# Patient Record
Sex: MALE | Race: WHITE | Employment: OTHER
[De-identification: names, ages, dates, MRNs, and addresses within clinical notes are randomized per-mention and may not be internally consistent; named-entity substitution may affect disease eponyms.]

---

## 2017-01-17 ENCOUNTER — TELEPHONE (OUTPATIENT)
Dept: FAMILY MEDICINE CLINIC | Facility: CLINIC | Age: 82
End: 2017-01-17

## 2017-01-17 RX ORDER — ROSUVASTATIN CALCIUM 10 MG/1
TABLET, COATED ORAL
Qty: 90 TABLET | Refills: 1 | Status: SHIPPED | OUTPATIENT
Start: 2017-01-17 | End: 2017-06-12 | Stop reason: SDUPTHER

## 2017-01-30 ENCOUNTER — NURSE ONLY (OUTPATIENT)
Dept: CARDIOLOGY | Facility: CLINIC | Age: 82
End: 2017-01-30

## 2017-01-30 DIAGNOSIS — Z95.0 PACEMAKER: ICD-10-CM

## 2017-01-30 PROCEDURE — 93288 INTERROG EVL PM/LDLS PM IP: CPT | Performed by: INTERNAL MEDICINE

## 2017-02-03 ENCOUNTER — TELEPHONE (OUTPATIENT)
Dept: FAMILY MEDICINE CLINIC | Facility: CLINIC | Age: 82
End: 2017-02-03

## 2017-02-09 RX ORDER — CLOPIDOGREL BISULFATE 75 MG/1
TABLET ORAL
Qty: 30 TABLET | Refills: 11 | Status: SHIPPED | OUTPATIENT
Start: 2017-02-09 | End: 2018-02-13 | Stop reason: SDUPTHER

## 2017-03-13 RX ORDER — LEVOTHYROXINE SODIUM 175 UG/1
175 TABLET ORAL DAILY
Qty: 90 TABLET | Refills: 1 | Status: SHIPPED | OUTPATIENT
Start: 2017-03-13 | End: 2018-02-06 | Stop reason: SDUPTHER

## 2017-05-05 ENCOUNTER — OFFICE VISIT (OUTPATIENT)
Dept: FAMILY MEDICINE CLINIC | Age: 82
End: 2017-05-05
Payer: MEDICARE

## 2017-05-05 VITALS
SYSTOLIC BLOOD PRESSURE: 124 MMHG | DIASTOLIC BLOOD PRESSURE: 70 MMHG | WEIGHT: 225 LBS | OXYGEN SATURATION: 97 % | BODY MASS INDEX: 35.24 KG/M2 | HEART RATE: 77 BPM

## 2017-05-05 DIAGNOSIS — M54.50 ACUTE RIGHT-SIDED LOW BACK PAIN WITHOUT SCIATICA: ICD-10-CM

## 2017-05-05 DIAGNOSIS — I48.20 CHRONIC ATRIAL FIBRILLATION (HCC): Primary | ICD-10-CM

## 2017-05-05 PROCEDURE — 1123F ACP DISCUSS/DSCN MKR DOCD: CPT | Performed by: FAMILY MEDICINE

## 2017-05-05 PROCEDURE — 4040F PNEUMOC VAC/ADMIN/RCVD: CPT | Performed by: FAMILY MEDICINE

## 2017-05-05 PROCEDURE — 1036F TOBACCO NON-USER: CPT | Performed by: FAMILY MEDICINE

## 2017-05-05 PROCEDURE — 99213 OFFICE O/P EST LOW 20 MIN: CPT | Performed by: FAMILY MEDICINE

## 2017-05-05 PROCEDURE — G8427 DOCREV CUR MEDS BY ELIG CLIN: HCPCS | Performed by: FAMILY MEDICINE

## 2017-05-05 PROCEDURE — G8598 ASA/ANTIPLAT THER USED: HCPCS | Performed by: FAMILY MEDICINE

## 2017-05-05 PROCEDURE — G8419 CALC BMI OUT NRM PARAM NOF/U: HCPCS | Performed by: FAMILY MEDICINE

## 2017-05-05 RX ORDER — TRIAMCINOLONE ACETONIDE 40 MG/ML
20 INJECTION, SUSPENSION INTRA-ARTICULAR; INTRAMUSCULAR ONCE
Status: COMPLETED | OUTPATIENT
Start: 2017-05-05 | End: 2017-05-05

## 2017-05-05 RX ADMIN — TRIAMCINOLONE ACETONIDE 20 MG: 40 INJECTION, SUSPENSION INTRA-ARTICULAR; INTRAMUSCULAR at 09:14

## 2017-05-05 ASSESSMENT — PATIENT HEALTH QUESTIONNAIRE - PHQ9
2. FEELING DOWN, DEPRESSED OR HOPELESS: 0
1. LITTLE INTEREST OR PLEASURE IN DOING THINGS: 0
SUM OF ALL RESPONSES TO PHQ9 QUESTIONS 1 & 2: 0
SUM OF ALL RESPONSES TO PHQ QUESTIONS 1-9: 0

## 2017-05-21 ASSESSMENT — ENCOUNTER SYMPTOMS
ABDOMINAL DISTENTION: 0
WHEEZING: 0
BACK PAIN: 1
COLOR CHANGE: 0
NAUSEA: 0
CONSTIPATION: 0
PHOTOPHOBIA: 0
ABDOMINAL PAIN: 0
COUGH: 0
VOMITING: 0
DIARRHEA: 0
SHORTNESS OF BREATH: 0
TROUBLE SWALLOWING: 0
FACIAL SWELLING: 0

## 2017-06-07 ENCOUNTER — HOSPITAL ENCOUNTER (OUTPATIENT)
Age: 82
Discharge: HOME | End: 2017-06-07
Payer: MEDICARE

## 2017-06-07 DIAGNOSIS — E78.5 HYPERLIPIDEMIA, UNSPECIFIED HYPERLIPIDEMIA TYPE: ICD-10-CM

## 2017-06-07 DIAGNOSIS — E03.9 HYPOTHYROIDISM, UNSPECIFIED TYPE: ICD-10-CM

## 2017-06-07 DIAGNOSIS — Z98.61 HISTORY OF PTCA: ICD-10-CM

## 2017-06-07 DIAGNOSIS — I25.10 CORONARY ARTERY DISEASE INVOLVING NATIVE CORONARY ARTERY OF NATIVE HEART WITHOUT ANGINA PECTORIS: ICD-10-CM

## 2017-06-07 DIAGNOSIS — I48.91 ATRIAL FIBRILLATION, UNSPECIFIED TYPE (HCC): ICD-10-CM

## 2017-06-07 DIAGNOSIS — E55.9 UNSPECIFIED VITAMIN D DEFICIENCY: ICD-10-CM

## 2017-06-07 DIAGNOSIS — Z95.0 PACEMAKER: ICD-10-CM

## 2017-06-07 LAB
ABSOLUTE EOS #: 0.2 K/UL (ref 0–0.4)
ABSOLUTE LYMPH #: 1.6 K/UL (ref 0.9–2.5)
ABSOLUTE MONO #: 0.5 K/UL (ref 0–1)
ALBUMIN SERPL-MCNC: 4.2 G/DL (ref 3.5–5.2)
ALBUMIN/GLOBULIN RATIO: NORMAL (ref 1–2.5)
ALP BLD-CCNC: 86 U/L (ref 40–129)
ALT SERPL-CCNC: 9 U/L (ref 5–41)
ANION GAP SERPL CALCULATED.3IONS-SCNC: 11 MMOL/L (ref 9–17)
AST SERPL-CCNC: 16 U/L
BASOPHILS # BLD: 0 %
BASOPHILS ABSOLUTE: 0 K/UL (ref 0–0.2)
BILIRUB SERPL-MCNC: 0.9 MG/DL (ref 0.3–1.2)
BUN BLDV-MCNC: 15 MG/DL (ref 8–23)
BUN/CREAT BLD: 16 (ref 9–20)
CALCIUM SERPL-MCNC: 9.9 MG/DL (ref 8.6–10.4)
CHLORIDE BLD-SCNC: 100 MMOL/L (ref 98–107)
CHOLESTEROL/HDL RATIO: 4.5
CHOLESTEROL: 148 MG/DL
CO2: 27 MMOL/L (ref 20–31)
CREAT SERPL-MCNC: 0.96 MG/DL (ref 0.7–1.2)
DIFFERENTIAL TYPE: YES
EOSINOPHILS RELATIVE PERCENT: 3 %
GFR AFRICAN AMERICAN: >60 ML/MIN
GFR NON-AFRICAN AMERICAN: >60 ML/MIN
GFR SERPL CREATININE-BSD FRML MDRD: NORMAL ML/MIN/{1.73_M2}
GFR SERPL CREATININE-BSD FRML MDRD: NORMAL ML/MIN/{1.73_M2}
GLUCOSE BLD-MCNC: 96 MG/DL (ref 70–99)
HCT VFR BLD CALC: 40.9 % (ref 41–53)
HDLC SERPL-MCNC: 33 MG/DL
HEMOGLOBIN: 13.7 G/DL (ref 13.5–17.5)
LDL CHOLESTEROL: 91 MG/DL (ref 0–130)
LYMPHOCYTES # BLD: 23 %
MAGNESIUM: 1.9 MG/DL (ref 1.6–2.6)
MCH RBC QN AUTO: 29.7 PG (ref 26–34)
MCHC RBC AUTO-ENTMCNC: 33.4 G/DL (ref 31–37)
MCV RBC AUTO: 88.9 FL (ref 80–100)
MONOCYTES # BLD: 8 %
PATIENT FASTING?: YES
PDW BLD-RTO: 14.2 % (ref 12.1–15.2)
PLATELET # BLD: 199 K/UL (ref 140–450)
PLATELET ESTIMATE: ABNORMAL
PMV BLD AUTO: ABNORMAL FL (ref 6–12)
POTASSIUM SERPL-SCNC: 4.5 MMOL/L (ref 3.7–5.3)
RBC # BLD: 4.6 M/UL (ref 4.5–5.9)
RBC # BLD: ABNORMAL 10*6/UL
SEG NEUTROPHILS: 66 %
SEGMENTED NEUTROPHILS ABSOLUTE COUNT: 4.5 K/UL (ref 2.1–6.5)
SODIUM BLD-SCNC: 138 MMOL/L (ref 135–144)
THYROXINE, FREE: 1.31 NG/DL (ref 0.93–1.7)
TOTAL PROTEIN: 7.6 G/DL (ref 6.4–8.3)
TRIGL SERPL-MCNC: 120 MG/DL
TSH SERPL DL<=0.05 MIU/L-ACNC: 11.35 MIU/L (ref 0.3–5)
VITAMIN D 25-HYDROXY: 32.1 NG/ML (ref 30–100)
VLDLC SERPL CALC-MCNC: ABNORMAL MG/DL (ref 1–30)
WBC # BLD: 6.8 K/UL (ref 3.5–11)
WBC # BLD: ABNORMAL 10*3/UL

## 2017-06-07 PROCEDURE — 83735 ASSAY OF MAGNESIUM: CPT

## 2017-06-07 PROCEDURE — 84439 ASSAY OF FREE THYROXINE: CPT

## 2017-06-07 PROCEDURE — 36415 COLL VENOUS BLD VENIPUNCTURE: CPT

## 2017-06-07 PROCEDURE — 93005 ELECTROCARDIOGRAM TRACING: CPT

## 2017-06-07 PROCEDURE — 80053 COMPREHEN METABOLIC PANEL: CPT

## 2017-06-07 PROCEDURE — 85025 COMPLETE CBC W/AUTO DIFF WBC: CPT

## 2017-06-07 PROCEDURE — 84443 ASSAY THYROID STIM HORMONE: CPT

## 2017-06-07 PROCEDURE — 80061 LIPID PANEL: CPT

## 2017-06-07 PROCEDURE — 82306 VITAMIN D 25 HYDROXY: CPT

## 2017-06-12 ENCOUNTER — OFFICE VISIT (OUTPATIENT)
Dept: CARDIOLOGY CLINIC | Age: 82
End: 2017-06-12
Payer: MEDICARE

## 2017-06-12 VITALS
HEART RATE: 60 BPM | WEIGHT: 216 LBS | BODY MASS INDEX: 33.83 KG/M2 | SYSTOLIC BLOOD PRESSURE: 110 MMHG | OXYGEN SATURATION: 96 % | DIASTOLIC BLOOD PRESSURE: 60 MMHG

## 2017-06-12 DIAGNOSIS — E03.9 HYPOTHYROIDISM, UNSPECIFIED TYPE: Primary | ICD-10-CM

## 2017-06-12 DIAGNOSIS — I25.10 CORONARY ARTERY DISEASE INVOLVING NATIVE CORONARY ARTERY OF NATIVE HEART WITHOUT ANGINA PECTORIS: ICD-10-CM

## 2017-06-12 DIAGNOSIS — I48.20 CHRONIC ATRIAL FIBRILLATION (HCC): ICD-10-CM

## 2017-06-12 DIAGNOSIS — E55.9 VITAMIN D DEFICIENCY DISEASE: ICD-10-CM

## 2017-06-12 DIAGNOSIS — E78.5 HYPERLIPIDEMIA, UNSPECIFIED HYPERLIPIDEMIA TYPE: ICD-10-CM

## 2017-06-12 DIAGNOSIS — Z95.0 PACEMAKER: ICD-10-CM

## 2017-06-12 LAB
EKG ATRIAL RATE: 60 BPM
EKG P-R INTERVAL: 256 MS
EKG Q-T INTERVAL: 508 MS
EKG QRS DURATION: 212 MS
EKG QTC CALCULATION (BAZETT): 508 MS
EKG R AXIS: -78 DEGREES
EKG T AXIS: 106 DEGREES
EKG VENTRICULAR RATE: 60 BPM

## 2017-06-12 PROCEDURE — 1123F ACP DISCUSS/DSCN MKR DOCD: CPT | Performed by: INTERNAL MEDICINE

## 2017-06-12 PROCEDURE — 4040F PNEUMOC VAC/ADMIN/RCVD: CPT | Performed by: INTERNAL MEDICINE

## 2017-06-12 PROCEDURE — 1036F TOBACCO NON-USER: CPT | Performed by: INTERNAL MEDICINE

## 2017-06-12 PROCEDURE — G8427 DOCREV CUR MEDS BY ELIG CLIN: HCPCS | Performed by: INTERNAL MEDICINE

## 2017-06-12 PROCEDURE — G8417 CALC BMI ABV UP PARAM F/U: HCPCS | Performed by: INTERNAL MEDICINE

## 2017-06-12 PROCEDURE — 93288 INTERROG EVL PM/LDLS PM IP: CPT | Performed by: INTERNAL MEDICINE

## 2017-06-12 PROCEDURE — G8598 ASA/ANTIPLAT THER USED: HCPCS | Performed by: INTERNAL MEDICINE

## 2017-06-12 PROCEDURE — 99214 OFFICE O/P EST MOD 30 MIN: CPT | Performed by: INTERNAL MEDICINE

## 2017-06-12 RX ORDER — ROSUVASTATIN CALCIUM 10 MG/1
TABLET, COATED ORAL
Qty: 90 TABLET | Refills: 3 | Status: SHIPPED | OUTPATIENT
Start: 2017-06-12 | End: 2018-07-23 | Stop reason: SDUPTHER

## 2017-09-13 ENCOUNTER — OFFICE VISIT (OUTPATIENT)
Dept: SURGERY | Age: 82
End: 2017-09-13
Payer: MEDICARE

## 2017-09-13 VITALS — RESPIRATION RATE: 18 BRPM | HEIGHT: 67 IN | BODY MASS INDEX: 34.06 KG/M2 | WEIGHT: 217 LBS

## 2017-09-13 DIAGNOSIS — K42.9 RECURRENT UMBILICAL HERNIA: Primary | ICD-10-CM

## 2017-09-13 DIAGNOSIS — K80.20 CALCULUS OF GALLBLADDER WITHOUT CHOLECYSTITIS WITHOUT OBSTRUCTION: ICD-10-CM

## 2017-09-13 PROCEDURE — 99213 OFFICE O/P EST LOW 20 MIN: CPT | Performed by: SURGERY

## 2017-09-13 PROCEDURE — 1036F TOBACCO NON-USER: CPT | Performed by: SURGERY

## 2017-09-13 PROCEDURE — G8417 CALC BMI ABV UP PARAM F/U: HCPCS | Performed by: SURGERY

## 2017-09-13 PROCEDURE — 1123F ACP DISCUSS/DSCN MKR DOCD: CPT | Performed by: SURGERY

## 2017-09-13 PROCEDURE — G8598 ASA/ANTIPLAT THER USED: HCPCS | Performed by: SURGERY

## 2017-09-13 PROCEDURE — 4040F PNEUMOC VAC/ADMIN/RCVD: CPT | Performed by: SURGERY

## 2017-09-13 PROCEDURE — G8427 DOCREV CUR MEDS BY ELIG CLIN: HCPCS | Performed by: SURGERY

## 2017-09-13 NOTE — LETTER
[facility] - [address] [address]  Phone: [phone]  Fax: [phone]    Genoveva Heller MD        October 1, 2017     Gilda Mcduffie DO  [address]  [place] [address]    Patient: Derek Cifuentes  MR Number: [id number]  YOB: 1932  Date of Visit: 9/13/2017    Dear Dr. Gilda Mcduffie: Thank you for the request for consultation for HAM-IT Dials to me for the evaluation of recurrent hernia, umbilical. Below are the relevant portions of my assessment and plan of care. Assessment:    1. Recurrent umbilical hernia     2. Calculus of gallbladder without cholecystitis without obstruction           Plan:    Mr Bala Tidwell has developed a recurrent umbilical hernia. He also has asymptomatic gallstones. The hernia is small and causes minimal discomfort at this time. Repair has been offered, but respectfully declined. He will follow up with me if the hernia increases in size or becomes more uncomfortable. If you have questions, please do not hesitate to call me. I look forward to following Sravani Shah along with you.     Sincerely,        Genoveva Heller MD

## 2017-10-01 ASSESSMENT — ENCOUNTER SYMPTOMS
BACK PAIN: 0
VOMITING: 0
TROUBLE SWALLOWING: 0
BLOOD IN STOOL: 0
SORE THROAT: 0
COUGH: 0
NAUSEA: 0
SHORTNESS OF BREATH: 0
CHOKING: 0
ABDOMINAL PAIN: 1

## 2017-10-01 NOTE — COMMUNICATION BODY
Assessment:    1. Recurrent umbilical hernia     2. Calculus of gallbladder without cholecystitis without obstruction           Plan:    Mr Magali Quinn has developed a recurrent umbilical hernia. He also has asymptomatic gallstones. The hernia is small and causes minimal discomfort at this time. Repair has been offered, but respectfully declined. He will follow up with me if the hernia increases in size or becomes more uncomfortable.

## 2017-10-01 NOTE — PROGRESS NOTES
gallbladder demonstrates shadowing stones in the dependent portion of    the gallbladder and gallbladder neck. There is no evidence of wall    thickening, pericholecystic fluid. Sonographic Pate sign was negative.       The liver is normal in sonographic appearance with normal echotexture. No    focal lesion is identified. Negative for intrahepatic or extrahepatic    biliary ductal dilatation. The common bile duct measures 5 mm.       The right kidney measures 11.6 x 4.2 x 4.6 cm and demonstrates normal    cortical thickness and echogenicity. No focal lesion is identified. Normal arterial waveforms are identified within the right renal    parenchyma. No hydronephrosis.             IMPRESSION:          1. 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 This examination is somewhat limited for the    evaluation of the abdominal aorta due to body habitus. If there is    continued clinical concern, consider further evaluation with CT    angiography to establish a baseline for future followup.       2. Cholelithiasis without evidence for acute cholecystitis. Trans[facility] by[facility] on Feb 25 2014 11:02A    Read by: Dean Chavez M.D.  [id number] on Feb 25 2014 11:02A    Electronically Signed by: Darcie Ly. Dean Chavez M.D. on:  Feb 25 2014 11:02A        Assessment:      1. Recurrent umbilical hernia     2. Calculus of gallbladder without cholecystitis without obstruction           Plan:      Mr Carolina Guerrero has developed a recurrent umbilical hernia. He also has asymptomatic gallstones. The hernia is small and causes minimal discomfort at this time. Repair has been offered, but respectfully declined. He will follow up with me if the hernia increases in size or becomes more uncomfortable.

## 2017-11-28 RX ORDER — CLOBETASOL PROPIONATE 0.5 MG/G
CREAM TOPICAL
Qty: 1 TUBE | Refills: 3 | Status: SHIPPED | OUTPATIENT
Start: 2017-11-28 | End: 2019-02-04 | Stop reason: SDUPTHER

## 2018-01-23 ENCOUNTER — HOSPITAL ENCOUNTER (OUTPATIENT)
Age: 83
Discharge: HOME | End: 2018-01-23
Payer: MEDICARE

## 2018-01-23 DIAGNOSIS — E78.5 HYPERLIPIDEMIA, UNSPECIFIED HYPERLIPIDEMIA TYPE: ICD-10-CM

## 2018-01-23 DIAGNOSIS — E03.9 HYPOTHYROIDISM, UNSPECIFIED TYPE: ICD-10-CM

## 2018-01-23 DIAGNOSIS — Z95.0 PACEMAKER: ICD-10-CM

## 2018-01-23 DIAGNOSIS — E55.9 VITAMIN D DEFICIENCY DISEASE: ICD-10-CM

## 2018-01-23 DIAGNOSIS — I48.20 CHRONIC ATRIAL FIBRILLATION (HCC): ICD-10-CM

## 2018-01-23 DIAGNOSIS — I25.10 CORONARY ARTERY DISEASE INVOLVING NATIVE CORONARY ARTERY OF NATIVE HEART WITHOUT ANGINA PECTORIS: ICD-10-CM

## 2018-01-23 LAB
ABSOLUTE EOS #: 0.1 K/UL (ref 0–0.4)
ABSOLUTE IMMATURE GRANULOCYTE: ABNORMAL K/UL (ref 0–0.3)
ABSOLUTE LYMPH #: 1.6 K/UL (ref 1–4.8)
ABSOLUTE MONO #: 0.4 K/UL (ref 0–1)
ALBUMIN SERPL-MCNC: 4.2 G/DL (ref 3.5–5.2)
ALBUMIN/GLOBULIN RATIO: ABNORMAL (ref 1–2.5)
ALP BLD-CCNC: 71 U/L (ref 40–129)
ALT SERPL-CCNC: 8 U/L (ref 5–41)
ANION GAP SERPL CALCULATED.3IONS-SCNC: 13 MMOL/L (ref 8–16)
AST SERPL-CCNC: 15 U/L
BASOPHILS # BLD: 1 % (ref 0–2)
BASOPHILS ABSOLUTE: 0.1 K/UL (ref 0–0.2)
BILIRUB SERPL-MCNC: 0.57 MG/DL (ref 0.3–1.2)
BUN BLDV-MCNC: 14 MG/DL (ref 8–23)
BUN/CREAT BLD: 15 (ref 9–20)
CALCIUM SERPL-MCNC: 9.5 MG/DL (ref 8.6–10.4)
CHLORIDE BLD-SCNC: 102 MMOL/L (ref 98–107)
CHOLESTEROL/HDL RATIO: 7.5
CHOLESTEROL: 196 MG/DL
CO2: 28 MMOL/L (ref 20–31)
CREAT SERPL-MCNC: 0.93 MG/DL (ref 0.7–1.2)
DIFFERENTIAL TYPE: YES
EKG ATRIAL RATE: 65 BPM
EKG P AXIS: 70 DEGREES
EKG P-R INTERVAL: 280 MS
EKG Q-T INTERVAL: 518 MS
EKG QRS DURATION: 220 MS
EKG QTC CALCULATION (BAZETT): 538 MS
EKG R AXIS: -77 DEGREES
EKG T AXIS: 96 DEGREES
EKG VENTRICULAR RATE: 65 BPM
EOSINOPHILS RELATIVE PERCENT: 2 % (ref 0–5)
GFR AFRICAN AMERICAN: >60 ML/MIN
GFR NON-AFRICAN AMERICAN: >60 ML/MIN
GFR SERPL CREATININE-BSD FRML MDRD: ABNORMAL ML/MIN/{1.73_M2}
GFR SERPL CREATININE-BSD FRML MDRD: ABNORMAL ML/MIN/{1.73_M2}
GLUCOSE BLD-MCNC: 100 MG/DL (ref 70–99)
HCT VFR BLD CALC: 39.2 % (ref 41–53)
HDLC SERPL-MCNC: 26 MG/DL
HEMOGLOBIN: 13.2 G/DL (ref 13.5–17.5)
IMMATURE GRANULOCYTES: ABNORMAL %
LDL CHOLESTEROL: 122 MG/DL (ref 0–130)
LYMPHOCYTES # BLD: 26 % (ref 13–44)
MAGNESIUM: 1.6 MG/DL (ref 1.6–2.6)
MCH RBC QN AUTO: 30 PG (ref 26–34)
MCHC RBC AUTO-ENTMCNC: 33.7 G/DL (ref 31–37)
MCV RBC AUTO: 88.9 FL (ref 80–100)
MONOCYTES # BLD: 7 % (ref 5–9)
NRBC AUTOMATED: ABNORMAL PER 100 WBC
PATIENT FASTING?: YES
PDW BLD-RTO: 14.2 % (ref 12.1–15.2)
PLATELET # BLD: 176 K/UL (ref 140–450)
PLATELET ESTIMATE: ABNORMAL
PMV BLD AUTO: ABNORMAL FL (ref 6–12)
POTASSIUM SERPL-SCNC: 4.2 MMOL/L (ref 3.7–5.3)
RBC # BLD: 4.41 M/UL (ref 4.5–5.9)
RBC # BLD: ABNORMAL 10*6/UL
SEG NEUTROPHILS: 64 % (ref 39–75)
SEGMENTED NEUTROPHILS ABSOLUTE COUNT: 3.9 K/UL (ref 2.1–6.5)
SODIUM BLD-SCNC: 143 MMOL/L (ref 135–144)
THYROXINE, FREE: 0.68 NG/DL (ref 0.93–1.7)
TOTAL PROTEIN: 7.3 G/DL (ref 6.4–8.3)
TRIGL SERPL-MCNC: 238 MG/DL
TSH SERPL DL<=0.05 MIU/L-ACNC: 55.12 MIU/L (ref 0.3–5)
VITAMIN D 25-HYDROXY: 26.2 NG/ML (ref 30–100)
VLDLC SERPL CALC-MCNC: ABNORMAL MG/DL (ref 1–30)
WBC # BLD: 6.2 K/UL (ref 3.5–11)
WBC # BLD: ABNORMAL 10*3/UL

## 2018-01-23 PROCEDURE — 80061 LIPID PANEL: CPT

## 2018-01-23 PROCEDURE — 36415 COLL VENOUS BLD VENIPUNCTURE: CPT

## 2018-01-23 PROCEDURE — 93005 ELECTROCARDIOGRAM TRACING: CPT

## 2018-01-23 PROCEDURE — 80053 COMPREHEN METABOLIC PANEL: CPT

## 2018-01-23 PROCEDURE — 85025 COMPLETE CBC W/AUTO DIFF WBC: CPT

## 2018-01-23 PROCEDURE — 84439 ASSAY OF FREE THYROXINE: CPT

## 2018-01-23 PROCEDURE — 82306 VITAMIN D 25 HYDROXY: CPT

## 2018-01-23 PROCEDURE — 84443 ASSAY THYROID STIM HORMONE: CPT

## 2018-01-23 PROCEDURE — 83735 ASSAY OF MAGNESIUM: CPT

## 2018-01-29 ENCOUNTER — OFFICE VISIT (OUTPATIENT)
Dept: CARDIOLOGY CLINIC | Age: 83
End: 2018-01-29
Payer: MEDICARE

## 2018-01-29 VITALS
BODY MASS INDEX: 34.61 KG/M2 | WEIGHT: 221 LBS | HEART RATE: 66 BPM | DIASTOLIC BLOOD PRESSURE: 60 MMHG | OXYGEN SATURATION: 96 % | SYSTOLIC BLOOD PRESSURE: 120 MMHG

## 2018-01-29 DIAGNOSIS — I48.20 CHRONIC ATRIAL FIBRILLATION (HCC): ICD-10-CM

## 2018-01-29 DIAGNOSIS — Z95.0 PACEMAKER: ICD-10-CM

## 2018-01-29 DIAGNOSIS — E55.9 VITAMIN D DEFICIENCY DISEASE: ICD-10-CM

## 2018-01-29 DIAGNOSIS — E78.5 HYPERLIPIDEMIA, UNSPECIFIED HYPERLIPIDEMIA TYPE: ICD-10-CM

## 2018-01-29 DIAGNOSIS — E03.9 HYPOTHYROIDISM, UNSPECIFIED TYPE: Primary | ICD-10-CM

## 2018-01-29 DIAGNOSIS — I25.10 CORONARY ARTERY DISEASE INVOLVING NATIVE CORONARY ARTERY OF NATIVE HEART WITHOUT ANGINA PECTORIS: ICD-10-CM

## 2018-01-29 PROCEDURE — G8484 FLU IMMUNIZE NO ADMIN: HCPCS | Performed by: INTERNAL MEDICINE

## 2018-01-29 PROCEDURE — G8598 ASA/ANTIPLAT THER USED: HCPCS | Performed by: INTERNAL MEDICINE

## 2018-01-29 PROCEDURE — 99214 OFFICE O/P EST MOD 30 MIN: CPT | Performed by: INTERNAL MEDICINE

## 2018-01-29 PROCEDURE — 4040F PNEUMOC VAC/ADMIN/RCVD: CPT | Performed by: INTERNAL MEDICINE

## 2018-01-29 PROCEDURE — 1123F ACP DISCUSS/DSCN MKR DOCD: CPT | Performed by: INTERNAL MEDICINE

## 2018-01-29 PROCEDURE — 93288 INTERROG EVL PM/LDLS PM IP: CPT | Performed by: INTERNAL MEDICINE

## 2018-01-29 PROCEDURE — G8417 CALC BMI ABV UP PARAM F/U: HCPCS | Performed by: INTERNAL MEDICINE

## 2018-01-29 PROCEDURE — G8427 DOCREV CUR MEDS BY ELIG CLIN: HCPCS | Performed by: INTERNAL MEDICINE

## 2018-01-29 PROCEDURE — 1036F TOBACCO NON-USER: CPT | Performed by: INTERNAL MEDICINE

## 2018-01-29 RX ORDER — METOPROLOL TARTRATE 100 MG/1
100 TABLET ORAL 2 TIMES DAILY
Qty: 60 TABLET | Refills: 11 | Status: SHIPPED | OUTPATIENT
Start: 2018-01-29 | End: 2019-03-04 | Stop reason: SDUPTHER

## 2018-01-29 NOTE — PROGRESS NOTES
Ov Dr. Kameron Portillo 6 month f/u  Pacemaker check   No hospitalizations, procedures, er visits   No chest pain   No sob   No dizziness, lightheadedness   Forgets to take medications   Appetite good   Bedside echo done   Check thyroid in 2 months   Change lopressor 100mg bid   See in 6 months

## 2018-01-29 NOTE — LETTER
Eriberto Rodriguez M.D. [address] [place] [address]  [phone]        2018      Ellamae Linen Phoenix, [facility] 80      RE:   Lynne Alexis  :  1932      Dear Dr. Phoenix:    [address]:  1. Sick sinus syndrome with pacemaker. 2.  Coronary artery disease. HISTORY OF PRESENT ILLNESS:   Mr. Lucas Mccall is a very pleasant 28-year-old gentleman who had atrial fibrillation in  with shortness of breath. He had 4-vessel bypass surgery at [facility] after he had a catheterization at that time. On 2012, he went to the emergency room with a heart rate of 32. I placed a [employer] 40 DDD pacemaker on 2012. On 2012, he had shortness of breath and repeat catheterization showed 99% LAD, circumflex and right coronary artery. The LIMA was patent to the LAD, the vein graft patent to the intermediate branch of the circumflex, the vein graft was occluded to the OM branch of the circumflex, and he had an occluded vein graft to the right coronary artery, EF of 40%. I did angioplasty to the right coronary artery placing two 3.0 x 38 mm ION stents and 3.0 x 24 and 3.5 x 16 ION stents, all in the right coronary artery with a good end result. He had an episode of 24-beat run of nonsustained ventricular tachycardia found on his pacemaker in 2016. We had increased his Lopressor to 50 mg 3 times a day, which he has remained. He has a history of hypothyroidism. He has still been hypothyroid and actually, today his TSH was up to 55. However, when I questioned him, he is taking his Synthroid on an as needed basis (rather rarely. ..). He has had no chest pain or chest discomfort. No PND, orthopnea, or pedal edema. No syncope or near syncope. His shortness of breath is about at baseline. LUNGS:  Quite clear to auscultation and percussion. ABDOMEN:  Soft and nontender. Good bowel sounds. EXTREMITIES:  Good femoral pulses. Good pedal pulses. No pedal edema. Skin was warm and dry. No calf tenderness. Nail beds pink. Good cap refill. PULSES:  Bilateral symmetrical radial, brachial and carotid pulses. No carotid bruits. Good femoral and pedal pulses. NEUROLOGIC EXAM:  Within normal limits. PSYCHIATRIC EXAM:  Within normal limits. Pacemaker was interrogated, still at beginning of life. He had a 12-beat run of nonsustained ventricular tachycardia. LABORATORY DATA:  On 01/23/2018, sodium 143, potassium 4.2, BUN 14, creatinine 0.93. GFR greater than 60. Magnesium is 1.6. His GFR is greater than 60. Cholesterol 196 with an HDL of 26, LDL of 122 with triglycerides at 238. ALT was 8, AST was 15. TSH was 55.12 with a free thyroxine of 0.68. Vitamin D was 26.2. White count 6.2, hemoglobin 13.2, with a platelet count of 805,439. EKG showed atrial sensed, ventricular paced rhythm. IMPRESSION:  1. A 12-beat run of nonsustained ventricular tachycardia, new since we have increased his Lopressor to 50 mg t.i.d.  2.  Four-vessel bypass surgery in 2000. 3.  Sick sinus syndrome with bradycardia and 2:1 block with the placement of [address] DDD pacemaker on 08/06/2012, still at beginning of life. 4.  Catheterization on 09/19/2012 because of shortness of breath that showed 99% disease in all 3 major vessels with a patent LIMA to the LAD, a patent vein graft to the OM branch of the circumflex with an occluded vein graft to the second OM and an occluded vein graft to the right coronary artery, EF of 40% with diffuse 95% disease in the proximal portion of a very large right coronary artery with angioplasty of the right coronary artery placing two 3.0 x 38, 3.0 x 24, and 3.5 x 16 ION stents in the right coronary artery with a good end result.

## 2018-02-05 NOTE — PROGRESS NOTES
of nonsustained ventricular tachycardia. CARDIAC RISK FACTORS:  Known CAD:  Positive. Bypass Surgery:  Positive. PTCA:  Positive. Hypertension:  Negative. Hyperlipidemia:  Positive. Smoking:  Negative. Diabetes:  Negative. MEDICATIONS AT HOME:  He is currently on aspirin 81 mg daily, Plavix 75 mg daily, Synthroid 175 mcg daily, Lopressor 50 mg t.i.d., Crestor 10 mg daily. PAST MEDICAL HISTORY:  Cardiac as above. He has had hypertension; hyperlipidemia; atrial fibrillation in 2009, on Coumadin; hypothyroidism, on high doses of thyroid with his TSH now at 55, with him not taking Synthroid on a regular basis. He has a history of myalgias on 20 mg of Crestor but has tolerated 10 mg. Status post cataract surgery. FAMILY HISTORY:  Significant for CAD. SOCIAL HISTORY:  He is 80years old, 2 children, one in [place]. He lives with a lady friend that he has been with for 24 years. He does not smoke or drink alcohol, stays active. REVIEW OF SYSTEMS:  Cardiac as above. Other 10 systems reviewed including neurologic, psychiatric, pulmonary, cardiac, GI, , renal, and musculoskeletal.  No fevers, sweats or chills. No weight gain or weight loss. PHYSICAL EXAMINATION:  VITAL SIGNS:  His blood pressure was 120/60 with a heart rate of 66 and regular. Respiratory rate 18. O2 saturation 96%. Weight 221 pounds. GENERAL:  He is a pleasant 80-year-old gentleman. Denied pain. He was oriented to person, place and time. Answered questions appropriately. SKIN:  No unusual skin changes. HEENT:  The pupils are equally round and intact. Mucous membranes were dry. NECK:  No JVD. Good carotid pulses. No carotid bruits. No lymphadenopathy or thyromegaly. CARDIOVASCULAR EXAM:  S1 and S2 were normal.  No S3 or S4. Soft systolic blowing type murmur. No diastolic murmur. PMI was normal.  No lifts, thrusts or pericardial friction rub. LUNGS:  Quite clear to auscultation and percussion.   ABDOMEN: Soft and nontender. Good bowel sounds. EXTREMITIES:  Good femoral pulses. Good pedal pulses. No pedal edema. Skin was warm and dry. No calf tenderness. Nail beds pink. Good cap refill. PULSES:  Bilateral symmetrical radial, brachial and carotid pulses. No carotid bruits. Good femoral and pedal pulses. NEUROLOGIC EXAM:  Within normal limits. PSYCHIATRIC EXAM:  Within normal limits. Pacemaker was interrogated, still at beginning of life. He had a 12-beat run of nonsustained ventricular tachycardia. LABORATORY DATA:  On 01/23/2018, sodium 143, potassium 4.2, BUN 14, creatinine 0.93. GFR greater than 60. Magnesium is 1.6. His GFR is greater than 60. Cholesterol 196 with an HDL of 26, LDL of 122 with triglycerides at 238. ALT was 8, AST was 15. TSH was 55.12 with a free thyroxine of 0.68. Vitamin D was 26.2. White count 6.2, hemoglobin 13.2, with a platelet count of 622,502. EKG showed atrial sensed, ventricular paced rhythm. IMPRESSION:  1. A 12-beat run of nonsustained ventricular tachycardia, new since we have increased his Lopressor to 50 mg t.i.d.  2.  Four-vessel bypass surgery in 2000. 3.  Sick sinus syndrome with bradycardia and 2:1 block with the placement of [address] DDD pacemaker on 08/06/2012, still at beginning of life. 4.  Catheterization on 09/19/2012 because of shortness of breath that showed 99% disease in all 3 major vessels with a patent LIMA to the LAD, a patent vein graft to the OM branch of the circumflex with an occluded vein graft to the second OM and an occluded vein graft to the right coronary artery, EF of 40% with diffuse 95% disease in the proximal portion of a very large right coronary artery with angioplasty of the right coronary artery placing two 3.0 x 38, 3.0 x 24, and 3.5 x 16 ION stents in the right coronary artery with a good end result. 5.  Hypothyroidism with TSH of 55, with him not taking Synthroid on a regular basis.   6.

## 2018-02-06 RX ORDER — LEVOTHYROXINE SODIUM 175 UG/1
175 TABLET ORAL DAILY
Qty: 90 TABLET | Refills: 1 | Status: SHIPPED | OUTPATIENT
Start: 2018-02-06 | End: 2018-09-05 | Stop reason: SDUPTHER

## 2018-02-13 ENCOUNTER — OFFICE VISIT (OUTPATIENT)
Dept: FAMILY MEDICINE CLINIC | Age: 83
End: 2018-02-13
Payer: MEDICARE

## 2018-02-13 VITALS
BODY MASS INDEX: 36.49 KG/M2 | DIASTOLIC BLOOD PRESSURE: 70 MMHG | HEART RATE: 60 BPM | SYSTOLIC BLOOD PRESSURE: 110 MMHG | WEIGHT: 233 LBS | OXYGEN SATURATION: 95 %

## 2018-02-13 DIAGNOSIS — Z23 NEED FOR 23-POLYVALENT PNEUMOCOCCAL POLYSACCHARIDE VACCINE: ICD-10-CM

## 2018-02-13 DIAGNOSIS — E03.9 ACQUIRED HYPOTHYROIDISM: Primary | ICD-10-CM

## 2018-02-13 PROCEDURE — 1123F ACP DISCUSS/DSCN MKR DOCD: CPT | Performed by: FAMILY MEDICINE

## 2018-02-13 PROCEDURE — G0009 ADMIN PNEUMOCOCCAL VACCINE: HCPCS | Performed by: FAMILY MEDICINE

## 2018-02-13 PROCEDURE — G8427 DOCREV CUR MEDS BY ELIG CLIN: HCPCS | Performed by: FAMILY MEDICINE

## 2018-02-13 PROCEDURE — 4040F PNEUMOC VAC/ADMIN/RCVD: CPT | Performed by: FAMILY MEDICINE

## 2018-02-13 PROCEDURE — G8484 FLU IMMUNIZE NO ADMIN: HCPCS | Performed by: FAMILY MEDICINE

## 2018-02-13 PROCEDURE — 1036F TOBACCO NON-USER: CPT | Performed by: FAMILY MEDICINE

## 2018-02-13 PROCEDURE — 99213 OFFICE O/P EST LOW 20 MIN: CPT | Performed by: FAMILY MEDICINE

## 2018-02-13 PROCEDURE — 90732 PPSV23 VACC 2 YRS+ SUBQ/IM: CPT | Performed by: FAMILY MEDICINE

## 2018-02-13 PROCEDURE — G8598 ASA/ANTIPLAT THER USED: HCPCS | Performed by: FAMILY MEDICINE

## 2018-02-13 PROCEDURE — G8417 CALC BMI ABV UP PARAM F/U: HCPCS | Performed by: FAMILY MEDICINE

## 2018-02-13 RX ORDER — CLOPIDOGREL BISULFATE 75 MG/1
TABLET ORAL
Qty: 30 TABLET | Refills: 11 | Status: SHIPPED | OUTPATIENT
Start: 2018-02-13 | End: 2019-03-25 | Stop reason: SDUPTHER

## 2018-02-13 ASSESSMENT — PATIENT HEALTH QUESTIONNAIRE - PHQ9
SUM OF ALL RESPONSES TO PHQ QUESTIONS 1-9: 0
SUM OF ALL RESPONSES TO PHQ9 QUESTIONS 1 & 2: 0
1. LITTLE INTEREST OR PLEASURE IN DOING THINGS: 0
2. FEELING DOWN, DEPRESSED OR HOPELESS: 0

## 2018-02-25 ASSESSMENT — ENCOUNTER SYMPTOMS
CONSTIPATION: 0
COLOR CHANGE: 0
BACK PAIN: 0
TROUBLE SWALLOWING: 0
NAUSEA: 0
DIARRHEA: 0
FACIAL SWELLING: 0
ABDOMINAL DISTENTION: 0
ABDOMINAL PAIN: 0
VOMITING: 0
PHOTOPHOBIA: 0
COUGH: 0
SHORTNESS OF BREATH: 0
WHEEZING: 0

## 2018-02-25 NOTE — PROGRESS NOTES
metoprolol (LOPRESSOR) 100 MG tablet Take 1 tablet by mouth 2 times daily 1/29/18  Yes Brian Hanson MD   clobetasol (TEMOVATE) 0.05 % cream Apply topically 2 times daily prn 11/28/17  Yes Brian Hanson MD   rosuvastatin (CRESTOR) 10 MG tablet TAKE 1 TABLET BY MOUTH nightly 6/12/17  Yes Brian Hanson MD   hydrocortisone 0.5 % cream Apply topically 2 times daily Apply topically 2 times daily. Uses prn   Yes Historical Provider, MD   aspirin 81 MG tablet Take 81 mg by mouth daily. Yes Historical Provider, MD   clopidogrel (PLAVIX) 75 MG tablet TAKE 1 TABLET BY MOUTH DAILY 2/13/18   Brian Hanson MD        Allergies:       Menthol (topical analgesic)    Social History:     Tobacco:    reports that he has never smoked. He has never used smokeless tobacco.  Alcohol:      reports that he drinks alcohol. Drug Use:  reports that he does not use drugs. Family History:     Family History   Problem Relation Age of Onset    Heart Disease Father        Review of Systems:     Positive and Negative as described in HPI    Review of Systems   Constitutional: Negative for activity change, appetite change, fever and unexpected weight change. HENT: Negative for ear pain, facial swelling and trouble swallowing. Eyes: Negative for photophobia and visual disturbance. Respiratory: Negative for cough, shortness of breath and wheezing. Cardiovascular: Negative for chest pain and palpitations. Gastrointestinal: Negative for abdominal distention, abdominal pain, constipation, diarrhea, nausea and vomiting. Endocrine: Negative for polydipsia, polyphagia and polyuria. Musculoskeletal: Negative for arthralgias, back pain, gait problem, joint swelling, myalgias, neck pain and neck stiffness. Skin: Negative for color change and rash. Allergic/Immunologic: Negative for environmental allergies and food allergies. Neurological: Negative for dizziness, syncope, weakness, light-headedness and headaches. Psychiatric/Behavioral: Negative for dysphoric mood. The patient is not nervous/anxious. Physical Exam:     Physical Exam   Constitutional: He is oriented to person, place, and time. He appears well-developed and well-nourished. HENT:   Head: Normocephalic and atraumatic. Right Ear: External ear normal.   Left Ear: External ear normal.   Eyes: Conjunctivae and EOM are normal.   Neck: Normal range of motion. Neck supple. No thyromegaly present. Cardiovascular: Normal rate. Exam reveals no gallop and no friction rub. Pulmonary/Chest: Effort normal and breath sounds normal. No respiratory distress. He has no wheezes. He has no rales. He exhibits no tenderness. Abdominal: Soft. Bowel sounds are normal. He exhibits no distension. There is no tenderness. There is no rebound and no guarding. Musculoskeletal: Normal range of motion. He exhibits no edema. Lymphadenopathy:     He has no cervical adenopathy. Neurological: He is alert and oriented to person, place, and time. He exhibits normal muscle tone. Coordination normal.   Skin: Skin is warm and dry. No rash noted. No erythema. Psychiatric: He has a normal mood and affect. His behavior is normal. Judgment and thought content normal.   Nursing note and vitals reviewed.       Vitals:  /70   Pulse 60   Wt 233 lb (105.7 kg)   SpO2 95%   BMI 36.49 kg/m²       Data:     Lab Results   Component Value Date     01/23/2018    K 4.2 01/23/2018     01/23/2018    CO2 28 01/23/2018    BUN 14 01/23/2018    CREATININE 0.93 01/23/2018    GLUCOSE 100 01/23/2018    PROT 7.3 01/23/2018    LABALBU 4.2 01/23/2018    BILITOT 0.57 01/23/2018    ALKPHOS 71 01/23/2018    AST 15 01/23/2018    ALT 8 01/23/2018     Lab Results   Component Value Date    WBC 6.2 01/23/2018    RBC 4.41 01/23/2018    HGB 13.2 01/23/2018    HCT 39.2 01/23/2018    MCV 88.9 01/23/2018    MCH 30.0 01/23/2018    MCHC 33.7 01/23/2018    RDW 14.2 01/23/2018     prescribed medications. Barriers to medication compliance addressed. Patient given educational materials - see patient instructions. All patient questions answered. Patient voiced understanding.

## 2018-05-09 ENCOUNTER — HOSPITAL ENCOUNTER (OUTPATIENT)
Age: 83
Discharge: HOME | End: 2018-05-11
Payer: MEDICARE

## 2018-05-09 ENCOUNTER — OFFICE VISIT (OUTPATIENT)
Dept: FAMILY MEDICINE CLINIC | Age: 83
End: 2018-05-09
Payer: MEDICARE

## 2018-05-09 ENCOUNTER — HOSPITAL ENCOUNTER (OUTPATIENT)
Age: 83
Discharge: HOME | End: 2018-05-09
Payer: MEDICARE

## 2018-05-09 ENCOUNTER — HOSPITAL ENCOUNTER (OUTPATIENT)
Dept: GENERAL RADIOLOGY | Age: 83
Discharge: HOME | End: 2018-05-11
Payer: MEDICARE

## 2018-05-09 VITALS — HEART RATE: 60 BPM | SYSTOLIC BLOOD PRESSURE: 118 MMHG | OXYGEN SATURATION: 97 % | DIASTOLIC BLOOD PRESSURE: 80 MMHG

## 2018-05-09 DIAGNOSIS — M79.671 RIGHT FOOT PAIN: ICD-10-CM

## 2018-05-09 DIAGNOSIS — M79.671 RIGHT FOOT PAIN: Primary | ICD-10-CM

## 2018-05-09 LAB
ANION GAP SERPL CALCULATED.3IONS-SCNC: 10 MMOL/L (ref 9–17)
BUN BLDV-MCNC: 15 MG/DL (ref 8–23)
BUN/CREAT BLD: 20 (ref 9–20)
CALCIUM SERPL-MCNC: 9.5 MG/DL (ref 8.6–10.4)
CHLORIDE BLD-SCNC: 101 MMOL/L (ref 98–107)
CO2: 31 MMOL/L (ref 20–31)
CREAT SERPL-MCNC: 0.75 MG/DL (ref 0.7–1.2)
GFR AFRICAN AMERICAN: >60 ML/MIN
GFR NON-AFRICAN AMERICAN: >60 ML/MIN
GFR SERPL CREATININE-BSD FRML MDRD: ABNORMAL ML/MIN/{1.73_M2}
GFR SERPL CREATININE-BSD FRML MDRD: ABNORMAL ML/MIN/{1.73_M2}
GLUCOSE BLD-MCNC: 115 MG/DL (ref 70–99)
POTASSIUM SERPL-SCNC: 3.6 MMOL/L (ref 3.7–5.3)
SODIUM BLD-SCNC: 142 MMOL/L (ref 135–144)
URIC ACID: 4.8 MG/DL (ref 3.4–7)

## 2018-05-09 PROCEDURE — 1036F TOBACCO NON-USER: CPT | Performed by: FAMILY MEDICINE

## 2018-05-09 PROCEDURE — G8598 ASA/ANTIPLAT THER USED: HCPCS | Performed by: FAMILY MEDICINE

## 2018-05-09 PROCEDURE — 84550 ASSAY OF BLOOD/URIC ACID: CPT

## 2018-05-09 PROCEDURE — 1123F ACP DISCUSS/DSCN MKR DOCD: CPT | Performed by: FAMILY MEDICINE

## 2018-05-09 PROCEDURE — 36415 COLL VENOUS BLD VENIPUNCTURE: CPT

## 2018-05-09 PROCEDURE — 99213 OFFICE O/P EST LOW 20 MIN: CPT | Performed by: FAMILY MEDICINE

## 2018-05-09 PROCEDURE — 73630 X-RAY EXAM OF FOOT: CPT

## 2018-05-09 PROCEDURE — 4040F PNEUMOC VAC/ADMIN/RCVD: CPT | Performed by: FAMILY MEDICINE

## 2018-05-09 PROCEDURE — 80048 BASIC METABOLIC PNL TOTAL CA: CPT

## 2018-05-09 PROCEDURE — G8427 DOCREV CUR MEDS BY ELIG CLIN: HCPCS | Performed by: FAMILY MEDICINE

## 2018-05-09 PROCEDURE — G8417 CALC BMI ABV UP PARAM F/U: HCPCS | Performed by: FAMILY MEDICINE

## 2018-05-09 ASSESSMENT — PATIENT HEALTH QUESTIONNAIRE - PHQ9
1. LITTLE INTEREST OR PLEASURE IN DOING THINGS: 0
SUM OF ALL RESPONSES TO PHQ9 QUESTIONS 1 & 2: 0
SUM OF ALL RESPONSES TO PHQ QUESTIONS 1-9: 0
2. FEELING DOWN, DEPRESSED OR HOPELESS: 0

## 2018-05-10 ENCOUNTER — TELEPHONE (OUTPATIENT)
Dept: FAMILY MEDICINE CLINIC | Age: 83
End: 2018-05-10

## 2018-05-10 DIAGNOSIS — M19.071 ARTHRITIS OF RIGHT FOOT: Primary | ICD-10-CM

## 2018-05-20 ASSESSMENT — ENCOUNTER SYMPTOMS
SHORTNESS OF BREATH: 0
FACIAL SWELLING: 0
TROUBLE SWALLOWING: 0
COUGH: 0
COLOR CHANGE: 0
BACK PAIN: 0
WHEEZING: 0
DIARRHEA: 0
ABDOMINAL DISTENTION: 0
ABDOMINAL PAIN: 0
CONSTIPATION: 0
VOMITING: 0
PHOTOPHOBIA: 0
NAUSEA: 0

## 2018-07-23 ENCOUNTER — HOSPITAL ENCOUNTER (OUTPATIENT)
Age: 83
Discharge: HOME | End: 2018-07-25
Payer: MEDICARE

## 2018-07-23 ENCOUNTER — HOSPITAL ENCOUNTER (OUTPATIENT)
Dept: GENERAL RADIOLOGY | Age: 83
Discharge: HOME | End: 2018-07-25
Payer: MEDICARE

## 2018-07-23 ENCOUNTER — HOSPITAL ENCOUNTER (OUTPATIENT)
Age: 83
Discharge: HOME | End: 2018-07-23
Payer: MEDICARE

## 2018-07-23 DIAGNOSIS — E03.9 HYPOTHYROIDISM, UNSPECIFIED TYPE: ICD-10-CM

## 2018-07-23 DIAGNOSIS — E78.5 HYPERLIPIDEMIA, UNSPECIFIED HYPERLIPIDEMIA TYPE: ICD-10-CM

## 2018-07-23 DIAGNOSIS — Z95.0 PACEMAKER: ICD-10-CM

## 2018-07-23 DIAGNOSIS — I48.20 CHRONIC ATRIAL FIBRILLATION (HCC): ICD-10-CM

## 2018-07-23 DIAGNOSIS — E55.9 VITAMIN D DEFICIENCY DISEASE: ICD-10-CM

## 2018-07-23 DIAGNOSIS — I25.10 CORONARY ARTERY DISEASE INVOLVING NATIVE CORONARY ARTERY OF NATIVE HEART WITHOUT ANGINA PECTORIS: ICD-10-CM

## 2018-07-23 LAB
ABSOLUTE EOS #: 0.1 K/UL (ref 0–0.4)
ABSOLUTE IMMATURE GRANULOCYTE: ABNORMAL K/UL (ref 0–0.3)
ABSOLUTE LYMPH #: 1.4 K/UL (ref 1–4.8)
ABSOLUTE MONO #: 0.4 K/UL (ref 0–1)
ALBUMIN SERPL-MCNC: 4.2 G/DL (ref 3.5–5.2)
ALBUMIN/GLOBULIN RATIO: ABNORMAL (ref 1–2.5)
ALP BLD-CCNC: 79 U/L (ref 40–129)
ALT SERPL-CCNC: 9 U/L (ref 5–41)
ANION GAP SERPL CALCULATED.3IONS-SCNC: 10 MMOL/L (ref 9–17)
AST SERPL-CCNC: 17 U/L
BASOPHILS # BLD: 1 % (ref 0–2)
BASOPHILS ABSOLUTE: 0 K/UL (ref 0–0.2)
BILIRUB SERPL-MCNC: 0.83 MG/DL (ref 0.3–1.2)
BUN BLDV-MCNC: 16 MG/DL (ref 8–23)
BUN/CREAT BLD: 18 (ref 9–20)
CALCIUM SERPL-MCNC: 9.7 MG/DL (ref 8.6–10.4)
CHLORIDE BLD-SCNC: 102 MMOL/L (ref 98–107)
CHOLESTEROL/HDL RATIO: 4.7
CHOLESTEROL: 145 MG/DL
CO2: 29 MMOL/L (ref 20–31)
CREAT SERPL-MCNC: 0.89 MG/DL (ref 0.7–1.2)
DIFFERENTIAL TYPE: YES
EKG ATRIAL RATE: 62 BPM
EKG P-R INTERVAL: 256 MS
EKG Q-T INTERVAL: 524 MS
EKG QRS DURATION: 220 MS
EKG QTC CALCULATION (BAZETT): 531 MS
EKG R AXIS: -74 DEGREES
EKG T AXIS: 99 DEGREES
EKG VENTRICULAR RATE: 62 BPM
EOSINOPHILS RELATIVE PERCENT: 2 % (ref 0–5)
GFR AFRICAN AMERICAN: >60 ML/MIN
GFR NON-AFRICAN AMERICAN: >60 ML/MIN
GFR SERPL CREATININE-BSD FRML MDRD: ABNORMAL ML/MIN/{1.73_M2}
GFR SERPL CREATININE-BSD FRML MDRD: ABNORMAL ML/MIN/{1.73_M2}
GLUCOSE BLD-MCNC: 101 MG/DL (ref 70–99)
HCT VFR BLD CALC: 40 % (ref 41–53)
HDLC SERPL-MCNC: 31 MG/DL
HEMOGLOBIN: 13.5 G/DL (ref 13.5–17.5)
IMMATURE GRANULOCYTES: ABNORMAL %
LDL CHOLESTEROL: 87 MG/DL (ref 0–130)
LYMPHOCYTES # BLD: 27 % (ref 13–44)
MAGNESIUM: 1.8 MG/DL (ref 1.6–2.6)
MCH RBC QN AUTO: 29.8 PG (ref 26–34)
MCHC RBC AUTO-ENTMCNC: 33.7 G/DL (ref 31–37)
MCV RBC AUTO: 88.5 FL (ref 80–100)
MONOCYTES # BLD: 7 % (ref 5–9)
NRBC AUTOMATED: ABNORMAL PER 100 WBC
PATIENT FASTING?: YES
PDW BLD-RTO: 13.9 % (ref 12.1–15.2)
PLATELET # BLD: 158 K/UL (ref 140–450)
PLATELET ESTIMATE: ABNORMAL
PMV BLD AUTO: ABNORMAL FL (ref 6–12)
POTASSIUM SERPL-SCNC: 4.2 MMOL/L (ref 3.7–5.3)
RBC # BLD: 4.52 M/UL (ref 4.5–5.9)
RBC # BLD: ABNORMAL 10*6/UL
SEG NEUTROPHILS: 63 % (ref 39–75)
SEGMENTED NEUTROPHILS ABSOLUTE COUNT: 3.4 K/UL (ref 2.1–6.5)
SODIUM BLD-SCNC: 141 MMOL/L (ref 135–144)
TOTAL PROTEIN: 7.5 G/DL (ref 6.4–8.3)
TRIGL SERPL-MCNC: 135 MG/DL
TSH SERPL DL<=0.05 MIU/L-ACNC: 1.67 MIU/L (ref 0.3–5)
VITAMIN D 25-HYDROXY: 36.7 NG/ML (ref 30–100)
VLDLC SERPL CALC-MCNC: ABNORMAL MG/DL (ref 1–30)
WBC # BLD: 5.4 K/UL (ref 3.5–11)
WBC # BLD: ABNORMAL 10*3/UL

## 2018-07-23 PROCEDURE — 83735 ASSAY OF MAGNESIUM: CPT

## 2018-07-23 PROCEDURE — 85025 COMPLETE CBC W/AUTO DIFF WBC: CPT

## 2018-07-23 PROCEDURE — 93005 ELECTROCARDIOGRAM TRACING: CPT

## 2018-07-23 PROCEDURE — 80053 COMPREHEN METABOLIC PANEL: CPT

## 2018-07-23 PROCEDURE — 80061 LIPID PANEL: CPT

## 2018-07-23 PROCEDURE — 82306 VITAMIN D 25 HYDROXY: CPT

## 2018-07-23 PROCEDURE — 71046 X-RAY EXAM CHEST 2 VIEWS: CPT

## 2018-07-23 PROCEDURE — 84443 ASSAY THYROID STIM HORMONE: CPT

## 2018-07-23 PROCEDURE — 36415 COLL VENOUS BLD VENIPUNCTURE: CPT

## 2018-07-23 RX ORDER — ROSUVASTATIN CALCIUM 10 MG/1
TABLET, COATED ORAL
Qty: 90 TABLET | Refills: 3 | Status: SHIPPED | OUTPATIENT
Start: 2018-07-23 | End: 2019-03-25 | Stop reason: SDUPTHER

## 2018-07-23 NOTE — TELEPHONE ENCOUNTER
Patient stopped in asking for refill of his Rosuvastatin 10mg tabs nightly to be sent to [facility]

## 2018-07-30 ENCOUNTER — OFFICE VISIT (OUTPATIENT)
Dept: CARDIOLOGY CLINIC | Age: 83
End: 2018-07-30
Payer: MEDICARE

## 2018-07-30 VITALS
HEART RATE: 60 BPM | SYSTOLIC BLOOD PRESSURE: 128 MMHG | OXYGEN SATURATION: 100 % | DIASTOLIC BLOOD PRESSURE: 70 MMHG | WEIGHT: 214 LBS | BODY MASS INDEX: 33.52 KG/M2

## 2018-07-30 DIAGNOSIS — I25.10 CORONARY ARTERY DISEASE INVOLVING NATIVE CORONARY ARTERY OF NATIVE HEART WITHOUT ANGINA PECTORIS: Primary | ICD-10-CM

## 2018-07-30 DIAGNOSIS — E78.5 HYPERLIPIDEMIA, UNSPECIFIED HYPERLIPIDEMIA TYPE: ICD-10-CM

## 2018-07-30 DIAGNOSIS — I48.20 CHRONIC ATRIAL FIBRILLATION (HCC): ICD-10-CM

## 2018-07-30 DIAGNOSIS — E03.9 ACQUIRED HYPOTHYROIDISM: ICD-10-CM

## 2018-07-30 DIAGNOSIS — E55.9 VITAMIN D DEFICIENCY DISEASE: ICD-10-CM

## 2018-07-30 DIAGNOSIS — Z95.0 PACEMAKER: ICD-10-CM

## 2018-07-30 PROCEDURE — 1123F ACP DISCUSS/DSCN MKR DOCD: CPT | Performed by: INTERNAL MEDICINE

## 2018-07-30 PROCEDURE — 1036F TOBACCO NON-USER: CPT | Performed by: INTERNAL MEDICINE

## 2018-07-30 PROCEDURE — 99214 OFFICE O/P EST MOD 30 MIN: CPT | Performed by: INTERNAL MEDICINE

## 2018-07-30 PROCEDURE — G8427 DOCREV CUR MEDS BY ELIG CLIN: HCPCS | Performed by: INTERNAL MEDICINE

## 2018-07-30 PROCEDURE — G8598 ASA/ANTIPLAT THER USED: HCPCS | Performed by: INTERNAL MEDICINE

## 2018-07-30 PROCEDURE — 1101F PT FALLS ASSESS-DOCD LE1/YR: CPT | Performed by: INTERNAL MEDICINE

## 2018-07-30 PROCEDURE — 4040F PNEUMOC VAC/ADMIN/RCVD: CPT | Performed by: INTERNAL MEDICINE

## 2018-07-30 PROCEDURE — G8417 CALC BMI ABV UP PARAM F/U: HCPCS | Performed by: INTERNAL MEDICINE

## 2018-07-30 PROCEDURE — 93288 INTERROG EVL PM/LDLS PM IP: CPT | Performed by: INTERNAL MEDICINE

## 2018-07-30 NOTE — PROGRESS NOTES
Ov DR Oseguera Livers 6 mth follow up   [facility] every day on [place]  No c/o chest pain   Some sob at end walk  Pacemaker checked per Roland doty age 15 walks with pt   Name is OMEGA MENENDEZ [facility], had another Mickeal Bevels   couple weeks ago  Bedside echo done. Will see in 6 mths.

## 2018-08-03 NOTE — PROGRESS NOTES
Jennyfer Mcclellan M.D. [address] [place] [address] [facility] [address] [phone]      2018      Humera Burroughs,   [address] [facility], [address] [place] [address] Avenue      RE:   Alyson Schaefer  :  1932      Dear Dr. Pratik Burroughs:    [address]:  1. Sick sinus syndrome. 2.  Coronary artery disease. HISTORY OF PRESENT ILLNESS: Mr. Chana Banasl is a pleasant 24-year-old gentleman who had shortness of breath and atrial fibrillation in . He had subsequent 4-vessel bypass surgery at [facility] after a catheterization. On 2012, he went to the emergency room with a heart rate of 32 and I placed a [employer] 40 DDD pacemaker on 2012. On 2012, he had shortness of breath and a repeat catheterization showed the LAD had 99% disease. The intermediate branch of the circumflex was subtotally occluded and filled from a vein graft. The OM branch of the circumflex was large and had remnant of a vein graft, which was occluded. The vein graft to the right coronary artery was occluded. The vein graft to the OM was occluded. The vein graft to the intermediate was widely patent. The LIMA to the LAD was widely patent. EF was 40%. Therefore, the vein graft to the OM and the vein graft to the right coronary artery were occluded. The right coronary artery was very large and had severe 95% to 99% disease in the midportion. We did complex angioplasty of the right coronary artery placing 3.0 x 38, 3.0 x 38, 3.5 x 24 and 3.5 x 16 mm drug-eluting ION stents with a good end result. He had one 24-beat run of nonsustained ventricular tachycardia in 2016. We increased his Lopressor to 50 mg 3 times a day and he has had no further ventricular tachycardia. He has been doing well since I saw him in January. He has had no hospitalizations or procedures.   His dog, Bonifacio,  several months ago, but [place], lalitha, 13 years old, still is with him 24 hours a day. [place]. He has no complaints of chest pain. He has some shortness of breath, but this has not changed. He has had no hospitalizations or procedures. His appetite has been good. He has had no fevers, sweats or chills. No weight loss or weight gain. Pacemaker was interrogated, still at beginning of life with 4-1/2 years left on battery. CARDIAC RISK FACTORS:  Known CAD:  Positive. Bypass Surgery:  Positive. PTCA:  Positive. Hypertension:  Negative. Hyperlipidemia:  Positive. Smoking:  Negative. Diabetes:  Negative. MEDICATIONS AT HOME:  He is currently on aspirin 81 mg daily, Plavix 75 mg daily, Synthroid 175 mcg daily, Lopressor 100 mg b.i.d., Crestor 10 mg daily. PAST MEDICAL HISTORY:  1. Cardiac as above. 2.  Hypertension. 3.  Hyperlipidemia. 4.  Atrial fibrillation in , on Coumadin. 5.  Hypothyroidism, on high dose of thyroid with his TSH being now normal, but this was high at 55 in January. He has tolerated 10 mg of Crestor daily. 6.  He is status post cataract surgery. FAMILY HISTORY:  Significant for CAD. SOCIAL HISTORY:  He is 80years old, 2 children, one in [place]. He lives with Luis Alberto Hobbs for the last 25 years. He does not smoke or drink alcohol. Stays active. He had a dog, Bonifacio, that was a poodle that  several months ago. Another dog, Gaby Dickson, is 15years old and is with him and adores him. Mr. Lucia Cotton and his partner, Luis Alberto Hobbs, are \"on the go most of the time. \"     REVIEW OF SYSTEMS:  Cardiac as above. Other systems reviewed including constitutional, eyes, ears, nose and throat, cardiovascular, respiratory, GI, , musculoskeletal, integumentary, neurologic, psychiatric, endocrine, hematologic and allergic/immunologic, and are negative except for what is described above.     PHYSICAL EXAMINATION:  VITAL SIGNS:  His blood pressure was 130/60 in both arms with a heart rate of years left on the battery, with him pacing the atrium 40% of the time and the ventricle 98% of the time. 3.  Euthyroid, on treatment. 4.  History of 24-beat run of nonsustained ventricular tachycardia, which has been controlled with higher dose of Lopressor at 100 mg b.i.d.  5.  EF of 45%, which has been stable. 6.  Hyperlipidemia, under good control. 7.  Hypertension, well controlled. PLAN:  1. We will see in 6 months. 2.  Continue same medications. DISCUSSION:  Mr. Yohannes Tejada overall is doing excellent. [place] on a daily basis with his dog, Crys, who adores him. He has had no chest pain, shortness of breath or any unusual loss of energy. He has had no hospitalizations or procedures. There is no indication that he is having any anginal-type symptoms. I would do no testing such as Lexiscan Cardiolite stress test or echocardiogram at this time. I made no change in medications. I look forward to seeing him again in 6 months. He and his partner, Arina Gann, are a delight to see and I look forward to seeing them in 6 months. Thank you very much.     Sincerely,        Saw Glover    D: 07/30/2018 11:22:33     T: 07/31/2018 5:02:50     [email or web address]  Job#: [id number]   Doc#: [id number]

## 2018-08-16 ENCOUNTER — TELEPHONE (OUTPATIENT)
Dept: FAMILY MEDICINE CLINIC | Age: 83
End: 2018-08-16

## 2018-09-05 RX ORDER — LEVOTHYROXINE SODIUM 175 UG/1
175 TABLET ORAL DAILY
Qty: 90 TABLET | Refills: 1 | Status: SHIPPED | OUTPATIENT
Start: 2018-09-05 | End: 2019-03-26 | Stop reason: SDUPTHER

## 2018-09-05 NOTE — TELEPHONE ENCOUNTER
Patient is asking for a refill on Levothyroxine 175 MCG. Patient did see Dr. Marques Perez in the past.  Last check up was 2/13/18. Patient seen Dr. Marielos Salinas on 7/30/18.     Drug [address] Maintenance   Topic Date Due    DTaP/Tdap/Td vaccine (1 - Tdap) 11/27/1951    Shingles Vaccine (1 of 2 - 2 Dose Series) 11/27/1982    Flu vaccine (1) 09/01/2018    TSH testing  07/23/2019    Pneumococcal low/med risk  Completed             (applicable per patient's age: Cancer Screenings, Depression Screening, Fall Risk Screening, Immunizations)    LDL Cholesterol (mg/dL)   Date Value   07/23/2018 87     AST (U/L)   Date Value   07/23/2018 17     ALT (U/L)   Date Value   07/23/2018 9     BUN (mg/dL)   Date Value   07/23/2018 16      (goal A1C is < 7)   (goal LDL is <100) need 30-50% reduction from baseline     BP Readings from Last 3 Encounters:   07/30/18 128/70   05/09/18 118/80   02/13/18 110/70    (goal /80)      All Future Testing planned in [facility]:  Lab Frequency Next Occurrence   TSH with Reflex Once 01/29/2019   TSH with Reflex Once 03/30/2019   CBC Auto Differential Once 03/30/2019   Comprehensive Metabolic Panel Once 32/63/1710   Vitamin D 25 Hydroxy Once 03/30/2019   Lipid Panel Once 03/30/2019   Magnesium Once 03/30/2019   EKG 12 Lead Once 03/30/2019       Next Visit Date:  Future Appointments  Date Time Provider Andres Sharma   2/4/2019 11:00 AM MD Abimael Benitez [facility]            Patient Active Problem List:     Hard of hearing     CAD (coronary artery disease)     Atrial fibrillation ([address]     Hiatal hernia     Hyperlipidemia     Hypothyroidism     Pacemaker     History of PTCA     Calculus of gallbladder without cholecystitis without obstruction     Umbilical hernia

## 2018-09-05 NOTE — TELEPHONE ENCOUNTER
Last OV: 5/9/2018  Last RX: 2-6-18  Next scheduled apt: Visit date not found    Patient last tsh was 1.67

## 2018-12-28 ENCOUNTER — TELEPHONE (OUTPATIENT)
Dept: CARDIOLOGY CLINIC | Age: 83
End: 2018-12-28

## 2019-01-30 ENCOUNTER — HOSPITAL ENCOUNTER (OUTPATIENT)
Age: 84
Discharge: HOME | End: 2019-01-30
Payer: MEDICARE

## 2019-01-30 DIAGNOSIS — I48.20 CHRONIC ATRIAL FIBRILLATION (HCC): ICD-10-CM

## 2019-01-30 DIAGNOSIS — E78.5 HYPERLIPIDEMIA, UNSPECIFIED HYPERLIPIDEMIA TYPE: ICD-10-CM

## 2019-01-30 DIAGNOSIS — Z95.0 PACEMAKER: ICD-10-CM

## 2019-01-30 DIAGNOSIS — I25.10 CORONARY ARTERY DISEASE INVOLVING NATIVE CORONARY ARTERY OF NATIVE HEART WITHOUT ANGINA PECTORIS: ICD-10-CM

## 2019-01-30 DIAGNOSIS — E55.9 VITAMIN D DEFICIENCY DISEASE: ICD-10-CM

## 2019-01-30 DIAGNOSIS — E03.9 ACQUIRED HYPOTHYROIDISM: ICD-10-CM

## 2019-01-30 LAB
ABSOLUTE EOS #: 0.3 K/UL (ref 0–0.4)
ABSOLUTE IMMATURE GRANULOCYTE: ABNORMAL K/UL (ref 0–0.3)
ABSOLUTE LYMPH #: 1.7 K/UL (ref 1–4.8)
ABSOLUTE MONO #: 0.4 K/UL (ref 0–1)
ALBUMIN SERPL-MCNC: 4.5 G/DL (ref 3.5–5.2)
ALBUMIN/GLOBULIN RATIO: ABNORMAL (ref 1–2.5)
ALP BLD-CCNC: 84 U/L (ref 40–129)
ALT SERPL-CCNC: 8 U/L (ref 5–41)
ANION GAP SERPL CALCULATED.3IONS-SCNC: 9 MMOL/L (ref 9–17)
AST SERPL-CCNC: 17 U/L
BASOPHILS # BLD: 1 % (ref 0–2)
BASOPHILS ABSOLUTE: 0 K/UL (ref 0–0.2)
BILIRUB SERPL-MCNC: 0.9 MG/DL (ref 0.3–1.2)
BUN BLDV-MCNC: 14 MG/DL (ref 8–23)
BUN/CREAT BLD: 15 (ref 9–20)
CALCIUM SERPL-MCNC: 10.2 MG/DL (ref 8.6–10.4)
CHLORIDE BLD-SCNC: 103 MMOL/L (ref 98–107)
CHOLESTEROL/HDL RATIO: 4.3
CHOLESTEROL: 150 MG/DL
CO2: 30 MMOL/L (ref 20–31)
CREAT SERPL-MCNC: 0.93 MG/DL (ref 0.7–1.2)
DIFFERENTIAL TYPE: YES
EKG ATRIAL RATE: 67 BPM
EKG P AXIS: 116 DEGREES
EKG P-R INTERVAL: 248 MS
EKG Q-T INTERVAL: 504 MS
EKG QRS DURATION: 220 MS
EKG QTC CALCULATION (BAZETT): 532 MS
EKG R AXIS: -74 DEGREES
EKG T AXIS: 101 DEGREES
EKG VENTRICULAR RATE: 67 BPM
EOSINOPHILS RELATIVE PERCENT: 6 % (ref 0–5)
GFR AFRICAN AMERICAN: >60 ML/MIN
GFR NON-AFRICAN AMERICAN: >60 ML/MIN
GFR SERPL CREATININE-BSD FRML MDRD: ABNORMAL ML/MIN/{1.73_M2}
GFR SERPL CREATININE-BSD FRML MDRD: ABNORMAL ML/MIN/{1.73_M2}
GLUCOSE BLD-MCNC: 104 MG/DL (ref 70–99)
HCT VFR BLD CALC: 41.9 % (ref 41–53)
HDLC SERPL-MCNC: 35 MG/DL
HEMOGLOBIN: 13.9 G/DL (ref 13.5–17.5)
IMMATURE GRANULOCYTES: ABNORMAL %
LDL CHOLESTEROL: 89 MG/DL (ref 0–130)
LYMPHOCYTES # BLD: 27 % (ref 13–44)
MAGNESIUM: 1.8 MG/DL (ref 1.6–2.6)
MCH RBC QN AUTO: 30 PG (ref 26–34)
MCHC RBC AUTO-ENTMCNC: 33.1 G/DL (ref 31–37)
MCV RBC AUTO: 90.4 FL (ref 80–100)
MONOCYTES # BLD: 7 % (ref 5–9)
NRBC AUTOMATED: ABNORMAL PER 100 WBC
PATIENT FASTING?: YES
PDW BLD-RTO: 13.8 % (ref 12.1–15.2)
PLATELET # BLD: 166 K/UL (ref 140–450)
PLATELET ESTIMATE: ABNORMAL
PMV BLD AUTO: ABNORMAL FL (ref 6–12)
POTASSIUM SERPL-SCNC: 4 MMOL/L (ref 3.7–5.3)
RBC # BLD: 4.63 M/UL (ref 4.5–5.9)
RBC # BLD: ABNORMAL 10*6/UL
SEG NEUTROPHILS: 59 % (ref 39–75)
SEGMENTED NEUTROPHILS ABSOLUTE COUNT: 3.7 K/UL (ref 2.1–6.5)
SODIUM BLD-SCNC: 142 MMOL/L (ref 135–144)
THYROXINE, FREE: 1.21 NG/DL (ref 0.93–1.7)
TOTAL PROTEIN: 7.9 G/DL (ref 6.4–8.3)
TRIGL SERPL-MCNC: 131 MG/DL
TSH SERPL DL<=0.05 MIU/L-ACNC: 11.13 MIU/L (ref 0.3–5)
VITAMIN D 25-HYDROXY: 27.6 NG/ML (ref 30–100)
VLDLC SERPL CALC-MCNC: ABNORMAL MG/DL (ref 1–30)
WBC # BLD: 6.2 K/UL (ref 3.5–11)
WBC # BLD: ABNORMAL 10*3/UL

## 2019-01-30 PROCEDURE — 93005 ELECTROCARDIOGRAM TRACING: CPT

## 2019-01-30 PROCEDURE — 84439 ASSAY OF FREE THYROXINE: CPT

## 2019-01-30 PROCEDURE — 80061 LIPID PANEL: CPT

## 2019-01-30 PROCEDURE — 82306 VITAMIN D 25 HYDROXY: CPT

## 2019-01-30 PROCEDURE — 85025 COMPLETE CBC W/AUTO DIFF WBC: CPT

## 2019-01-30 PROCEDURE — 36415 COLL VENOUS BLD VENIPUNCTURE: CPT

## 2019-01-30 PROCEDURE — 83735 ASSAY OF MAGNESIUM: CPT

## 2019-01-30 PROCEDURE — 80053 COMPREHEN METABOLIC PANEL: CPT

## 2019-01-30 PROCEDURE — 84443 ASSAY THYROID STIM HORMONE: CPT

## 2019-02-04 ENCOUNTER — OFFICE VISIT (OUTPATIENT)
Dept: CARDIOLOGY CLINIC | Age: 84
End: 2019-02-04
Payer: MEDICARE

## 2019-02-04 VITALS
OXYGEN SATURATION: 94 % | WEIGHT: 214 LBS | DIASTOLIC BLOOD PRESSURE: 60 MMHG | HEART RATE: 64 BPM | BODY MASS INDEX: 33.52 KG/M2 | SYSTOLIC BLOOD PRESSURE: 130 MMHG

## 2019-02-04 DIAGNOSIS — E78.5 HYPERLIPIDEMIA, UNSPECIFIED HYPERLIPIDEMIA TYPE: ICD-10-CM

## 2019-02-04 DIAGNOSIS — Z95.0 PACEMAKER: ICD-10-CM

## 2019-02-04 DIAGNOSIS — E03.9 ACQUIRED HYPOTHYROIDISM: ICD-10-CM

## 2019-02-04 DIAGNOSIS — I25.10 CORONARY ARTERY DISEASE INVOLVING NATIVE CORONARY ARTERY OF NATIVE HEART WITHOUT ANGINA PECTORIS: Primary | ICD-10-CM

## 2019-02-04 DIAGNOSIS — E55.9 VITAMIN D DEFICIENCY DISEASE: ICD-10-CM

## 2019-02-04 DIAGNOSIS — I48.20 CHRONIC ATRIAL FIBRILLATION (HCC): ICD-10-CM

## 2019-02-04 PROCEDURE — G8598 ASA/ANTIPLAT THER USED: HCPCS | Performed by: INTERNAL MEDICINE

## 2019-02-04 PROCEDURE — G8427 DOCREV CUR MEDS BY ELIG CLIN: HCPCS | Performed by: INTERNAL MEDICINE

## 2019-02-04 PROCEDURE — 93288 INTERROG EVL PM/LDLS PM IP: CPT | Performed by: INTERNAL MEDICINE

## 2019-02-04 PROCEDURE — G8417 CALC BMI ABV UP PARAM F/U: HCPCS | Performed by: INTERNAL MEDICINE

## 2019-02-04 PROCEDURE — 99214 OFFICE O/P EST MOD 30 MIN: CPT | Performed by: INTERNAL MEDICINE

## 2019-02-04 PROCEDURE — G8484 FLU IMMUNIZE NO ADMIN: HCPCS | Performed by: INTERNAL MEDICINE

## 2019-02-04 PROCEDURE — 1123F ACP DISCUSS/DSCN MKR DOCD: CPT | Performed by: INTERNAL MEDICINE

## 2019-02-04 PROCEDURE — 1101F PT FALLS ASSESS-DOCD LE1/YR: CPT | Performed by: INTERNAL MEDICINE

## 2019-02-04 PROCEDURE — 1036F TOBACCO NON-USER: CPT | Performed by: INTERNAL MEDICINE

## 2019-02-04 PROCEDURE — 4040F PNEUMOC VAC/ADMIN/RCVD: CPT | Performed by: INTERNAL MEDICINE

## 2019-02-04 RX ORDER — CLOBETASOL PROPIONATE 0.5 MG/G
CREAM TOPICAL
Qty: 1 TUBE | Refills: 3 | Status: SHIPPED | OUTPATIENT
Start: 2019-02-04 | End: 2019-09-23

## 2019-02-04 RX ORDER — CLOPIDOGREL BISULFATE 75 MG/1
75 TABLET ORAL DAILY
Qty: 30 TABLET | Refills: 11 | Status: SHIPPED | OUTPATIENT
Start: 2019-02-04 | End: 2019-03-25 | Stop reason: SDUPTHER

## 2019-02-18 ENCOUNTER — TELEPHONE (OUTPATIENT)
Dept: FAMILY MEDICINE CLINIC | Age: 84
End: 2019-02-18

## 2019-03-04 RX ORDER — METOPROLOL TARTRATE 100 MG/1
100 TABLET ORAL 2 TIMES DAILY
Qty: 60 TABLET | Refills: 11 | Status: SHIPPED | OUTPATIENT
Start: 2019-03-04 | End: 2019-03-25 | Stop reason: SDUPTHER

## 2019-03-25 ENCOUNTER — OFFICE VISIT (OUTPATIENT)
Dept: FAMILY MEDICINE CLINIC | Age: 84
End: 2019-03-25
Payer: MEDICARE

## 2019-03-25 ENCOUNTER — HOSPITAL ENCOUNTER (OUTPATIENT)
Age: 84
Discharge: HOME | End: 2019-03-25
Payer: MEDICARE

## 2019-03-25 VITALS
TEMPERATURE: 97.8 F | HEIGHT: 67 IN | BODY MASS INDEX: 32.96 KG/M2 | OXYGEN SATURATION: 95 % | WEIGHT: 210 LBS | SYSTOLIC BLOOD PRESSURE: 108 MMHG | DIASTOLIC BLOOD PRESSURE: 54 MMHG | HEART RATE: 60 BPM

## 2019-03-25 DIAGNOSIS — E03.9 ACQUIRED HYPOTHYROIDISM: ICD-10-CM

## 2019-03-25 DIAGNOSIS — E03.9 ACQUIRED HYPOTHYROIDISM: Primary | ICD-10-CM

## 2019-03-25 DIAGNOSIS — I48.20 CHRONIC ATRIAL FIBRILLATION (HCC): ICD-10-CM

## 2019-03-25 DIAGNOSIS — M17.11 PRIMARY OSTEOARTHRITIS OF RIGHT KNEE: ICD-10-CM

## 2019-03-25 LAB
T3 FREE: 3.32 PG/ML (ref 2.02–4.43)
TSH SERPL DL<=0.05 MIU/L-ACNC: 0.39 MIU/L (ref 0.3–5)

## 2019-03-25 PROCEDURE — 1123F ACP DISCUSS/DSCN MKR DOCD: CPT | Performed by: NURSE PRACTITIONER

## 2019-03-25 PROCEDURE — 99214 OFFICE O/P EST MOD 30 MIN: CPT | Performed by: NURSE PRACTITIONER

## 2019-03-25 PROCEDURE — G8417 CALC BMI ABV UP PARAM F/U: HCPCS | Performed by: NURSE PRACTITIONER

## 2019-03-25 PROCEDURE — 4040F PNEUMOC VAC/ADMIN/RCVD: CPT | Performed by: NURSE PRACTITIONER

## 2019-03-25 PROCEDURE — 84443 ASSAY THYROID STIM HORMONE: CPT

## 2019-03-25 PROCEDURE — G8598 ASA/ANTIPLAT THER USED: HCPCS | Performed by: NURSE PRACTITIONER

## 2019-03-25 PROCEDURE — 86376 MICROSOMAL ANTIBODY EACH: CPT

## 2019-03-25 PROCEDURE — 1036F TOBACCO NON-USER: CPT | Performed by: NURSE PRACTITIONER

## 2019-03-25 PROCEDURE — 36415 COLL VENOUS BLD VENIPUNCTURE: CPT

## 2019-03-25 PROCEDURE — G8484 FLU IMMUNIZE NO ADMIN: HCPCS | Performed by: NURSE PRACTITIONER

## 2019-03-25 PROCEDURE — 86800 THYROGLOBULIN ANTIBODY: CPT

## 2019-03-25 PROCEDURE — 84481 FREE ASSAY (FT-3): CPT

## 2019-03-25 PROCEDURE — G8427 DOCREV CUR MEDS BY ELIG CLIN: HCPCS | Performed by: NURSE PRACTITIONER

## 2019-03-25 RX ORDER — ROSUVASTATIN CALCIUM 10 MG/1
TABLET, COATED ORAL
Qty: 90 TABLET | Refills: 3 | Status: SHIPPED | OUTPATIENT
Start: 2019-03-25 | End: 2019-11-26 | Stop reason: SDUPTHER

## 2019-03-25 RX ORDER — CLOPIDOGREL BISULFATE 75 MG/1
TABLET ORAL
Qty: 90 TABLET | Refills: 3 | Status: SHIPPED | OUTPATIENT
Start: 2019-03-25 | End: 2020-01-08

## 2019-03-25 RX ORDER — METOPROLOL TARTRATE 100 MG/1
100 TABLET ORAL 2 TIMES DAILY
Qty: 180 TABLET | Refills: 3 | Status: SHIPPED | OUTPATIENT
Start: 2019-03-25 | End: 2020-03-27

## 2019-03-25 ASSESSMENT — PATIENT HEALTH QUESTIONNAIRE - PHQ9
2. FEELING DOWN, DEPRESSED OR HOPELESS: 0
SUM OF ALL RESPONSES TO PHQ QUESTIONS 1-9: 0
SUM OF ALL RESPONSES TO PHQ QUESTIONS 1-9: 0
1. LITTLE INTEREST OR PLEASURE IN DOING THINGS: 0
SUM OF ALL RESPONSES TO PHQ9 QUESTIONS 1 & 2: 0

## 2019-03-25 ASSESSMENT — ENCOUNTER SYMPTOMS
RHINORRHEA: 1
EYE ITCHING: 1
SHORTNESS OF BREATH: 0
CHEST TIGHTNESS: 0

## 2019-03-26 DIAGNOSIS — E03.9 ACQUIRED HYPOTHYROIDISM: Primary | ICD-10-CM

## 2019-03-26 LAB
THYROGLOBULIN AB: <20 IU/ML (ref 0–40)
THYROID PEROXIDASE (TPO) AB: 15.7 IU/ML (ref 0–35)

## 2019-03-26 RX ORDER — LEVOTHYROXINE SODIUM 0.15 MG/1
150 TABLET ORAL DAILY
Qty: 60 TABLET | Refills: 0 | Status: SHIPPED | OUTPATIENT
Start: 2019-03-26 | End: 2019-05-31 | Stop reason: SDUPTHER

## 2019-07-15 ENCOUNTER — HOSPITAL ENCOUNTER (OUTPATIENT)
Age: 84
Discharge: HOME | End: 2019-07-15
Payer: MEDICARE

## 2019-07-15 ENCOUNTER — OFFICE VISIT (OUTPATIENT)
Dept: FAMILY MEDICINE CLINIC | Age: 84
End: 2019-07-15
Payer: MEDICARE

## 2019-07-15 ENCOUNTER — HOSPITAL ENCOUNTER (OUTPATIENT)
Age: 84
Setting detail: SPECIMEN
Discharge: HOME | End: 2019-07-15
Payer: MEDICARE

## 2019-07-15 ENCOUNTER — HOSPITAL ENCOUNTER (OUTPATIENT)
Dept: GENERAL RADIOLOGY | Age: 84
Discharge: HOME | End: 2019-07-17
Payer: MEDICARE

## 2019-07-15 ENCOUNTER — HOSPITAL ENCOUNTER (OUTPATIENT)
Age: 84
Discharge: HOME | End: 2019-07-17
Payer: MEDICARE

## 2019-07-15 VITALS
BODY MASS INDEX: 32.98 KG/M2 | HEART RATE: 65 BPM | TEMPERATURE: 98 F | DIASTOLIC BLOOD PRESSURE: 78 MMHG | WEIGHT: 210.6 LBS | SYSTOLIC BLOOD PRESSURE: 136 MMHG | OXYGEN SATURATION: 96 %

## 2019-07-15 DIAGNOSIS — E03.9 ACQUIRED HYPOTHYROIDISM: ICD-10-CM

## 2019-07-15 DIAGNOSIS — M25.571 PAIN AND SWELLING OF RIGHT ANKLE: Primary | ICD-10-CM

## 2019-07-15 DIAGNOSIS — M25.571 ACUTE RIGHT ANKLE PAIN: ICD-10-CM

## 2019-07-15 DIAGNOSIS — I48.20 CHRONIC ATRIAL FIBRILLATION (HCC): ICD-10-CM

## 2019-07-15 DIAGNOSIS — M10.9 ACUTE GOUT OF RIGHT ANKLE, UNSPECIFIED CAUSE: ICD-10-CM

## 2019-07-15 DIAGNOSIS — M25.471 PAIN AND SWELLING OF RIGHT ANKLE: Primary | ICD-10-CM

## 2019-07-15 LAB
ABSOLUTE EOS #: 0.1 K/UL (ref 0–0.4)
ABSOLUTE IMMATURE GRANULOCYTE: ABNORMAL K/UL (ref 0–0.3)
ABSOLUTE LYMPH #: 1.3 K/UL (ref 1–4.8)
ABSOLUTE MONO #: 0.5 K/UL (ref 0–1)
ALBUMIN SERPL-MCNC: 4.2 G/DL (ref 3.5–5.2)
ALBUMIN/GLOBULIN RATIO: ABNORMAL (ref 1–2.5)
ALP BLD-CCNC: 94 U/L (ref 40–129)
ALT SERPL-CCNC: 7 U/L (ref 5–41)
ANION GAP SERPL CALCULATED.3IONS-SCNC: 13 MMOL/L (ref 9–17)
AST SERPL-CCNC: 20 U/L
BASOPHILS # BLD: 1 % (ref 0–2)
BASOPHILS ABSOLUTE: 0 K/UL (ref 0–0.2)
BILIRUB SERPL-MCNC: 1.14 MG/DL (ref 0.3–1.2)
BUN BLDV-MCNC: 15 MG/DL (ref 8–23)
BUN/CREAT BLD: 17 (ref 9–20)
CALCIUM SERPL-MCNC: 10.3 MG/DL (ref 8.6–10.4)
CHLORIDE BLD-SCNC: 99 MMOL/L (ref 98–107)
CO2: 29 MMOL/L (ref 20–31)
CREAT SERPL-MCNC: 0.89 MG/DL (ref 0.7–1.2)
DIFFERENTIAL TYPE: YES
EOSINOPHILS RELATIVE PERCENT: 2 % (ref 0–5)
GFR AFRICAN AMERICAN: >60 ML/MIN
GFR NON-AFRICAN AMERICAN: >60 ML/MIN
GFR SERPL CREATININE-BSD FRML MDRD: ABNORMAL ML/MIN/{1.73_M2}
GFR SERPL CREATININE-BSD FRML MDRD: ABNORMAL ML/MIN/{1.73_M2}
GLUCOSE BLD-MCNC: 106 MG/DL (ref 70–99)
HCT VFR BLD CALC: 38.4 % (ref 41–53)
HEMOGLOBIN: 13.3 G/DL (ref 13.5–17.5)
IMMATURE GRANULOCYTES: ABNORMAL %
LYMPHOCYTES # BLD: 18 % (ref 13–44)
MCH RBC QN AUTO: 31.2 PG (ref 26–34)
MCHC RBC AUTO-ENTMCNC: 34.6 G/DL (ref 31–37)
MCV RBC AUTO: 90.1 FL (ref 80–100)
MONOCYTES # BLD: 7 % (ref 5–9)
NRBC AUTOMATED: ABNORMAL PER 100 WBC
PDW BLD-RTO: 14.2 % (ref 12.1–15.2)
PLATELET # BLD: 144 K/UL (ref 140–450)
PLATELET ESTIMATE: ABNORMAL
PMV BLD AUTO: ABNORMAL FL (ref 6–12)
POTASSIUM SERPL-SCNC: 4.1 MMOL/L (ref 3.7–5.3)
RBC # BLD: 4.27 M/UL (ref 4.5–5.9)
RBC # BLD: ABNORMAL 10*6/UL
SEDIMENTATION RATE, ERYTHROCYTE: 32 MM (ref 0–20)
SEG NEUTROPHILS: 72 % (ref 39–75)
SEGMENTED NEUTROPHILS ABSOLUTE COUNT: 5.2 K/UL (ref 2.1–6.5)
SODIUM BLD-SCNC: 141 MMOL/L (ref 135–144)
THYROXINE, FREE: 1.15 NG/DL (ref 0.93–1.7)
TOTAL PROTEIN: 7.8 G/DL (ref 6.4–8.3)
TSH SERPL DL<=0.05 MIU/L-ACNC: 6.84 MIU/L (ref 0.3–5)
URIC ACID: 4.1 MG/DL (ref 3.4–7)
WBC # BLD: 7.2 K/UL (ref 3.5–11)
WBC # BLD: ABNORMAL 10*3/UL

## 2019-07-15 PROCEDURE — 85651 RBC SED RATE NONAUTOMATED: CPT

## 2019-07-15 PROCEDURE — 99214 OFFICE O/P EST MOD 30 MIN: CPT | Performed by: NURSE PRACTITIONER

## 2019-07-15 PROCEDURE — 86140 C-REACTIVE PROTEIN: CPT

## 2019-07-15 PROCEDURE — 84550 ASSAY OF BLOOD/URIC ACID: CPT

## 2019-07-15 PROCEDURE — G8598 ASA/ANTIPLAT THER USED: HCPCS | Performed by: NURSE PRACTITIONER

## 2019-07-15 PROCEDURE — 73610 X-RAY EXAM OF ANKLE: CPT

## 2019-07-15 PROCEDURE — 84443 ASSAY THYROID STIM HORMONE: CPT

## 2019-07-15 PROCEDURE — 36415 COLL VENOUS BLD VENIPUNCTURE: CPT | Performed by: NURSE PRACTITIONER

## 2019-07-15 PROCEDURE — G8417 CALC BMI ABV UP PARAM F/U: HCPCS | Performed by: NURSE PRACTITIONER

## 2019-07-15 PROCEDURE — 1123F ACP DISCUSS/DSCN MKR DOCD: CPT | Performed by: NURSE PRACTITIONER

## 2019-07-15 PROCEDURE — 1036F TOBACCO NON-USER: CPT | Performed by: NURSE PRACTITIONER

## 2019-07-15 PROCEDURE — 85025 COMPLETE CBC W/AUTO DIFF WBC: CPT

## 2019-07-15 PROCEDURE — G8427 DOCREV CUR MEDS BY ELIG CLIN: HCPCS | Performed by: NURSE PRACTITIONER

## 2019-07-15 PROCEDURE — 80053 COMPREHEN METABOLIC PANEL: CPT

## 2019-07-15 PROCEDURE — 4040F PNEUMOC VAC/ADMIN/RCVD: CPT | Performed by: NURSE PRACTITIONER

## 2019-07-15 PROCEDURE — 84439 ASSAY OF FREE THYROXINE: CPT

## 2019-07-15 NOTE — PATIENT INSTRUCTIONS
care is a key part of your treatment and safety. Be sure to make and go to all appointments, and call your doctor if you are having problems. It's also a good idea to know your test results and keep a list of the medicines you take. How can you care for yourself at home? · Plan your meals and snacks around foods that are low in purines and are safe for you to eat. These foods include:  ? Green vegetables and tomatoes. ? Fruits. ? Whole-grain breads, rice, and cereals. ? Eggs, peanut butter, and nuts. ? Low-fat milk, cheese, and other milk products. ? Popcorn. ? Gelatin desserts, chocolate, cocoa, and cakes and sweets, in small amounts. · You can eat certain foods that are medium-high in purines, but eat them only once in a while. These foods include:  ? Legumes, such as dried beans and dried peas. You can have 1 cup cooked legumes each day. ? Asparagus, cauliflower, spinach, mushrooms, and green peas. ? Fish and seafood (other than very high-purine seafood). ? Oatmeal, wheat bran, and wheat germ. · Limit very high-purine foods, including:  ? Organ meats, such as liver, kidneys, sweetbreads, and brains. ? Meats, including cotto, beef, pork, and lamb. ? Game meats and any other meats in large amounts. ? Anchovies, sardines, herring, mackerel, and scallops. ? Gravy. ? Beer. Where can you learn more? Go to https://[email or web address].[email or web address]. org and sign in to your [employer] account. Enter F448 in the [facility] box to learn more about \"Purine-Restricted Diet: Care Instructions. \"     If you do not have an account, please click on the \"Sign Up Now\" link. Current as of: November 7, 2018  Content Version: 12.0  © 8078-4652 Healthwise, [employer]. Care instructions adapted under license by [facility] ([facility]).  If you have questions about a medical condition or this instruction, always ask your healthcare professional. [address] any warranty or liability for your use of

## 2019-07-15 NOTE — PROGRESS NOTES
This Encounter   Procedures    XR ANKLE RIGHT (MIN 3 VIEWS)     Standing Status:   Future     Number of Occurrences:   1     Standing Expiration Date:   7/15/2020     Order Specific Question:   Reason for exam:     Answer:   pain and swelling in ankle x 2 days no injury    Comprehensive Metabolic Panel     Standing Status:   Future     Number of Occurrences:   1     Standing Expiration Date:   7/15/2020    Uric Acid     Standing Status:   Future     Number of Occurrences:   1     Standing Expiration Date:   7/15/2020    TSH with Reflex     Standing Status:   Future     Number of Occurrences:   1     Standing Expiration Date:   7/15/2020    Sedimentation Rate     Standing Status:   Future     Number of Occurrences:   1     Standing Expiration Date:   7/15/2020    CBC Auto Differential     Standing Status:   Future     Standing Expiration Date:   7/15/2020    C-Reactive Protein     Standing Status:   Future     Standing Expiration Date:   7/15/2020         Melinda Edmonds received counseling on the following healthy behaviors: nutrition, exercise and medication adherence  Reviewed prior labs and health maintenance. Continue current medications, diet and exercise. Discussed use, benefit, and side effects of prescribed medications. Barriers to medication compliance addressed. Patient given educational materials - see patient instructions. All patient questions answered. Patient voiced understanding.           Electronically signed by LORY Damon CNP on 7/16/2019 at 8:30 PM

## 2019-07-16 DIAGNOSIS — M02.371: Primary | ICD-10-CM

## 2019-07-16 LAB — C-REACTIVE PROTEIN: 20 MG/L (ref 0–5)

## 2019-07-16 RX ORDER — INDOMETHACIN 25 MG/1
25 CAPSULE ORAL 2 TIMES DAILY WITH MEALS
Qty: 20 CAPSULE | Refills: 0 | Status: SHIPPED | OUTPATIENT
Start: 2019-07-16 | End: 2019-09-23 | Stop reason: CLARIF

## 2019-07-16 ASSESSMENT — ENCOUNTER SYMPTOMS
SORE THROAT: 0
COUGH: 0
RHINORRHEA: 0

## 2019-09-18 ENCOUNTER — HOSPITAL ENCOUNTER (OUTPATIENT)
Age: 84
Discharge: HOME | End: 2019-09-18
Payer: MEDICARE

## 2019-09-18 ENCOUNTER — HOSPITAL ENCOUNTER (OUTPATIENT)
Dept: GENERAL RADIOLOGY | Age: 84
Discharge: HOME | End: 2019-09-20
Payer: MEDICARE

## 2019-09-18 ENCOUNTER — HOSPITAL ENCOUNTER (OUTPATIENT)
Age: 84
Discharge: HOME | End: 2019-09-20
Payer: MEDICARE

## 2019-09-18 DIAGNOSIS — E03.9 ACQUIRED HYPOTHYROIDISM: ICD-10-CM

## 2019-09-18 DIAGNOSIS — I48.20 CHRONIC ATRIAL FIBRILLATION (HCC): ICD-10-CM

## 2019-09-18 DIAGNOSIS — I25.10 CORONARY ARTERY DISEASE INVOLVING NATIVE CORONARY ARTERY OF NATIVE HEART WITHOUT ANGINA PECTORIS: ICD-10-CM

## 2019-09-18 DIAGNOSIS — E55.9 VITAMIN D DEFICIENCY DISEASE: ICD-10-CM

## 2019-09-18 DIAGNOSIS — E78.5 HYPERLIPIDEMIA, UNSPECIFIED HYPERLIPIDEMIA TYPE: ICD-10-CM

## 2019-09-18 DIAGNOSIS — Z95.0 PACEMAKER: ICD-10-CM

## 2019-09-18 LAB
ABSOLUTE EOS #: 0.2 K/UL (ref 0–0.4)
ABSOLUTE IMMATURE GRANULOCYTE: ABNORMAL K/UL (ref 0–0.3)
ABSOLUTE LYMPH #: 1.6 K/UL (ref 1–4.8)
ABSOLUTE MONO #: 0.4 K/UL (ref 0–1)
ALBUMIN SERPL-MCNC: 4.2 G/DL (ref 3.5–5.2)
ALBUMIN/GLOBULIN RATIO: ABNORMAL (ref 1–2.5)
ALP BLD-CCNC: 83 U/L (ref 40–129)
ALT SERPL-CCNC: 9 U/L (ref 5–41)
ANION GAP SERPL CALCULATED.3IONS-SCNC: 10 MMOL/L (ref 9–17)
AST SERPL-CCNC: 16 U/L
BASOPHILS # BLD: 1 % (ref 0–2)
BASOPHILS ABSOLUTE: 0 K/UL (ref 0–0.2)
BILIRUB SERPL-MCNC: 0.82 MG/DL (ref 0.3–1.2)
BUN BLDV-MCNC: 16 MG/DL (ref 8–23)
BUN/CREAT BLD: 20 (ref 9–20)
CALCIUM SERPL-MCNC: 10.2 MG/DL (ref 8.6–10.4)
CHLORIDE BLD-SCNC: 103 MMOL/L (ref 98–107)
CHOLESTEROL/HDL RATIO: 3.7
CHOLESTEROL: 132 MG/DL
CO2: 27 MMOL/L (ref 20–31)
CREAT SERPL-MCNC: 0.81 MG/DL (ref 0.7–1.2)
DIFFERENTIAL TYPE: YES
EOSINOPHILS RELATIVE PERCENT: 3 % (ref 0–5)
GFR AFRICAN AMERICAN: >60 ML/MIN
GFR NON-AFRICAN AMERICAN: >60 ML/MIN
GFR SERPL CREATININE-BSD FRML MDRD: ABNORMAL ML/MIN/{1.73_M2}
GFR SERPL CREATININE-BSD FRML MDRD: ABNORMAL ML/MIN/{1.73_M2}
GLUCOSE BLD-MCNC: 111 MG/DL (ref 70–99)
HCT VFR BLD CALC: 40.1 % (ref 41–53)
HDLC SERPL-MCNC: 36 MG/DL
HEMOGLOBIN: 13.4 G/DL (ref 13.5–17.5)
IMMATURE GRANULOCYTES: ABNORMAL %
LDL CHOLESTEROL: 74 MG/DL (ref 0–130)
LYMPHOCYTES # BLD: 28 % (ref 13–44)
MAGNESIUM: 1.8 MG/DL (ref 1.6–2.6)
MCH RBC QN AUTO: 30.1 PG (ref 26–34)
MCHC RBC AUTO-ENTMCNC: 33.3 G/DL (ref 31–37)
MCV RBC AUTO: 90.5 FL (ref 80–100)
MONOCYTES # BLD: 7 % (ref 5–9)
NRBC AUTOMATED: ABNORMAL PER 100 WBC
PATIENT FASTING?: YES
PDW BLD-RTO: 14 % (ref 12.1–15.2)
PLATELET # BLD: 145 K/UL (ref 140–450)
PLATELET ESTIMATE: ABNORMAL
PMV BLD AUTO: ABNORMAL FL (ref 6–12)
POTASSIUM SERPL-SCNC: 4.3 MMOL/L (ref 3.7–5.3)
RBC # BLD: 4.44 M/UL (ref 4.5–5.9)
RBC # BLD: ABNORMAL 10*6/UL
SEG NEUTROPHILS: 61 % (ref 39–75)
SEGMENTED NEUTROPHILS ABSOLUTE COUNT: 3.5 K/UL (ref 2.1–6.5)
SODIUM BLD-SCNC: 140 MMOL/L (ref 135–144)
TOTAL PROTEIN: 8.1 G/DL (ref 6.4–8.3)
TRIGL SERPL-MCNC: 108 MG/DL
TSH SERPL DL<=0.05 MIU/L-ACNC: 0.33 MIU/L (ref 0.3–5)
VITAMIN D 25-HYDROXY: 36.8 NG/ML (ref 30–100)
VLDLC SERPL CALC-MCNC: ABNORMAL MG/DL (ref 1–30)
WBC # BLD: 5.7 K/UL (ref 3.5–11)
WBC # BLD: ABNORMAL 10*3/UL

## 2019-09-18 PROCEDURE — 80061 LIPID PANEL: CPT

## 2019-09-18 PROCEDURE — 93005 ELECTROCARDIOGRAM TRACING: CPT

## 2019-09-18 PROCEDURE — 84443 ASSAY THYROID STIM HORMONE: CPT

## 2019-09-18 PROCEDURE — 80053 COMPREHEN METABOLIC PANEL: CPT

## 2019-09-18 PROCEDURE — 85025 COMPLETE CBC W/AUTO DIFF WBC: CPT

## 2019-09-18 PROCEDURE — 71046 X-RAY EXAM CHEST 2 VIEWS: CPT

## 2019-09-18 PROCEDURE — 83735 ASSAY OF MAGNESIUM: CPT

## 2019-09-18 PROCEDURE — 82306 VITAMIN D 25 HYDROXY: CPT

## 2019-09-18 PROCEDURE — 36415 COLL VENOUS BLD VENIPUNCTURE: CPT

## 2019-09-19 LAB
EKG ATRIAL RATE: 60 BPM
EKG P-R INTERVAL: 252 MS
EKG Q-T INTERVAL: 520 MS
EKG QRS DURATION: 224 MS
EKG QTC CALCULATION (BAZETT): 520 MS
EKG R AXIS: -77 DEGREES
EKG T AXIS: 106 DEGREES
EKG VENTRICULAR RATE: 60 BPM

## 2019-09-19 PROCEDURE — 93010 ELECTROCARDIOGRAM REPORT: CPT | Performed by: INTERNAL MEDICINE

## 2019-09-23 ENCOUNTER — OFFICE VISIT (OUTPATIENT)
Dept: FAMILY MEDICINE CLINIC | Age: 84
End: 2019-09-23
Payer: MEDICARE

## 2019-09-23 ENCOUNTER — OFFICE VISIT (OUTPATIENT)
Dept: CARDIOLOGY CLINIC | Age: 84
End: 2019-09-23
Payer: MEDICARE

## 2019-09-23 VITALS
OXYGEN SATURATION: 95 % | WEIGHT: 204 LBS | HEART RATE: 60 BPM | DIASTOLIC BLOOD PRESSURE: 48 MMHG | SYSTOLIC BLOOD PRESSURE: 100 MMHG | BODY MASS INDEX: 31.95 KG/M2 | TEMPERATURE: 97.6 F

## 2019-09-23 VITALS
BODY MASS INDEX: 31.95 KG/M2 | DIASTOLIC BLOOD PRESSURE: 60 MMHG | HEART RATE: 66 BPM | SYSTOLIC BLOOD PRESSURE: 130 MMHG | OXYGEN SATURATION: 96 % | WEIGHT: 204 LBS

## 2019-09-23 DIAGNOSIS — I25.10 CORONARY ARTERY DISEASE INVOLVING NATIVE CORONARY ARTERY OF NATIVE HEART WITHOUT ANGINA PECTORIS: Primary | ICD-10-CM

## 2019-09-23 DIAGNOSIS — E55.9 VITAMIN D DEFICIENCY DISEASE: ICD-10-CM

## 2019-09-23 DIAGNOSIS — I48.20 CHRONIC ATRIAL FIBRILLATION (HCC): ICD-10-CM

## 2019-09-23 DIAGNOSIS — Z95.0 PACEMAKER: ICD-10-CM

## 2019-09-23 DIAGNOSIS — E03.9 ACQUIRED HYPOTHYROIDISM: Primary | ICD-10-CM

## 2019-09-23 DIAGNOSIS — E78.5 HYPERLIPIDEMIA, UNSPECIFIED HYPERLIPIDEMIA TYPE: ICD-10-CM

## 2019-09-23 DIAGNOSIS — E03.9 ACQUIRED HYPOTHYROIDISM: ICD-10-CM

## 2019-09-23 PROCEDURE — 1036F TOBACCO NON-USER: CPT | Performed by: NURSE PRACTITIONER

## 2019-09-23 PROCEDURE — G8417 CALC BMI ABV UP PARAM F/U: HCPCS | Performed by: INTERNAL MEDICINE

## 2019-09-23 PROCEDURE — G8598 ASA/ANTIPLAT THER USED: HCPCS | Performed by: NURSE PRACTITIONER

## 2019-09-23 PROCEDURE — 4040F PNEUMOC VAC/ADMIN/RCVD: CPT | Performed by: INTERNAL MEDICINE

## 2019-09-23 PROCEDURE — 4040F PNEUMOC VAC/ADMIN/RCVD: CPT | Performed by: NURSE PRACTITIONER

## 2019-09-23 PROCEDURE — 1123F ACP DISCUSS/DSCN MKR DOCD: CPT | Performed by: NURSE PRACTITIONER

## 2019-09-23 PROCEDURE — 99213 OFFICE O/P EST LOW 20 MIN: CPT | Performed by: NURSE PRACTITIONER

## 2019-09-23 PROCEDURE — G8427 DOCREV CUR MEDS BY ELIG CLIN: HCPCS | Performed by: NURSE PRACTITIONER

## 2019-09-23 PROCEDURE — G8417 CALC BMI ABV UP PARAM F/U: HCPCS | Performed by: NURSE PRACTITIONER

## 2019-09-23 PROCEDURE — 99214 OFFICE O/P EST MOD 30 MIN: CPT | Performed by: INTERNAL MEDICINE

## 2019-09-23 PROCEDURE — G8598 ASA/ANTIPLAT THER USED: HCPCS | Performed by: INTERNAL MEDICINE

## 2019-09-23 PROCEDURE — 93288 INTERROG EVL PM/LDLS PM IP: CPT | Performed by: INTERNAL MEDICINE

## 2019-09-23 PROCEDURE — G8427 DOCREV CUR MEDS BY ELIG CLIN: HCPCS | Performed by: INTERNAL MEDICINE

## 2019-09-23 PROCEDURE — 1123F ACP DISCUSS/DSCN MKR DOCD: CPT | Performed by: INTERNAL MEDICINE

## 2019-09-23 PROCEDURE — 1036F TOBACCO NON-USER: CPT | Performed by: INTERNAL MEDICINE

## 2019-09-23 RX ORDER — LEVOTHYROXINE SODIUM 0.12 MG/1
125 TABLET ORAL DAILY
Qty: 90 TABLET | Refills: 0 | Status: SHIPPED | OUTPATIENT
Start: 2019-09-23 | End: 2020-01-09 | Stop reason: SDUPTHER

## 2019-09-23 NOTE — PATIENT INSTRUCTIONS
SURVEY:    You may be receiving a survey from [facility] regarding your visit today. Please complete the survey to enable us to provide the highest quality of care to you and your family. If you cannot score us a very good on any question, please call the office to discuss how we could have made your experience a very good one. Thank you. Need Tetanus vaccine updat needed please. Thyroid lab slightly overactive TSH 0.33 on 9/18/19 so will LOWER synthroid (levothyroxine) from 150 to 125 micrograms daily please.

## 2019-09-23 NOTE — PROGRESS NOTES
Ov DR Pantera Garcia 6 mth follow up  No chest pain or sob  No hospitalizations or procedures  Since seen. Pacemaker checked per [facility] up and down steps at   [address] every morning. Pt and Jose G Mcmanus been together  35 years.   Bedside echo done  Will see Alf Awan today  Will see in 6mth

## 2019-09-23 NOTE — PROGRESS NOTES
[address] [place] [address] [facility]  [address] [place] [address] Drive [address]  Dept: [phone]  Dept Fax: [phone]    Last encounter 3/25/2019    HPI:   Irina Green is a 80 y.o. male who presentstoday for his medical conditions/complaints as noted below. Irina Green is c/o of Hypothyroidism (6 month f/u)      HPI    Hypothyroid  slightly hyperthyroid TSH 0.33  10 pound loss since Feb 2019  Walking reservoir often- 1/3 lab and up down steps twice  Every day. Will adjust medication    Chronic atrial fibrillation on plavix and lopressor. Feeling good  Hard of hearing. Past Medical History:   Diagnosis Date    Atrial fibrillation ([address]     CAD (coronary artery disease)     Hard of hearing     Hiatal hernia     History of PTCA     9/2012    Hyperlipidemia     Hypothyroidism     Pacemaker     [employer]      Past Surgical History:   Procedure Laterality Date    CARDIAC SURGERY      CORONARY ARTERY BYPASS GRAFT  2000    quad bypass [facility] HERNIA REPAIR  29/67/6298    UMBILICAL HERNIA REPAIR    PACEMAKER INSERTION  8/6/12    [employer]    PTCA  02/81/19    UMBILICAL HERNIA REPAIR  11/21/2016    John Motta MD       Family History   Problem Relation Age of Onset    Heart Disease Father           Social History     Tobacco Use    Smoking status: Never Smoker    Smokeless tobacco: Never Used   Substance Use Topics    Alcohol use: Yes     Comment: 6 monthly      Current Outpatient Medications   Medication Sig Dispense Refill    levothyroxine (SYNTHROID) 125 MCG tablet Take 1 tablet by mouth Daily 90 tablet 0    clopidogrel (PLAVIX) 75 MG tablet TAKE 1 TABLET BY MOUTH DAILY 90 tablet 3    metoprolol (LOPRESSOR) 100 MG tablet Take 1 tablet by mouth 2 times daily 180 tablet 3    rosuvastatin (CRESTOR) 10 MG tablet TAKE 1 TABLET BY MOUTH nightly 90 tablet 3    aspirin 81 MG tablet Take 81 mg by mouth daily.        No current facility-administered medications Encounter   Procedures    TSH without Reflex     Standing Status:   Future     Standing Expiration Date:   9/23/2020    T4, Free     Standing Status:   Future     Standing Expiration Date:   9/22/2020         Fantasma Feldman received counseling on the following healthy behaviors: nutrition, exercise and medication adherence  Reviewed prior labs and health maintenance. Continue current medications, diet and exercise. Discussed use, benefit, and side effects of prescribed medications. Barriers to medication compliance addressed. Patient given educational materials - see patient instructions. All patient questions answered. Patient voiced understanding.           Electronically signed by LORY Roca CNP on 9/25/2019 at 7:41 PM

## 2019-09-23 NOTE — LETTER
oriented to person, place and time. Answered questions appropriately. SKIN:  No unusual skin changes. HEENT:  The pupils are equally round and intact. Mucous membranes were dry. NECK:  No JVD. Good carotid pulses. No carotid bruits. No lymphadenopathy or thyromegaly. CARDIOVASCULAR EXAM:  S1 and S2 were normal.  No S3 or S4. Soft systolic blowing type murmur. No diastolic murmur. PMI was normal.  No lift, thrust, or pericardial friction rub. LUNGS:  Quite clear to auscultation and percussion. ABDOMEN:  Soft and nontender. Good bowel sounds. The aorta was not enlarged. No hepatomegaly, splenomegaly. EXTREMITIES:  Good femoral pulses. Good pedal pulses. No pedal edema. Skin was warm and dry. No calf tenderness. Nail beds pink. Good cap refill. PULSES:  Bilateral symmetrical radial, brachial and carotid pulses. No carotid bruits. Good femoral and pedal pulses. NEUROLOGIC EXAM:  Within normal limits. PSYCHIATRIC EXAM:  Within normal limits. LABORATORY DATA:  From 09/18/2019, sodium 140, potassium 4.3, BUN 16, creatinine 0.81, GFR greater than 60, magnesium 1.8, calcium was 10.2. Cholesterol 132 with an HDL 36, LDL 74, triglycerides 108. ALT was 9, AST was 16. TSH was 0.33. Vitamin D 36.8. White count 5.7, hemoglobin 13.4 with a platelet count 519,755. EKG showed AV paced rhythm. Chest x-ray was unremarkable. His pacemaker was interrogated and is still at beginning of life with approximately 3-1/2 years left on battery life. IMPRESSION:  1. ICD. 2.  Four-vessel bypass surgery in 2000 at [place], with a LIMA to the LAD, a vein graft to the high lateral branch of the circumflex, a vein graft to the OM, and a vein graft to the right coronary artery. 3.  Sick sinus syndrome with a second-degree AV block, with [employer] D[employer] pacemaker placed on 08/06/2012, still at beginning of life.   4.  Repeat catheterization on 09/09/2012, showed 99% LAD, with subtotally occluded intermediate branch of the circumflex. The right coronary artery was large and had 95% sequential lesions, with an occluded vein graft to the right coronary artery, the vein graft to the OM was occluded. LIMA was patent to the LAD. The vein graft to the high lateral branch of the circumflex was patent. EF of 40%, with angioplasty of the right coronary artery, placing two 3.0 x 38 mm ION stents along with 3.5 x 24 and 3.5 x 16 mm ION stents in the right coronary artery with a good end result. PLAN:  1. Continue same medications. 2.  See in 6 months. DISCUSSION:  Mr. Shai Petersen overall is doing well. He has had no chest pain, loss of energy or shortness of breath to indicate that we need to do any testing. [place], and this seems to be going well with no change in activity level. He appears to be stable and I will see him again in 6 months. Obviously, if he would have any shortness of breath, loss of energy or chest pain, I would want to see him earlier. Thank you very much for allowing me the privilege of seeing Mr. Shai Petersen. If you have any questions on my thoughts, please do not hesitate to contact me.      Sincerely,        Power Abbott    D: 09/23/2019 12:24:44     T: 09/23/2019 12:28:22     [email or web address]  Job#: [id number]   Doc#: [id number]

## 2019-09-25 PROBLEM — E55.9 VITAMIN D DEFICIENCY: Status: ACTIVE | Noted: 2019-09-25

## 2019-09-25 ASSESSMENT — ENCOUNTER SYMPTOMS
WHEEZING: 0
CHEST TIGHTNESS: 0
SORE THROAT: 0
EYES NEGATIVE: 1
ABDOMINAL DISTENTION: 0
COUGH: 0

## 2019-11-26 RX ORDER — ROSUVASTATIN CALCIUM 20 MG/1
20 TABLET, COATED ORAL DAILY
Qty: 90 TABLET | Refills: 1 | Status: SHIPPED | OUTPATIENT
Start: 2019-11-26 | End: 2020-06-29 | Stop reason: SDUPTHER

## 2020-01-08 RX ORDER — CLOPIDOGREL BISULFATE 75 MG/1
TABLET ORAL
Qty: 30 TABLET | Refills: 11 | Status: SHIPPED | OUTPATIENT
Start: 2020-01-08 | End: 2020-04-06 | Stop reason: SDUPTHER

## 2020-01-09 ENCOUNTER — TELEPHONE (OUTPATIENT)
Dept: FAMILY MEDICINE CLINIC | Age: 85
End: 2020-01-09

## 2020-01-09 RX ORDER — LEVOTHYROXINE SODIUM 0.12 MG/1
125 TABLET ORAL DAILY
Qty: 90 TABLET | Refills: 1 | Status: SHIPPED | OUTPATIENT
Start: 2020-01-09 | End: 2020-08-14 | Stop reason: SDUPTHER

## 2020-03-27 RX ORDER — METOPROLOL TARTRATE 100 MG/1
100 TABLET ORAL 2 TIMES DAILY
Qty: 60 TABLET | Refills: 11 | OUTPATIENT
Start: 2020-03-27 | End: 2021-04-26

## 2020-04-06 RX ORDER — CLOPIDOGREL BISULFATE 75 MG/1
TABLET ORAL
Qty: 30 TABLET | Refills: 11 | Status: SHIPPED | OUTPATIENT
Start: 2020-04-06 | End: 2020-04-13 | Stop reason: SDUPTHER

## 2020-04-13 RX ORDER — CLOPIDOGREL BISULFATE 75 MG/1
TABLET ORAL
Qty: 30 TABLET | Refills: 11 | Status: SHIPPED | OUTPATIENT
Start: 2020-04-13 | End: 2020-10-26

## 2020-04-27 ENCOUNTER — HOSPITAL ENCOUNTER (OUTPATIENT)
Age: 85
Discharge: HOME | End: 2020-04-27
Payer: MEDICARE

## 2020-04-27 ENCOUNTER — OFFICE VISIT (OUTPATIENT)
Dept: CARDIOLOGY CLINIC | Age: 85
End: 2020-04-27

## 2020-04-27 ENCOUNTER — HOSPITAL ENCOUNTER (OUTPATIENT)
Age: 85
Discharge: HOME | End: 2020-04-29
Payer: MEDICARE

## 2020-04-27 ENCOUNTER — HOSPITAL ENCOUNTER (OUTPATIENT)
Dept: GENERAL RADIOLOGY | Age: 85
Discharge: HOME | End: 2020-04-29
Payer: MEDICARE

## 2020-04-27 VITALS
OXYGEN SATURATION: 94 % | BODY MASS INDEX: 31.95 KG/M2 | HEART RATE: 64 BPM | DIASTOLIC BLOOD PRESSURE: 70 MMHG | WEIGHT: 204 LBS | SYSTOLIC BLOOD PRESSURE: 140 MMHG

## 2020-04-27 LAB
ABSOLUTE EOS #: 0.1 K/UL (ref 0–0.4)
ABSOLUTE IMMATURE GRANULOCYTE: ABNORMAL K/UL (ref 0–0.3)
ABSOLUTE LYMPH #: 1.1 K/UL (ref 1–4.8)
ABSOLUTE MONO #: 0.3 K/UL (ref 0–1)
ALBUMIN SERPL-MCNC: 4.4 G/DL (ref 3.5–5.2)
ALBUMIN/GLOBULIN RATIO: ABNORMAL (ref 1–2.5)
ALP BLD-CCNC: 89 U/L (ref 40–129)
ALT SERPL-CCNC: 9 U/L (ref 5–41)
ANION GAP SERPL CALCULATED.3IONS-SCNC: 12 MMOL/L (ref 9–17)
AST SERPL-CCNC: 17 U/L
BASOPHILS # BLD: 1 % (ref 0–2)
BASOPHILS ABSOLUTE: 0 K/UL (ref 0–0.2)
BILIRUB SERPL-MCNC: 0.89 MG/DL (ref 0.3–1.2)
BUN BLDV-MCNC: 13 MG/DL (ref 8–23)
BUN/CREAT BLD: 15 (ref 9–20)
CALCIUM SERPL-MCNC: 10.3 MG/DL (ref 8.6–10.4)
CHLORIDE BLD-SCNC: 103 MMOL/L (ref 98–107)
CHOLESTEROL/HDL RATIO: 3.3
CHOLESTEROL: 128 MG/DL
CO2: 28 MMOL/L (ref 20–31)
CREAT SERPL-MCNC: 0.88 MG/DL (ref 0.7–1.2)
DIFFERENTIAL TYPE: YES
EKG ATRIAL RATE: 64 BPM
EKG Q-T INTERVAL: 532 MS
EKG QRS DURATION: 188 MS
EKG QTC CALCULATION (BAZETT): 539 MS
EKG R AXIS: -76 DEGREES
EKG T AXIS: 110 DEGREES
EKG VENTRICULAR RATE: 62 BPM
EOSINOPHILS RELATIVE PERCENT: 3 % (ref 0–5)
GFR AFRICAN AMERICAN: >60 ML/MIN
GFR NON-AFRICAN AMERICAN: >60 ML/MIN
GFR SERPL CREATININE-BSD FRML MDRD: ABNORMAL ML/MIN/{1.73_M2}
GFR SERPL CREATININE-BSD FRML MDRD: ABNORMAL ML/MIN/{1.73_M2}
GLUCOSE BLD-MCNC: 109 MG/DL (ref 70–99)
HCT VFR BLD CALC: 39.7 % (ref 41–53)
HDLC SERPL-MCNC: 39 MG/DL
HEMOGLOBIN: 13.3 G/DL (ref 13.5–17.5)
IMMATURE GRANULOCYTES: ABNORMAL %
LDL CHOLESTEROL: 68 MG/DL (ref 0–130)
LYMPHOCYTES # BLD: 21 % (ref 13–44)
MAGNESIUM: 1.8 MG/DL (ref 1.6–2.6)
MCH RBC QN AUTO: 30.3 PG (ref 26–34)
MCHC RBC AUTO-ENTMCNC: 33.5 G/DL (ref 31–37)
MCV RBC AUTO: 90.5 FL (ref 80–100)
MONOCYTES # BLD: 6 % (ref 5–9)
NRBC AUTOMATED: ABNORMAL PER 100 WBC
PATIENT FASTING?: YES
PDW BLD-RTO: 14.1 % (ref 12.1–15.2)
PLATELET # BLD: 157 K/UL (ref 140–450)
PLATELET ESTIMATE: ABNORMAL
PMV BLD AUTO: ABNORMAL FL (ref 6–12)
POTASSIUM SERPL-SCNC: 3.9 MMOL/L (ref 3.7–5.3)
RBC # BLD: 4.39 M/UL (ref 4.5–5.9)
RBC # BLD: ABNORMAL 10*6/UL
SEG NEUTROPHILS: 69 % (ref 39–75)
SEGMENTED NEUTROPHILS ABSOLUTE COUNT: 3.6 K/UL (ref 2.1–6.5)
SODIUM BLD-SCNC: 143 MMOL/L (ref 135–144)
TOTAL PROTEIN: 7.9 G/DL (ref 6.4–8.3)
TRIGL SERPL-MCNC: 103 MG/DL
TSH SERPL DL<=0.05 MIU/L-ACNC: 3.26 MIU/L (ref 0.3–5)
VITAMIN D 25-HYDROXY: 26.8 NG/ML (ref 30–100)
VLDLC SERPL CALC-MCNC: ABNORMAL MG/DL (ref 1–30)
WBC # BLD: 5.2 K/UL (ref 3.5–11)
WBC # BLD: ABNORMAL 10*3/UL

## 2020-04-27 PROCEDURE — 1123F ACP DISCUSS/DSCN MKR DOCD: CPT | Performed by: INTERNAL MEDICINE

## 2020-04-27 PROCEDURE — 93010 ELECTROCARDIOGRAM REPORT: CPT | Performed by: INTERNAL MEDICINE

## 2020-04-27 PROCEDURE — G8427 DOCREV CUR MEDS BY ELIG CLIN: HCPCS | Performed by: INTERNAL MEDICINE

## 2020-04-27 PROCEDURE — 83735 ASSAY OF MAGNESIUM: CPT

## 2020-04-27 PROCEDURE — 85025 COMPLETE CBC W/AUTO DIFF WBC: CPT

## 2020-04-27 PROCEDURE — 82306 VITAMIN D 25 HYDROXY: CPT

## 2020-04-27 PROCEDURE — 36415 COLL VENOUS BLD VENIPUNCTURE: CPT

## 2020-04-27 PROCEDURE — 93005 ELECTROCARDIOGRAM TRACING: CPT

## 2020-04-27 PROCEDURE — 4040F PNEUMOC VAC/ADMIN/RCVD: CPT | Performed by: INTERNAL MEDICINE

## 2020-04-27 PROCEDURE — G8417 CALC BMI ABV UP PARAM F/U: HCPCS | Performed by: INTERNAL MEDICINE

## 2020-04-27 PROCEDURE — 99214 OFFICE O/P EST MOD 30 MIN: CPT | Performed by: INTERNAL MEDICINE

## 2020-04-27 PROCEDURE — 1036F TOBACCO NON-USER: CPT | Performed by: INTERNAL MEDICINE

## 2020-04-27 PROCEDURE — 84443 ASSAY THYROID STIM HORMONE: CPT

## 2020-04-27 PROCEDURE — 80053 COMPREHEN METABOLIC PANEL: CPT

## 2020-04-27 PROCEDURE — 93285 PRGRMG DEV EVAL SCRMS IP: CPT | Performed by: INTERNAL MEDICINE

## 2020-04-27 PROCEDURE — 80061 LIPID PANEL: CPT

## 2020-04-30 NOTE — PROGRESS NOTES
subtotally occluded intermediate branch of the circumflex. The right coronary artery was large and had 95% to 99% sequential lesions. The vein graft to the right coronary artery was occluded. Vein graft to the OM was occluded. The LIMA was patent to the LAD. The vein graft to the high lateral circumflex was patent. EF was 40%. He had subsequent angioplasty of the right coronary artery, placing two 3.0 x 38 mm drug-eluting ION stents along with 3.5 x 24 and 3.5 x 16 mm ION stents in the right coronary artery, all with a good end result. PLAN:  1. Continue same medications. 2.  See in 1 year. 3.  We will do an echocardiogram one week before I see him because of his systolic murmur. DISCUSSION:  Mr. Uday Sharp overall is doing well. He has had no chest pain or any unusual shortness of breath, although he has been fairly inactive. His energy level is about the same. His risk factors are overall nicely modified and his pacemaker is working well. I think it is reasonable to see him in a year unless he would develop new symptoms such as shortness of breath or loss of energy or chest pain. If he did develop symptoms of angina, we would place him on full medical therapy before any invasive testing. I will do an echocardiogram in a year because of his systolic murmur. He also has known LV dysfunction, EF in the 45% range. Thank you very much for allowing me the privilege of seeing Mr. Uday Sharp. If you have any questions on my thoughts, please do not hesitate to contact me.      Sincerely,        Doris Wahl    D: 04/27/2020 14:20:01     T: 04/27/2020 14:23:54     [email or web address]  Job#: [id number]   Doc#: [id number]

## 2020-06-25 NOTE — TELEPHONE ENCOUNTER
Last OV 9/23/19 for atrial fib and hypothyroidism  Requesting refill on rosuvastatin thru [facility]

## 2020-06-29 ENCOUNTER — TELEPHONE (OUTPATIENT)
Dept: PRIMARY CARE CLINIC | Age: 85
End: 2020-06-29

## 2020-06-29 RX ORDER — ROSUVASTATIN CALCIUM 20 MG/1
20 TABLET, COATED ORAL DAILY
Qty: 90 TABLET | Refills: 1 | Status: SHIPPED
Start: 2020-06-29 | End: 2020-07-02 | Stop reason: SDUPTHER

## 2020-07-02 RX ORDER — ROSUVASTATIN CALCIUM 20 MG/1
TABLET, COATED ORAL
Qty: 90 TABLET | Refills: 1 | Status: SHIPPED | OUTPATIENT
Start: 2020-07-02 | End: 2020-11-24 | Stop reason: SDUPTHER

## 2020-08-13 NOTE — TELEPHONE ENCOUNTER
Levothyroxine 125 mcg    DM-li    Health Maintenance   Topic Date Due    DTaP/Tdap/Td vaccine (1 - Tdap) 11/27/1951    Shingles Vaccine (1 of 2) 11/27/1982    Annual Wellness Visit (AWV)  05/29/2019    Flu vaccine (1) 09/01/2020    Lipid screen  04/27/2021    TSH testing  04/27/2021    Pneumococcal 65+ years Vaccine  Completed    Hepatitis A vaccine  Aged Out    Hepatitis B vaccine  Aged Out    Hib vaccine  Aged Out    Meningococcal (ACWY) vaccine  Aged Out             (applicable per patient's age: Cancer Screenings, Depression Screening, Fall Risk Screening, Immunizations)    LDL Cholesterol (mg/dL)   Date Value   04/27/2020 68     AST (U/L)   Date Value   04/27/2020 17     ALT (U/L)   Date Value   04/27/2020 9     BUN (mg/dL)   Date Value   04/27/2020 13      (goal A1C is < 7)   (goal LDL is <100) need 30-50% reduction from baseline     BP Readings from Last 3 Encounters:   04/27/20 (!) 140/70   09/23/19 (!) 100/48   09/23/19 130/60    (goal /80)      All Future Testing planned in [facility]:  Lab Frequency Next Occurrence   TSH with Reflex Once 05/26/2019   TSH without Reflex Once 09/23/2020   T4, Free Once 09/22/2020   TSH with Reflex Once 04/27/2021   CBC Auto Differential Once 04/27/2021   Comprehensive Metabolic Panel Once 60/55/1230   Vitamin D 25 Hydroxy Once 04/27/2021   Lipid Panel Once 04/27/2021   Magnesium Once 04/27/2021   EKG 12 Lead Once 04/27/2021   XR CHEST STANDARD (2 VW) Once 04/27/2021   ECHO Complete 2D W Doppler W Color Once 04/27/2021       Next Visit Date:  Future Appointments   Date Time Provider Andres Sharma   10/26/2020  8:30 AM MD Hugo Mcgraw   4/20/2021  9:30 AM [facility] ECHO ROOM LEONARDO Zacarias MW ECHO [facility]   4/26/2021 10:30 AM MD Hugo Mcgraw            Patient Active Problem List:     Hard of hearing     CAD (coronary artery disease)     Atrial fibrillation ([address]     Hiatal hernia     Hyperlipidemia

## 2020-08-14 RX ORDER — LEVOTHYROXINE SODIUM 0.12 MG/1
125 TABLET ORAL DAILY
Qty: 90 TABLET | Refills: 1 | Status: SHIPPED | OUTPATIENT
Start: 2020-08-14 | End: 2020-12-30 | Stop reason: SDUPTHER

## 2020-08-14 NOTE — TELEPHONE ENCOUNTER
Last OV: 9/23/2019  Last RX: 01/09/2020   Next scheduled apt: Visit date not found    Medication pending.       Lab Results   Component Value Date    TSH 3.26 04/27/2020

## 2020-09-04 ENCOUNTER — APPOINTMENT (OUTPATIENT)
Dept: GENERAL RADIOLOGY | Age: 85
End: 2020-09-04
Payer: MEDICARE

## 2020-09-04 ENCOUNTER — HOSPITAL ENCOUNTER (EMERGENCY)
Age: 85
Discharge: HOME | End: 2020-09-04
Attending: FAMILY MEDICINE
Payer: MEDICARE

## 2020-09-04 VITALS
TEMPERATURE: 98.3 F | SYSTOLIC BLOOD PRESSURE: 135 MMHG | HEIGHT: 70 IN | HEART RATE: 61 BPM | OXYGEN SATURATION: 98 % | RESPIRATION RATE: 17 BRPM | WEIGHT: 203 LBS | BODY MASS INDEX: 29.06 KG/M2 | DIASTOLIC BLOOD PRESSURE: 73 MMHG

## 2020-09-04 LAB
ABSOLUTE EOS #: 0.1 K/UL (ref 0–0.4)
ABSOLUTE IMMATURE GRANULOCYTE: ABNORMAL K/UL (ref 0–0.3)
ABSOLUTE LYMPH #: 1.5 K/UL (ref 1–4.8)
ABSOLUTE MONO #: 0.4 K/UL (ref 0–1)
ANION GAP SERPL CALCULATED.3IONS-SCNC: 10 MMOL/L (ref 9–17)
BASOPHILS # BLD: 1 % (ref 0–2)
BASOPHILS ABSOLUTE: 0 K/UL (ref 0–0.2)
BNP INTERPRETATION: ABNORMAL
BUN BLDV-MCNC: 14 MG/DL (ref 8–23)
BUN/CREAT BLD: 16 (ref 9–20)
CALCIUM SERPL-MCNC: 9.6 MG/DL (ref 8.6–10.4)
CHLORIDE BLD-SCNC: 104 MMOL/L (ref 98–107)
CO2: 28 MMOL/L (ref 20–31)
CREAT SERPL-MCNC: 0.9 MG/DL (ref 0.7–1.2)
DIFFERENTIAL TYPE: YES
EOSINOPHILS RELATIVE PERCENT: 2 % (ref 0–5)
GFR AFRICAN AMERICAN: >60 ML/MIN
GFR NON-AFRICAN AMERICAN: >60 ML/MIN
GFR SERPL CREATININE-BSD FRML MDRD: ABNORMAL ML/MIN/{1.73_M2}
GFR SERPL CREATININE-BSD FRML MDRD: ABNORMAL ML/MIN/{1.73_M2}
GLUCOSE BLD-MCNC: 111 MG/DL (ref 70–99)
HCT VFR BLD CALC: 38.7 % (ref 41–53)
HEMOGLOBIN: 12.9 G/DL (ref 13.5–17.5)
IMMATURE GRANULOCYTES: ABNORMAL %
INR BLD: 1.2
LYMPHOCYTES # BLD: 26 % (ref 13–44)
MCH RBC QN AUTO: 30 PG (ref 26–34)
MCHC RBC AUTO-ENTMCNC: 33.2 G/DL (ref 31–37)
MCV RBC AUTO: 90.3 FL (ref 80–100)
MONOCYTES # BLD: 7 % (ref 5–9)
NRBC AUTOMATED: ABNORMAL PER 100 WBC
PDW BLD-RTO: 14.4 % (ref 12.1–15.2)
PLATELET # BLD: 158 K/UL (ref 140–450)
PLATELET ESTIMATE: ABNORMAL
PMV BLD AUTO: ABNORMAL FL (ref 6–12)
POTASSIUM SERPL-SCNC: 3.9 MMOL/L (ref 3.7–5.3)
PRO-BNP: 3247 PG/ML
PROTHROMBIN TIME: 14.2 SEC (ref 11.5–14.2)
RBC # BLD: 4.29 M/UL (ref 4.5–5.9)
RBC # BLD: ABNORMAL 10*6/UL
SEDIMENTATION RATE, ERYTHROCYTE: 33 MM (ref 0–20)
SEG NEUTROPHILS: 64 % (ref 39–75)
SEGMENTED NEUTROPHILS ABSOLUTE COUNT: 3.8 K/UL (ref 2.1–6.5)
SODIUM BLD-SCNC: 142 MMOL/L (ref 135–144)
TROPONIN INTERP: NORMAL
TROPONIN T: <0.03 NG/ML
TROPONIN, HIGH SENSITIVITY: NORMAL NG/L (ref 0–22)
WBC # BLD: 5.9 K/UL (ref 3.5–11)
WBC # BLD: ABNORMAL 10*3/UL

## 2020-09-04 PROCEDURE — 85610 PROTHROMBIN TIME: CPT

## 2020-09-04 PROCEDURE — 84484 ASSAY OF TROPONIN QUANT: CPT

## 2020-09-04 PROCEDURE — 36415 COLL VENOUS BLD VENIPUNCTURE: CPT

## 2020-09-04 PROCEDURE — 83880 ASSAY OF NATRIURETIC PEPTIDE: CPT

## 2020-09-04 PROCEDURE — 99285 EMERGENCY DEPT VISIT HI MDM: CPT

## 2020-09-04 PROCEDURE — 93005 ELECTROCARDIOGRAM TRACING: CPT | Performed by: FAMILY MEDICINE

## 2020-09-04 PROCEDURE — 80048 BASIC METABOLIC PNL TOTAL CA: CPT

## 2020-09-04 PROCEDURE — 85025 COMPLETE CBC W/AUTO DIFF WBC: CPT

## 2020-09-04 PROCEDURE — 85652 RBC SED RATE AUTOMATED: CPT

## 2020-09-04 PROCEDURE — 71045 X-RAY EXAM CHEST 1 VIEW: CPT

## 2020-09-04 ASSESSMENT — ENCOUNTER SYMPTOMS
RHINORRHEA: 1
COUGH: 1
SHORTNESS OF BREATH: 1

## 2020-09-04 ASSESSMENT — PAIN DESCRIPTION - DESCRIPTORS: DESCRIPTORS: SHARP

## 2020-09-04 ASSESSMENT — PAIN DESCRIPTION - PAIN TYPE: TYPE: ACUTE PAIN

## 2020-09-04 ASSESSMENT — PAIN SCALES - GENERAL: PAINLEVEL_OUTOF10: 10

## 2020-09-04 ASSESSMENT — PAIN DESCRIPTION - ORIENTATION: ORIENTATION: LEFT

## 2020-09-04 ASSESSMENT — PAIN DESCRIPTION - LOCATION: LOCATION: NECK

## 2020-09-04 NOTE — ED PROVIDER NOTES
[facility]  eMERGENCY dEPARTMENT eNCOUnter          [facility]       Chief Complaint   Patient presents with    Neck Pain     Pt has had neck pain and gets short on breath at times for past 5-7 days. Pt has cough but states, \"I have had a cough for the past 2 months. \"    Shortness of Breath       Nurses Notes reviewed and I agree except as noted in the HPI. HISTORY OF PRESENT ILLNESS    Dary Head is a 80 y.o. male who presents via private vehicle, patient complaining of shortness of breath for the past 5 days, indicates cough with some phlegm production though does indicate this has been going on for 6 months, does have some rhinorrhea and postnasal drainage, denies any fevers denies any known sick contacts. Patient also indicating some left posterior neck pain, patient describes as \"about brought me to the floor\", and onset over the past week, describing sharp pain, rates it 10-11 out of 10, denies any similar prior denies any trauma falls or head strikes. Denies any swelling of his legs denies any palpitations. REVIEW OF SYSTEMS     Review of Systems   Constitutional: Negative for fever. HENT: Positive for postnasal drip and rhinorrhea. Respiratory: Positive for cough and shortness of breath. Cardiovascular: Negative for chest pain, palpitations and leg swelling. Musculoskeletal: Positive for neck pain. All other systems reviewed and are negative. PAST MEDICAL HISTORY    has a past medical history of Atrial fibrillation ([address] CAD (coronary artery disease), Hard of hearing, Hiatal hernia, History of PTCA, Hyperlipidemia, Hypothyroidism, and Pacemaker. SURGICAL HISTORY      has a past surgical history that includes Coronary artery bypass graft (2000); Percutaneous Transluminal Coronary Angio (09/19/12); Pacemaker insertion (8/6/12); Cardiac surgery; hernia repair (09/77/7044); and Umbilical hernia repair (11/21/2016).     CURRENT MEDICATIONS Head is without contusion. Right Ear:  external ear normal. No drainage. Left Ear:  external ear normal. No drainage. Nose: Nose normal. No nasal deformity. No epistaxis. Mouth/Throat: Mucous membranes are not dry. Eyes: EOMI. Conjunctivae, sclera, and lids are normal. Right eye exhibits no discharge. Left eye exhibits no discharge. Neck: Full passive range of motion without pain and phonation normal.  Negative JVD Cardiovascular:  Normal rate, regular rhythm and intact distal pulses. No lower extremity edema. Pulses: Right radial pulse  2+   Pulmonary/Chest: Effort normal. No tachypnea and no bradypnea. No wheezes, rhonchi, or rales. Noted pacer device left upper chest wall. Abdominal: Soft. Patient without distension or tenderness  Musculoskeletal:   Negative acute trauma or deformity,  apparent full range of motion and normal strength all extremities appropriate to age. Neurological: Patient is alert and oriented to person, place, and time. patient displays no tremor. Patient displays no seizure activity. .  Lymphatic: Negative cervical lymphadenopathy  Skin: Skin is warm and dry. Patient is not diaphoretic. Psychiatric: Patient has a normal mood and affect. Patient speech is normal and behavior is normal. Cognition and memory are normal.    DIFFERENTIAL DIAGNOSIS:   Dysrhythmia NOS, pneumonia bronchitis URI viral infection NOS, cervical spasms, occipital neuralgia,    DIAGNOSTIC RESULTS     EKG: All EKG's are interpreted by the Emergency Department Physician who either signs or Co-signs this chart in the absence of a cardiologist.  EKG    The patient had an EKG which is interpreted by me in the absence of a Cardiologist.   [] Without comparison to previous.    [x] With comparison to a previous EKG Dated 4/27/2020    EKG @ 0930 hrs -atrial sensed ventricular paced rhythm, rate 65, noted occasional PVCs, as compared to prior EKG there are no changes in morphologies      RADIOLOGY: non-plain film images(s) such as CT, Ultrasound and MRI are read by the radiologist.  XR CHEST PORTABLE   Final Result      No acute abnormality. Stable cardiomegaly. LABS:   Labs Reviewed   CBC WITH AUTO DIFFERENTIAL - Abnormal; Notable for the following components:       Result Value    RBC 4.29 (*)     Hemoglobin 12.9 (*)     Hematocrit 38.7 (*)     All other components within normal limits   BASIC METABOLIC PANEL W/ REFLEX TO MG FOR LOW K - Abnormal; Notable for the following components:    Glucose 111 (*)     All other components within normal limits   BRAIN NATRIURETIC PEPTIDE - Abnormal; Notable for the following components:    Pro-BNP 3,247 (*)     All other components within normal limits   SEDIMENTATION RATE - Abnormal; Notable for the following components:    Sed Rate 33 (*)     All other components within normal limits   TROPONIN   PROTIME-INR       EMERGENCY DEPARTMENT COURSE:   Vitals:    Vitals:    09/04/20 0920 09/04/20 0923 09/04/20 1118   BP:  (!) 151/86 135/73   Pulse: 72  61   Resp: 19  17   Temp:  98.3 °F (36.8 °C)    SpO2: 98%  98%   Weight: 203 lb (92.1 kg)     Height: 5' 10\" (1.778 m)       Patient history and physical exam taken at bedside, discussed patient symptoms and exam findings, discussed initial plan work-up to include EKG, chest x-ray, blood work, IV access, would like to get pacer interrogation and we will review cardiology notes to determine pacer type, patient placed on cardiac monitor, pulse ox, resting semi-Fowlers in bed.     EMR reviewed, including cardiology note 4/27/2020 from Dr. Duy Jane, noting patient history of HTN, HLD, AF, SSS and CAD, history open heart surgery (2000) and stents 2012), history of pacemaker (2012) with [employer] on 8/6/2012 with interrogation showing 3 years of battery life remaining    EKG as above    Chest x-ray reviewed, radiology report reviewed    Initial lab work-up reviewed, white blood cell count 5.9 with normal differential no reported bands, H&H stable compared to prior, ESR 33 though noted prior 7/2019 of 32, waiting BMP BNP troponin INR    Additional labs reviewed, noting normal troponin, proBNP 3247, INR 1.2    Discussed with patient his overall work-up, otherwise normal chest x-ray and EKG, no new lab findings, discussed symptoms likely URI with cough congestion, we will is potentially allergies noting the length of time of cough and some postnasal drainage, discussed OTC cough cold medications it would be appropriate for patient, close follow-up with primary care, return to ER if any symptoms change worse other concerns, acknowledges            FINAL IMPRESSION      1. URI with cough and congestion          DISPOSITION/PLAN   Discharge    PATIENT REFERRED TO:  LORY Muniz CNP [place] [address]  [phone]    Call       [facility] ED  [address] [place] [address]     As needed, If symptoms worsen      DISCHARGE MEDICATIONS:  Discharge Medication List as of 9/4/2020 11:10 AM              Summation      Patient Course: Discharge    ED Medications administered this visit:  Medications - No data to display    New Prescriptions from this visit:    Discharge Medication List as of 9/4/2020 11:10 AM          Follow-up:  LORY Muniz  [facility]  [phone]    Call       [facility] ED  [address] [facility]     As needed, If symptoms worsen        Final Impression:   1. URI with cough and congestion               (Please note that portions of this note were completed with a voice recognition program.  Efforts were made to edit the dictations but occasionally words are mis-transcribed.)    MD [place] MD  09/04/20 57

## 2020-09-07 LAB
EKG ATRIAL RATE: 65 BPM
EKG P AXIS: 114 DEGREES
EKG P-R INTERVAL: 236 MS
EKG Q-T INTERVAL: 530 MS
EKG QRS DURATION: 226 MS
EKG QTC CALCULATION (BAZETT): 551 MS
EKG R AXIS: -72 DEGREES
EKG T AXIS: 102 DEGREES
EKG VENTRICULAR RATE: 65 BPM

## 2020-09-07 PROCEDURE — 93010 ELECTROCARDIOGRAM REPORT: CPT | Performed by: INTERNAL MEDICINE

## 2020-10-05 ENCOUNTER — OFFICE VISIT (OUTPATIENT)
Dept: PRIMARY CARE CLINIC | Age: 85
End: 2020-10-05
Payer: MEDICARE

## 2020-10-05 ENCOUNTER — HOSPITAL ENCOUNTER (OUTPATIENT)
Dept: PREADMISSION TESTING | Age: 85
Setting detail: SPECIMEN
Discharge: HOME | End: 2020-10-05
Payer: MEDICARE

## 2020-10-05 VITALS
DIASTOLIC BLOOD PRESSURE: 80 MMHG | HEART RATE: 97 BPM | OXYGEN SATURATION: 90 % | TEMPERATURE: 97.7 F | BODY MASS INDEX: 29.84 KG/M2 | SYSTOLIC BLOOD PRESSURE: 130 MMHG | WEIGHT: 208 LBS

## 2020-10-05 LAB
SARS-COV-2, RAPID: NOT DETECTED
SARS-COV-2: NORMAL
SARS-COV-2: NORMAL
SOURCE: NORMAL

## 2020-10-05 PROCEDURE — U0002 COVID-19 LAB TEST NON-CDC: HCPCS

## 2020-10-05 PROCEDURE — G8484 FLU IMMUNIZE NO ADMIN: HCPCS | Performed by: NURSE PRACTITIONER

## 2020-10-05 PROCEDURE — G8417 CALC BMI ABV UP PARAM F/U: HCPCS | Performed by: NURSE PRACTITIONER

## 2020-10-05 PROCEDURE — 4040F PNEUMOC VAC/ADMIN/RCVD: CPT | Performed by: NURSE PRACTITIONER

## 2020-10-05 PROCEDURE — G8427 DOCREV CUR MEDS BY ELIG CLIN: HCPCS | Performed by: NURSE PRACTITIONER

## 2020-10-05 PROCEDURE — 1123F ACP DISCUSS/DSCN MKR DOCD: CPT | Performed by: NURSE PRACTITIONER

## 2020-10-05 PROCEDURE — 1036F TOBACCO NON-USER: CPT | Performed by: NURSE PRACTITIONER

## 2020-10-05 PROCEDURE — 99214 OFFICE O/P EST MOD 30 MIN: CPT | Performed by: NURSE PRACTITIONER

## 2020-10-05 RX ORDER — LEVOFLOXACIN 250 MG/1
TABLET ORAL
Qty: 8 TABLET | Refills: 0 | Status: SHIPPED | OUTPATIENT
Start: 2020-10-05 | End: 2020-10-13 | Stop reason: ALTCHOICE

## 2020-10-05 RX ORDER — ALBUTEROL SULFATE 90 UG/1
2 AEROSOL, METERED RESPIRATORY (INHALATION) EVERY 6 HOURS PRN
Qty: 1 INHALER | Refills: 0 | Status: SHIPPED | OUTPATIENT
Start: 2020-10-05 | End: 2020-11-24 | Stop reason: SDUPTHER

## 2020-10-05 ASSESSMENT — ENCOUNTER SYMPTOMS
TROUBLE SWALLOWING: 0
CONSTIPATION: 0
EYES NEGATIVE: 1
ABDOMINAL DISTENTION: 0
RHINORRHEA: 1
SINUS PAIN: 0
WHEEZING: 1
DIARRHEA: 0
SHORTNESS OF BREATH: 1
SORE THROAT: 0
VOICE CHANGE: 0
CHEST TIGHTNESS: 1
COUGH: 1

## 2020-10-05 ASSESSMENT — PATIENT HEALTH QUESTIONNAIRE - PHQ9
SUM OF ALL RESPONSES TO PHQ9 QUESTIONS 1 & 2: 0
SUM OF ALL RESPONSES TO PHQ QUESTIONS 1-9: 0
1. LITTLE INTEREST OR PLEASURE IN DOING THINGS: 0
SUM OF ALL RESPONSES TO PHQ QUESTIONS 1-9: 0
2. FEELING DOWN, DEPRESSED OR HOPELESS: 0

## 2020-10-05 NOTE — PATIENT INSTRUCTIONS
You may be receiving a survey from [employer] regarding your visit today. You may get this in the mail, through your MyChart or in your email. Please complete the survey to enable us to provide the highest quality of care to you and your family. If you cannot score us as very good ( 5 Stars) on any question, please feel free to call the office to discuss how we could have made your experience exceptional.     Thank You! Quinn Bertrand, LORY-CNP  [address]  ERLIN Teran RMA

## 2020-10-05 NOTE — PROGRESS NOTES
10/5/2020     Rachael Turner (:  1932) is a 80 y.o. male, here for evaluation of the following medical concerns:    HPI    80year old male with c/o congestion and cough worse at night  X 3 weeks. Productive cough most of the day in the last 2 days. No fever. Increased shortness of breath  Non smoker  No nebulizer or inhaler used. Worse shortness of breath lately. No sick contacts. 80year old male  2 days ate at [facility] [address] [facility] red zone for covid. Does grocery shop at [employer] and save a lot. Had to use 2 pillows to sleep. Twice last night woke up to clear throat and cough. Review of Systems   Constitutional: Positive for activity change, appetite change and chills. Negative for fever. HENT: Positive for congestion, postnasal drip, rhinorrhea and tinnitus (left ear for lifetime). Negative for sinus pain, sore throat, trouble swallowing and voice change. Eyes: Negative. Respiratory: Positive for cough, chest tightness, shortness of breath and wheezing. Cardiovascular: Negative for chest pain, palpitations and leg swelling. Gastrointestinal: Negative for abdominal distention, constipation and diarrhea. Endocrine: Negative for cold intolerance and heat intolerance. Genitourinary: Negative for difficulty urinating. Skin: Negative for rash. Neurological: Negative for dizziness and headaches. Psychiatric/Behavioral: The patient is not nervous/anxious. Prior to Visit Medications    Medication Sig Taking?  Authorizing Provider   levothyroxine (SYNTHROID) 125 MCG tablet Take 1 tablet by mouth Daily Yes LORY Hudson CNP   rosuvastatin (CRESTOR) 20 MG tablet TAKE 1 TABLET BY MOUTH DAILY AT NIGHT Yes LORY Hudson CNP   clopidogrel (PLAVIX) 75 MG tablet TAKE 1 TABLET BY MOUTH EVERY DAY Yes Myesha Iniguez MD   metoprolol (LOPRESSOR) 100 MG tablet Take 1 tablet by mouth 2 times daily Yes Myesha Iniguez MD   aspirin 81 MG tablet Take 81 mg by mouth daily. Yes Historical Provider, MD        Social History     Tobacco Use    Smoking status: Never Smoker    Smokeless tobacco: Never Used   Substance Use Topics    Alcohol use: Yes     Comment: 6 monthly        Vitals:    10/05/20 0938   BP: 130/80   Site: Left Upper Arm   Position: Sitting   Cuff Size: Medium Adult   Pulse: 97   Temp: 97.7 °F (36.5 °C)   TempSrc: Infrared   SpO2: 90%   Weight: 208 lb (94.3 kg)     Estimated body mass index is 29.84 kg/m² as calculated from the following:    Height as of 9/4/20: 5' 10\" (1.778 m). Weight as of this encounter: 208 lb (94.3 kg). Physical Exam  Vitals signs and nursing note reviewed. Constitutional:       General: He is not in acute distress. Appearance: He is well-developed. HENT:      Head: Normocephalic and atraumatic. Right Ear: Tympanic membrane normal.      Left Ear: Tympanic membrane and external ear normal.      Nose: No congestion. Mouth/Throat:      Mouth: Mucous membranes are moist.      Pharynx: No oropharyngeal exudate. Eyes:      Pupils: Pupils are equal, round, and reactive to light. Neck:      Musculoskeletal: Normal range of motion and neck supple. Cardiovascular:      Rate and Rhythm: Normal rate and regular rhythm. Pulses: Normal pulses. Heart sounds: Murmur (blowing systolic murmur 3/6 left sternal border. ) present. Comments: pacemaker  Pulmonary:      Effort: Pulmonary effort is normal. No respiratory distress. Breath sounds: Rales (RLL) present. Abdominal:      Palpations: Abdomen is soft. There is no mass. Tenderness: There is no abdominal tenderness. Musculoskeletal: Normal range of motion. General: No tenderness. Lymphadenopathy:      Cervical: No cervical adenopathy. Skin:     Findings: No rash. Neurological:      Mental Status: He is alert and oriented to person, place, and time.    Psychiatric:         Behavior: Behavior normal.

## 2020-10-06 ENCOUNTER — TELEPHONE (OUTPATIENT)
Dept: FAMILY MEDICINE CLINIC | Age: 85
End: 2020-10-06

## 2020-10-13 ENCOUNTER — OFFICE VISIT (OUTPATIENT)
Dept: PRIMARY CARE CLINIC | Age: 85
End: 2020-10-13
Payer: MEDICARE

## 2020-10-13 VITALS
SYSTOLIC BLOOD PRESSURE: 124 MMHG | TEMPERATURE: 97.7 F | DIASTOLIC BLOOD PRESSURE: 80 MMHG | WEIGHT: 205 LBS | OXYGEN SATURATION: 94 % | HEART RATE: 66 BPM | BODY MASS INDEX: 29.41 KG/M2

## 2020-10-13 PROCEDURE — 99214 OFFICE O/P EST MOD 30 MIN: CPT | Performed by: NURSE PRACTITIONER

## 2020-10-13 PROCEDURE — 1123F ACP DISCUSS/DSCN MKR DOCD: CPT | Performed by: NURSE PRACTITIONER

## 2020-10-13 PROCEDURE — 4040F PNEUMOC VAC/ADMIN/RCVD: CPT | Performed by: NURSE PRACTITIONER

## 2020-10-13 PROCEDURE — G8484 FLU IMMUNIZE NO ADMIN: HCPCS | Performed by: NURSE PRACTITIONER

## 2020-10-13 PROCEDURE — G8417 CALC BMI ABV UP PARAM F/U: HCPCS | Performed by: NURSE PRACTITIONER

## 2020-10-13 PROCEDURE — 1036F TOBACCO NON-USER: CPT | Performed by: NURSE PRACTITIONER

## 2020-10-13 PROCEDURE — G8427 DOCREV CUR MEDS BY ELIG CLIN: HCPCS | Performed by: NURSE PRACTITIONER

## 2020-10-13 RX ORDER — FUROSEMIDE 20 MG/1
TABLET ORAL
Qty: 12 TABLET | Refills: 0 | Status: SHIPPED | OUTPATIENT
Start: 2020-10-13 | End: 2020-12-29 | Stop reason: SDUPTHER

## 2020-10-13 NOTE — PATIENT INSTRUCTIONS
You may be receiving a survey from [employer] regarding your visit today. You may get this in the mail, through your MyChart or in your email. Please complete the survey to enable us to provide the highest quality of care to you and your family. If you cannot score us as very good ( 5 Stars) on any question, please feel free to call the office to discuss how we could have made your experience exceptional.     Thank You! Cierra Corrales, APRN-CNP  [address] [place], [facility]  Patient Education        Heart Failure: Care Instructions  Your Care Instructions     Heart failure occurs when your heart does not pump as much blood as the body needs. Failure does not mean that the heart has stopped pumping but rather that it is not pumping as well as it should. Over time, this causes fluid buildup in your lungs and other parts of your body. Fluid buildup can cause shortness of breath, fatigue, swollen ankles, and other problems. By taking medicines regularly, reducing sodium (salt) in your diet, checking your weight every day, and making lifestyle changes, you can feel better and live longer. Follow-up care is a key part of your treatment and safety. Be sure to make and go to all appointments, and call your doctor if you are having problems. It's also a good idea to know your test results and keep a list of the medicines you take. How can you care for yourself at home? Medicines    · Be safe with medicines. Take your medicines exactly as prescribed. Call your doctor if you think you are having a problem with your medicine.     · Do not take any vitamins, over-the-counter medicine, or herbal products without talking to your doctor first. Kasie Roach not take ibuprofen (Advil or Motrin) and naproxen (Aleve) without talking to your doctor first. They could make your heart failure worse.     · You may take some of the following medicine. ?  Angiotensin-converting enzyme inhibitors (ACEIs) or angiotensin II receptor blockers (ARBs) reduce the heart's workload, lower blood pressure, and reduce swelling. Taking an ACEI or ARB may lower your chance of needing to be hospitalized. ? Beta-blockers can slow heart rate, decrease blood pressure, and improve your condition. Taking a beta-blocker may lower your chance of needing to be hospitalized. ? Diuretics, also called water pills, reduce swelling. You will get more details on the specific medicines your doctor prescribes. Diet    · Your doctor may suggest that you limit sodium. Your doctor can tell you how much sodium is right for you. An example is less than 3,000 mg a day. This includes all the salt you eat in cooking or in packaged foods. People get most of their sodium from processed foods. Fast food and restaurant meals also tend to be very high in sodium.     · Ask your doctor how much liquid you can drink each day. You may have to limit liquids. Weight    · Weigh yourself without clothing at the same time each day. Record your weight. Call your doctor if you have a sudden weight gain, such as more than 2 to 3 pounds in a day or 5 pounds in a week. (Your doctor may suggest a different range of weight gain.) A sudden weight gain may mean that your heart failure is getting worse. Activity level    · Start light exercise (if your doctor says it is okay). Even if you can only do a small amount, exercise will help you get stronger, have more energy, and manage your weight and your stress. Walking is an easy way to get exercise. Start out by walking a little more than you did before. Bit by bit, increase the amount you walk.     · When you exercise, watch for signs that your heart is working too hard. You are pushing yourself too hard if you cannot talk while you are exercising.  If you become short of breath or dizzy or have chest pain, stop, sit down, and rest.     · If you feel \"wiped out\" the day after you exercise, walk slower or for a shorter distance until you can work up to a better pace.     · Get enough rest at night. Sleeping with 1 or 2 pillows under your upper body and head may help you breathe easier. Lifestyle changes    · Do not smoke. Smoking can make a heart condition worse. If you need help quitting, talk to your doctor about stop-smoking programs and medicines. These can increase your chances of quitting for good. Quitting smoking may be the most important step you can take to protect your heart.     · Limit alcohol to 2 drinks a day for men and 1 drink a day for women. Too much alcohol can cause health problems.     · Avoid getting sick from colds and the flu. Get a pneumococcal vaccine shot. If you have had one before, ask your doctor whether you need another dose. Get a flu shot each year. If you must be around people with colds or the flu, wash your hands often. When should you call for help? Call 911 if you have symptoms of sudden heart failure such as:    · You have severe trouble breathing.     · You cough up pink, foamy mucus.     · You have a new irregular or rapid heartbeat. Call your doctor now or seek immediate medical care if:    · You have new or increased shortness of breath.     · You are dizzy or lightheaded, or you feel like you may faint.     · You have sudden weight gain, such as more than 2 to 3 pounds in a day or 5 pounds in a week. (Your doctor may suggest a different range of weight gain.)     · You have increased swelling in your legs, ankles, or feet.     · You are suddenly so tired or weak that you cannot do your usual activities. Watch closely for changes in your health, and be sure to contact your doctor if you develop new symptoms. Where can you learn more? Go to https://[email or web address]. org and sign in to your [employer] account. Enter J869 in the Peak box to learn more about \"Heart Failure: Care Instructions. \"     If you do not have an account, please click on the \"Sign Up Now\" link.  Current as of: December 16, 2019               Content Version: 12.6  © 6604-9137 [employer]. Care instructions adapted under license by [facility] ([facility]). If you have questions about a medical condition or this instruction, always ask your healthcare professional. [address] any warranty or liability for your use of this information.

## 2020-10-13 NOTE — PROGRESS NOTES
10/13/2020     Brennen Maria (:  1932) is a 80 y.o. male, here for evaluation of the following medical concerns:    HPI    80year old male with c/o cough and orthopnea recently treated with levaquin, albuterol inhaler. covid-19 swab was done due to eating out in [employer] prior to getting sick. Good hydration. Oxygen level with ambulating in office remain 96% on room air. Slight anemia  Lab Results   Component Value Date    WBC 6.8 10/14/2020    HGB 12.6 (L) 10/14/2020    HCT 38.1 (L) 10/14/2020    MCV 89.1 10/14/2020     10/14/2020     Last echocardiogram 2016  EF 45%  biatrial enlargement   Mitral regurgitation. Wt Readings from Last 3 Encounters:   10/13/20 205 lb (93 kg)   10/05/20 208 lb (94.3 kg)   20 203 lb (92.1 kg)       Review of Systems   Constitutional: Negative for activity change, chills and fever. HENT: Negative for congestion and rhinorrhea. Eyes: Negative. Respiratory: Positive for cough and shortness of breath. Cardiovascular: Negative for chest pain. Gastrointestinal: Negative for constipation. Genitourinary: Negative for difficulty urinating. Musculoskeletal: Negative for arthralgias and back pain. Skin: Negative for rash. Neurological: Negative for dizziness and headaches. Psychiatric/Behavioral: The patient is not nervous/anxious. Prior to Visit Medications    Medication Sig Taking? Authorizing Provider   furosemide (LASIX) 20 MG tablet Take 1 pill by mouth twice a week Tuesday and Friday only please for Congestive heart failure.  Yes LORY Menjivar CNP   albuterol sulfate HFA (VENTOLIN HFA) 108 (90 Base) MCG/ACT inhaler Inhale 2 puffs into the lungs every 6 hours as needed for Wheezing Yes LORY Menjivar CNP   levothyroxine (SYNTHROID) 125 MCG tablet Take 1 tablet by mouth Daily Yes LORY Menjivar CNP   rosuvastatin (CRESTOR) 20 MG tablet TAKE 1 TABLET BY MOUTH DAILY AT NIGHT Yes LORY Menjivar CNP   clopidogrel (PLAVIX) 75 MG tablet TAKE 1 TABLET BY MOUTH EVERY DAY Yes Lilibeth Quintanilla MD   metoprolol (LOPRESSOR) 100 MG tablet Take 1 tablet by mouth 2 times daily Yes Lilibeth Quintanilla MD   aspirin 81 MG tablet Take 81 mg by mouth daily. Yes Historical Provider, MD        Social History     Tobacco Use    Smoking status: Never Smoker    Smokeless tobacco: Never Used   Substance Use Topics    Alcohol use: Yes     Comment: 6 monthly        Vitals:    10/13/20 1001   BP: 124/80   Site: Right Upper Arm   Position: Sitting   Cuff Size: Medium Adult   Pulse: 66   Temp: 97.7 °F (36.5 °C)   TempSrc: Infrared   SpO2: 94%   Weight: 205 lb (93 kg)     Estimated body mass index is 29.41 kg/m² as calculated from the following:    Height as of 9/4/20: 5' 10\" (1.778 m). Weight as of this encounter: 205 lb (93 kg). Physical Exam  Vitals signs and nursing note reviewed. Constitutional:       General: He is not in acute distress. Appearance: Normal appearance. He is well-developed. HENT:      Head: Normocephalic and atraumatic. Right Ear: Tympanic membrane normal.      Left Ear: Tympanic membrane and external ear normal.      Nose: No congestion. Mouth/Throat:      Pharynx: No oropharyngeal exudate. Eyes:      Pupils: Pupils are equal, round, and reactive to light. Neck:      Musculoskeletal: Normal range of motion and neck supple. Vascular: No carotid bruit (neg thalia). Cardiovascular:      Rate and Rhythm: Normal rate and regular rhythm. Pulses: Normal pulses. Heart sounds: Murmur (holosystolic murmur L sternal border 3/6) present. Pulmonary:      Effort: Pulmonary effort is normal. No respiratory distress. Breath sounds: Normal breath sounds. Abdominal:      Palpations: Abdomen is soft. There is no mass. Tenderness: There is no abdominal tenderness. Musculoskeletal: Normal range of motion. Lymphadenopathy:      Cervical: No cervical adenopathy.    Skin:

## 2020-10-14 ENCOUNTER — HOSPITAL ENCOUNTER (OUTPATIENT)
Age: 85
Discharge: HOME | End: 2020-10-16
Payer: MEDICARE

## 2020-10-14 ENCOUNTER — HOSPITAL ENCOUNTER (OUTPATIENT)
Age: 85
Discharge: HOME | End: 2020-10-14
Payer: MEDICARE

## 2020-10-14 ENCOUNTER — HOSPITAL ENCOUNTER (OUTPATIENT)
Dept: GENERAL RADIOLOGY | Age: 85
Discharge: HOME | End: 2020-10-16
Payer: MEDICARE

## 2020-10-14 LAB
ABSOLUTE EOS #: 0.1 K/UL (ref 0–0.4)
ABSOLUTE IMMATURE GRANULOCYTE: ABNORMAL K/UL (ref 0–0.3)
ABSOLUTE LYMPH #: 1.3 K/UL (ref 1–4.8)
ABSOLUTE MONO #: 0.6 K/UL (ref 0–1)
ALBUMIN SERPL-MCNC: 3.9 G/DL (ref 3.5–5.2)
ALBUMIN/GLOBULIN RATIO: ABNORMAL (ref 1–2.5)
ALP BLD-CCNC: 89 U/L (ref 40–129)
ALT SERPL-CCNC: 13 U/L (ref 5–41)
ANION GAP SERPL CALCULATED.3IONS-SCNC: 9 MMOL/L (ref 9–17)
AST SERPL-CCNC: 19 U/L
BASOPHILS # BLD: 1 % (ref 0–2)
BASOPHILS ABSOLUTE: 0 K/UL (ref 0–0.2)
BILIRUB SERPL-MCNC: 0.88 MG/DL (ref 0.3–1.2)
BUN BLDV-MCNC: 13 MG/DL (ref 8–23)
BUN/CREAT BLD: 15 (ref 9–20)
CALCIUM SERPL-MCNC: 9.3 MG/DL (ref 8.6–10.4)
CHLORIDE BLD-SCNC: 101 MMOL/L (ref 98–107)
CHOLESTEROL/HDL RATIO: 4
CHOLESTEROL: 116 MG/DL
CO2: 28 MMOL/L (ref 20–31)
CREAT SERPL-MCNC: 0.84 MG/DL (ref 0.7–1.2)
DIFFERENTIAL TYPE: YES
EOSINOPHILS RELATIVE PERCENT: 2 % (ref 0–5)
GFR AFRICAN AMERICAN: >60 ML/MIN
GFR NON-AFRICAN AMERICAN: >60 ML/MIN
GFR SERPL CREATININE-BSD FRML MDRD: ABNORMAL ML/MIN/{1.73_M2}
GFR SERPL CREATININE-BSD FRML MDRD: ABNORMAL ML/MIN/{1.73_M2}
GLUCOSE BLD-MCNC: 119 MG/DL (ref 70–99)
HCT VFR BLD CALC: 38.1 % (ref 41–53)
HDLC SERPL-MCNC: 29 MG/DL
HEMOGLOBIN: 12.6 G/DL (ref 13.5–17.5)
IMMATURE GRANULOCYTES: ABNORMAL %
LDL CHOLESTEROL: 70 MG/DL (ref 0–130)
LYMPHOCYTES # BLD: 20 % (ref 13–44)
MAGNESIUM: 1.9 MG/DL (ref 1.6–2.6)
MCH RBC QN AUTO: 29.6 PG (ref 26–34)
MCHC RBC AUTO-ENTMCNC: 33.2 G/DL (ref 31–37)
MCV RBC AUTO: 89.1 FL (ref 80–100)
MONOCYTES # BLD: 9 % (ref 5–9)
NRBC AUTOMATED: ABNORMAL PER 100 WBC
PATIENT FASTING?: YES
PDW BLD-RTO: 14.6 % (ref 12.1–15.2)
PLATELET # BLD: 191 K/UL (ref 140–450)
PLATELET ESTIMATE: ABNORMAL
PMV BLD AUTO: ABNORMAL FL (ref 6–12)
POTASSIUM SERPL-SCNC: 3.7 MMOL/L (ref 3.7–5.3)
RBC # BLD: 4.27 M/UL (ref 4.5–5.9)
RBC # BLD: ABNORMAL 10*6/UL
SEG NEUTROPHILS: 68 % (ref 39–75)
SEGMENTED NEUTROPHILS ABSOLUTE COUNT: 4.7 K/UL (ref 2.1–6.5)
SODIUM BLD-SCNC: 138 MMOL/L (ref 135–144)
THYROXINE, FREE: 1.45 NG/DL (ref 0.93–1.7)
TOTAL PROTEIN: 7.6 G/DL (ref 6.4–8.3)
TRIGL SERPL-MCNC: 86 MG/DL
TSH SERPL DL<=0.05 MIU/L-ACNC: 5.77 MIU/L (ref 0.3–5)
VITAMIN D 25-HYDROXY: 27.7 NG/ML (ref 30–100)
VLDLC SERPL CALC-MCNC: ABNORMAL MG/DL (ref 1–30)
WBC # BLD: 6.8 K/UL (ref 3.5–11)
WBC # BLD: ABNORMAL 10*3/UL

## 2020-10-14 PROCEDURE — 80053 COMPREHEN METABOLIC PANEL: CPT

## 2020-10-14 PROCEDURE — 83735 ASSAY OF MAGNESIUM: CPT

## 2020-10-14 PROCEDURE — 71046 X-RAY EXAM CHEST 2 VIEWS: CPT

## 2020-10-14 PROCEDURE — 80061 LIPID PANEL: CPT

## 2020-10-14 PROCEDURE — 84439 ASSAY OF FREE THYROXINE: CPT

## 2020-10-14 PROCEDURE — 93005 ELECTROCARDIOGRAM TRACING: CPT

## 2020-10-14 PROCEDURE — 82306 VITAMIN D 25 HYDROXY: CPT

## 2020-10-14 PROCEDURE — 84443 ASSAY THYROID STIM HORMONE: CPT

## 2020-10-14 PROCEDURE — 36415 COLL VENOUS BLD VENIPUNCTURE: CPT

## 2020-10-14 PROCEDURE — 85025 COMPLETE CBC W/AUTO DIFF WBC: CPT

## 2020-10-15 LAB
EKG ATRIAL RATE: 72 BPM
EKG P AXIS: 87 DEGREES
EKG P-R INTERVAL: 198 MS
EKG Q-T INTERVAL: 518 MS
EKG QRS DURATION: 224 MS
EKG QTC CALCULATION (BAZETT): 567 MS
EKG R AXIS: -74 DEGREES
EKG T AXIS: 103 DEGREES
EKG VENTRICULAR RATE: 72 BPM

## 2020-10-15 PROCEDURE — 93010 ELECTROCARDIOGRAM REPORT: CPT | Performed by: INTERNAL MEDICINE

## 2020-10-15 ASSESSMENT — ENCOUNTER SYMPTOMS
SHORTNESS OF BREATH: 1
RHINORRHEA: 0
EYES NEGATIVE: 1
COUGH: 1
CONSTIPATION: 0
BACK PAIN: 0

## 2020-10-26 ENCOUNTER — OFFICE VISIT (OUTPATIENT)
Dept: CARDIOLOGY CLINIC | Age: 85
End: 2020-10-26
Payer: MEDICARE

## 2020-10-26 VITALS
WEIGHT: 198 LBS | BODY MASS INDEX: 28.41 KG/M2 | SYSTOLIC BLOOD PRESSURE: 120 MMHG | HEART RATE: 71 BPM | DIASTOLIC BLOOD PRESSURE: 70 MMHG | OXYGEN SATURATION: 93 %

## 2020-10-26 PROCEDURE — 1123F ACP DISCUSS/DSCN MKR DOCD: CPT | Performed by: INTERNAL MEDICINE

## 2020-10-26 PROCEDURE — G8428 CUR MEDS NOT DOCUMENT: HCPCS | Performed by: INTERNAL MEDICINE

## 2020-10-26 PROCEDURE — G8484 FLU IMMUNIZE NO ADMIN: HCPCS | Performed by: INTERNAL MEDICINE

## 2020-10-26 PROCEDURE — 99214 OFFICE O/P EST MOD 30 MIN: CPT | Performed by: INTERNAL MEDICINE

## 2020-10-26 PROCEDURE — 1036F TOBACCO NON-USER: CPT | Performed by: INTERNAL MEDICINE

## 2020-10-26 PROCEDURE — 4040F PNEUMOC VAC/ADMIN/RCVD: CPT | Performed by: INTERNAL MEDICINE

## 2020-10-26 PROCEDURE — G8417 CALC BMI ABV UP PARAM F/U: HCPCS | Performed by: INTERNAL MEDICINE

## 2020-10-26 NOTE — LETTER
Mati Vieira M.D. [address] [place] [address]  [phone]        2020        Laura Huizar, CNP  [address] [facility] 80    RE:   Beata Gracia  :  1932    Dear Jo Monreal:    CHIEF COMPLAINT:  1. Sick sinus syndrome, status post [employer] pacemaker on 2012.  2.  Status post coronary artery bypass surgery in . 3.  Status post PTCA in . 4.  Murmur of the aortic stenosis, although echo is still pending. HISTORY OF PRESENT ILLNESS:  I had the pleasure of seeing Mr. Irma Mcdowell in our office on 10/26/2020. He is a pleasant 80-year-old gentleman who had atrial fibrillation in  associated with shortness of breath. He had a catheterization at [facility] with subsequent 4-vessel coronary artery bypass surgery in [facility]. On 2020, he developed a heart rate of 32 beats per minute. We stopped his beta-blocker, but he remained bradycardic, and therefore, I placed a [employer] 40 DDD pacemaker on 2012, which is still at beginning of life. He is pacer dependent. On 2012, he developed shortness of breath, and a repeat catheterization showed 99% LAD, intermediate branch of the circumflex was occluded, the OM branch of the circumflex was occluded, and the right coronary artery had 95% to 99% disease. The vein graft to the right coronary artery was occluded. The vein graft to the OM was occluded. The vein graft to the high lateral branch of the circumflex was patent. The LIMA was patent to the LAD. His EF was 40%. We did angioplasty of the right coronary artery, placing 3.0 x 38, 3.0 x 38, 3.5 x 24, and 3.5 x 16 mm drug-eluting ION stents with a good end result. We did not attempt to open an occluded OM branch to the circumflex.     He had a 24-beat run of nonsustained ventricular tachycardia in 2016, we increased his Lopressor and he has had no further episodes. He states he has shortness of breath but does not feel that it is worse than it has been previously. His energy level is low. He has had no chest pain or chest discomfort. He saw Luke Webb, who noted a rather loud murmur in the aortic region, ordered an echocardiogram; however, Mr. Josiane Fletcher did not do this as of yet. He has had no syncope or near syncope, lightheadedness or dizziness. He was given Levaquin for possible pneumonia, which helped his shortness of breath at that time. CARDIAC RISK FACTORS:  Known CAD:  Positive. Bypass Surgery:  Positive. PTCA:  Positive. Hypertension:  Positive. Hyperlipidemia:  Positive. Smoking:  Negative. Diabetes:  Negative. MEDICATIONS AT HOME:  He is currently on albuterol inhaler every 6 hours p.r.n., aspirin 81 mg daily, Lasix 20 mg twice a week on Tuesdays and Thursdays, Synthroid 125 mcg daily, Lopressor 100 mg b.i.d., Crestor 20 mg daily. PAST MEDICAL AND SURGICAL HISTORY:  1. Cardiac as above. 2.  Hypertension. 3.  Hyperlipidemia. 4.  Atrial fibrillation in 2000, for which he had been on Coumadin, which was stopped after his open heart surgery. 5.  Euthyroid, on treatment. 6.  Status post cataract surgery. FAMILY HISTORY:  Significant for CAD. SOCIAL HISTORY:  He is 80years old, 2 children, one in [place]. He has lived with [facility] for [facility] years. Does not smoke or drink alcohol. He has been relatively inactive. REVIEW OF SYSTEMS:  Cardiac as above. Other systems reviewed including constitutional, eyes, ears, nose and throat, cardiovascular, respiratory, GI, , musculoskeletal, integumentary, neurologic, endocrine, hematologic and allergic/immunologic are negative except for what is described above. No weight loss or weight gain. No change in bowel habits. No blood in stools. No fevers, sweats or chills.     PHYSICAL EXAMINATION: VITAL SIGNS:  His blood pressure was 130/70 with a heart rate of 70 and regular. Respiratory rate 18. O2 sat 93%. Weight 198 pounds. GENERAL:  He is a pleasant 80year-old gentleman. Denied pain. He was oriented to person, place and time. Answered questions appropriately. SKIN:  No unusual skin changes. HEENT:  The pupils are equally round and intact. Mucous membranes were dry. NECK:  No JVD. Good carotid pulses. No carotid bruits. No lymphadenopathy or thyromegaly. CARDIOVASCULAR EXAM:  S1 and S2 were normal.  No S3 or S4. He had a grade 3/6 systolic ejection murmur in the aortic region consistent with aortic stenosis. No diastolic murmur. PMI was normal.  No lift, thrust, or pericardial friction rub. LUNGS:  Quite clear to auscultation and percussion. ABDOMEN:  Soft and nontender. Good bowel sounds. EXTREMITIES:  Good femoral pulses. Good pedal pulses. No pedal edema. Skin was warm and dry. No calf tenderness. Nail beds pink. Good cap refill. PULSES:  Bilateral symmetrical radial, brachial and carotid pulses. No carotid bruits. Good femoral and pedal pulses. NEUROLOGIC EXAM:  Within normal limits. PSYCHIATRIC EXAM:  Within normal limits. LABORATORY DATA:  On 10/14/2020, sodium was 138, potassium 3.7, BUN 13, creatinine 0.84, GFR greater than 60. Magnesium was 1.9, glucose 119, calcium was 9.3. Cholesterol 116, HDL 29, LDL was 70, triglycerides 86. ALT was 13, AST was 19. TSH was 5.77, free thyroxine 1.45. Vitamin D 27.7. White count 6.8, hemoglobin 12.6 with a platelet count of 545,[phone]. EKG showed atrial sensed, ventricular paced rhythm. His chest x-ray was unremarkable. IMPRESSION:  1. Aortic ejection murmur in the right sternal border in the second intercostal space consistent with aortic stenosis, with echo pending. 2.  Coronary artery disease.   3.  Four-vessel bypass surgery in 2000 at [place] with a LIMA to the

## 2020-10-26 NOTE — PROGRESS NOTES
Ov DR Marycarmen Lockhart 6 mth follow up  Some sob no worse   No chest pain   No ankle edema  Cough productive   White sputum. No hospitalizations or   Procedures since seen. In ED in September with   Neck pain. Will do echo and call   See in 6 mths.

## 2020-11-03 ENCOUNTER — OFFICE VISIT (OUTPATIENT)
Dept: PRIMARY CARE CLINIC | Age: 85
End: 2020-11-03
Payer: MEDICARE

## 2020-11-03 VITALS
SYSTOLIC BLOOD PRESSURE: 120 MMHG | BODY MASS INDEX: 28.7 KG/M2 | WEIGHT: 200 LBS | HEART RATE: 71 BPM | TEMPERATURE: 97 F | DIASTOLIC BLOOD PRESSURE: 80 MMHG | OXYGEN SATURATION: 95 %

## 2020-11-03 PROCEDURE — G8427 DOCREV CUR MEDS BY ELIG CLIN: HCPCS | Performed by: NURSE PRACTITIONER

## 2020-11-03 PROCEDURE — 1123F ACP DISCUSS/DSCN MKR DOCD: CPT | Performed by: NURSE PRACTITIONER

## 2020-11-03 PROCEDURE — G8484 FLU IMMUNIZE NO ADMIN: HCPCS | Performed by: NURSE PRACTITIONER

## 2020-11-03 PROCEDURE — G8417 CALC BMI ABV UP PARAM F/U: HCPCS | Performed by: NURSE PRACTITIONER

## 2020-11-03 PROCEDURE — 1036F TOBACCO NON-USER: CPT | Performed by: NURSE PRACTITIONER

## 2020-11-03 PROCEDURE — 4040F PNEUMOC VAC/ADMIN/RCVD: CPT | Performed by: NURSE PRACTITIONER

## 2020-11-03 PROCEDURE — 99214 OFFICE O/P EST MOD 30 MIN: CPT | Performed by: NURSE PRACTITIONER

## 2020-11-03 ASSESSMENT — ENCOUNTER SYMPTOMS
CONSTIPATION: 0
COUGH: 0
EYES NEGATIVE: 1
NAUSEA: 0

## 2020-11-03 NOTE — PROGRESS NOTES
11/3/2020     Yesenia Rain (:  1932) is a 80 y.o. male, here for evaluation of the following medical concerns:    HPI    CHF  Weight consistent with taking lasix 3 days a week no repeat labs yet. Will request BMP. Pt seen cardiology but still has not done echocardiogram yet. Pt now aware we need this done to evaluate heart valves and pressures evaluated. Pt keeping track of weight at home. He is to inform office if weight increases 3-5 pounds. Hypothyroid on supplement  Lab Results   Component Value Date    TSH 5.77 (H) 10/14/2020       Has albuterol inhaler no known asthma or copd, but does have hx smoking approx 20 pack years in lifetime. Pt at baseline, gets winded with exertion. No current inhaler use. Fluid status euvolemic. Hypercholesterolemia on statin tolerating well. Review of Systems   Constitutional: Negative for activity change, appetite change, chills and fever. HENT: Negative for congestion and mouth sores. Eyes: Negative. Respiratory: Negative for cough. Gastrointestinal: Negative for constipation and nausea. Endocrine: Negative for cold intolerance and heat intolerance. Genitourinary: Negative for difficulty urinating. Musculoskeletal: Negative for arthralgias. Skin: Negative for rash. Neurological: Negative for dizziness and headaches. Psychiatric/Behavioral: Negative for sleep disturbance. The patient is not nervous/anxious. Prior to Visit Medications    Medication Sig Taking? Authorizing Provider   furosemide (LASIX) 20 MG tablet Take 1 pill by mouth twice a week Tuesday and Friday only please for Congestive heart failure.  Yes LORY Zaidi CNP   albuterol sulfate HFA (VENTOLIN HFA) 108 (90 Base) MCG/ACT inhaler Inhale 2 puffs into the lungs every 6 hours as needed for Wheezing Yes Scott Oh APRN - CNP   levothyroxine (SYNTHROID) 125 MCG tablet Take 1 tablet by mouth Daily Yes LORY Zaidi - CLAU rosuvastatin (CRESTOR) 20 MG tablet TAKE 1 TABLET BY MOUTH DAILY AT NIGHT Yes Marques Acosta, APRN - CNP   metoprolol (LOPRESSOR) 100 MG tablet Take 1 tablet by mouth 2 times daily Yes Karen De Souza MD   aspirin 81 MG tablet Take 81 mg by mouth daily. Yes Historical Provider, MD        Social History     Tobacco Use    Smoking status: Never Smoker    Smokeless tobacco: Never Used   Substance Use Topics    Alcohol use: Yes     Comment: 6 monthly        Vitals:    11/03/20 0956   BP: 120/80   Site: Left Upper Arm   Position: Sitting   Cuff Size: Medium Adult   Pulse: 71   Temp: 97 °F (36.1 °C)   TempSrc: Infrared   SpO2: 95%   Weight: 200 lb (90.7 kg)     Estimated body mass index is 28.7 kg/m² as calculated from the following:    Height as of 9/4/20: 5' 10\" (1.778 m). Weight as of this encounter: 200 lb (90.7 kg). Physical Exam  Vitals signs and nursing note reviewed. Constitutional:       General: He is not in acute distress. Appearance: Normal appearance. He is well-developed. HENT:      Head: Normocephalic and atraumatic. Right Ear: Tympanic membrane normal.      Left Ear: Tympanic membrane and external ear normal.      Nose: No congestion. Mouth/Throat:      Mouth: Mucous membranes are moist.      Pharynx: No oropharyngeal exudate. Eyes:      Pupils: Pupils are equal, round, and reactive to light. Neck:      Musculoskeletal: Normal range of motion and neck supple. Cardiovascular:      Rate and Rhythm: Normal rate and regular rhythm. Pulses: Normal pulses. Heart sounds: Normal heart sounds. No murmur. Pulmonary:      Effort: Pulmonary effort is normal. No respiratory distress. Breath sounds: Normal breath sounds. Abdominal:      Palpations: Abdomen is soft. There is no mass. Tenderness: There is no abdominal tenderness. Musculoskeletal: Normal range of motion. General: No swelling. Right lower leg: No edema. Left lower leg: No edema.

## 2020-11-17 ENCOUNTER — HOSPITAL ENCOUNTER (OUTPATIENT)
Dept: PULMONOLOGY | Age: 85
Discharge: HOME | End: 2020-11-17
Payer: MEDICARE

## 2020-11-17 ENCOUNTER — HOSPITAL ENCOUNTER (OUTPATIENT)
Dept: PREADMISSION TESTING | Age: 85
Setting detail: SPECIMEN
Discharge: HOME | End: 2020-11-17
Payer: MEDICARE

## 2020-11-17 ENCOUNTER — HOSPITAL ENCOUNTER (OUTPATIENT)
Dept: NON INVASIVE DIAGNOSTICS | Age: 85
Discharge: HOME | End: 2020-11-17
Payer: MEDICARE

## 2020-11-17 VITALS — OXYGEN SATURATION: 99 %

## 2020-11-17 LAB
LV EF: 35 %
LVEF MODALITY: NORMAL
SARS-COV-2, RAPID: NOT DETECTED
SARS-COV-2: NORMAL
SARS-COV-2: NORMAL
SOURCE: NORMAL

## 2020-11-17 PROCEDURE — 94727 GAS DIL/WSHOT DETER LNG VOL: CPT

## 2020-11-17 PROCEDURE — C9803 HOPD COVID-19 SPEC COLLECT: HCPCS

## 2020-11-17 PROCEDURE — 93306 TTE W/DOPPLER COMPLETE: CPT

## 2020-11-17 PROCEDURE — 94060 EVALUATION OF WHEEZING: CPT

## 2020-11-17 PROCEDURE — 94060 EVALUATION OF WHEEZING: CPT | Performed by: INTERNAL MEDICINE

## 2020-11-17 PROCEDURE — 6370000000 HC RX 637 (ALT 250 FOR IP): Performed by: INTERNAL MEDICINE

## 2020-11-17 PROCEDURE — U0002 COVID-19 LAB TEST NON-CDC: HCPCS

## 2020-11-17 PROCEDURE — 94729 DIFFUSING CAPACITY: CPT | Performed by: INTERNAL MEDICINE

## 2020-11-17 PROCEDURE — 94729 DIFFUSING CAPACITY: CPT

## 2020-11-17 RX ORDER — ALBUTEROL SULFATE 90 UG/1
2 AEROSOL, METERED RESPIRATORY (INHALATION) ONCE
Status: COMPLETED | OUTPATIENT
Start: 2020-11-17 | End: 2020-11-17

## 2020-11-17 RX ADMIN — ALBUTEROL SULFATE 2 PUFF: 90 AEROSOL, METERED RESPIRATORY (INHALATION) at 12:56

## 2020-11-17 NOTE — PROCEDURES
[address]                               PULMONARY FUNCTION    PATIENT NAME: Jack Rosen                     :        1932  MED REC NO:   [id number]                              ROOM:  ACCOUNT NO:   [de-identified]                           ADMIT DATE: 2020  PROVIDER:     Roxanna Castro    DATE OF PROCEDURE:  2020    PULMONARY FUNCTION TESTS WITH BRONCHODILATOR AND DLCO    ATTENDING PHYSICIAN:  Hermes Medina CNP    REASON FOR TEST:  Shortness of breath, history of smoking. SUMMARY:  1. Respiratory therapist reports the patient's effort as being good. The patient was unable to perform body plethysmography, so volumes are  obtained with nitrogen washout. 2.  The forced vital capacity is decreased at 75% of predicted. 3.  The forced exhaled volume in 1 second is decreased at 60% of  predicted. 4.  The forced exhaled volume in 1 second/forced vital capacity is  decreased at 79% of predicted. 5.  The forced exhaled flow at 25-75% is decreased at 50% of predicted. 6.  A significant improvement of 31% was seen in the forced exhaled flow  at 25-75% after one time dose of bronchodilator with a 10% improvement  in the forced exhaled volume in 1 second. 7.  Total lung capacity is decreased at 79% of predicted. 8.  Residual volume/total lung capacity is increased at 129% of  predicted. 9.  The DLCO is decreased at 57% of predicted. IMPRESSION:  1.  Pulmonary function test results show a mild restrictive disease with  a concomitant moderate obstructive component. There is a significant  reversible obstructive component as seen by an improvement in the forced  exhaled flow at 25-75% and forced exhaled volume in 1 second after one  time dose of bronchodilator. 2.  Increased residual volume/total lung capacity suggest air trapping.   3.  The DLCO is moderately cabrera URBINA    D: 11/17/2020 13:55:10       T: 11/17/2020 15:12:51     [email or web address]  Job#: [id number]     Doc#: [id number]    CC:

## 2020-11-20 RX ORDER — FLUTICASONE FUROATE AND VILANTEROL TRIFENATATE 100; 25 UG/1; UG/1
1 POWDER RESPIRATORY (INHALATION) DAILY
Qty: 1 EACH | Refills: 1 | Status: SHIPPED | OUTPATIENT
Start: 2020-11-20 | End: 2021-03-05 | Stop reason: SDUPTHER

## 2020-11-24 ENCOUNTER — OFFICE VISIT (OUTPATIENT)
Dept: PRIMARY CARE CLINIC | Age: 85
End: 2020-11-24
Payer: MEDICARE

## 2020-11-24 ENCOUNTER — TELEPHONE (OUTPATIENT)
Dept: CARDIOLOGY CLINIC | Age: 85
End: 2020-11-24

## 2020-11-24 VITALS
TEMPERATURE: 97.8 F | WEIGHT: 192 LBS | BODY MASS INDEX: 27.55 KG/M2 | SYSTOLIC BLOOD PRESSURE: 112 MMHG | DIASTOLIC BLOOD PRESSURE: 68 MMHG | OXYGEN SATURATION: 96 % | HEART RATE: 78 BPM

## 2020-11-24 PROCEDURE — 1123F ACP DISCUSS/DSCN MKR DOCD: CPT | Performed by: NURSE PRACTITIONER

## 2020-11-24 PROCEDURE — G8926 SPIRO NO PERF OR DOC: HCPCS | Performed by: NURSE PRACTITIONER

## 2020-11-24 PROCEDURE — G8427 DOCREV CUR MEDS BY ELIG CLIN: HCPCS | Performed by: NURSE PRACTITIONER

## 2020-11-24 PROCEDURE — 99213 OFFICE O/P EST LOW 20 MIN: CPT | Performed by: NURSE PRACTITIONER

## 2020-11-24 PROCEDURE — 1036F TOBACCO NON-USER: CPT | Performed by: NURSE PRACTITIONER

## 2020-11-24 PROCEDURE — G8417 CALC BMI ABV UP PARAM F/U: HCPCS | Performed by: NURSE PRACTITIONER

## 2020-11-24 PROCEDURE — G8484 FLU IMMUNIZE NO ADMIN: HCPCS | Performed by: NURSE PRACTITIONER

## 2020-11-24 PROCEDURE — 3023F SPIROM DOC REV: CPT | Performed by: NURSE PRACTITIONER

## 2020-11-24 PROCEDURE — 4040F PNEUMOC VAC/ADMIN/RCVD: CPT | Performed by: NURSE PRACTITIONER

## 2020-11-24 PROCEDURE — G0438 PPPS, INITIAL VISIT: HCPCS | Performed by: NURSE PRACTITIONER

## 2020-11-24 RX ORDER — ALBUTEROL SULFATE 90 UG/1
2 AEROSOL, METERED RESPIRATORY (INHALATION) EVERY 4 HOURS PRN
Qty: 1 INHALER | Refills: 1 | Status: SHIPPED | OUTPATIENT
Start: 2020-11-24 | End: 2021-03-05 | Stop reason: SDUPTHER

## 2020-11-24 RX ORDER — ROSUVASTATIN CALCIUM 20 MG/1
20 TABLET, COATED ORAL DAILY
Qty: 90 TABLET | Refills: 1 | Status: SHIPPED | OUTPATIENT
Start: 2020-11-24 | End: 2021-12-06 | Stop reason: SDUPTHER

## 2020-11-24 NOTE — PROGRESS NOTES
Medicare Annual Wellness Visit  Name: Arsh Velez Date: 2020   MRN: [id number] Sex: Male   Age: 80 y.o. Ethnicity: Non-/Non    : 1932 Race: Rosa Stiles is here for Medicare AWV; Cough (Has a hard time sleeping at night due to his cough when laying down.); Health Maintenance (had flu at [facility]); and Results (pt here to go over results.)    Screenings for behavioral, psychosocial and functional/safety risks, and cognitive dysfunction are all negative except as indicated below. These results, as well as other patient data from the [address] [facility] [address] form, are documented in Flowsheets linked to this Encounter. Allergies   Allergen Reactions    Menthol (Topical Analgesic)          Prior to Visit Medications    Medication Sig Taking? Authorizing Provider   albuterol sulfate HFA (VENTOLIN HFA) 108 (90 Base) MCG/ACT inhaler Inhale 2 puffs into the lungs every 4 hours as needed for Wheezing Yes LORY Zaidi CNP   rosuvastatin (CRESTOR) 20 MG tablet Take 1 tablet by mouth daily Yes LORY Zaidi CNP   fluticasone-vilanterol (BREO ELLIPTA) 100-25 MCG/INH AEPB inhaler Inhale 1 puff into the lungs daily Yes LORY Zaidi CNP   furosemide (LASIX) 20 MG tablet Take 1 pill by mouth twice a week Tuesday and Friday only please for Congestive heart failure. Yes LORY Zaidi CNP   levothyroxine (SYNTHROID) 125 MCG tablet Take 1 tablet by mouth Daily Yes LORY Zaidi CNP   metoprolol (LOPRESSOR) 100 MG tablet Take 1 tablet by mouth 2 times daily Yes Daniella Salinas MD   aspirin 81 MG tablet Take 81 mg by mouth daily.  Yes Historical Provider, MD         Past Medical History:   Diagnosis Date    Atrial fibrillation ([facility]     CAD (coronary artery disease)     Hard of hearing     Hiatal hernia     History of PTCA     2012    Hyperlipidemia     Hypothyroidism     Pacemaker     [employer]       Past Surgical History:   Procedure Laterality Date    CARDIAC SURGERY      CORONARY ARTERY BYPASS GRAFT  2000    quad bypass [place]    HERNIA REPAIR  63/32/4153    UMBILICAL HERNIA REPAIR    PACEMAKER INSERTION  8/6/12    [employer]    PTCA  14/05/38    UMBILICAL HERNIA REPAIR  11/21/2016    Lizbeth Jay MD         Family History   Problem Relation Age of Onset    Heart Disease Father        CareTeam (Including outside providers/suppliers regularly involved in providing care):   Patient Care Team:  LORY Christianson CNP as PCP - General (Certified Nurse Practitioner)  LORY Christianson CNP as PCP [facility]  Benjamin Mai MD as Surgeon (General Surgery)    Wt Readings from Last 3 Encounters:   11/24/20 192 lb (87.1 kg)   11/03/20 200 lb (90.7 kg)   10/26/20 198 lb (89.8 kg)     Vitals:    11/24/20 1015   BP: 112/68   Site: Right Upper Arm   Position: Sitting   Cuff Size: Medium Adult   Pulse: 78   Temp: 97.8 °F (36.6 °C)   TempSrc: Infrared   SpO2: 96%   Weight: 192 lb (87.1 kg)     Body mass index is 27.55 kg/m². Based upon direct observation of the patient, evaluation of cognition reveals recent and remote memory intact.     General Appearance: alert and oriented to person, place and time, well developed and well- nourished, in no acute distress  Skin: warm and dry, no rash or erythema  Head: normocephalic and atraumatic  Eyes: pupils equal, round, and reactive to light, extraocular eye movements intact, conjunctivae normal  ENT: tympanic membrane, external ear and ear canal normal bilaterally  Neck: supple and non-tender without mass, no thyromegaly or thyroid nodules, no cervical lymphadenopathy  Pulmonary/Chest: clear to auscultation bilaterally- no wheezes, rales or rhonchi, normal air movement, no respiratory distress  Cardiovascular: normal rate, regular rhythm, normal S1 and S2, systolic right sternal border 3/6 murmurs, rubs, clicks, or gallops, distal pulses intact, no carotid bruits  Abdomen: soft, patient in written form: see Patient Instructions/AVS.    Darius Sotelo was seen today for medicare awv, cough, health maintenance and results. Diagnoses and all orders for this visit:    Routine general medical examination at a health care facility    Advanced directives, counseling/discussion  -     [facility] Clinical Specialist    Systolic murmur    Paroxysmal atrial fibrillation (HCC)    Aortic stenosis, moderate    Stage 1 mild COPD by GOLD classification ([address]    Other orders  -     albuterol sulfate HFA (VENTOLIN HFA) 108 (90 Base) MCG/ACT inhaler; Inhale 2 puffs into the lungs every 4 hours as needed for Wheezing  -     rosuvastatin (CRESTOR) 20 MG tablet; Take 1 tablet by mouth daily          HPI Notes    Name: Darcie Gill  : 1932         Chief Complaint:     Chief Complaint   Patient presents with    Medicare AWV    Cough     Has a hard time sleeping at night due to his cough when laying down.  Health Maintenance     had flu at [facility] Results     pt here to go over results. History of Present Illness:        HPI    Review recent PFT- mild restrictive lung disease with reversible obstructive component. DLCO decreased at 57% of predicted. Total Lung capacity is decreased at 79% predicted. Pt started on Breo inhaler and also has albuterol rescue,. Reviewed with pt proper use , reminded to rinse mouth after use. Echocardiogram reviewed with significant aortic stenosis reviewed s/s with patient if dizziness, near syncope, activity intolerance, chest pain to inform office or seek treatment.      Atrial fibrillation by hx on lopressor and ASA     Past Medical History:     Past Medical History:   Diagnosis Date    Atrial fibrillation ([address]     CAD (coronary artery disease)     Hard of hearing     Hiatal hernia     History of PTCA     2012    Hyperlipidemia     Hypothyroidism     Pacemaker     [employer]      Reviewed all health maintenance requirements and ordered appropriate tests  Health Maintenance Due   Topic Date Due    DTaP/Tdap/Td vaccine (1 - Tdap) 11/27/1951    Shingles Vaccine (1 of 2) 11/27/1982    Annual Wellness Visit (AWV)  05/29/2019       Past Surgical History:     Past Surgical History:   Procedure Laterality Date    CARDIAC SURGERY      CORONARY ARTERY BYPASS GRAFT  2000    quad bypass [place]    HERNIA REPAIR  85/87/3791    UMBILICAL HERNIA REPAIR    PACEMAKER INSERTION  8/6/12    [employer]    PTCA  71/61/86    UMBILICAL HERNIA REPAIR  11/21/2016    Guillermina Garcia MD        Medications:       Prior to Admission medications    Medication Sig Start Date End Date Taking? Authorizing Provider   albuterol sulfate HFA (VENTOLIN HFA) 108 (90 Base) MCG/ACT inhaler Inhale 2 puffs into the lungs every 4 hours as needed for Wheezing 11/24/20  Yes Brittni Im, APRN - CNP   rosuvastatin (CRESTOR) 20 MG tablet Take 1 tablet by mouth daily 11/24/20  Yes Brittni Im, APRN - CNP   fluticasone-vilanterol (BREO ELLIPTA) 100-25 MCG/INH AEPB inhaler Inhale 1 puff into the lungs daily 11/20/20  Yes Brittni Im, APRN - CNP   furosemide (LASIX) 20 MG tablet Take 1 pill by mouth twice a week Tuesday and Friday only please for Congestive heart failure. 10/13/20  Yes Brittni Im, APRN - CNP   levothyroxine (SYNTHROID) 125 MCG tablet Take 1 tablet by mouth Daily 8/14/20  Yes Brittni Im, APRN - CNP   metoprolol (LOPRESSOR) 100 MG tablet Take 1 tablet by mouth 2 times daily 3/27/20  Yes Gunnar Simpson MD   aspirin 81 MG tablet Take 81 mg by mouth daily. Yes Historical Provider, MD        Allergies:       Menthol (topical analgesic)    Social History:     Tobacco:    reports that he has never smoked. He has never used smokeless tobacco.  Alcohol:      reports current alcohol use. Drug Use:  reports no history of drug use.     Family History:     Family History   Problem Relation Age of Onset    Heart Disease Father        Review of Systems: Normal range of motion. Lymphadenopathy:      Cervical: No cervical adenopathy. Skin:     Findings: No rash. Neurological:      Mental Status: He is alert and oriented to person, place, and time. Psychiatric:         Behavior: Behavior normal.         Thought Content: Thought content normal.         Judgment: Judgment normal.                 Data:     Lab Results   Component Value Date     10/14/2020    K 3.7 10/14/2020     10/14/2020    CO2 28 10/14/2020    BUN 13 10/14/2020    CREATININE 0.84 10/14/2020    GLUCOSE 119 10/14/2020    PROT 7.6 10/14/2020    LABALBU 3.9 10/14/2020    BILITOT 0.88 10/14/2020    ALKPHOS 89 10/14/2020    AST 19 10/14/2020    ALT 13 10/14/2020     Lab Results   Component Value Date    WBC 6.8 10/14/2020    RBC 4.27 10/14/2020    HGB 12.6 10/14/2020    HCT 38.1 10/14/2020    MCV 89.1 10/14/2020    MCH 29.6 10/14/2020    MCHC 33.2 10/14/2020    RDW 14.6 10/14/2020     10/14/2020    MPV NOT REPORTED 10/14/2020     Lab Results   Component Value Date    TSH 5.77 10/14/2020     Lab Results   Component Value Date    CHOL 116 10/14/2020    HDL 29 10/14/2020          Assessment & Plan        Diagnosis Orders        1. Advanced directives, counseling/discussion  [facility] Clinical Special[facility]   2. Systolic murmur     3. Paroxysmal atrial fibrillation (HCC)     4. Aortic stenosis, moderate     5. Stage 1 mild COPD by GOLD classification ([facility])                     Completed Refills   Requested Prescriptions     Signed Prescriptions Disp Refills    albuterol sulfate HFA (VENTOLIN HFA) 108 (90 Base) MCG/ACT inhaler 1 Inhaler 1     Sig: Inhale 2 puffs into the lungs every 4 hours as needed for Wheezing    rosuvastatin (CRESTOR) 20 MG tablet 90 tablet 1     Sig: Take 1 tablet by mouth daily     Return in 6 months (on 5/24/2021) for Medicare Annual Wellness Visit in 1 year, hypercholesterolemia in June 2021.   Orders Placed This Encounter   Medications    albuterol sulfate HFA (VENTOLIN HFA) 108 (90 Base) MCG/ACT inhaler     Sig: Inhale 2 puffs into the lungs every 4 hours as needed for Wheezing     Dispense:  1 Inhaler     Refill:  1    rosuvastatin (CRESTOR) 20 MG tablet     Sig: Take 1 tablet by mouth daily     Dispense:  90 tablet     Refill:  1     Orders Placed This Encounter   Procedures   Yola Query Referral to [address] [place] [address] Specialist     Referral Priority:   Routine     Referral Type: Other     Referral Reason:   Specialty Services Required     Number of Visits Requested:   1         Patient Instructions     You may be receiving a survey from [employer] regarding your visit today. You may get this in the mail, through your MyChart or in your email. Please complete the survey to enable us to provide the highest quality of care to you and your family. If you cannot score us as very good ( 5 Stars) on any question, please feel free to call the office to discuss how we could have made your experience exceptional.     Thank You! Chris Bowen, LORY-ANNA Godinez Bent. Patient Education        Breathing Techniques for COPD: Care Instructions  Your Care Instructions     Breathing is hard when you have chronic obstructive pulmonary disease (COPD). You may take quick, short breaths. Breathing this way makes it harder to get air into your lungs. Learning new ways to control your breathing may help. You may feel better and be able to do more. You can try three basic ways to control your breathing. They are pursed-lip breathing, diaphragmatic breathing, and breathing while bending. Use these methods when you are more short of breath than normal. Practice them often so you can do them well. Follow-up care is a key part of your treatment and safety. Be sure to make and go to all appointments, and call your doctor if you are having problems. It's also a good idea to know your test results and keep a list of the medicines you take.   How can you care for yourself at home? · Pursed-lip breathing helps you breathe more air out so that your next breath can be deeper. For this type of breathing, you breathe in through your nose and out through your mouth while almost closing your lips. Breathe in for about 2 seconds, and breathe out for 4 to 6 seconds. Pursed-lip breathing decreases shortness of breath and improves your ability to exercise. · Diaphragmatic breathing helps your lungs expand so that they take in more air. ? Lie on your back, or prop yourself up on several pillows. ? Put one hand on your belly and the other on your chest. When you breathe in, push your belly out as far as possible. You should feel the hand on your belly move out, while the hand on your chest does not move. ? When you breathe out, you should feel the hand on your belly move in. When you can do this type of breathing well while lying down, learn to do it while sitting or standing. Many people with COPD find this breathing method helpful. ? Practice diaphragmatic breathing for 20 minutes, 2 or 3 times a day. · Breathing while bending forward at the waist may make breathing easier. It can reduce shortness of breath while you exercise or rest. It helps the diaphragm move more easily. The diaphragm is a large muscle that separates your lungs from your belly. It helps draw air into your lungs as you breathe. When should you call for help? Call your doctor now or seek immediate medical care if:    · Your breathing methods do not help.     · Your shortness of breath gets worse.     · You cough up blood.     · You have swelling in your belly and legs.     · You have severe chest pain. Watch closely for changes in your health, and be sure to contact your doctor if you have any problems. Where can you learn more? Go to https://anupama.Selleration. org and sign in to your [employer] account.  Enter C801 in the Noquo box to learn more about \"Breathing called a COPD exacerbation (say \"egg-ZASS-er-BAY-shun\"). When this happens, your usual symptoms quickly get worse and stay bad. This can be dangerous. You may have to go to the hospital.  How can you keep COPD from getting worse? Not smoking is the best way to keep COPD from getting worse. If you need help quitting, talk to your doctor about stop-smoking programs and medicines. Also, be sure to get your flu and pneumococcal vaccines. And avoid air pollution, fumes, and dust as much as you can. How is COPD treated? COPD is treated with medicines and oxygen. You also can take steps at home to stay healthy and keep your COPD from getting worse. Medicines and oxygen therapy  · You may be taking medicines such as:  ? Bronchodilators. These help open your airways and make breathing easier. Bronchodilators are either short-acting (work for 6 to 9 hours) or long-acting (work for 24 hours). You inhale most bronchodilators, so they start to act quickly. Always carry your quick-relief inhaler with you in case you need it while you are away from home. ? Corticosteroids. These reduce airway inflammation. They come in pill or inhaled form. You must take these medicines every day for them to work well. · Take your medicines exactly as prescribed. Call your doctor if you think you are having a problem with your medicine. · Oxygen therapy boosts the amount of oxygen in your blood and helps you breathe easier. Use the flow rate your doctor has recommended, and do not change it without talking to your doctor first.  Other care at home  · If your doctor recommends it, get more exercise. Walking is a good choice. Bit by bit, increase the amount you walk every day. Try for at least 30 minutes on most days of the week. · Learn breathing methods--such as breathing through pursed lips--to help you become less short of breath.   · If your doctor has not set you up with a pulmonary rehabilitation program, talk to him or her about whether rehab is right for you. Rehab includes exercise programs, education about your disease and how to manage it, help with diet and other changes, and emotional support. · Eat regular, healthy meals. Use bronchodilators about 1 hour before you eat to make it easier to eat. Eat several small meals instead of three large ones. Drink beverages at the end of the meal. Avoid foods that are hard to chew. Follow-up care is a key part of your treatment and safety. Be sure to make and go to all appointments, and call your doctor if you are having problems. It's also a good idea to know your test results and keep a list of the medicines you take. Where can you learn more? Go to https://WikiBrains.Fieldglass. org and sign in to your "[employer] account. Enter V314 in the [employer] box to learn more about \"Learning About COPD. \"     If you do not have an account, please click on the \"Sign Up Now\" link. Current as of: February 24, 2020               Content Version: 12.6  © 2006-2020 [employer]. Care instructions adapted under license by [facility] ([facility]). If you have questions about a medical condition or this instruction, always ask your healthcare professional. [place] [address] any warranty or liability for your use of this information. Patient Education        Learning About COPD Triggers  What are triggers? When you have COPD (chronic obstructive pulmonary disease), certain things can make your symptoms worse. These are called triggers. They include:  · Cigarette smoke or air pollution. · Illnesses like colds, flu, or pneumonia. · Cleaning supplies or other chemicals. · Gases, particles, or fumes from wood or kerosene home heaters. Not all people have the same triggers. What may cause symptoms in one person may not be a problem for another person. How do triggers affect COPD?   Triggers can make it harder for your lungs to work as they should and can lead to sudden difficulty breathing and other symptoms. When you are around a trigger, a COPD flare-up is more likely. If your symptoms are severe, you may need emergency treatment or have to go to the hospital for treatment. If you know what your triggers are and can avoid them, you can reduce how often you have flare-ups and how much COPD affects your life. It's also important to be active and to take your daily medicines as prescribed. This helps prevent flare-ups too. What can you do to avoid triggers? The first thing is to know your triggers. When you are having symptoms, note the things around you that might be causing them. Then look for patterns in what may be triggering your symptoms. When you have your list of possible triggers, work with your doctor to find ways to avoid them. Here are some ways to avoid a few common triggers. · Do not smoke or allow others to smoke around you. If you need help quitting, talk to your doctor about stop-smoking programs and medicines. These can increase your chances of quitting for good. · If there is a lot of pollution, pollen, or dust outside, stay at home and keep your windows closed. Use an air conditioner or air filter in your home. Check your local weather report or newspaper for air quality and pollen reports. · Get a flu vaccine every year. Talk to your doctor about getting a pneumococcal shot. Wash your hands often to prevent infections. How can you manage a flare-up? Do not panic if you start to have a COPD flare-up. If you have a COPD action plan, follow the plan. In general:  · Use your quick-relief inhaler as directed by your doctor. If your symptoms do not get better after you use your medicine, have someone take you to the emergency room. Call an ambulance if needed. · Use a spacer with your metered-dose inhaler (MDI). If you have a nebulizer for inhaled medicine, use it. A spacer or nebulizer may help get more medicine to your lungs.   · If your doctor has given you other inhaled medicines or steroid pills, take them as directed. · If your doctor has given you a prescription for an antibiotic, fill it if you need to. · Call your doctor if you have to use your antibiotic or steroid pills. Where can you learn more? Go to https://chpepiceweb.Terviu. org and sign in to your [employer] account. Enter L347 in the [facility] box to learn more about \"Learning About COPD Triggers. \"     If you do not have an account, please click on the \"Sign Up Now\" link. Current as of: February 24, 2020               Content Version: 12.6  © 0184-1914 [employer]. Care instructions adapted under license by [facility] ([facility]). If you have questions about a medical condition or this instruction, always ask your healthcare professional. [address] any warranty or liability for your use of this information. Personalized Preventive Plan for Ly Haley - 11/24/2020  Medicare offers a range of preventive health benefits. Some of the tests and screenings are paid in full while other may be subject to a deductible, co-insurance, and/or copay. Some of these benefits include a comprehensive review of your medical history including lifestyle, illnesses that may run in your family, and various assessments and screenings as appropriate. After reviewing your medical record and screening and assessments performed today your provider may have ordered immunizations, labs, imaging, and/or referrals for you. A list of these orders (if applicable) as well as your Preventive Care list are included within your After Visit Summary for your review. Other Preventive Recommendations:    · A preventive eye exam performed by an eye specialist is recommended every 1-2 years to screen for glaucoma; cataracts, macular degeneration, and other eye disorders. · A preventive dental visit is recommended every 6 months.   · Try to get at least 150 minutes of exercise per week or 10,000 steps per day on a pedometer . · Order or download the FREE \"Exercise & Physical Activity: Your Everyday Guide\" from [employer]. Call 7-240.766.8218 or search [employer] online. · You need 8318-3575 mg of calcium and 9348-6462 IU of vitamin D per day. It is possible to meet your calcium requirement with diet alone, but a vitamin D supplement is usually necessary to meet this goal.  · When exposed to the sun, use a sunscreen that protects against both UVA and UVB radiation with an SPF of 30 or greater. Reapply every 2 to 3 hours or after sweating, drying off with a towel, or swimming. · Always wear a seat belt when traveling in a car. Always wear a helmet when riding a bicycle or motorcycle. Electronically signed by LORY Henderson CNP on 11/25/2020 at 4:09 PM           Completed Refills   Requested Prescriptions     Signed Prescriptions Disp Refills    albuterol sulfate HFA (VENTOLIN HFA) 108 (90 Base) MCG/ACT inhaler 1 Inhaler 1     Sig: Inhale 2 puffs into the lungs every 4 hours as needed for Wheezing    rosuvastatin (CRESTOR) 20 MG tablet 90 tablet 1     Sig: Take 1 tablet by mouth daily           Wyjohn Coyne received counseling on the following healthy behaviors: nutrition and medication adherence  Reviewed prior labs and health maintenance  Continue current medications, diet and exercise. Discussed use, benefit, and side effects of prescribed medications. Barriers to medication compliance addressed. Patient given educational materials - see patient instructions  Was a self-tracking handout given in paper form or via EasyCopayt?  No:     Requested Prescriptions     Signed Prescriptions Disp Refills    albuterol sulfate HFA (VENTOLIN HFA) 108 (90 Base) MCG/ACT inhaler 1 Inhaler 1     Sig: Inhale 2 puffs into the lungs every 4 hours as needed for Wheezing    rosuvastatin (CRESTOR) 20 MG tablet 90 tablet 1     Sig: Take 1 tablet by mouth daily       All patient questions answered. Patient voiced understanding. Quality Measures    Body mass index is 27.55 kg/m². Elevated. Weight control planned discussed Healthy diet and regular exercise. BP: 112/68. Blood pressure is normal. Treatment plan consists of Weight Reduction and No treatment change needed. Fall Risk 10/5/2020 7/15/2019 5/9/2018 2/13/2018 5/5/2017 2/9/2015   2 or more falls in past year? no no no no no no   Fall with injury in past year? no no no no no no     The patient does not have a history of falls. I did not -fall , complete a risk assessment for falls.  A plan of care for falls home safety tips provided, No Treatment plan indicated    Lab Results   Component Value Date    LDLCHOLESTEROL 70 10/14/2020    (goal LDL reduction with dx if diabetes is 50% LDL reduction)    PHQ Scores 10/5/2020 3/25/2019 5/9/2018 2/13/2018 5/5/2017 2/9/2015   PHQ2 Score 0 0 0 0 0 0   PHQ9 Score 0 0 0 0 0 0     Interpretation of Total Score Depression Severity: 1-4 = Minimal depression, 5-9 = Mild depression, 10-14 = Moderate depression, 15-19 = Moderately severe depression, 20-27 = Severe depression

## 2020-11-24 NOTE — PATIENT INSTRUCTIONS
You may be receiving a survey from [employer] regarding your visit today. You may get this in the mail, through your MyChart or in your email. Please complete the survey to enable us to provide the highest quality of care to you and your family. If you cannot score us as very good ( 5 Stars) on any question, please feel free to call the office to discuss how we could have made your experience exceptional.     Thank You! Amber Cuenca, APRN-CLAU Otto, ROGERS Echevarria. Patient Education        Breathing Techniques for COPD: Care Instructions  Your Care Instructions     Breathing is hard when you have chronic obstructive pulmonary disease (COPD). You may take quick, short breaths. Breathing this way makes it harder to get air into your lungs. Learning new ways to control your breathing may help. You may feel better and be able to do more. You can try three basic ways to control your breathing. They are pursed-lip breathing, diaphragmatic breathing, and breathing while bending. Use these methods when you are more short of breath than normal. Practice them often so you can do them well. Follow-up care is a key part of your treatment and safety. Be sure to make and go to all appointments, and call your doctor if you are having problems. It's also a good idea to know your test results and keep a list of the medicines you take. How can you care for yourself at home? · Pursed-lip breathing helps you breathe more air out so that your next breath can be deeper. For this type of breathing, you breathe in through your nose and out through your mouth while almost closing your lips. Breathe in for about 2 seconds, and breathe out for 4 to 6 seconds. Pursed-lip breathing decreases shortness of breath and improves your ability to exercise. · Diaphragmatic breathing helps your lungs expand so that they take in more air. ? Lie on your back, or prop yourself up on several pillows.   ? Put one hand on a lung disease that makes it hard to breathe. COPD stands for chronic obstructive pulmonary disease. It is caused by damage to the lungs over many years, usually from smoking. COPD is a mix of two diseases: chronic bronchitis and emphysema. Other things that may put you at risk for COPD include breathing chemical fumes, dust, or air pollution over a long period of time. Secondhand smoke is also bad. In chronic bronchitis, the airways that carry air to the lungs (bronchial tubes) get inflamed and make a lot of mucus. This can narrow or block the airways, making it hard for you to breathe. In emphysema, the air sacs in your lungs are damaged and lose their stretch. Less air gets in and out of your lungs, which makes you feel short of breath. What can you expect when you have COPD? COPD gets worse over time. You cannot undo the damage to your lungs. Over time, you may find that:  · You get short of breath even when you do simple things like get dressed or fix a meal.  · It is hard to eat or exercise. · You lose weight and feel weaker. But there are things you can do to prevent more damage and feel better. What are the symptoms? The main symptoms are:  · A cough that will not go away. · Mucus that comes up when you cough. · Shortness of breath that gets worse with activity. At times, your symptoms may suddenly flare up and get much worse. This is a called a COPD exacerbation (say \"egg-ZASS-er-BAY-shsailaja\"). When this happens, your usual symptoms quickly get worse and stay bad. This can be dangerous. You may have to go to the hospital.  How can you keep COPD from getting worse? Not smoking is the best way to keep COPD from getting worse. If you need help quitting, talk to your doctor about stop-smoking programs and medicines. Also, be sure to get your flu and pneumococcal vaccines. And avoid air pollution, fumes, and dust as much as you can. How is COPD treated? COPD is treated with medicines and oxygen.  You good idea to know your test results and keep a list of the medicines you take. Where can you learn more? Go to https://chpepiceweb.Polatis. org and sign in to your [employer] account. Enter V314 in the [facility] box to learn more about \"Learning About COPD. \"     If you do not have an account, please click on the \"Sign Up Now\" link. Current as of: February 24, 2020               Content Version: 12.6  © 2006-2020 "[employer] Incorporated. Care instructions adapted under license by [facility] ([facility]). If you have questions about a medical condition or this instruction, always ask your healthcare professional. [address] any warranty or liability for your use of this information. Patient Education        Learning About COPD Triggers  What are triggers? When you have COPD (chronic obstructive pulmonary disease), certain things can make your symptoms worse. These are called triggers. They include:  · Cigarette smoke or air pollution. · Illnesses like colds, flu, or pneumonia. · Cleaning supplies or other chemicals. · Gases, particles, or fumes from wood or kerosene home heaters. Not all people have the same triggers. What may cause symptoms in one person may not be a problem for another person. How do triggers affect COPD? Triggers can make it harder for your lungs to work as they should and can lead to sudden difficulty breathing and other symptoms. When you are around a trigger, a COPD flare-up is more likely. If your symptoms are severe, you may need emergency treatment or have to go to the hospital for treatment. If you know what your triggers are and can avoid them, you can reduce how often you have flare-ups and how much COPD affects your life. It's also important to be active and to take your daily medicines as prescribed. This helps prevent flare-ups too. What can you do to avoid triggers? The first thing is to know your triggers.   When you are having symptoms, note the things around you that might be causing them. Then look for patterns in what may be triggering your symptoms. When you have your list of possible triggers, work with your doctor to find ways to avoid them. Here are some ways to avoid a few common triggers. · Do not smoke or allow others to smoke around you. If you need help quitting, talk to your doctor about stop-smoking programs and medicines. These can increase your chances of quitting for good. · If there is a lot of pollution, pollen, or dust outside, stay at home and keep your windows closed. Use an air conditioner or air filter in your home. Check your local weather report or newspaper for air quality and pollen reports. · Get a flu vaccine every year. Talk to your doctor about getting a pneumococcal shot. Wash your hands often to prevent infections. How can you manage a flare-up? Do not panic if you start to have a COPD flare-up. If you have a COPD action plan, follow the plan. In general:  · Use your quick-relief inhaler as directed by your doctor. If your symptoms do not get better after you use your medicine, have someone take you to the emergency room. Call an ambulance if needed. · Use a spacer with your metered-dose inhaler (MDI). If you have a nebulizer for inhaled medicine, use it. A spacer or nebulizer may help get more medicine to your lungs. · If your doctor has given you other inhaled medicines or steroid pills, take them as directed. · If your doctor has given you a prescription for an antibiotic, fill it if you need to. · Call your doctor if you have to use your antibiotic or steroid pills. Where can you learn more? Go to https://MobileCausegalinaGliAffidabili.it.Connotate. org and sign in to your [employer] account. Enter P238 in the [facility] box to learn more about \"Learning About COPD Triggers. \"     If you do not have an account, please click on the \"Sign Up Now\" link.   Current as of: February 24, 2020               Content Version: 12.6  © 5718-6960 [employer]. Care instructions adapted under license by [facility] ([facility]). If you have questions about a medical condition or this instruction, always ask your healthcare professional. [address] any warranty or liability for your use of this information. Personalized Preventive Plan for Meaghan Barahona - 11/24/2020  Medicare offers a range of preventive health benefits. Some of the tests and screenings are paid in full while other may be subject to a deductible, co-insurance, and/or copay. Some of these benefits include a comprehensive review of your medical history including lifestyle, illnesses that may run in your family, and various assessments and screenings as appropriate. After reviewing your medical record and screening and assessments performed today your provider may have ordered immunizations, labs, imaging, and/or referrals for you. A list of these orders (if applicable) as well as your Preventive Care list are included within your After Visit Summary for your review. Other Preventive Recommendations:    · A preventive eye exam performed by an eye specialist is recommended every 1-2 years to screen for glaucoma; cataracts, macular degeneration, and other eye disorders. · A preventive dental visit is recommended every 6 months. · Try to get at least 150 minutes of exercise per week or 10,000 steps per day on a pedometer . · Order or download the FREE \"Exercise & Physical Activity: Your Everyday Guide\" from [employer]. Call 8-912.541.4888 or search [employer] online. · You need 9982-8641 mg of calcium and 4657-3065 IU of vitamin D per day.  It is possible to meet your calcium requirement with diet alone, but a vitamin D supplement is usually necessary to meet this goal.  · When exposed to the sun, use a sunscreen that protects against both UVA and UVB radiation with an SPF of 30 or greater. Reapply every 2 to 3 hours or after sweating, drying off with a towel, or swimming. · Always wear a seat belt when traveling in a car. Always wear a helmet when riding a bicycle or motorcycle.

## 2020-11-25 ENCOUNTER — TELEPHONE (OUTPATIENT)
Dept: CARE COORDINATION | Age: 85
End: 2020-11-25

## 2020-11-25 ASSESSMENT — ENCOUNTER SYMPTOMS
WHEEZING: 0
CHEST TIGHTNESS: 0
SHORTNESS OF BREATH: 0
ABDOMINAL DISTENTION: 0
EYES NEGATIVE: 1
ABDOMINAL PAIN: 0
COUGH: 0

## 2020-11-25 NOTE — TELEPHONE ENCOUNTER
This ACP specialist received referral for Advance Care Planning on 11/24. Called patient to continue discussion on Advance Directive. Patient reports that he has already giving information on his friend Evgeny Buchanan and Rebekah Walsh to call them if he has an emergency. This ACP tried explaining to patient that the Advance Directive would put in place a primary healthcare decision maker if he is in a terminal condition or permanently unconscious. Patient reports that he is not interested at this time, and hung up. We will keep referral open for 30 days and will  try to follow up with patient in few weeks.

## 2020-12-08 ENCOUNTER — TELEPHONE (OUTPATIENT)
Dept: CARE COORDINATION | Age: 85
End: 2020-12-08

## 2020-12-14 ENCOUNTER — TELEPHONE (OUTPATIENT)
Dept: CARE COORDINATION | Age: 85
End: 2020-12-14

## 2020-12-23 ENCOUNTER — OFFICE VISIT (OUTPATIENT)
Dept: CARDIOLOGY CLINIC | Age: 85
End: 2020-12-23

## 2020-12-23 ENCOUNTER — HOSPITAL ENCOUNTER (OUTPATIENT)
Age: 85
Discharge: HOME | End: 2020-12-23
Payer: MEDICARE

## 2020-12-23 ENCOUNTER — TELEPHONE (OUTPATIENT)
Dept: PRIMARY CARE CLINIC | Age: 85
End: 2020-12-23

## 2020-12-23 LAB
-: ABNORMAL
ABSOLUTE EOS #: 0.1 K/UL (ref 0–0.4)
ABSOLUTE IMMATURE GRANULOCYTE: ABNORMAL K/UL (ref 0–0.3)
ABSOLUTE LYMPH #: 1.1 K/UL (ref 1–4.8)
ABSOLUTE MONO #: 0.4 K/UL (ref 0–1)
AMORPHOUS: ABNORMAL
BACTERIA: ABNORMAL
BASOPHILS # BLD: 1 % (ref 0–2)
BASOPHILS ABSOLUTE: 0 K/UL (ref 0–0.2)
BILIRUBIN URINE: NEGATIVE
CASTS UA: ABNORMAL /LPF
COLOR: YELLOW
COMMENT UA: ABNORMAL
CRYSTALS, UA: ABNORMAL /HPF
DIFFERENTIAL TYPE: YES
EOSINOPHILS RELATIVE PERCENT: 2 % (ref 0–5)
EPITHELIAL CELLS UA: ABNORMAL /HPF
GLUCOSE URINE: NEGATIVE
HCT VFR BLD CALC: 36 % (ref 41–53)
HEMOGLOBIN: 11.9 G/DL (ref 13.5–17.5)
IMMATURE GRANULOCYTES: ABNORMAL %
KETONES, URINE: NEGATIVE
LEUKOCYTE ESTERASE, URINE: NEGATIVE
LYMPHOCYTES # BLD: 21 % (ref 13–44)
MCH RBC QN AUTO: 29 PG (ref 26–34)
MCHC RBC AUTO-ENTMCNC: 33.2 G/DL (ref 31–37)
MCV RBC AUTO: 87.4 FL (ref 80–100)
MONOCYTES # BLD: 8 % (ref 5–9)
MUCUS: ABNORMAL
NITRITE, URINE: NEGATIVE
NRBC AUTOMATED: ABNORMAL PER 100 WBC
OTHER OBSERVATIONS UA: ABNORMAL
PDW BLD-RTO: 15 % (ref 12.1–15.2)
PH UA: 7 (ref 5–8)
PLATELET # BLD: 179 K/UL (ref 140–450)
PLATELET ESTIMATE: ABNORMAL
PMV BLD AUTO: ABNORMAL FL (ref 6–12)
PROTEIN UA: ABNORMAL
RBC # BLD: 4.11 M/UL (ref 4.5–5.9)
RBC # BLD: ABNORMAL 10*6/UL
RBC UA: ABNORMAL /HPF (ref 0–2)
RENAL EPITHELIAL, UA: ABNORMAL /HPF
SEG NEUTROPHILS: 68 % (ref 39–75)
SEGMENTED NEUTROPHILS ABSOLUTE COUNT: 3.4 K/UL (ref 2.1–6.5)
SPECIFIC GRAVITY UA: 1.01 (ref 1–1.03)
TRICHOMONAS: ABNORMAL
TROPONIN INTERP: ABNORMAL
TROPONIN T: 0.05 NG/ML
TROPONIN, HIGH SENSITIVITY: ABNORMAL NG/L (ref 0–22)
TSH SERPL DL<=0.05 MIU/L-ACNC: 3.65 MIU/L (ref 0.3–5)
TURBIDITY: CLEAR
URINE HGB: NEGATIVE
UROBILINOGEN, URINE: ABNORMAL
WBC # BLD: 5 K/UL (ref 3.5–11)
WBC # BLD: ABNORMAL 10*3/UL
WBC UA: ABNORMAL /HPF
YEAST: ABNORMAL

## 2020-12-23 PROCEDURE — 84484 ASSAY OF TROPONIN QUANT: CPT

## 2020-12-23 PROCEDURE — 84443 ASSAY THYROID STIM HORMONE: CPT

## 2020-12-23 PROCEDURE — G8417 CALC BMI ABV UP PARAM F/U: HCPCS | Performed by: INTERNAL MEDICINE

## 2020-12-23 PROCEDURE — 93005 ELECTROCARDIOGRAM TRACING: CPT

## 2020-12-23 PROCEDURE — 85025 COMPLETE CBC W/AUTO DIFF WBC: CPT

## 2020-12-23 PROCEDURE — 81001 URINALYSIS AUTO W/SCOPE: CPT

## 2020-12-23 PROCEDURE — G8427 DOCREV CUR MEDS BY ELIG CLIN: HCPCS | Performed by: INTERNAL MEDICINE

## 2020-12-23 PROCEDURE — 99211 OFF/OP EST MAY X REQ PHY/QHP: CPT | Performed by: INTERNAL MEDICINE

## 2020-12-23 PROCEDURE — 36415 COLL VENOUS BLD VENIPUNCTURE: CPT

## 2020-12-23 NOTE — PROGRESS NOTES
Pt walked into office   C/o a lot dizziness   Weakness. Feels flushed   No fever. DR Kendrick Lucia saw pt   Decreased metoprolol  To once a day   Do u/a , cbc tropinin   tsh and ekg .    Pt called ;with lab and ekg  Results  If not any better by tomorrow  Need to go to ED

## 2020-12-23 NOTE — TELEPHONE ENCOUNTER
Pt called [address] office c/o feeling hot in am, seen cardiologist today. Some testing done and medication adjusted (pt offers he was taking plavix twice a day incorrectly)     Discussed with pt need to have home health check on him for med and safety check pt agreeable. Pt recently lost his significant other Nona and is living at her home at this time    [facility]    Will order home health.

## 2020-12-25 ENCOUNTER — APPOINTMENT (OUTPATIENT)
Dept: GENERAL RADIOLOGY | Age: 85
End: 2020-12-25
Payer: MEDICARE

## 2020-12-25 ENCOUNTER — HOSPITAL ENCOUNTER (EMERGENCY)
Age: 85
Discharge: HOME | End: 2020-12-25
Attending: FAMILY MEDICINE
Payer: MEDICARE

## 2020-12-25 VITALS
WEIGHT: 182 LBS | TEMPERATURE: 98.5 F | OXYGEN SATURATION: 96 % | RESPIRATION RATE: 18 BRPM | SYSTOLIC BLOOD PRESSURE: 127 MMHG | HEIGHT: 70 IN | HEART RATE: 82 BPM | BODY MASS INDEX: 26.05 KG/M2 | DIASTOLIC BLOOD PRESSURE: 70 MMHG

## 2020-12-25 LAB
ABSOLUTE EOS #: 0.1 K/UL (ref 0–0.4)
ABSOLUTE IMMATURE GRANULOCYTE: ABNORMAL K/UL (ref 0–0.3)
ABSOLUTE LYMPH #: 1 K/UL (ref 1–4.8)
ABSOLUTE MONO #: 0.4 K/UL (ref 0–1)
ANION GAP SERPL CALCULATED.3IONS-SCNC: 11 MMOL/L (ref 9–17)
BASOPHILS # BLD: 1 % (ref 0–2)
BASOPHILS ABSOLUTE: 0 K/UL (ref 0–0.2)
BUN BLDV-MCNC: 11 MG/DL (ref 8–23)
BUN/CREAT BLD: 14 (ref 9–20)
CALCIUM SERPL-MCNC: 9.2 MG/DL (ref 8.6–10.4)
CHLORIDE BLD-SCNC: 103 MMOL/L (ref 98–107)
CO2: 27 MMOL/L (ref 20–31)
CREAT SERPL-MCNC: 0.76 MG/DL (ref 0.7–1.2)
DIFFERENTIAL TYPE: YES
EKG ATRIAL RATE: 77 BPM
EKG P AXIS: 114 DEGREES
EKG P-R INTERVAL: 196 MS
EKG Q-T INTERVAL: 488 MS
EKG QRS DURATION: 226 MS
EKG QTC CALCULATION (BAZETT): 552 MS
EKG R AXIS: -73 DEGREES
EKG T AXIS: 101 DEGREES
EKG VENTRICULAR RATE: 77 BPM
EOSINOPHILS RELATIVE PERCENT: 2 % (ref 0–5)
GFR AFRICAN AMERICAN: >60 ML/MIN
GFR NON-AFRICAN AMERICAN: >60 ML/MIN
GFR SERPL CREATININE-BSD FRML MDRD: ABNORMAL ML/MIN/{1.73_M2}
GFR SERPL CREATININE-BSD FRML MDRD: ABNORMAL ML/MIN/{1.73_M2}
GLUCOSE BLD-MCNC: 116 MG/DL (ref 70–99)
HCT VFR BLD CALC: 36.2 % (ref 41–53)
HEMOGLOBIN: 12 G/DL (ref 13.5–17.5)
IMMATURE GRANULOCYTES: ABNORMAL %
INR BLD: 1.2
LYMPHOCYTES # BLD: 22 % (ref 13–44)
MCH RBC QN AUTO: 29 PG (ref 26–34)
MCHC RBC AUTO-ENTMCNC: 33.2 G/DL (ref 31–37)
MCV RBC AUTO: 87.3 FL (ref 80–100)
MONOCYTES # BLD: 8 % (ref 5–9)
NRBC AUTOMATED: ABNORMAL PER 100 WBC
PDW BLD-RTO: 15.6 % (ref 12.1–15.2)
PLATELET # BLD: 161 K/UL (ref 140–450)
PLATELET ESTIMATE: ABNORMAL
PMV BLD AUTO: ABNORMAL FL (ref 6–12)
POTASSIUM SERPL-SCNC: 3.7 MMOL/L (ref 3.7–5.3)
PROTHROMBIN TIME: 14.6 SEC (ref 11.5–14.2)
RBC # BLD: 4.15 M/UL (ref 4.5–5.9)
RBC # BLD: ABNORMAL 10*6/UL
SEG NEUTROPHILS: 67 % (ref 39–75)
SEGMENTED NEUTROPHILS ABSOLUTE COUNT: 3.1 K/UL (ref 2.1–6.5)
SODIUM BLD-SCNC: 141 MMOL/L (ref 135–144)
TROPONIN INTERP: NORMAL
TROPONIN INTERP: NORMAL
TROPONIN T: <0.03 NG/ML
TROPONIN T: <0.03 NG/ML
TROPONIN, HIGH SENSITIVITY: NORMAL NG/L (ref 0–22)
TROPONIN, HIGH SENSITIVITY: NORMAL NG/L (ref 0–22)
WBC # BLD: 4.7 K/UL (ref 3.5–11)
WBC # BLD: ABNORMAL 10*3/UL

## 2020-12-25 PROCEDURE — 85610 PROTHROMBIN TIME: CPT

## 2020-12-25 PROCEDURE — 80048 BASIC METABOLIC PNL TOTAL CA: CPT

## 2020-12-25 PROCEDURE — 36415 COLL VENOUS BLD VENIPUNCTURE: CPT

## 2020-12-25 PROCEDURE — 71045 X-RAY EXAM CHEST 1 VIEW: CPT

## 2020-12-25 PROCEDURE — 93010 ELECTROCARDIOGRAM REPORT: CPT | Performed by: INTERNAL MEDICINE

## 2020-12-25 PROCEDURE — 93005 ELECTROCARDIOGRAM TRACING: CPT | Performed by: FAMILY MEDICINE

## 2020-12-25 PROCEDURE — 85025 COMPLETE CBC W/AUTO DIFF WBC: CPT

## 2020-12-25 PROCEDURE — 84484 ASSAY OF TROPONIN QUANT: CPT

## 2020-12-25 PROCEDURE — 6370000000 HC RX 637 (ALT 250 FOR IP): Performed by: FAMILY MEDICINE

## 2020-12-25 PROCEDURE — 99285 EMERGENCY DEPT VISIT HI MDM: CPT

## 2020-12-25 RX ORDER — METOPROLOL SUCCINATE 50 MG/1
50 TABLET, EXTENDED RELEASE ORAL DAILY
Qty: 30 TABLET | Refills: 0 | Status: SHIPPED | OUTPATIENT
Start: 2020-12-25 | End: 2021-01-29 | Stop reason: SDUPTHER

## 2020-12-25 RX ORDER — ASPIRIN 81 MG/1
324 TABLET, CHEWABLE ORAL ONCE
Status: COMPLETED | OUTPATIENT
Start: 2020-12-25 | End: 2020-12-25

## 2020-12-25 RX ORDER — ISOSORBIDE MONONITRATE 30 MG/1
30 TABLET, EXTENDED RELEASE ORAL DAILY
Status: DISCONTINUED | OUTPATIENT
Start: 2020-12-25 | End: 2020-12-25 | Stop reason: HOSPADM

## 2020-12-25 RX ORDER — ISOSORBIDE MONONITRATE 30 MG/1
15 TABLET, EXTENDED RELEASE ORAL DAILY
Qty: 30 TABLET | Refills: 0 | Status: SHIPPED | OUTPATIENT
Start: 2020-12-25 | End: 2020-12-29 | Stop reason: SDUPTHER

## 2020-12-25 RX ADMIN — ISOSORBIDE MONONITRATE 30 MG: 30 TABLET, EXTENDED RELEASE ORAL at 11:28

## 2020-12-25 RX ADMIN — ASPIRIN 324 MG: 81 TABLET, CHEWABLE ORAL at 10:03

## 2020-12-25 ASSESSMENT — PAIN SCALES - GENERAL
PAINLEVEL_OUTOF10: 8
PAINLEVEL_OUTOF10: 4

## 2020-12-25 ASSESSMENT — PAIN DESCRIPTION - DESCRIPTORS: DESCRIPTORS: CONSTANT;PRESSURE

## 2020-12-25 ASSESSMENT — PAIN DESCRIPTION - ORIENTATION: ORIENTATION: MID;LEFT;RIGHT

## 2020-12-25 ASSESSMENT — PAIN DESCRIPTION - FREQUENCY: FREQUENCY: CONTINUOUS

## 2020-12-25 ASSESSMENT — PAIN DESCRIPTION - PROGRESSION: CLINICAL_PROGRESSION: GRADUALLY WORSENING

## 2020-12-25 ASSESSMENT — PAIN DESCRIPTION - LOCATION: LOCATION: CHEST

## 2020-12-25 ASSESSMENT — PAIN DESCRIPTION - ONSET: ONSET: ON-GOING

## 2020-12-25 ASSESSMENT — PAIN DESCRIPTION - PAIN TYPE: TYPE: ACUTE PAIN

## 2020-12-25 NOTE — ED PROVIDER NOTES
Congestive heart failure. LEVOTHYROXINE (SYNTHROID) 125 MCG TABLET    Take 1 tablet by mouth Daily    ROSUVASTATIN (CRESTOR) 20 MG TABLET    Take 1 tablet by mouth daily       ALLERGIES     is allergic to menthol (topical analgesic). FAMILY HISTORY     He indicated that his mother is . He indicated that his father is . He indicated that both of his sisters are alive. He indicated that three of his six brothers are alive. He indicated that his maternal grandmother is . He indicated that his maternal grandfather is . He indicated that his paternal grandmother is . He indicated that his paternal grandfather is . He indicated that two of his three sons are alive. family history includes Heart Disease in his father. SOCIAL HISTORY      reports that he has never smoked. He has never used smokeless tobacco. He reports current alcohol use. He reports that he does not use drugs. PHYSICAL EXAM     INITIAL VITALS:  height is 5' 10\" (1.778 m) and weight is 182 lb (82.6 kg). His oral temperature is 98.5 °F (36.9 °C). His blood pressure is 127/70 and his pulse is 82. His respiration is 18 and oxygen saturation is 96%. Physical Exam   Constitutional: Patient is oriented to person, place, and time. Patient appears well-developed and well-nourished. Patient is active and cooperative. HENT:   Head: Normocephalic and atraumatic. Head is without contusion. Right Ear:  external ear normal. No drainage. Left Ear: external ear normal. No drainage. Nose: Nose normal. No nasal deformity. No epistaxis. Mouth/Throat: Mucous membranes are not dry. Eyes: EOMI. Conjunctivae, sclera, and lids are normal. Right eye exhibits no discharge. Left eye exhibits no discharge. Neck: Full passive range of motion without pain and phonation normal.   Cardiovascular:   Normal rate, regular rhythm and intact distal pulses. No lower extremity edema.   Pulses: Radial pulse is 2+ Pulmonary/Chest: Effort normal.   No tachypnea and no bradypnea. No wheezes, rhonchi, or rales. Noted pacer device left upper chest wall  Abdominal: Soft. Patient without distension or tenderness  Musculoskeletal:   Negative acute trauma or deformity,  apparent full range of motion and normal strength all extremities appropriate to age. Neurological: Patient is alert and oriented to person, place, and time. patient displays no tremor. Patient displays no seizure activity. Skin: Skin is warm and dry. Patient is not diaphoretic. Psychiatric: Patient has a normal mood and affect. Patient speech is normal and behavior is normal. Cognition and memory are normal.      DIFFERENTIAL DIAGNOSIS:   ACS, AA, PE, GERD/gastritis, PTX, anxiety, msk    DIAGNOSTIC RESULTS     EKG: All EKG's are interpreted by the Emergency Department Physician who either signs or Co-signs this chart in the absence of a cardiologist.  EKG    The patient had an EKG which is interpreted by me in the absence of a Cardiologist.   [] Without comparison to previous. [x] With comparison to a previous EKG Dated  12/23/2020    EKG @ 0842 hrs -paced rhythm, rate 96, axis -73,  , as compared to prior EKG no significant changes morphology    EKG  @ 1045 hrs -paced rhythm, rate 74, axis -66,  , as compared to prior EKG no significant changes morphology      RADIOLOGY: non-plain film images(s) such as CT, Ultrasound and MRI are read by the radiologist.  XR CHEST PORTABLE   Final Result      Hazy bibasilar opacities, which may represent atelectasis versus developing    infectious infiltrate.                       LABS:   Labs Reviewed   CBC WITH AUTO DIFFERENTIAL - Abnormal; Notable for the following components:       Result Value    RBC 4.15 (*)     Hemoglobin 12.0 (*)     Hematocrit 36.2 (*)     RDW 15.6 (*)     All other components within normal limits   BASIC METABOLIC PANEL W/ REFLEX TO MG FOR LOW K - Abnormal; Notable for the following components:    Glucose 116 (*)     All other components within normal limits   PROTIME-INR - Abnormal; Notable for the following components:    Protime 14.6 (*)     All other components within normal limits   TROPONIN   TROPONIN       EMERGENCY DEPARTMENT COURSE:   Vitals:    Vitals:    12/25/20 1046 12/25/20 1102 12/25/20 1116 12/25/20 1131   BP: 130/62 128/71 127/69 127/70   Pulse: 77 81 77 82   Resp: 20 17 19 18   Temp:       TempSrc:       SpO2: 96% 96% 96% 96%   Weight:       Height:         Patient history and physical exam taken at bedside, discussed patient's symptoms and exam findings as well as initial plan of workup to include EKG, chest x-ray, blood work with saline lock insertion, and chewable aspirin, pacer interrogation. Patient placed on cardiac monitor and continuous pulse oximetry resting semi-Fowlers in bed.     EKG as above    EMR reviewed, noting patient cardiology office visit 12/23, unable to view that note at this time, however I do see the labs that were drawn at the time treat otherwise normal    Advised by nursing that we are unable to interrogate a magnify360 cardiac pacemaker, will discuss this with cardiology    Chest x-ray reviewed    Lab work-up reviewed noting normal troponin    Discussed the patient initial work-up, I would like to get a 2-hour troponin and EKG, I will contact his cardiologist to discuss case, acknowledges    Case was discussed with Dr. Phillip Medicine, cardiology, regarding patient's presentation current work-up, agrees with getting 2-hour troponin/EKG, if all is well can discharge patient home, medication change including metoprolol succinate 50 mg daily, and start patient on Imdur 15 mg daily, both medications in the morning, he will not be in office next week but advised follow-up week after    EKG #2 as above, second troponin negative    Discussed with patient my conversation with his cardiologist, including recommendations for medication changes and adds, discussed his overall work-up in the emergency room, at this time I feel we can safely discharge patient home, will give first dose in brother in the emergency room and dose for tomorrow to allow patient time to get to the pharmacy, prescriptions otherwise sent to the pharmacy, outpatient follow-up with cardiology, return to ER if any symptoms change worsen or other concerns, patient acknowledges    FINAL IMPRESSION      1.  Chest pain, unspecified type          DISPOSITION/PLAN   D/c    PATIENT REFERRED TO:  MD Haresh [phone] [id number]  [phone]    Call       Leigh Turcios APRN - [facility]  [place] [address] [facility]  [phone]    Call   As needed    [facility] ED  [address] [facility] [address] [phone]    As needed, If symptoms worsen      DISCHARGE MEDICATIONS:  New Prescriptions    ISOSORBIDE MONONITRATE (IMDUR) 30 MG EXTENDED RELEASE TABLET    Take 0.5 tablets by mouth daily    METOPROLOL SUCCINATE (TOPROL XL) 50 MG EXTENDED RELEASE TABLET    Take 1 tablet by mouth daily           Summation      Patient Course:  D/c    ED Medications administered this visit:    Medications   isosorbide mononitrate (IMDUR) extended release tablet 30 mg (30 mg Oral Given 12/25/20 1128)   aspirin chewable tablet 324 mg (324 mg Oral Given 12/25/20 1003)       New Prescriptions from this visit:    New Prescriptions    ISOSORBIDE MONONITRATE (IMDUR) 30 MG EXTENDED RELEASE TABLET    Take 0.5 tablets by mouth daily    METOPROLOL SUCCINATE (TOPROL XL) 50 MG EXTENDED RELEASE TABLET    Take 1 tablet by mouth daily       Follow-up:  MD Haresh Rodriguez [address]    Call       Leigh Turcios APRN - CNP  [place] [address] [facility]  [phone]    Call   As needed    [facility] ED  [address] [facility] [address] [phone]    As needed, If symptoms worsen        Final Impression: 1. Chest pain, unspecified type               (Please note that portions of this note were completed with a voice recognition program.  Efforts were made to edit the dictations but occasionally words are mis-transcribed.)    MD Carlota Morrison MD  12/25/20 0835

## 2020-12-26 LAB
EKG ATRIAL RATE: 108 BPM
EKG ATRIAL RATE: 74 BPM
EKG P AXIS: 91 DEGREES
EKG P-R INTERVAL: 206 MS
EKG Q-T INTERVAL: 464 MS
EKG Q-T INTERVAL: 496 MS
EKG QRS DURATION: 196 MS
EKG QRS DURATION: 224 MS
EKG QTC CALCULATION (BAZETT): 550 MS
EKG QTC CALCULATION (BAZETT): 586 MS
EKG R AXIS: -66 DEGREES
EKG R AXIS: -73 DEGREES
EKG T AXIS: 102 DEGREES
EKG T AXIS: 97 DEGREES
EKG VENTRICULAR RATE: 74 BPM
EKG VENTRICULAR RATE: 96 BPM

## 2020-12-26 PROCEDURE — 93010 ELECTROCARDIOGRAM REPORT: CPT | Performed by: INTERNAL MEDICINE

## 2020-12-26 RX ORDER — CLOPIDOGREL BISULFATE 75 MG/1
1 TABLET ORAL DAILY
COMMUNITY
Start: 2020-12-05 | End: 2020-12-29 | Stop reason: SDUPTHER

## 2020-12-28 ENCOUNTER — TELEPHONE (OUTPATIENT)
Dept: PRIMARY CARE CLINIC | Age: 85
End: 2020-12-28

## 2020-12-28 NOTE — TELEPHONE ENCOUNTER
Pt called and questions if he should continue taking Plavix. Pt states he was reviewing medications and he was to ask if Plavix should be continued.

## 2020-12-28 NOTE — TELEPHONE ENCOUNTER
Yes is to be taking plavix important med for both heart and stroke prevention. There was no questions if this is to be taken, but we were more concerned if he was taking it incorrectly bid instead of daily which is what he told me on the phone. Home health is to see him this week , he was in ER over the weekend with chest pain and recently lost his significant other Pat , which they were together for many years. His mourning her loss has restricted his ability to care for himself some.

## 2020-12-29 NOTE — TELEPHONE ENCOUNTER
Ryan Arreola from home health called, will not be able to  pot for home care due to him driving around town. Did state that Pt states that the plavix was hand written on his discharge form from ED. Has not been taking this medication since at least Dec. 10th unsure how he was taking it before. Also needs additional refills, all pending. Do you want pt to come in sooner then the 06/22/21 appt? Please advise.

## 2020-12-30 RX ORDER — CLOPIDOGREL BISULFATE 75 MG/1
75 TABLET ORAL DAILY
Qty: 30 TABLET | Refills: 5 | Status: SHIPPED | OUTPATIENT
Start: 2020-12-30 | End: 2021-06-23

## 2020-12-30 RX ORDER — ISOSORBIDE MONONITRATE 30 MG/1
15 TABLET, EXTENDED RELEASE ORAL DAILY
Qty: 30 TABLET | Refills: 5 | Status: SHIPPED | OUTPATIENT
Start: 2020-12-30 | End: 2021-12-06 | Stop reason: SDUPTHER

## 2020-12-30 RX ORDER — FUROSEMIDE 20 MG/1
TABLET ORAL
Qty: 12 TABLET | Refills: 5 | Status: SHIPPED | OUTPATIENT
Start: 2020-12-30 | End: 2021-10-15 | Stop reason: SDUPTHER

## 2020-12-30 RX ORDER — LEVOTHYROXINE SODIUM 0.12 MG/1
125 TABLET ORAL DAILY
Qty: 90 TABLET | Refills: 1 | Status: SHIPPED | OUTPATIENT
Start: 2020-12-30 | End: 2021-09-02 | Stop reason: SDUPTHER

## 2020-12-30 NOTE — PROGRESS NOTES
Cardiology    Denies chest pain, but complains of being \"weak\" in the morning. Usually subsides about 10 AM.  No unusual sob. He is also grieving the loss of his wife 2 weeks ago. He has 3 stepdaughters who help him with food etc.    No syncope or near syncope. BP today was 94/50 with HR 60. Exam unremarkable. I think he is hypotensive in the morning from evening dose of Metoprolol. Will hold and observe symptoms. If develops chest pain or sob should go to ER. Will call in 1 week.     Jean-Pierre Ty MD

## 2021-01-04 ENCOUNTER — HOSPITAL ENCOUNTER (EMERGENCY)
Age: 86
Discharge: HOME | End: 2021-01-04
Attending: EMERGENCY MEDICINE
Payer: MEDICARE

## 2021-01-04 ENCOUNTER — APPOINTMENT (OUTPATIENT)
Dept: GENERAL RADIOLOGY | Age: 86
End: 2021-01-04
Payer: MEDICARE

## 2021-01-04 VITALS
SYSTOLIC BLOOD PRESSURE: 92 MMHG | TEMPERATURE: 97.8 F | OXYGEN SATURATION: 95 % | BODY MASS INDEX: 25.77 KG/M2 | HEART RATE: 71 BPM | RESPIRATION RATE: 19 BRPM | HEIGHT: 70 IN | WEIGHT: 180 LBS | DIASTOLIC BLOOD PRESSURE: 48 MMHG

## 2021-01-04 DIAGNOSIS — F41.1 ANXIETY STATE: ICD-10-CM

## 2021-01-04 DIAGNOSIS — R23.2 FLUSHES: Primary | ICD-10-CM

## 2021-01-04 LAB
ABSOLUTE EOS #: 0.1 K/UL (ref 0–0.4)
ABSOLUTE IMMATURE GRANULOCYTE: ABNORMAL K/UL (ref 0–0.3)
ABSOLUTE LYMPH #: 1.3 K/UL (ref 1–4.8)
ABSOLUTE MONO #: 0.4 K/UL (ref 0–1)
ANION GAP SERPL CALCULATED.3IONS-SCNC: 9 MMOL/L (ref 9–17)
BASOPHILS # BLD: 2 % (ref 0–2)
BASOPHILS ABSOLUTE: 0.1 K/UL (ref 0–0.2)
BUN BLDV-MCNC: 16 MG/DL (ref 8–23)
BUN/CREAT BLD: 22 (ref 9–20)
CALCIUM SERPL-MCNC: 9.3 MG/DL (ref 8.6–10.4)
CHLORIDE BLD-SCNC: 102 MMOL/L (ref 98–107)
CO2: 27 MMOL/L (ref 20–31)
CREAT SERPL-MCNC: 0.72 MG/DL (ref 0.7–1.2)
DIFFERENTIAL TYPE: YES
EOSINOPHILS RELATIVE PERCENT: 2 % (ref 0–5)
GFR AFRICAN AMERICAN: >60 ML/MIN
GFR NON-AFRICAN AMERICAN: >60 ML/MIN
GFR SERPL CREATININE-BSD FRML MDRD: ABNORMAL ML/MIN/{1.73_M2}
GFR SERPL CREATININE-BSD FRML MDRD: ABNORMAL ML/MIN/{1.73_M2}
GLUCOSE BLD-MCNC: 149 MG/DL (ref 70–99)
HCT VFR BLD CALC: 35.4 % (ref 41–53)
HEMOGLOBIN: 11.6 G/DL (ref 13.5–17.5)
IMMATURE GRANULOCYTES: ABNORMAL %
INR BLD: 1.1
LYMPHOCYTES # BLD: 22 % (ref 13–44)
MCH RBC QN AUTO: 29 PG (ref 26–34)
MCHC RBC AUTO-ENTMCNC: 32.9 G/DL (ref 31–37)
MCV RBC AUTO: 88.2 FL (ref 80–100)
MONOCYTES # BLD: 6 % (ref 5–9)
NRBC AUTOMATED: ABNORMAL PER 100 WBC
PARTIAL THROMBOPLASTIN TIME: 30 SEC (ref 23.9–33.8)
PDW BLD-RTO: 17.3 % (ref 12.1–15.2)
PLATELET # BLD: 155 K/UL (ref 140–450)
PLATELET ESTIMATE: ABNORMAL
PMV BLD AUTO: ABNORMAL FL (ref 6–12)
POTASSIUM SERPL-SCNC: 4 MMOL/L (ref 3.7–5.3)
PROTHROMBIN TIME: 13.9 SEC (ref 11.5–14.2)
RBC # BLD: 4.01 M/UL (ref 4.5–5.9)
RBC # BLD: ABNORMAL 10*6/UL
SEG NEUTROPHILS: 68 % (ref 39–75)
SEGMENTED NEUTROPHILS ABSOLUTE COUNT: 4.2 K/UL (ref 2.1–6.5)
SODIUM BLD-SCNC: 138 MMOL/L (ref 135–144)
TROPONIN INTERP: NORMAL
TROPONIN T: <0.03 NG/ML
TROPONIN, HIGH SENSITIVITY: NORMAL NG/L (ref 0–22)
TSH SERPL DL<=0.05 MIU/L-ACNC: 1.8 MIU/L (ref 0.3–5)
WBC # BLD: 6.1 K/UL (ref 3.5–11)
WBC # BLD: ABNORMAL 10*3/UL

## 2021-01-04 PROCEDURE — 85025 COMPLETE CBC W/AUTO DIFF WBC: CPT

## 2021-01-04 PROCEDURE — 84443 ASSAY THYROID STIM HORMONE: CPT

## 2021-01-04 PROCEDURE — 80048 BASIC METABOLIC PNL TOTAL CA: CPT

## 2021-01-04 PROCEDURE — 85730 THROMBOPLASTIN TIME PARTIAL: CPT

## 2021-01-04 PROCEDURE — 84484 ASSAY OF TROPONIN QUANT: CPT

## 2021-01-04 PROCEDURE — 85610 PROTHROMBIN TIME: CPT

## 2021-01-04 PROCEDURE — 93005 ELECTROCARDIOGRAM TRACING: CPT | Performed by: EMERGENCY MEDICINE

## 2021-01-04 PROCEDURE — 99283 EMERGENCY DEPT VISIT LOW MDM: CPT

## 2021-01-04 PROCEDURE — 36415 COLL VENOUS BLD VENIPUNCTURE: CPT

## 2021-01-04 PROCEDURE — 71045 X-RAY EXAM CHEST 1 VIEW: CPT

## 2021-01-04 ASSESSMENT — PAIN SCALES - GENERAL: PAINLEVEL_OUTOF10: 4

## 2021-01-04 ASSESSMENT — PAIN DESCRIPTION - PAIN TYPE: TYPE: ACUTE PAIN

## 2021-01-04 ASSESSMENT — PAIN DESCRIPTION - DESCRIPTORS: DESCRIPTORS: ACHING

## 2021-01-04 ASSESSMENT — PAIN DESCRIPTION - PROGRESSION: CLINICAL_PROGRESSION: GRADUALLY WORSENING

## 2021-01-04 NOTE — ED PROVIDER NOTES
[address] COMPLAINT    Chief Complaint   Patient presents with    Chest Pain     pt has chest pain arcross his chest that is no better than last time in ER after medication changes       HPI    Jazmine Giraldo is a 80 y.o. male who presentsto ED from home. By car. With complaint of \"chest burning\". Waking up at night with hot flushes and  Sweats. Onset for several days. Patient denies new medications. She has history of coronary artery disease atrial fibrillation and a pacemaker. Location of symptoms chest.  She denies chest pain. Patient said that it feels like \"hot flash\" usually in the evenings. Patient recently lost his wife. Patient denies fever denies chills. Patient states that the episodes usually happen at night. Patient wakes up with sweats and hot flashes. Patient denies any recent medication changes. Patient states that he had seen Landy Key who changed his medications. PAST MEDICAL HISTORY    Past Medical History:   Diagnosis Date    Atrial fibrillation ([address]     CAD (coronary artery disease)     Hard of hearing     Hiatal hernia     History of PTCA     9/2012    Hyperlipidemia     Hypothyroidism     Pacemaker     [employer]       SURGICAL HISTORY    Past Surgical History:   Procedure Laterality Date    CARDIAC SURGERY      CORONARY ARTERY BYPASS GRAFT  2000    quad bypass [place]    HERNIA REPAIR  42/45/8366    UMBILICAL HERNIA REPAIR    PACEMAKER INSERTION  8/6/12    [employer]    PTCA  03/41/95    UMBILICAL HERNIA REPAIR  11/21/2016    Jagdeep Mata MD       CURRENT MEDICATIONS    Current Outpatient Rx   Medication Sig Dispense Refill    furosemide (LASIX) 20 MG tablet Take 1 pill by mouth twice a week Tuesday and Friday only please for Congestive heart failure.  12 tablet 5    clopidogrel (PLAVIX) 75 MG tablet Take 1 tablet by mouth daily 30 tablet 5    isosorbide mononitrate (IMDUR) 30 MG extended release tablet Take 0.5 tablets by mouth daily 30 tablet 5    levothyroxine (SYNTHROID) 125 MCG tablet Take 1 tablet by mouth Daily 90 tablet 1    metoprolol succinate (TOPROL XL) 50 MG extended release tablet Take 1 tablet by mouth daily 30 tablet 0    albuterol sulfate HFA (VENTOLIN HFA) 108 (90 Base) MCG/ACT inhaler Inhale 2 puffs into the lungs every 4 hours as needed for Wheezing 1 Inhaler 1    rosuvastatin (CRESTOR) 20 MG tablet Take 1 tablet by mouth daily 90 tablet 1    fluticasone-vilanterol (BREO ELLIPTA) 100-25 MCG/INH AEPB inhaler Inhale 1 puff into the lungs daily 1 each 1    aspirin 81 MG tablet Take 81 mg by mouth daily.          ALLERGIES    Allergies   Allergen Reactions    Menthol (Topical Analgesic)        FAMILY HISTORY    Family History   Problem Relation Age of Onset    Heart Disease Father        SOCIAL HISTORY    Social History     Socioeconomic History    Marital status:      Spouse name: None    Number of children: None    Years of education: None    Highest education level: None   Occupational History    None   Social Needs    Financial resource strain: None    Food insecurity     Worry: None     Inability: None    Transportation needs     Medical: None     Non-medical: None   Tobacco Use    Smoking status: Never Smoker    Smokeless tobacco: Never Used   Substance and Sexual Activity    Alcohol use: Yes     Comment: 6 monthly    Drug use: No    Sexual activity: None   Lifestyle    Physical activity     Days per week: None     Minutes per session: None    Stress: None   Relationships    Social connections     Talks on phone: None     Gets together: None     Attends Restorationist service: None     Active member of club or organization: None     Attends meetings of clubs or organizations: None     Relationship status: None    Intimate partner violence     Fear of current or ex partner: None     Emotionally abused: None     Physically abused: None     Forced sexual activity: None   Other Topics Concern    None   Social History Narrative    None           Review of Systems:  Constitutional: Patient complains of sweats worse at night. Eyes:  Denies photophobia or discharge   HENT:  Denies sore throat or ear pain   Respiratory:  Denies cough or shortness of breath   Cardiovascular: She denies chest pain. Patient states that he has \"hot flashes across his chest.  GI:  Denies abdominal pain, nausea, vomiting, or diarrhea   Musculoskeletal:  Denies back pain   Skin:  Denies rash   Neurologic:  Denies headache, focal weakness or sensory changes   Endocrine:  Denies polyuria or polydypsia   Lymphatic:  Denies swollen glands   Psychiatric:  Denies depression, suicidal ideation or homicidal ideation   All systems negative except as marked. PHYSICAL EXAM    VITAL SIGNS: BP (!) 90/53   Pulse 68   Temp 97.8 °F (36.6 °C) (Oral)   Resp 18   Ht 5' 10\" (1.778 m)   Wt 180 lb (81.6 kg)   SpO2 97%   BMI 25.83 kg/m²    Constitutional: Elderly male in no acute distress HENT:  Normocephalic, Atraumatic, Bilateral external ears normal, Oropharynx moist, No oral exudates, Nose normal. Neck- Normal range of motion, No tenderness, Supple, No stridor. Eyes:  PERRL, EOMI, Conjunctiva normal, No discharge. Respiratory:  Normal breath sounds, No respiratory distress, No wheezing, No chest tenderness. Cardiovascular: Regular  GI:  Bowel sounds normal, Soft, No tenderness, No masses, No pulsatile masses. : External genitalia appear normal, No masses or lesions. No discharge. No CVA tenderness. Musculoskeletal:  Intact distal pulses, No edema, No tenderness, No cyanosis, No clubbing. Good range of motion in all major joints. No tenderness to palpation or major deformities noted. Back- No tenderness. Integument:  Warm, Dry, No erythema, No rash. Lymphatic:  No lymphadenopathy noted. Neurologic:  Alert & oriented x 3, Normal motor function, Normal sensory function, No focal deficits noted. Psychiatric:  Affect normal, Judgment normal, Mood normal.     EKG    Wide QRS, paced rhythm rate of 82    RADIOLOGY    XR CHEST PORTABLE   Final Result      Grossly negative for an acute cardiopulmonary process. PROCEDURES        Labs  Labs Reviewed   BASIC METABOLIC PANEL - Abnormal; Notable for the following components:       Result Value    Glucose 149 (*)     Bun/Cre Ratio 22 (*)     All other components within normal limits   CBC WITH AUTO DIFFERENTIAL - Abnormal; Notable for the following components:    RBC 4.01 (*)     Hemoglobin 11.6 (*)     Hematocrit 35.4 (*)     RDW 17.3 (*)     All other components within normal limits   APTT   PROTIME-INR   TROPONIN   TSH WITHOUT REFLEX   URINALYSIS             Summation      Patient Course: Labs with no acute findings. Patient has stable vital signs. Follow-up with primary care provider in 2 to 3 days. Follow-up with cardiologist.    Warning signs were discussed. Return to ED if worse. ED Medications administered this visit:  Medications - No data to display    New Prescriptions from this visit:    New Prescriptions    No medications on file       Follow-up:  No follow-up provider specified. Final Impression:   1. Flushes    2.  Anxiety state               (Please note that portions of this note were completed with a voice recognition program.  Efforts were made to edit the dictations but occasionally words are mis-transcribed.)        Lynne Ernandez MD  01/04/21 [id number]

## 2021-01-05 LAB
EKG ATRIAL RATE: 82 BPM
EKG P AXIS: -2 DEGREES
EKG P-R INTERVAL: 196 MS
EKG Q-T INTERVAL: 480 MS
EKG QRS DURATION: 218 MS
EKG QTC CALCULATION (BAZETT): 560 MS
EKG R AXIS: -77 DEGREES
EKG T AXIS: 95 DEGREES
EKG VENTRICULAR RATE: 82 BPM

## 2021-01-05 PROCEDURE — 93010 ELECTROCARDIOGRAM REPORT: CPT | Performed by: INTERNAL MEDICINE

## 2021-01-06 ENCOUNTER — CARE COORDINATION (OUTPATIENT)
Dept: CARE COORDINATION | Age: 86
End: 2021-01-06

## 2021-01-06 NOTE — CARE COORDINATION
Ambulatory Care Coordination Note  1/6/2021  CM Risk Score: 6  [place]son 10 Year Mortality Risk Score: 47%     ACC: Claude Massing, [place]    Summary Note:     Patient HOPR patient for Care coordination:   -Phone call attempt today to reach for Care coordination: left vm message. Chart REviewed: (Patient with recent loss of significant other)  -noted some confusion with medications. -home care was recently ordered to the patient's home, but unfortunately; patient not homebound    Patient Active Problem List   Diagnosis    Hard of hearing    CAD (coronary artery disease)    [address] [address]    Hiatal hernia    Hyperlipidemia    Hypothyroidism    Pacemaker    History of PTCA    Calculus of gallbladder without cholecystitis without obstruction    Umbilical hernia    Vitamin D deficiency       Utilization reviewed:   01/04/21: ER for:   Summation        Patient Course: Labs with no acute findings. Patient has stable vital signs. Follow-up with primary care provider in 2 to 3 days. Follow-up with cardiologist.    Warning signs were discussed. Return to ED if worse. ED Medications administered this visit:  Medications - No data to display     New Prescriptions from this visit:        New Prescriptions     No medications on file         Follow-up:  No follow-up provider specified. Final Impression:   1. Flushes    2. Anxiety state            12/25/20: ED visit Chest pain Reviewed ER note:         New Prescriptions from this visit:         New Prescriptions     ISOSORBIDE MONONITRATE (IMDUR) 30 MG EXTENDED RELEASE TABLET    Take 0.5 tablets by mouth daily     METOPROLOL SUCCINATE (TOPROL XL) 50 MG EXTENDED RELEASE TABLET    Take 1 tablet by mouth daily        Final Impression:   1. Chest pain, unspecified type      09/202: ED note reviewed:     URI    12/23/20 Cardiology Note Reviewed:   Denies chest pain, but complains of being \"weak\" in the morning.   Usually subsides about 10 AM.  No unusual sob. He is also grieving the loss of his wife 2 weeks ago. He has 3 stepdaughters who help him with food etc.     No syncope or near syncope.     BP today was 94/50 with HR 60. Exam unremarkable.     I think he is hypotensive in the morning from evening dose of Metoprolol. Will hold and observe symptoms.     If develops chest pain or sob should go to ER. Will call in 1 week.     Wai Vasquez MD     Care Coordination Plan of Care: This nurse Care Coordinator will  - attempt to reach back out to patient next week if no return call  -Patient to see PCP tomorrow.   -Reach out letter prepared,  to mail      Prior to Admission medications    Medication Sig Start Date End Date Taking? Authorizing Provider   furosemide (LASIX) 20 MG tablet Take 1 pill by mouth twice a week Tuesday and Friday only please for Congestive heart failure.  12/30/20   Claudette Filippo, APRN - CNP   clopidogrel (PLAVIX) 75 MG tablet Take 1 tablet by mouth daily 12/30/20   Claudette Filippo, APRN - CNP   isosorbide mononitrate (IMDUR) 30 MG extended release tablet Take 0.5 tablets by mouth daily 12/30/20   Claudette Filippo, APRN - CNP   levothyroxine (SYNTHROID) 125 MCG tablet Take 1 tablet by mouth Daily 12/30/20   Claudette Filippo, APRN - CNP   metoprolol succinate (TOPROL XL) 50 MG extended release tablet Take 1 tablet by mouth daily 12/25/20   Joan Apley, MD   albuterol sulfate HFA (VENTOLIN HFA) 108 (90 Base) MCG/ACT inhaler Inhale 2 puffs into the lungs every 4 hours as needed for Wheezing 11/24/20   Claudette Filippo, APRN - CNP   rosuvastatin (CRESTOR) 20 MG tablet Take 1 tablet by mouth daily 11/24/20   Claudette Filippo, APRN - CNP   fluticasone-vilanterol (BREO ELLIPTA) 100-25 MCG/INH AEPB inhaler Inhale 1 puff into the lungs daily 11/20/20   Claudette Filippo, APRN - CNP   metoprolol (LOPRESSOR) 100 MG tablet Take 1 tablet by mouth 2 times daily  Patient taking differently: Take 100 mg by mouth daily  3/27/20 12/25/20 Terry Crouch MD   aspirin 81 MG tablet Take 81 mg by mouth daily.     Historical Provider, MD       Future Appointments   Date Time Provider Andres Rodriguezi   1/7/2021  2:15 PM LORY Viera CNP [facility]   4/20/2021  9:30 AM [facility]   4/26/2021 10:30 AM Terry Crouch MD [facility]   6/22/2021  9:30 AM LORY Viera CNP [facility]

## 2021-01-06 NOTE — LETTER
1/6/2021    [address] [place] [address] [address]      Dear Kaylie Mayo,    My name is Blanca Luz and I am a registered nurse who partners with LORY Arce CNP to improve patients' health. LORY Arce CNP believes you would benefit from working with me. As a member of your health care team, I would work with other providers involved in your care, offer education for your specific health conditions, and connect you with additional resources as needed. I will collaborate with LORY Arce CNP to support you in following your treatment plan. The additional support I provide is no additional cost to you. My primary focus is to help you achieve specific goals and improve your health. We are committed to walk with you on this journey and look forward to working with you. Please call me to further discuss your healthcare needs. I am available by phone or for appointments at the office. You can reach me at [phone].     In good health,     Blanca Luz RN

## 2021-01-07 ENCOUNTER — CARE COORDINATION (OUTPATIENT)
Dept: CARE COORDINATION | Age: 86
End: 2021-01-07

## 2021-01-07 ENCOUNTER — OFFICE VISIT (OUTPATIENT)
Dept: PRIMARY CARE CLINIC | Age: 86
End: 2021-01-07
Payer: MEDICARE

## 2021-01-07 VITALS
SYSTOLIC BLOOD PRESSURE: 136 MMHG | RESPIRATION RATE: 20 BRPM | OXYGEN SATURATION: 95 % | TEMPERATURE: 97.5 F | DIASTOLIC BLOOD PRESSURE: 68 MMHG | HEART RATE: 81 BPM

## 2021-01-07 DIAGNOSIS — I35.0 AORTIC STENOSIS, MODERATE: ICD-10-CM

## 2021-01-07 DIAGNOSIS — Z12.5 PROSTATE CANCER SCREENING: ICD-10-CM

## 2021-01-07 DIAGNOSIS — R61 UNEXPLAINED NIGHT SWEATS: ICD-10-CM

## 2021-01-07 DIAGNOSIS — F43.21 MOURNING: ICD-10-CM

## 2021-01-07 DIAGNOSIS — K08.9 POOR DENTITION: ICD-10-CM

## 2021-01-07 DIAGNOSIS — E03.9 ACQUIRED HYPOTHYROIDISM: ICD-10-CM

## 2021-01-07 DIAGNOSIS — I48.0 PAROXYSMAL ATRIAL FIBRILLATION (HCC): Primary | ICD-10-CM

## 2021-01-07 DIAGNOSIS — R01.1 SYSTOLIC MURMUR: ICD-10-CM

## 2021-01-07 DIAGNOSIS — I25.810 CORONARY ARTERY DISEASE INVOLVING CORONARY BYPASS GRAFT OF NATIVE HEART WITHOUT ANGINA PECTORIS: ICD-10-CM

## 2021-01-07 PROCEDURE — 99348 HOME/RES VST EST LOW MDM 30: CPT | Performed by: NURSE PRACTITIONER

## 2021-01-07 PROCEDURE — 4040F PNEUMOC VAC/ADMIN/RCVD: CPT | Performed by: NURSE PRACTITIONER

## 2021-01-07 PROCEDURE — 1036F TOBACCO NON-USER: CPT | Performed by: NURSE PRACTITIONER

## 2021-01-07 PROCEDURE — 1123F ACP DISCUSS/DSCN MKR DOCD: CPT | Performed by: NURSE PRACTITIONER

## 2021-01-07 PROCEDURE — G8484 FLU IMMUNIZE NO ADMIN: HCPCS | Performed by: NURSE PRACTITIONER

## 2021-01-07 PROCEDURE — G8417 CALC BMI ABV UP PARAM F/U: HCPCS | Performed by: NURSE PRACTITIONER

## 2021-01-07 ASSESSMENT — ENCOUNTER SYMPTOMS
COUGH: 0
RHINORRHEA: 0
CONSTIPATION: 0
EYES NEGATIVE: 1
WHEEZING: 0
NAUSEA: 0
SHORTNESS OF BREATH: 0

## 2021-01-07 NOTE — PROGRESS NOTES
[facility] [address] PRIMARY CARE [place]  [address]  [address]  Dept: [phone]  Dept Fax: [phone]    Last encounter 11/24/2020    HPI:   Bill Gibbs is a 80 y.o. male who presentstoday for his medical conditions/complaints as noted below. Bill Gibbs is c/o of No chief complaint on file. HPI    80year old male lives alone multiple visits to ER in last few weeks. Lost significant other whom he lived with for many decades recently, and a week prior to that lost his  dog of 17 years. He is mourning both these losses. Today I am meeting pt in his home due to medications need checked for compliance and accuracy. Pt was off plavix prior and restarted. Has all med bottles today for review. He has gotten a new dog which is adjusting. Hypothyroid on supplement. Medication bottles reviewed today and timing AM and PM written on tops of all bottles. Denies any falls. He has multiple episodes of night sweats waking him around 2 am. Pt awoke with HOT feeling during this time and denies chest pain. We discussed multiple factors or possible causes: possibility of sleep apnea will do night pulse ox, risk of low blood sugar with weight loss not eating well since loss of spouse. 12 pound loss in last 1 1/2 months risk of angina with nitrate imdur 1/2 tab daily . Poor dentition-risk infection (afebrile)     Also risk of myelodysplastic or occult neoplasm with both night sweats and wt loss. Will monitor closely. Wt Readings from Last 3 Encounters:   01/04/21 180 lb (81.6 kg)   12/25/20 182 lb (82.6 kg)   11/24/20 192 lb (87.1 kg)     Lab Results   Component Value Date    WBC 6.1 01/04/2021    HGB 11.6 (L) 01/04/2021    HCT 35.4 (L) 01/04/2021    MCV 88.2 01/04/2021     01/04/2021     Lab Results   Component Value Date    TSH 1.80 01/04/2021       Not using inhaler.      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 Also successful bowling series with bowl listing his scores. Feeling appreciative for good life and glad he is doing okay. Past Medical History:   Diagnosis Date    Atrial fibrillation ([address]     CAD (coronary artery disease)     Hard of hearing     Hiatal hernia     History of PTCA     9/2012    Hyperlipidemia     Hypothyroidism     Pacemaker     [place]      Past Surgical History:   Procedure Laterality Date    CARDIAC SURGERY      CORONARY ARTERY BYPASS GRAFT  2000    quad bypass [place]    HERNIA REPAIR  66/11/9914    UMBILICAL HERNIA REPAIR    PACEMAKER INSERTION  8/6/12    [employer]    PTCA  90/74/35    UMBILICAL HERNIA REPAIR  11/21/2016    Maricarmen Tran MD       Family History   Problem Relation Age of Onset    Heart Disease Father           Social History     Tobacco Use    Smoking status: Never Smoker    Smokeless tobacco: Never Used   Substance Use Topics    Alcohol use: Yes     Comment: 6 monthly      Current Outpatient Medications   Medication Sig Dispense Refill    furosemide (LASIX) 20 MG tablet Take 1 pill by mouth twice a week Tuesday and Friday only please for Congestive heart failure.  12 tablet 5    clopidogrel (PLAVIX) 75 MG tablet Take 1 tablet by mouth daily 30 tablet 5    isosorbide mononitrate (IMDUR) 30 MG extended release tablet Take 0.5 tablets by mouth daily 30 tablet 5    levothyroxine (SYNTHROID) 125 MCG tablet Take 1 tablet by mouth Daily 90 tablet 1    metoprolol succinate (TOPROL XL) 50 MG extended release tablet Take 1 tablet by mouth daily 30 tablet 0    albuterol sulfate HFA (VENTOLIN HFA) 108 (90 Base) MCG/ACT inhaler Inhale 2 puffs into the lungs every 4 hours as needed for Wheezing 1 Inhaler 1    rosuvastatin (CRESTOR) 20 MG tablet Take 1 tablet by mouth daily 90 tablet 1    fluticasone-vilanterol (BREO ELLIPTA) 100-25 MCG/INH AEPB inhaler Inhale 1 puff into the lungs daily 1 each 1    aspirin 81 MG tablet Take 81 mg by mouth daily. No current facility-administered medications for this visit. Allergies   Allergen Reactions    Menthol (Topical Analgesic)        Health Maintenance   Topic Date Due    DTaP/Tdap/Td vaccine (1 - Tdap) 11/27/1951    Shingles Vaccine (1 of 2) 11/27/1982    Lipid screen  10/14/2021    Annual Wellness Visit (AWV)  11/25/2021    TSH testing  01/04/2022    Potassium monitoring  01/04/2022    Creatinine monitoring  01/04/2022    Flu vaccine  Completed    Pneumococcal 65+ years Vaccine  Completed    Hepatitis A vaccine  Aged Out    Hepatitis B vaccine  Aged Out    Hib vaccine  Aged Out    Meningococcal (ACWY) vaccine  Aged Out       Subjective:      Review of Systems   Constitutional: Positive for diaphoresis (at night 2 am common time) and unexpected weight change. Negative for activity change, appetite change, chills and fever. HENT: Negative for congestion, postnasal drip and rhinorrhea. Eyes: Negative. Respiratory: Negative for cough, shortness of breath and wheezing. Cardiovascular: Negative for chest pain. Gastrointestinal: Negative for constipation and nausea. Musculoskeletal: Negative for arthralgias. Skin: Negative for rash. Neurological: Negative for dizziness and headaches. Hematological: Negative for adenopathy. Psychiatric/Behavioral: Positive for decreased concentration and sleep disturbance (waking up sweating at 2 am). Negative for agitation. Objective:     Physical Exam  Vitals signs and nursing note reviewed. Constitutional:       General: He is not in acute distress. Appearance: Normal appearance. He is well-developed. HENT:      Head: Normocephalic and atraumatic. Comments: Poor dentition upper 2 teeth present and one broke off at gum line decay present.       Right Ear: Tympanic membrane normal. Left Ear: External ear normal.      Nose: No congestion. Mouth/Throat:      Mouth: Mucous membranes are moist.      Pharynx: No oropharyngeal exudate. Eyes:      Extraocular Movements: Extraocular movements intact. Pupils: Pupils are equal, round, and reactive to light. Neck:      Musculoskeletal: Normal range of motion and neck supple. Vascular: No carotid bruit (neg thalia). Comments: No cervical or axillary lymphadenopathy  Cardiovascular:      Rate and Rhythm: Normal rate and regular rhythm. Pulses: Normal pulses. Heart sounds: Murmur (left sternal border holosystolic, right sternal border 2/6 systolic murmur) present. Comments: pacemaker  Pulmonary:      Effort: Pulmonary effort is normal. No respiratory distress. Breath sounds: Normal breath sounds. Abdominal:      General: Abdomen is flat. Bowel sounds are normal.      Palpations: Abdomen is soft. There is no mass. Tenderness: There is no abdominal tenderness. Musculoskeletal: Normal range of motion. Right lower leg: No edema. Left lower leg: No edema. Lymphadenopathy:      Cervical: No cervical adenopathy. Skin:     General: Skin is warm. Findings: No rash. Neurological:      Mental Status: He is alert and oriented to person, place, and time. Psychiatric:         Behavior: Behavior normal.         Thought Content: Thought content normal.         Judgment: Judgment normal.       There were no vitals taken for this visit.     Data:     Lab Results   Component Value Date     01/04/2021    K 4.0 01/04/2021     01/04/2021    CO2 27 01/04/2021    BUN 16 01/04/2021    CREATININE 0.72 01/04/2021    GLUCOSE 149 01/04/2021    PROT 7.6 10/14/2020    LABALBU 3.9 10/14/2020    BILITOT 0.88 10/14/2020    ALKPHOS 89 10/14/2020    AST 19 10/14/2020    ALT 13 10/14/2020     Lab Results   Component Value Date    WBC 6.1 01/04/2021    RBC 4.01 01/04/2021    HGB 11.6 01/04/2021    HCT 35.4 01/04/2021    MCV 88.2 01/04/2021    MCH 29.0 01/04/2021    MCHC 32.9 01/04/2021    RDW 17.3 01/04/2021     01/04/2021    MPV NOT REPORTED 01/04/2021     Lab Results   Component Value Date    TSH 1.80 01/04/2021     Lab Results   Component Value Date    CHOL 116 10/14/2020    HDL 29 10/14/2020          Assessment & Plan       1. Paroxysmal atrial fibrillation (HCC)  Rate controlled, has pacemaker  On plavix, beta blocker and statin    2. Coronary artery disease involving coronary bypass graft of native heart without angina pectoris  On imdur  3. Acquired hypothyroidism  Continue supplement    4. Aortic stenosis, moderate  Loud murmur    5. Systolic murmur  unchanges    6. Mourning  Seems better good eye contact, good conversation, new dog.     7. Unexplained night sweats  Needs further eval    - Pulse oximetry, overnight; Future  - Insulin, Free; Future  - Cortisol; Future  - C-Reactive Protein; Future  - Hepatic Function Panel; Future  - Urinalysis Reflex to Culture; Future  - C-Peptide; Future  - Culture, Blood 1; Future  - Mononucleosis Screen; Future    8. Prostate cancer screening    - Psa screening; Future    9. Poor dentition    - Culture, Blood 1; Future      Differential dx: Low blood sugar  Sleep apnea  Thyroid abnormality  Neoplasm/lymphoma  Infection (poor dentition) check urinalysis. TB -low risk  angina            Completed Refills   Requested Prescriptions      No prescriptions requested or ordered in this encounter     No follow-ups on file. No orders of the defined types were placed in this encounter. No orders of the defined types were placed in this encounter. He Valentin received counseling on the following healthy behaviors: nutrition, exercise and medication adherence  Reviewed prior labs and health maintenance. Continue current medications, diet and exercise. Discussed use, benefit, and side effects of prescribed medications. Barriers to medication compliance addressed. Patient given educational materials - see patient instructions. All patient questions answered. Patient voiced understanding.           Electronically signed by LORY Martinez CNP on 1/7/2021 at 5:35 PM

## 2021-01-15 ENCOUNTER — TELEPHONE (OUTPATIENT)
Dept: PRIMARY CARE CLINIC | Age: 86
End: 2021-01-15

## 2021-01-15 ENCOUNTER — CARE COORDINATION (OUTPATIENT)
Dept: CARE COORDINATION | Age: 86
End: 2021-01-15

## 2021-01-15 DIAGNOSIS — F32.0 CURRENT MILD EPISODE OF MAJOR DEPRESSIVE DISORDER WITHOUT PRIOR EPISODE (HCC): ICD-10-CM

## 2021-01-15 DIAGNOSIS — R61 NIGHT SWEATS: ICD-10-CM

## 2021-01-15 DIAGNOSIS — Z71.89 ADVANCED CARE PLANNING/COUNSELING DISCUSSION: Primary | ICD-10-CM

## 2021-01-15 DIAGNOSIS — Z13.0 SCREENING, ANEMIA, DEFICIENCY, IRON: ICD-10-CM

## 2021-01-15 DIAGNOSIS — K14.4 SMOOTH TONGUE: ICD-10-CM

## 2021-01-15 DIAGNOSIS — Z12.5 PROSTATE CANCER SCREENING: Primary | ICD-10-CM

## 2021-01-15 SDOH — HEALTH STABILITY: PHYSICAL HEALTH: ON AVERAGE, HOW MANY MINUTES DO YOU ENGAGE IN EXERCISE AT THIS LEVEL?: 20 MIN

## 2021-01-15 SDOH — SOCIAL STABILITY: SOCIAL NETWORK: HOW OFTEN DO YOU GET TOGETHER WITH FRIENDS OR RELATIVES?: MORE THAN THREE TIMES A WEEK

## 2021-01-15 SDOH — SOCIAL STABILITY: SOCIAL NETWORK: HOW OFTEN DO YOU ATTENT MEETINGS OF THE CLUB OR ORGANIZATION YOU BELONG TO?: NEVER

## 2021-01-15 SDOH — HEALTH STABILITY: PHYSICAL HEALTH: ON AVERAGE, HOW MANY DAYS PER WEEK DO YOU ENGAGE IN MODERATE TO STRENUOUS EXERCISE (LIKE A BRISK WALK)?: 7 DAYS

## 2021-01-15 SDOH — ECONOMIC STABILITY: INCOME INSECURITY: HOW HARD IS IT FOR YOU TO PAY FOR THE VERY BASICS LIKE FOOD, HOUSING, MEDICAL CARE, AND HEATING?: NOT HARD AT ALL

## 2021-01-15 SDOH — ECONOMIC STABILITY: FOOD INSECURITY: WITHIN THE PAST 12 MONTHS, THE FOOD YOU BOUGHT JUST DIDN'T LAST AND YOU DIDN'T HAVE MONEY TO GET MORE.: NEVER TRUE

## 2021-01-15 SDOH — SOCIAL STABILITY: SOCIAL NETWORK
IN A TYPICAL WEEK, HOW MANY TIMES DO YOU TALK ON THE PHONE WITH FAMILY, FRIENDS, OR NEIGHBORS?: MORE THAN THREE TIMES A WEEK

## 2021-01-15 SDOH — SOCIAL STABILITY: SOCIAL NETWORK: ARE YOU MARRIED, WIDOWED, DIVORCED, SEPARATED, NEVER MARRIED, OR LIVING WITH A PARTNER?: DIVORCED

## 2021-01-15 SDOH — SOCIAL STABILITY: SOCIAL NETWORK: HOW OFTEN DO YOU ATTEND CHURCH OR RELIGIOUS SERVICES?: MORE THAN 4 TIMES PER YEAR

## 2021-01-15 NOTE — TELEPHONE ENCOUNTER
Check on patient how he is doing. Bp  Weight    Any further night sweats? I asked him to do night pulse ox did he do this (no results yet)    I also asked him to eat a bowl of cereal prior to bed to help if blood sugar could be dropping with his weight loss. Ask if this has helped. Need to do labs beginning Feb please   Cbc  cmp  Magnesium  PSA  Vitamin B12 and folate. Do not take vitamins for 2 days prior to testing please. Lab orders done can mail to pt.

## 2021-01-15 NOTE — CARE COORDINATION
Ambulatory Care Coordination Note  1/19/2021  CM Risk Score: 6  Charlson 10 Year Mortality Risk Score: 47%     ACC: Guillaume Way RN    Summary Note:     Ambulatory Care Coordination Note  1/15/2021  CM Risk Score: 6  Charlson 10 Year Mortality Risk Score: 47%     ACC: Guillaume Way RN    Summary Note:     Patient HOPR patient for Care coordination:   -Phone  today to reach for Care coordination:     Chart REviewed: (Patient with recent loss of significant other)  -noted some confusion with medications. -home care was recently ordered to the patient's home, but unfortunately; patient not homebound    Patient Active Problem List   Diagnosis    Hard of hearing    CAD (coronary artery disease)    Atrial fibrillation ([facility] [address]    Hiatal hernia    Hyperlipidemia    Hypothyroidism    Pacemaker    History of PTCA    Calculus of gallbladder without cholecystitis without obstruction    Umbilical hernia    Vitamin D deficiency       Utilization reviewed:   01/04/21: ER for:   Summation        Patient Course: Labs with no acute findings. Patient has stable vital signs. Follow-up with primary care provider in 2 to 3 days. Follow-up with cardiologist.    Warning signs were discussed. Return to ED if worse. ED Medications administered this visit:  Medications - No data to display     New Prescriptions from this visit:        New Prescriptions     No medications on file         Follow-up:  No follow-up provider specified. Final Impression:   1. Flushes    2. Anxiety state            12/25/20: ED visit Chest pain Reviewed ER note:         New Prescriptions from this visit:         New Prescriptions     ISOSORBIDE MONONITRATE (IMDUR) 30 MG EXTENDED RELEASE TABLET    Take 0.5 tablets by mouth daily     METOPROLOL SUCCINATE (TOPROL XL) 50 MG EXTENDED RELEASE TABLET    Take 1 tablet by mouth daily        Final Impression:   1.  Chest pain, unspecified type      09/202: ED note reviewed:     URI    12/23/20 Cardiology Note Reviewed:   Denies chest pain, but complains of being \"weak\" in the morning. Usually subsides about 10 AM.  No unusual sob. He is also grieving the loss of his wife 2 weeks ago. He has 3 stepdaughters who help him with food etc.     No syncope or near syncope.     BP today was 94/50 with HR 60. Exam unremarkable.     I think he is hypotensive in the morning from evening dose of Metoprolol. Will hold and observe symptoms.     If develops chest pain or sob should go to ER. Will call in 1 week.     Chula Renee MD     Reviewed Cheryle's instructions:   1)Pulse oximeter: Patient to monitor at home, waiting to get pulse ox. -MD office ordered. Reviewed PUlse ox instructions, when to notify PCP, when to return to Ed. 2)Blood pressure: 126/61 at eye doctor:   -Patient does not have blood presure cuff at home.   -will reference [facility] to see if they cover    3)Eye Doctor appointment:   -has cataracts that they are going to remove. 4)CHF: patient will weigh daily in the am.   Reviewed CHF Precautions with patient:  -patient verbalizes understanding to call MD office with weight increase of three pounds overnight or five pounds in a week. Patient to weigh daily; and record. Should weigh first thing in the morning, wearing the same type of clothes, after you urinate, before you eat breakfast.  Reviewed heart failure zones; and when should update cardiologist.   Denies any questions/concerns.  -Reviewed CHF zone tool; when to call MD/Cardiology. -patient denies any increase in swelling, denies any increase in shortness of breath, or any other new concerns regarding heart/CHF today. 5)COPD:   COPD:   --Patient denies any increase in cough/congestion. -patient denies any increase in shortness of breath.  -Patient to notify PCP office of any increased shortness of breath, or new symptoms to report.   -Patient  denies any questions regarding his medications.     Medications Reviewed:   1)albuterol: as needed  2)Breo: Daily  -Patient reports, 'that he does not have either inhaler, will call drug   Covid 19 precautions:   From CDC: Are you at higher risk for severe illness?  Wash your hands often.  Avoid close contact (6 feet, which is about two arm lengths) with people who are sick.  Put distance between yourself and other people if COVID-19 is spreading in your community.  Clean and disinfect frequently touched surfaces.  Avoid all cruise travel and non-essential air travel.  Call your healthcare professional if you have concerns about COVID-19 and your underlying condition or if you are sick. Patient discussed Covid 23 Vaccine: patient already talked to [facility]; he is waiting to hear from them on when he can come get the shot. Support in the home:   -Patient lives in is home.   -FeelsCal does okay by himself\". -He does, \"his own laundry\". -Denies needing additional support. Meals addressed:   -STatesCal feels that he does fine with the cooking, and he has been doing this\". -Denies needing any help. Offered Social Service referral: denies wanting social service. Transportation discussed: patient denies needing help with transportation    [address]  and completed. [place]:   -has [facility], shower chair, wancaitie  -Feels safe getting in/out.   -Has Cell phone with him and keeps with him at all times. Discussed Life Alert:  Patient denies wanting to set this up at this time. Phone call attempt to [facility] to question inhalers:   -phone continuously ringing busy; after calling multiple times?     Phone call back to patient to inform:   -[facility] the [facility], want to see if jaki has inhaler refills        Care Coordination Interventions    Program Enrollment: Complex Care  Referral from Primary Care Provider: Yes  Suggested Interventions and Community Resources  Fall Risk Prevention: Completed  Home Health Services: Completed (Comment: \"but patient not homebound, home care did not admit\". )  Meals on Wheels: Declined  Medication Assistance Program: Declined  Medi Set or Pill Pack: Declined  Registered Dietician: Completed (Comment: [facility])  Social Work: Declined  Specialty Services Referral: Completed (Comment: [facility]ferral to [facility])  Transportation Support: Declined  Zone Management Tools: Completed (Comment: copd, chf)         Prior to Admission medications    Medication Sig Start Date End Date Taking? Authorizing Provider   furosemide (LASIX) 20 MG tablet Take 1 pill by mouth twice a week Tuesday and Friday only please for Congestive heart failure. 12/30/20  Yes LORY Dick CNP   clopidogrel (PLAVIX) 75 MG tablet Take 1 tablet by mouth daily 12/30/20  Yes LORY Dick CNP   isosorbide mononitrate (IMDUR) 30 MG extended release tablet Take 0.5 tablets by mouth daily 12/30/20  Yes LORY Dick CNP   levothyroxine (SYNTHROID) 125 MCG tablet Take 1 tablet by mouth Daily 12/30/20  Yes LORY Dick CNP   metoprolol succinate (TOPROL XL) 50 MG extended release tablet Take 1 tablet by mouth daily 12/25/20  Yes David Hardwick MD   rosuvastatin (CRESTOR) 20 MG tablet Take 1 tablet by mouth daily 11/24/20  Yes LORY Dick CNP   aspirin 81 MG tablet Take 81 mg by mouth daily.    Yes Historical Provider, MD   albuterol sulfate HFA (VENTOLIN HFA) 108 (90 Base) MCG/ACT inhaler Inhale 2 puffs into the lungs every 4 hours as needed for Wheezing  Patient not taking: Reported on 1/15/2021 11/24/20   LORY Dick CNP   fluticasone-vilanterol (BREO ELLIPTA) 100-25 MCG/INH AEPB inhaler Inhale 1 puff into the lungs daily  Patient not taking: Reported on 1/15/2021 11/20/20   LORY Dick CNP   metoprolol (LOPRESSOR) 100 MG tablet Take 1 tablet by mouth 2 times daily  Patient taking differently: Take 100 mg by mouth daily  3/27/20 12/25/20  Maday Boucher MD       Future Appointments   Date Time Provider Andres Edith   4/20/2021  9:30 AM [facility] ECHO ROOM [facility] ECHO Cristina Vickers   4/26/2021 10:30 AM Maday Boucher MD Tia Number [id number]   6/22/2021  9:30 AM [facility]

## 2021-01-15 NOTE — TELEPHONE ENCOUNTER
States the night sweats are much improved    States he didn't know about night pulse ox    He is eating cereal before bed and feels this helped    Doesn't have BP or weight readings.  But feels its good     I advised him of labs and not to take vitamins 2 days prior and he states he doesn't take vitamins

## 2021-01-16 ASSESSMENT — PATIENT HEALTH QUESTIONNAIRE - PHQ9: SUM OF ALL RESPONSES TO PHQ QUESTIONS 1-9: 7

## 2021-01-16 NOTE — CARE COORDINATION
Phone call to [facility] this am; their phone was not working yesterday when admitted patient: questioned inhalers:   1)Albuterol $3.70  2)Breo $9.20    Spoke to pharmacy staff; questioned if patient has active script for Albuterol and Breo?   -Both of these inhalers     Phone call back to patient to inform:   -informed of inhalers will be ready today for ; notified of cost.    ACP Status reviewed: see ACP team  -See ACP Note; will refer to ACP Team    Depression Screening:   PHQ-9 Total Score: 7 (1/16/2021 11:47 AM)  Thoughts that you would be better off dead, or of hurting yourself in some way: 0 (1/16/2021 11:47 AM)    -offered referral to counseling/psych: patient denies  -Questioned if agreeable to be referred to spirtual care: patient agreeable. Referral to be placed. Care Coordination Plan of Care: This nurse Care Coordinator will  - follow up with patient next week  -COPD: how is breathing? CHF: how are weights? Monitoring at home? Questions on medications? Follow up ACP Team reaching out to patient.   -Follow up [address] Referral placed for patient; depression, recent loss of \"life  of 35 years\". -follow up [facility] reaching out to patient; diet education CHF, low sodium diet  -follow up and make sure patient picked up his inhalers       Ambulatory Care Coordination Assessment    Care Coordination Protocol  Program Enrollment: Complex Care  Referral from Primary Care Provider: Yes  Week 1 - Initial Assessment     Do you have all of your prescriptions and are they filled?: No  Barriers to medication adherence: None  Are you able to afford your medications?: Yes  How often do you have trouble taking your medications the way you have been told to take them?: I always take them as prescribed. Do you have Home O2 Therapy?: No      Ability to seek help/take action for Emergent Urgent situations i.e. fire, crime, inclement weather or health crisis. Elan Garcia Independent  Ability to ambulate to restroom: Independent  Ability handle personal hygeine needs (bathing/dressing/grooming): Independent  Ability to manage Medications: Independent  Ability to prepare Food Preparation: Independent  Ability to maintain home (clean home, laundry): Independent  Ability to drive and/or has transportation: Independent  Ability to do shopping: Independent  Ability to manage finances: Independent  Is patient able to live independently?: Yes     Current Housing: Private Residence        Per the Fall Risk Screening, did the patient have 2 or more falls or 1 fall with injury in the past year?: No     Frequent urination at night?: No  Do you use rails/bars?: No  Do you have a non-slip tub mat?: Yes     Are you experiencing loss of meaning?: No  Are you experiencing loss of hope and peace?: No     Thinking about your patient's physical health needs, are there any symptoms or problems (risk indicators) you are unsure about that require further investigation?: No identified areas of uncertainly or problems already being investigated   Are the patients physical health problems impacting on their mental well-being?: No identified areas of concern   Are there any problems with your patients lifestyle behaviors (alcohol, drugs, diet, exercise) that are impacting on physical or mental well-being?: No identified areas of concern   Do you have any other concerns about your patients mental well-being?  How would you rate their severity and impact on the patient?: No identified areas of concern   How would you rate their home environment in terms of safety and stability (including domestic violence, insecure housing, neighbor harassment)?: Consistently safe, supportive, stable, no identified problems   How do daily activities impact on the patient's well-being? (include current or anticipated unemployment, work, caregiving, access to transportation or other): No identified problems or perceived positive benefits   How would you rate their social network (family, work, friends)?: Good participation with social networks   How would you rate their financial resources (including ability to afford all required medical care)?: Financially secure, resources adequate, no identified problems   How wells does the patient now understand their health and well-being (symptoms, signs or risk factors) and what they need to do to manage their health?: Reasonable to good understanding and already engages in managing health or is willing to undertake better management   How well do you think your patient can engage in healthcare discussions? (Barriers include language, deafness, aphasia, alcohol or drug problems, learning difficulties, concentration): Clear and open communication, no identified barriers   Do other services need to be involved to help this patient?: Other care/services not required at this time   Are current services involved with this patient well-coordinated? (Include coordination with other services you are now recommendation): All required care/services in place and well-coordinated   Suggested Interventions and Community Resources  Fall Risk Prevention: Completed Home Health Services: Completed (Comment: \"but patient not homebound, home care did not admit\". )   Meals on Wheels: Declined   Medication Assistance Program: Declined   Medi Set or Pill Pack: Declined   Registered Dietician: Completed   Social Work: Declined   Specialty Service Referral: Completed   Transportation Services: Declined   Zone Management Tools: Completed         Schedule an appointment with the patient's PCP, Set up/Review an Education Plan, Set up/Review Goals              Prior to Admission medications    Medication Sig Start Date End Date Taking? Authorizing Provider   furosemide (LASIX) 20 MG tablet Take 1 pill by mouth twice a week Tuesday and Friday only please for Congestive heart failure.  12/30/20  Yes LORY Chris - CNP   clopidogrel (PLAVIX) 75 MG tablet Take 1 tablet by mouth daily 12/30/20  Yes LORY Chu CNP   isosorbide mononitrate (IMDUR) 30 MG extended release tablet Take 0.5 tablets by mouth daily 12/30/20  Yes LORY Chu CNP   levothyroxine (SYNTHROID) 125 MCG tablet Take 1 tablet by mouth Daily 12/30/20  Yes LORY Chu CNP   metoprolol succinate (TOPROL XL) 50 MG extended release tablet Take 1 tablet by mouth daily 12/25/20  Yes Andrés Granados MD   rosuvastatin (CRESTOR) 20 MG tablet Take 1 tablet by mouth daily 11/24/20  Yes LORY Chu CNP   aspirin 81 MG tablet Take 81 mg by mouth daily.    Yes Historical Provider, MD   albuterol sulfate HFA (VENTOLIN HFA) 108 (90 Base) MCG/ACT inhaler Inhale 2 puffs into the lungs every 4 hours as needed for Wheezing  Patient not taking: Reported on 1/15/2021 11/24/20   LORY Chu CNP   fluticasone-vilanterol (BREO ELLIPTA) 100-25 MCG/INH AEPB inhaler Inhale 1 puff into the lungs daily  Patient not taking: Reported on 1/15/2021 11/20/20   LORY Chu CNP   metoprolol (LOPRESSOR) 100 MG tablet Take 1 tablet by mouth 2 times daily  Patient taking differently: Take 100 mg by mouth daily  3/27/20 12/25/20  Todd Russell MD       Future Appointments   Date Time Provider Andres Sharma   4/20/2021  9:30 AM [facility] ECHO ROOM [facility]   4/26/2021 10:30 AM MD Soham Hinson [facility]   6/22/2021  9:30 AM LORY Chu CNP [facility]

## 2021-01-18 ENCOUNTER — TELEPHONE (OUTPATIENT)
Dept: CASE MANAGEMENT | Age: 86
End: 2021-01-18

## 2021-01-18 ENCOUNTER — CARE COORDINATION (OUTPATIENT)
Dept: CARE COORDINATION | Age: 86
End: 2021-01-18

## 2021-01-18 NOTE — ACP (ADVANCE CARE PLANNING)
SW attempted to contact pt regarding ACP referral to schedule appointment for advance directives and ACP discussion. Pt did not answer his phone and SW left a message for pt to return call.  Santi YOONW 1/18/2021

## 2021-01-18 NOTE — CARE COORDINATION
loss  Diabetes:  Hypertension:  Hyperlipidemia:  Other: CHF     Anthropometric Measurements:  HT: 5'10\"  Weight: 180  IBW: 166 + or - 10%  BMI: 25    Biochemical Data, Medical Tests and Procedures:    No results found for: LABA1C  No results found for: EAG    Lab Results   Component Value Date    CHOL 116 10/14/2020    CHOL 128 04/27/2020    CHOL 132 09/18/2019     Lab Results   Component Value Date    TRIG 86 10/14/2020    TRIG 103 04/27/2020    TRIG 108 09/18/2019     Lab Results   Component Value Date    HDL 29 (L) 10/14/2020    HDL 39 (L) 04/27/2020    HDL 36 (L) 09/18/2019     Lab Results   Component Value Date    LDLCHOLESTEROL 70 10/14/2020    LDLCHOLESTEROL 68 04/27/2020    LDLCHOLESTEROL 74 09/18/2019     Lab Results   Component Value Date    VLDL NOT REPORTED 10/14/2020    VLDL NOT REPORTED 04/27/2020    VLDL NOT REPORTED 09/18/2019     Lab Results   Component Value Date    CHOLHDLRATIO 4.0 10/14/2020    CHOLHDLRATIO 3.3 04/27/2020    CHOLHDLRATIO 3.7 09/18/2019       Lab Results   Component Value Date    WBC 6.1 01/04/2021    HGB 11.6 (L) 01/04/2021    HCT 35.4 (L) 01/04/2021    MCV 88.2 01/04/2021     01/04/2021       Lab Results   Component Value Date    CREATININE 0.72 01/04/2021    BUN 16 01/04/2021     01/04/2021    K 4.0 01/04/2021     01/04/2021    CO2 27 01/04/2021         NUTRITION DIAGNOSIS    #1 Problem  Food and nutrition-related knowledge deficit       Etiology  related to limiting sodium intake       Signs/Symptoms  as evidenced by patient unaware of foods to avoid/limit    NUTRITION INTERVENTION  Nutrition Prescription: used IBW    cardiac diet providing 1800-2000cals/d  Protein needs: 65-75gms/d  Fluid needs: 2400cc/day      Patient Goals:  1. [facility] [place] [address] guidelines- lowfat/cholesterol and low sodium.  Reviewed reading food labels-what to look for on labels.  Goal of limiting saturated, trans fat, cholesterol and sodium intake.    2. Discussed avoiding canned, prepackaged foods, processed meats and cheese. Discussed processed meats in detail to avoid- sausage, cotto, bologna, ham, ect- patient was eating some of these.  Goal is <2gm of sodium/day. 3. Reviewed best ways to cook foods-baking, broiling, grilling or steaming foods. Reviewed healthy fats- using olive or canola oil if adding oil and encouraged to use butter/margarine sparingly.   4. Discussed shopping the outer rim of the grocery store where fresher foods are found. Patient is adding salt to foods- discussed seasonings that can be used that do not contain salt. Encouraged patient to stop adding salt to foods. Nutrition Intervention Need:  Initial/Brief:  Comprehensive Nutrition Education: X  Coordination of other care during nutrition care:    Monitoring and Evaluation:  Ability to plan meals/snacks:  Ability to select healthy foods/meals: X  Food and Nutrition Knowledge: X  Self Monitoring:  Physical Activity:  Energy intake:  Fluid/beverage intake:  Fat/chol intake: X  Carb intake: Other: sodium intake: X    Plan:  1. Will continue to educate patient on DASH diet guidelines, foods, seasonings and sauces high in sodium to avoid, reading food labels, best way to cook foods.  Patient will be mailed nutrition information on all the above discussed. 2. Will follow up with patient in 2-3 weeks to review nutrition information and answer questions.     CAITLIN Riggs

## 2021-01-19 ENCOUNTER — HOSPITAL ENCOUNTER (OUTPATIENT)
Age: 86
Discharge: HOME | End: 2021-01-21
Payer: MEDICARE

## 2021-01-19 ENCOUNTER — OFFICE VISIT (OUTPATIENT)
Dept: PRIMARY CARE CLINIC | Age: 86
End: 2021-01-19
Payer: MEDICARE

## 2021-01-19 ENCOUNTER — HOSPITAL ENCOUNTER (OUTPATIENT)
Dept: GENERAL RADIOLOGY | Age: 86
Discharge: HOME | End: 2021-01-21
Payer: MEDICARE

## 2021-01-19 VITALS
DIASTOLIC BLOOD PRESSURE: 72 MMHG | OXYGEN SATURATION: 97 % | BODY MASS INDEX: 25.97 KG/M2 | WEIGHT: 181 LBS | HEART RATE: 63 BPM | SYSTOLIC BLOOD PRESSURE: 132 MMHG | TEMPERATURE: 97.8 F

## 2021-01-19 DIAGNOSIS — R29.898 XIPHOID PROMINENCE: Primary | ICD-10-CM

## 2021-01-19 DIAGNOSIS — R29.898 XIPHOID PROMINENCE: ICD-10-CM

## 2021-01-19 PROCEDURE — G8427 DOCREV CUR MEDS BY ELIG CLIN: HCPCS | Performed by: NURSE PRACTITIONER

## 2021-01-19 PROCEDURE — 99212 OFFICE O/P EST SF 10 MIN: CPT | Performed by: NURSE PRACTITIONER

## 2021-01-19 PROCEDURE — 1123F ACP DISCUSS/DSCN MKR DOCD: CPT | Performed by: NURSE PRACTITIONER

## 2021-01-19 PROCEDURE — G8417 CALC BMI ABV UP PARAM F/U: HCPCS | Performed by: NURSE PRACTITIONER

## 2021-01-19 PROCEDURE — 4040F PNEUMOC VAC/ADMIN/RCVD: CPT | Performed by: NURSE PRACTITIONER

## 2021-01-19 PROCEDURE — G8484 FLU IMMUNIZE NO ADMIN: HCPCS | Performed by: NURSE PRACTITIONER

## 2021-01-19 PROCEDURE — 1036F TOBACCO NON-USER: CPT | Performed by: NURSE PRACTITIONER

## 2021-01-19 PROCEDURE — 71046 X-RAY EXAM CHEST 2 VIEWS: CPT

## 2021-01-19 ASSESSMENT — ENCOUNTER SYMPTOMS
SHORTNESS OF BREATH: 0
ABDOMINAL DISTENTION: 0
WHEEZING: 0
COUGH: 0
ABDOMINAL PAIN: 0
EYES NEGATIVE: 1
CHEST TIGHTNESS: 0

## 2021-01-19 NOTE — PATIENT INSTRUCTIONS
You may be receiving a survey from [employer] regarding your visit today. You may get this in the mail, through your MyChart or in your email. Please complete the survey to enable us to provide the highest quality of care to you and your family. If you cannot score us as very good ( 5 Stars) on any question, please feel free to call the office to discuss how we could have made your experience exceptional.     Thank You! LORY Medrano-CNP  [address]  [place] Ter[place] RMA

## 2021-01-19 NOTE — PROGRESS NOTES
[facility] [address] PRIMARY CARE [place]  [address] [address]  Dept: [phone]  Dept Fax: [phone]    Last encounter 1/7/2021    HPI:   Mag Hsu is a 80 y.o. male who presentstoday for his medical conditions/complaints as noted below. Mag Hsu is c/o of   Bump on lower anterior chest.       HPI  Established patient    Noticed 3-4 days ago no recent injury or fall. But noticed area of xiphoid process outward more exaggerated when sitting back. Not painful, firm/bone like projection hx bypass surgery. Feels some clicking with moving chest. Only recent lifting was laundry basket. Overall pt weight has been stable since eating cereal in evening    Has new dog at home  Planned cataract surgery tomorrow.        Reviewed prior notes None, Cardiology, Neurology, Orthopedics, Pulmonary and Previous PCP  Reviewed previous Imaging and Hospital Records    Past Medical History:   Diagnosis Date    Atrial fibrillation ([address]     CAD (coronary artery disease)     Hard of hearing     Hiatal hernia     History of PTCA     9/2012    Hyperlipidemia     Hypothyroidism     Pacemaker     [employer]      Past Surgical History:   Procedure Laterality Date    CARDIAC SURGERY      CORONARY ARTERY BYPASS GRAFT  2000    quad bypass [place]    HERNIA REPAIR  24/92/1657    UMBILICAL HERNIA REPAIR    PACEMAKER INSERTION  8/6/12    [employer]    PTCA  36/72/38    UMBILICAL HERNIA REPAIR  11/21/2016    Jamey Armenta MD       Family History   Problem Relation Age of Onset    Heart Disease Father        Social History     Tobacco Use    Smoking status: Never Smoker    Smokeless tobacco: Never Used   Substance Use Topics    Alcohol use: Not Currently     Comment: 6 monthly      Current Outpatient Medications   Medication Sig Dispense Refill    furosemide (LASIX) 20 MG tablet Take 1 pill by mouth twice a week Tuesday and Friday only please for Congestive heart failure. 12 tablet 5    clopidogrel (PLAVIX) 75 MG tablet Take 1 tablet by mouth daily 30 tablet 5    isosorbide mononitrate (IMDUR) 30 MG extended release tablet Take 0.5 tablets by mouth daily 30 tablet 5    levothyroxine (SYNTHROID) 125 MCG tablet Take 1 tablet by mouth Daily 90 tablet 1    metoprolol succinate (TOPROL XL) 50 MG extended release tablet Take 1 tablet by mouth daily 30 tablet 0    rosuvastatin (CRESTOR) 20 MG tablet Take 1 tablet by mouth daily 90 tablet 1    aspirin 81 MG tablet Take 81 mg by mouth daily.  albuterol sulfate HFA (VENTOLIN HFA) 108 (90 Base) MCG/ACT inhaler Inhale 2 puffs into the lungs every 4 hours as needed for Wheezing (Patient not taking: Reported on 1/15/2021) 1 Inhaler 1    fluticasone-vilanterol (BREO ELLIPTA) 100-25 MCG/INH AEPB inhaler Inhale 1 puff into the lungs daily (Patient not taking: Reported on 1/15/2021) 1 each 1     No current facility-administered medications for this visit. Allergies   Allergen Reactions    Menthol (Topical Analgesic)        Health Maintenance   Topic Date Due    COVID-19 Vaccine (1 of 2) 11/27/1948    DTaP/Tdap/Td vaccine (1 - Tdap) 11/27/1951    Shingles Vaccine (1 of 2) 11/27/1982    Lipid screen  10/14/2021    Annual Wellness Visit (AWV)  11/25/2021    TSH testing  01/04/2022    Potassium monitoring  01/04/2022    Creatinine monitoring  01/04/2022    Flu vaccine  Completed    Pneumococcal 65+ years Vaccine  Completed    Hepatitis A vaccine  Aged Out    Hepatitis B vaccine  Aged Out    Hib vaccine  Aged Out    Meningococcal (ACWY) vaccine  Aged Out       Subjective:      Review of Systems   Constitutional: Negative. Negative for activity change, chills and fever. HENT: Negative for congestion, ear pain and sneezing. Eyes: Negative. Respiratory: Negative for cough, chest tightness, shortness of breath and wheezing.     Cardiovascular: Negative for chest pain, palpitations and leg swelling. Gastrointestinal: Negative for abdominal distention and abdominal pain. Endocrine: Negative for cold intolerance and heat intolerance. Genitourinary: Negative for difficulty urinating. Neurological: Negative for dizziness, syncope, light-headedness and headaches. Psychiatric/Behavioral: Negative for agitation, behavioral problems and sleep disturbance. Objective:     Physical Exam  Vitals signs and nursing note reviewed. Constitutional:       General: He is not in acute distress. Appearance: Normal appearance. He is well-developed. HENT:      Head: Normocephalic and atraumatic. Right Ear: Tympanic membrane normal.      Left Ear: Tympanic membrane and external ear normal.      Nose: No congestion. Mouth/Throat:      Mouth: Mucous membranes are moist.      Pharynx: No oropharyngeal exudate. Eyes:      Pupils: Pupils are equal, round, and reactive to light. Neck:      Musculoskeletal: Normal range of motion and neck supple. Cardiovascular:      Rate and Rhythm: Normal rate and regular rhythm. Pulses: Normal pulses. Heart sounds: Normal heart sounds. No murmur. Comments: Left upper chest pacemaker  Pulmonary:      Effort: Pulmonary effort is normal. No respiratory distress. Breath sounds: Normal breath sounds. Abdominal:      Palpations: Abdomen is soft. There is no mass. Tenderness: There is no abdominal tenderness. Musculoskeletal: Normal range of motion. Right lower leg: No edema. Left lower leg: No edema. Lymphadenopathy:      Cervical: No cervical adenopathy. Skin:     Coloration: Skin is pale. Findings: No rash. Neurological:      Mental Status: He is alert and oriented to person, place, and time. Psychiatric:         Behavior: Behavior normal.         Thought Content: Thought content normal.         Judgment: Judgment normal.            Xiphoid process with prominence new to pt noting.  Clicking to sternum with movement of arms or side to side motion. Non-tender. /72 (Site: Left Upper Arm, Position: Sitting, Cuff Size: Medium Adult)   Pulse 63   Temp 97.8 °F (36.6 °C) (Infrared)   Wt 181 lb (82.1 kg)   SpO2 97%   BMI 25.97 kg/m²     Data:     Lab Results   Component Value Date     01/04/2021    K 4.0 01/04/2021     01/04/2021    CO2 27 01/04/2021    BUN 16 01/04/2021    CREATININE 0.72 01/04/2021    GLUCOSE 149 01/04/2021    PROT 7.6 10/14/2020    LABALBU 3.9 10/14/2020    BILITOT 0.88 10/14/2020    ALKPHOS 89 10/14/2020    AST 19 10/14/2020    ALT 13 10/14/2020     Lab Results   Component Value Date    WBC 6.1 01/04/2021    RBC 4.01 01/04/2021    HGB 11.6 01/04/2021    HCT 35.4 01/04/2021    MCV 88.2 01/04/2021    MCH 29.0 01/04/2021    MCHC 32.9 01/04/2021    RDW 17.3 01/04/2021     01/04/2021    MPV NOT REPORTED 01/04/2021     Lab Results   Component Value Date    TSH 1.80 01/04/2021     Lab Results   Component Value Date    CHOL 116 10/14/2020    HDL 29 10/14/2020          Assessment & Plan       1. Xiphoid prominence  Likely normal variant will do xray to make sure no lytic lesion or fx. Some age related changes discussed xiphoid process with patient. No further intervention expected    - XR CHEST (2 VW); Future                  Completed Refills   Requested Prescriptions      No prescriptions requested or ordered in this encounter     No follow-ups on file. No orders of the defined types were placed in this encounter. Orders Placed This Encounter   Procedures    XR CHEST (2 VW)     Standing Status:   Future     Number of Occurrences:   1     Standing Expiration Date:   1/19/2022     Order Specific Question:   Reason for exam:     Answer:   pt with firm protrusion of what appears to be xiphoid process at base of sternum, hx CABG in past. new finding no trauma no pain.  rule out sternal separation/lesion         Wilberto Nicole received counseling on the following healthy behaviors: nutrition, exercise and medication adherence  Reviewed prior labs and health maintenance. Continue current medications, diet and exercise. Discussed use, benefit, and side effects of prescribed medications. Barriers to medication compliance addressed. Patient given educational materials - see patient instructions. All patient questions answered. Patient voiced understanding. On this date 01/19/21 I have spent 18 minutes reviewing previous notes, test results and face to face with the patient discussing the diagnosis and importance of compliance with the treatment plan as well as documenting on the day of the visit.      Electronically signed by LORY Ricks CNP on 1/19/2021 at 11:22 PM

## 2021-01-20 ENCOUNTER — TELEPHONE (OUTPATIENT)
Dept: CASE MANAGEMENT | Age: 86
End: 2021-01-20

## 2021-01-20 NOTE — ACP (ADVANCE CARE PLANNING)
SW attempted to contact pt again regarding referral for ACP. Pt did not answer and SW left a second message for pt to call SW regarding this. SW will try again next week.  Mal Mcconnell MSW LSW  1/20/2021

## 2021-01-21 ENCOUNTER — CARE COORDINATION (OUTPATIENT)
Dept: CARE COORDINATION | Age: 86
End: 2021-01-21

## 2021-01-21 NOTE — CARE COORDINATION
CC information sheets below, mailed out to pt.-  1)CHF zone tool   2)COPD zone tool   3)Fall precautions   4)li walk in care clinic   5)Right Care, Right Place, Right Time

## 2021-01-26 ENCOUNTER — CARE COORDINATION (OUTPATIENT)
Dept: CARE COORDINATION | Age: 86
End: 2021-01-26

## 2021-01-26 ENCOUNTER — TELEPHONE (OUTPATIENT)
Dept: PRIMARY CARE CLINIC | Age: 86
End: 2021-01-26

## 2021-01-26 NOTE — CARE COORDINATION
Ambulatory Care Coordination Note  1/26/2021  CM Risk Score: 6  Charlson 10 Year Mortality Risk Score: 47%     ACC: Nitin Savage RN    Summary Note:       Phone call attempt today to patient: leftvm message  -wanting to follow up regarding COPD, inhaler use, pcp appointment instructions, xray. -Dietician working with patient. -ACP TEam attempted reach out. Care Coordination Plan of Care: This nurse Care Coordinator will  - await call back from patient, and if no return call; will attempt to reach back out to patient next week. Care Coordination Interventions    Program Enrollment: Complex Care  Referral from Primary Care Provider: Yes  Suggested Interventions and Community Resources  Fall Risk Prevention: Completed  Home Health Services: Completed (Comment: \"but patient not homebound, home care did not admit\". )  Meals on Wheels: Declined  Medication Assistance Program: Declined  Medi Set or Pill Pack: Declined  Registered Dietician: Completed (Comment: [facility] dietican)  Social Work: Declined  Specialty Services Referral: Completed (Comment: REferral to [facility])  Transportation Support: Declined  Zone Management Tools: Completed (Comment: copd, chf)         Prior to Admission medications    Medication Sig Start Date End Date Taking? Authorizing Provider   furosemide (LASIX) 20 MG tablet Take 1 pill by mouth twice a week Tuesday and Friday only please for Congestive heart failure.  12/30/20   Clarivy Anthony APRN - CNP   clopidogrel (PLAVIX) 75 MG tablet Take 1 tablet by mouth daily 12/30/20   Claretta Rota, APRN - CNP   isosorbide mononitrate (IMDUR) 30 MG extended release tablet Take 0.5 tablets by mouth daily 12/30/20   Dania Anthony APRN - CNP   levothyroxine (SYNTHROID) 125 MCG tablet Take 1 tablet by mouth Daily 12/30/20   Claretta Rota, APRN - CNP   metoprolol succinate (TOPROL XL) 50 MG extended release tablet Take 1 tablet by mouth daily 12/25/20   Nicholas Ronquillo MD   albuterol sulfate HFA (VENTOLIN HFA) 108 (90 Base) MCG/ACT inhaler Inhale 2 puffs into the lungs every 4 hours as needed for Wheezing  Patient not taking: Reported on 1/15/2021 11/24/20   LORY Dietrich CNP   rosuvastatin (CRESTOR) 20 MG tablet Take 1 tablet by mouth daily 11/24/20   LORY Dietrich CNP   fluticasone-vilanterol (BREO ELLIPTA) 100-25 MCG/INH AEPB inhaler Inhale 1 puff into the lungs daily  Patient not taking: Reported on 1/15/2021 11/20/20   LORY Dietrich CNP   metoprolol (LOPRESSOR) 100 MG tablet Take 1 tablet by mouth 2 times daily  Patient taking differently: Take 100 mg by mouth daily  3/27/20 12/25/20  Trent Anderson MD   aspirin 81 MG tablet Take 81 mg by mouth daily.     Historical Provider, MD       Future Appointments   Date Time Provider Andres Sharma   4/20/2021  9:30 AM [facility] ECHO [facility]   4/26/2021 10:30 AM MD Shanelle Singh [facility]   6/22/2021  9:30 [facility]

## 2021-01-26 NOTE — TELEPHONE ENCOUNTER
Pt would like to know if anything can be done for that lump in his chest, states depending on how he moves it \"Snaps\"

## 2021-01-26 NOTE — TELEPHONE ENCOUNTER
No it is a normal variant which means with age and location we expect this can happen post open heart. The wires by xray for the sternum are intact. Would avoid activities that aggravate it. Likely his weight loss allows him to sense/and see this more prominently NO FURTHER intervention is needed. No tumor or lesion in bone to investigate, no fracture- all checked per recent xray.

## 2021-01-29 RX ORDER — METOPROLOL SUCCINATE 50 MG/1
50 TABLET, EXTENDED RELEASE ORAL DAILY
Qty: 30 TABLET | Refills: 5 | Status: SHIPPED | OUTPATIENT
Start: 2021-01-29 | End: 2021-08-10 | Stop reason: SDUPTHER

## 2021-01-29 NOTE — TELEPHONE ENCOUNTER
Patient needs new script for Metoprolol sent to [facility] Maintenance   Topic Date Due    COVID-19 Vaccine (1 of 2) 11/27/1948    DTaP/Tdap/Td vaccine (1 - Tdap) 11/27/1951    Shingles Vaccine (1 of 2) 11/27/1982    Lipid screen  10/14/2021    Annual Wellness Visit (AWV)  11/25/2021    TSH testing  01/04/2022    Potassium monitoring  01/04/2022    Creatinine monitoring  01/04/2022    Flu vaccine  Completed    Pneumococcal 65+ years Vaccine  Completed    Hepatitis A vaccine  Aged Out    Hepatitis B vaccine  Aged Out    Hib vaccine  Aged Out    Meningococcal (ACWY) vaccine  Aged Out             (applicable per patient's age: Cancer Screenings, Depression Screening, Fall Risk Screening, Immunizations)    LDL Cholesterol (mg/dL)   Date Value   10/14/2020 70     AST (U/L)   Date Value   10/14/2020 19     ALT (U/L)   Date Value   10/14/2020 13     BUN (mg/dL)   Date Value   01/04/2021 16      (goal A1C is < 7)   (goal LDL is <100) need 30-50% reduction from baseline     BP Readings from Last 3 Encounters:   01/19/21 132/72   01/07/21 136/68   01/04/21 (!) 92/48    (goal /80)      All Future Testing planned in [facility]:  Lab Frequency Next Occurrence   ECHO Complete 2D W Doppler W Color Once 04/27/2021   TSH with Reflex Once 04/26/2021   CBC Auto Differential Once 04/26/2021   Comprehensive Metabolic Panel Once 18/00/2638   Vitamin D 25 Hydroxy Once 04/26/2021   Lipid Panel Once 04/26/2021   Magnesium Once 04/26/2021   EKG 12 Lead Once 04/26/2021   Comprehensive Metabolic Panel Once 36/62/8495   Pulse oximetry, overnight Once 01/07/2021   Psa screening Once 01/07/2021   CBC Auto Differential Once 01/07/2021   Insulin, Free Once 01/07/2021   Cortisol Once 01/07/2021   C-Reactive Protein Once 01/07/2021   Hepatic Function Panel Once 01/07/2021   Urinalysis Reflex to Culture Once 01/07/2021   C-Peptide Once 01/07/2021   Culture, Blood 1 Once 01/07/2021   Mononucleosis Screen Once 01/07/2021 Vitamin B12 & Folate Once 01/15/2021   CBC Auto Differential Once 01/15/2021   Comprehensive Metabolic Panel Once 85/97/1872   Magnesium Once 01/15/2021   Psa screening Once 01/15/2021       Next Visit Date:  Future Appointments   Date Time Provider Andres Sharma   4/20/2021  9:30 AM [facility] ECHO ROOM [facility] ECHO [facility]   4/26/2021 10:30 AM MD Yazan Nunez [facility]   6/22/2021  9:30 AM LORY Joe - CNP  pc TPP            Patient Active Problem List:     Hard of hearing     CAD (coronary artery disease)     Atrial fibrillation ([address]     Hiatal hernia     Hyperlipidemia     Hypothyroidism     Pacemaker     History of PTCA     Calculus of gallbladder without cholecystitis without obstruction     Umbilical hernia     Vitamin D deficiency

## 2021-01-29 NOTE — TELEPHONE ENCOUNTER
Last OV: 01/07/21  Last RX: 12/25/20   Next scheduled apt: 06/22/21    Medication pending.       Health Maintenance   Topic Date Due    COVID-19 Vaccine (1 of 2) 11/27/1948    DTaP/Tdap/Td vaccine (1 - Tdap) 11/27/1951    Shingles Vaccine (1 of 2) 11/27/1982    Lipid screen  10/14/2021    Annual Wellness Visit (AWV)  11/25/2021    TSH testing  01/04/2022    Potassium monitoring  01/04/2022    Creatinine monitoring  01/04/2022    Flu vaccine  Completed    Pneumococcal 65+ years Vaccine  Completed    Hepatitis A vaccine  Aged Out    Hepatitis B vaccine  Aged Out    Hib vaccine  Aged Out    Meningococcal (ACWY) vaccine  Aged Out             (applicable per patient's age: Cancer Screenings, Depression Screening, Fall Risk Screening, Immunizations)    LDL Cholesterol (mg/dL)   Date Value   10/14/2020 70     AST (U/L)   Date Value   10/14/2020 19     ALT (U/L)   Date Value   10/14/2020 13     BUN (mg/dL)   Date Value   01/04/2021 16      (goal A1C is < 7)   (goal LDL is <100) need 30-50% reduction from baseline     BP Readings from Last 3 Encounters:   01/19/21 132/72   01/07/21 136/68   01/04/21 (!) 92/48    (goal /80)      All Future Testing planned in [facility]:  Lab Frequency Next Occurrence   ECHO Complete 2D W Doppler W Color Once 04/27/2021   TSH with Reflex Once 04/26/2021   CBC Auto Differential Once 04/26/2021   Comprehensive Metabolic Panel Once 63/66/8427   Vitamin D 25 Hydroxy Once 04/26/2021   Lipid Panel Once 04/26/2021   Magnesium Once 04/26/2021   EKG 12 Lead Once 04/26/2021   Comprehensive Metabolic Panel Once 54/02/5765   Pulse oximetry, overnight Once 01/07/2021   Psa screening Once 01/07/2021   CBC Auto Differential Once 01/07/2021   Insulin, Free Once 01/07/2021   Cortisol Once 01/07/2021   C-Reactive Protein Once 01/07/2021   Hepatic Function Panel Once 01/07/2021   Urinalysis Reflex to Culture Once 01/07/2021   C-Peptide Once 01/07/2021   Culture, Blood 1 Once 01/07/2021 Mononucleosis Screen Once 01/07/2021   Vitamin B12 & Folate Once 01/15/2021   CBC Auto Differential Once 01/15/2021   Comprehensive Metabolic Panel Once 09/96/9918   Magnesium Once 01/15/2021   Psa screening Once 01/15/2021       Next Visit Date:  Future Appointments   Date Time Provider Andres Sharma   4/20/2021  9:30 AM [facility] ECHO ROOM [facility] ECHO Magno Annie   4/26/2021 10:30 AM Caryle Roys, MD Lesli Pear [facility]   6/22/2021  9:30 AM LORY Armstrong - CNP nw pc TPP            Patient Active Problem List:     Hard of hearing     CAD (coronary artery disease)     Atrial fibrillation ([address]     Hiatal hernia     Hyperlipidemia     Hypothyroidism     Pacemaker     History of PTCA     Calculus of gallbladder without cholecystitis without obstruction     Umbilical hernia     Vitamin D deficiency

## 2021-02-01 ENCOUNTER — CARE COORDINATION (OUTPATIENT)
Dept: CARE COORDINATION | Age: 86
End: 2021-02-01

## 2021-02-01 NOTE — CARE COORDINATION
Ambulatory Care Coordination Note  2/1/2021  CM Risk Score: 6  Charlson 10 Year Mortality Risk Score: 47%     ACC: Jt Peña RN    Summary Note:       Phone call attempt today to patient: leftvm message  -have not been very successful in reaching patient for follow up.  -wanting to follow up regarding COPD, inhaler use, pcp appointment instructions, xray. -Dietician working with patient. -ACP TEam attempted reach out. Care Coordination Plan of Care: This nurse Care Coordinator will  - await call back from patient, and if no return call; will attempt to reach back out to patient later this week        Care Coordination Interventions    Program Enrollment: Complex Care  Referral from Primary Care Provider: Yes  Suggested Interventions and Community Resources  Fall Risk Prevention: Completed  Home Health Services: Completed (Comment: \"but patient not homebound, home care did not admit\". )  Meals on Wheels: Declined  Medication Assistance Program: Declined  Medi Set or Pill Pack: Declined  Registered Dietician: Completed (Comment: [facility] dietican)  Social Work: Declined  Specialty Services Referral: Completed (Comment: REferral to [facility])  Transportation Support: Declined  Zone Management Tools: Completed (Comment: copd, chf)         Prior to Admission medications    Medication Sig Start Date End Date Taking? Authorizing Provider   metoprolol succinate (TOPROL XL) 50 MG extended release tablet Take 1 tablet by mouth daily 1/29/21   LORY Cardona CNP   furosemide (LASIX) 20 MG tablet Take 1 pill by mouth twice a week Tuesday and Friday only please for Congestive heart failure.  12/30/20   LORY Cardona CNP   clopidogrel (PLAVIX) 75 MG tablet Take 1 tablet by mouth daily 12/30/20   LORY Cardona CNP   isosorbide mononitrate (IMDUR) 30 MG extended release tablet Take 0.5 tablets by mouth daily 12/30/20   LORY Cardona CNP   levothyroxine (SYNTHROID) 125 MCG tablet Take 1 tablet by mouth Daily 12/30/20   LORY Fernández CNP   albuterol sulfate HFA (VENTOLIN HFA) 108 (90 Base) MCG/ACT inhaler Inhale 2 puffs into the lungs every 4 hours as needed for Wheezing  Patient not taking: Reported on 1/15/2021 11/24/20   LORY Fernández CNP   rosuvastatin (CRESTOR) 20 MG tablet Take 1 tablet by mouth daily 11/24/20   LORY Fernández CNP   fluticasone-vilanterol (BREO ELLIPTA) 100-25 MCG/INH AEPB inhaler Inhale 1 puff into the lungs daily  Patient not taking: Reported on 1/15/2021 11/20/20   LORY Fernández CNP   metoprolol (LOPRESSOR) 100 MG tablet Take 1 tablet by mouth 2 times daily  Patient taking differently: Take 100 mg by mouth daily  3/27/20 12/25/20  Nabil Funk MD   aspirin 81 MG tablet Take 81 mg by mouth daily.     Historical Provider, MD       Future Appointments   Date Time Provider Andres Sharma   4/20/2021  9:30 AM [facility] ECHO ROOM [facility] ECHO [facility] Denisr   4/26/2021 10:30 AM MD Radha Herrera [facility]   6/22/2021  9:30 AM [facility]

## 2021-02-01 NOTE — CARE COORDINATION
Nutrition Care Coordinator Follow-Up visit:    Food Recall: eating 2-3 meals/d    Activity Level:  Sedentary: X  Lightly Active: Moderately Active:  Very Active:    Adult BMI:  Underweight (below 18.5)  Normal Weight (18.5-24.9)  Overweight (25-29. 9) X  Obese (30-39. 9)  Morbidly Obese (>40)    Weight Change: no change    Plan:  Plan was established with patient:  Increase dietary fiber by consuming whole grains, fruits and vegetables: X  Limit dietary cholesterol to >100mg/day: X  Increase water intake:  Avoid added sugar:   Avoid sweetened beverages:   Choose lean meats: X      Monitoring: Will monitor weight:  Will monitor adherence to meal plan: X  Will monitor adherence to exercise plan: Will monitor HGA1c:    Handouts Provided :  Low Carb snacking:  Carb counting /individual meal plan:  Portion Control: X  Food Labels: X  Physical Activity:  Low Fat/Cholesterol: X  Hypo/Hyperglycemia:  Calorie Controlled Meal Plan:    Goals: Increase water consumption to 8oz. 6-8 times daily:  Manage blood sugars by consuming 3 meals spaced every 4-5 hours with 2-3 snacks daily:  Increase fiber and decrease fat intake by consuming 1-2 fruit servings and 2-3 vegetable servings per day. discussed  Increase physical activity by:  Consume less than 2,000mg of sodium/day: discussed  Avoid consumption of sweetened beverages and added sugar by reading food labels:  Monitor blood sugars by using meter to check blood glucose before morning meal and 2 hours after a meal daily:  Decrease risk of coronary heart disease by consuming fish that contains omega-3 fatty acids at least twice a week, avoiding partially hydrogenated oil/trans fats and limiting saturated fat intake by reading food labels: discussed    Patient goals set:  1. Reviewed DASH guidelines- lowfat/cholesterol and low sodium.  Reviewed reading food labels-what to look for on labels.  Goal of limiting saturated, trans fat, cholesterol and sodium intake. 2. Reviewed avoiding canned, prepackaged foods, processed meats and cheese. Reviewed processed meats in detail to avoid- sausage, cotto, bologna, ham, ect- patient was eating some of these.  Goal is <2gm of sodium/day. 3. Reviewed best ways to cook foods-baking, broiling, grilling or steaming foods. Reviewed healthy fats- using olive or canola oil if adding oil and encouraged to use butter/margarine sparingly.   4. Reviewed shopping the outer rim of the grocery store where fresher foods are found. Patient relays trying not to add salt to foods. Reviewed seasonings that can be used that do not contain salt. Encouraged patient to stop adding salt to foods. Patient relays he did not get nutrition information I sent him yet, mail has been taking a while but will re-mail information just in case. Patient relays he is trying to avoid high sodium foods and cutting back on adding salt to foods. He had no questions at this time. Will follow up in 3-4 weeks to review and answer questions.      Livier Rubio

## 2021-02-03 ENCOUNTER — CARE COORDINATION (OUTPATIENT)
Dept: CARE COORDINATION | Age: 86
End: 2021-02-03

## 2021-02-03 SDOH — ECONOMIC STABILITY: HOUSING INSECURITY
IN THE LAST 12 MONTHS, WAS THERE A TIME WHEN YOU DID NOT HAVE A STEADY PLACE TO SLEEP OR SLEPT IN A SHELTER (INCLUDING NOW)?: NO

## 2021-02-03 NOTE — CARE COORDINATION
Ambulatory Care Coordination Note  2/3/2021  CM Risk Score: 6  Charlson 10 Year Mortality Risk Score: 47%     ACC: Luiza Lara RN    Summary Note:     Phone call to patient today to follow up with his care:   -spoke to patient himself.   -patient denies any new concerns today. Covid 19 [facility] REview:  -States, \"that he has spoke with the Health Department multiple times; and he is waiting for them to call him back. 2)Dietician Recommendations discussed:    Patient goals set:  1. Reviewed DASH guidelines- lowfat/cholesterol and low sodium.  Reviewed reading food labels-what to look for on labels.  Goal of limiting saturated, trans fat, cholesterol and sodium intake. 2. Reviewed avoiding canned, prepackaged foods, processed meats and cheese. Reviewed processed meats in detail to avoid- sausage, cotto, bologna, ham, ect- patient was eating some of these.  Goal is <2gm of sodium/day. 3. Reviewed best ways to cook foods-baking, broiling, grilling or steaming foods. Reviewed healthy fats- using olive or canola oil if adding oil and encouraged to use butter/margarine sparingly.   4. Reviewed shopping the outer rim of the grocery store where fresher foods are found. Patient relays trying not to add salt to foods. Reviewed seasonings that can be used that do not contain salt. Encouraged patient to stop adding salt to foods. Patient relays he did not get nutrition information I sent him yet, mail has been taking a while but will re-mail information just in case. Patient relays he is trying to avoid high sodium foods and cutting back on adding salt to foods. He had no questions at this time. Will follow up in 3-4 weeks to review and answer questions. 3)CHF:   -Questioned patient if he is weighing himself? -STaying, \"around 181-183 pounds\".      Reviewed CHF Precautions with patient:  -Patient verbalizes understanding to call MD office with weight increase of three pounds overnight or five pounds in a week.   Patient to weigh daily; and record. Should weigh first thing in the morning, wearing the same type of clothes, after you urinate, before you eat breakfast.  Reviewed heart failure zones; and when should update cardiologist.   Denies any questions/concerns. 4)COPD:   -Denies any increase in cough/congestion. -  -REviewed with patient Proper Inhaler Use:   1)Breo: Daily  2)Albuterol: Every 4 hours as needed for wheezing. 5)Spiritual Care REferral:   -discussed with patient Sister Joseph Flores calling him, and patient is agreeable. -Patient discusses recent passing of his dog, brother, and  of 28 years all within the last month or so. -Patient, \"is planning to move soon, not sure where yet\". -His, \"girlfriend's son lives in [place], and wants him to move in with him\". Patient, \"is considering this\". -Will follow up regarding. 6)Advanced CAre Planning:   -informed Patient that ACP Team Member will be calling back to help him get these documents completed. 7)Cataract Surgery:   -States, \"he did just have this surgery\". -Reports, \"that he feels his eye sight is better in that right eye since removal; and now he is going to have the cataract removed off Left eye\". Care Coordination Plan of Care: This nurse Care Coordinator will  - follow up with patient in 1 week:   1)did ACP Team Call him back to complete documents? 2)Spriitual Care: Did team member call him regarding referral?   3)COPD: how is his breathing? 4)CHF: how is his weights? Any new concerns? 5)Did he get his Covid 23 Vaccine? 6)Is he moving to [place]? Care Coordination Interventions    Program Enrollment: Complex Care  Referral from Primary Care Provider: Yes  Suggested Interventions and Community Resources  Fall Risk Prevention: Completed  Home Health Services: Completed (Comment: \"but patient not homebound, home care did not admit\".  )  Meals on Wheels: Declined  Medication Assistance Program: Declined  Medi Set or Pill Pack: Declined  Registered Dietician: Completed (Comment: ACC dietican)  Social Work: Declined  Specialty Services Referral: Completed (Comment: [facility])  Transportation Support: Declined  Zone Management Tools: Completed (Comment: copd, chf)     Education Reviewed Today: See Module for details. Goals Addressed                 This Visit's Progress       Care Coordination     Conditions and Symptoms        I will schedule office visits, as directed by my provider. I will keep my appointment or reschedule if I have to cancel. I will notify my provider of any barriers to my plan of care. I will follow my Zone Management tool to seek urgent or emergent care. I will notify my provider of any symptoms that indicate a worsening of my condition. Barriers: fear of failure, lack of support, and overwhelmed by complexity of regimen  Plan for overcoming my barriers:   -patient to work with ACM to help better manage his care.   -patient to monitor CHF: weigh self daily, take medications as prescribed.   -Patient to monitor COPD: use inhalers as prescribed, let PcP know if needs a refill.   -patient to let PCP know if unable to afford inhalers. Confidence: 7/10  Anticipated Goal Completion Date: 05/03/21              Prior to Admission medications    Medication Sig Start Date End Date Taking?  Authorizing Provider   albuterol sulfate HFA (VENTOLIN HFA) 108 (90 Base) MCG/ACT inhaler Inhale 2 puffs into the lungs every 4 hours as needed for Wheezing 11/24/20  Yes LORY Cardona CNP   fluticasone-vilanterol (BREO ELLIPTA) 100-25 MCG/INH AEPB inhaler Inhale 1 puff into the lungs daily 11/20/20  Yes LORY Cardona CNP   metoprolol succinate (TOPROL XL) 50 MG extended release tablet Take 1 tablet by mouth daily 1/29/21   LORY Cardona CNP   furosemide (LASIX) 20 MG tablet Take 1 pill by mouth twice a week Tuesday and Friday only please for Congestive heart failure. 12/30/20   Claudette Filippo, APRN - CNP   clopidogrel (PLAVIX) 75 MG tablet Take 1 tablet by mouth daily 12/30/20   Claudette Filippo, APRN - CNP   isosorbide mononitrate (IMDUR) 30 MG extended release tablet Take 0.5 tablets by mouth daily 12/30/20   Claudette Filippo, APRN - CNP   levothyroxine (SYNTHROID) 125 MCG tablet Take 1 tablet by mouth Daily 12/30/20   Claudette Filippo, APRN - CNP   rosuvastatin (CRESTOR) 20 MG tablet Take 1 tablet by mouth daily 11/24/20   Claudette Filippo, APRN - CNP   metoprolol (LOPRESSOR) 100 MG tablet Take 1 tablet by mouth 2 times daily  Patient taking differently: Take 100 mg by mouth daily  3/27/20 12/25/20  Emma Hanks MD   aspirin 81 MG tablet Take 81 mg by mouth daily.     Historical Provider, MD       Future Appointments   Date Time Provider Andres Sharma   4/20/2021  9:30 AM [facility] ECHO ROOM [facility] ECHO [facility]   4/26/2021 10:30 AM MD Britt Travis [facility]   6/22/2021  9:30 AM Claudette Paul, APRN - CNP [facility] pc [facility]

## 2021-02-03 NOTE — CARE COORDINATION
Ambulatory Care Coordination Note  2/3/2021  CM Risk Score: 6  Charlson 10 Year Mortality Risk Score: 47%     ACC: Lowell Hsu RN    Summary Note:     PHone call attempt to patient today:   Left additional voicemail message.   -Have been trying to reach patient for follow up.   -Did receive update from [address] yesterday patient pretty depressed with recent passing of his dog, brother, and girlfriend of 28 years.   -Referred patient to Spiritual Care, and they will be reaching out to patient. -ACP Team was reaching out to patient regarding ACP Documents, wanting to follow up regarding.   -Wanting to follow up regarding COPD, CHF. Care Coordination Plan of Care: This nurse Care Coordinator will  - await call back from patient, and if no return call; will attempt to reach back out to patient next week          Care Coordination Interventions    Program Enrollment: Complex Care  Referral from Primary Care Provider: Yes  Suggested Interventions and Community Resources  Fall Risk Prevention: Completed  Home Health Services: Completed (Comment: \"but patient not homebound, home care did not admit\". )  Meals on Wheels: Declined  Medication Assistance Program: Declined  Medi Set or Pill Pack: Declined  Registered Dietician: Completed (Comment: ACC dietican)  Social Work: Declined  Specialty Services Referral: Completed (Comment: REferral to [facility])  Transportation Support: Declined  Zone Management Tools: Completed (Comment: copd, chf)           Prior to Admission medications    Medication Sig Start Date End Date Taking? Authorizing Provider   metoprolol succinate (TOPROL XL) 50 MG extended release tablet Take 1 tablet by mouth daily 1/29/21   LORY Rios CNP   furosemide (LASIX) 20 MG tablet Take 1 pill by mouth twice a week Tuesday and Friday only please for Congestive heart failure.  12/30/20   LORY Rios CNP   clopidogrel (PLAVIX) 75 MG tablet Take 1 tablet by mouth daily 12/30/20 LORY Scott CNP   isosorbide mononitrate (IMDUR) 30 MG extended release tablet Take 0.5 tablets by mouth daily 12/30/20   LORY Scott CNP   levothyroxine (SYNTHROID) 125 MCG tablet Take 1 tablet by mouth Daily 12/30/20   LORY Scott CNP   albuterol sulfate HFA (VENTOLIN HFA) 108 (90 Base) MCG/ACT inhaler Inhale 2 puffs into the lungs every 4 hours as needed for Wheezing  Patient not taking: Reported on 1/15/2021 11/24/20   LORY Scott CNP   rosuvastatin (CRESTOR) 20 MG tablet Take 1 tablet by mouth daily 11/24/20   LORY Scott CNP   fluticasone-vilanterol (BREO ELLIPTA) 100-25 MCG/INH AEPB inhaler Inhale 1 puff into the lungs daily  Patient not taking: Reported on 1/15/2021 11/20/20   LORY Scott CNP   metoprolol (LOPRESSOR) 100 MG tablet Take 1 tablet by mouth 2 times daily  Patient taking differently: Take 100 mg by mouth daily  3/27/20 12/25/20  Kash Hardin MD   aspirin 81 MG tablet Take 81 mg by mouth daily.     Historical Provider, MD       Future Appointments   Date Time Provider Andres Sharma   4/20/2021  9:30 AM [facility] ECHO ROOM [facility] ECHO [facility]   4/26/2021 10:30 AM Kash Hardin MD [facility]   6/22/2021  9:30 AM LORY Scott CNP [facility]

## 2021-02-04 ENCOUNTER — CARE COORDINATION (OUTPATIENT)
Dept: CARE COORDINATION | Age: 86
End: 2021-02-04

## 2021-02-04 NOTE — CARE COORDINATION
Ambulatory Care Coordination Note  2/4/2021  CM Risk Score: 6  Charlson 10 Year Mortality Risk Score: 47%     ACC: Simeon Patel RN    Summary Note:     Phone call attempt to patient today to give patient phone number to [facility] to schedule Covid 19 vaccine;   -Left vm message and requested phone call back. -phone number: [phone]. Care Coordination Plan of Care: This nurse Care Coordinator will  - await call back from patient, and if no return call; will attempt to reach back out to patient again next week as scheduled. Care Coordination Interventions    Program Enrollment: Complex Care  Referral from Primary Care Provider: Yes  Suggested Interventions and Community Resources  Fall Risk Prevention: Completed  Home Health Services: Completed (Comment: \"but patient not homebound, home care did not admit\". )  Meals on Wheels: Declined  Medication Assistance Program: Declined  Medi Set or Pill Pack: Declined  Registered Dietician: Completed (Comment: ACC dietican)  Social Work: Declined  Specialty Services Referral: Completed (Comment: REferral to [facility])  Transportation Support: Declined  Zone Management Tools: Completed (Comment: copd, chf)           Prior to Admission medications    Medication Sig Start Date End Date Taking? Authorizing Provider   metoprolol succinate (TOPROL XL) 50 MG extended release tablet Take 1 tablet by mouth daily 1/29/21   LORY Serrato CNP   furosemide (LASIX) 20 MG tablet Take 1 pill by mouth twice a week Tuesday and Friday only please for Congestive heart failure.  12/30/20   LORY Serrato CNP   clopidogrel (PLAVIX) 75 MG tablet Take 1 tablet by mouth daily 12/30/20   LORY Serrato CNP   isosorbide mononitrate (IMDUR) 30 MG extended release tablet Take 0.5 tablets by mouth daily 12/30/20   LORY Serrato CNP   levothyroxine (SYNTHROID) 125 MCG tablet Take 1 tablet by mouth Daily 12/30/20   LORY Serrato CNP albuterol sulfate HFA (VENTOLIN HFA) 108 (90 Base) MCG/ACT inhaler Inhale 2 puffs into the lungs every 4 hours as needed for Wheezing 11/24/20   LORY Beauchamp CNP   rosuvastatin (CRESTOR) 20 MG tablet Take 1 tablet by mouth daily 11/24/20   LORY Beauchamp CNP   fluticasone-vilanterol (BREO ELLIPTA) 100-25 MCG/INH AEPB inhaler Inhale 1 puff into the lungs daily 11/20/20   LORY Beauchamp CNP   metoprolol (LOPRESSOR) 100 MG tablet Take 1 tablet by mouth 2 times daily  Patient taking differently: Take 100 mg by mouth daily  3/27/20 12/25/20  Jon Hopson MD   aspirin 81 MG tablet Take 81 mg by mouth daily.     Historical Provider, MD       Future Appointments   Date Time Provider Andres Sharma   4/20/2021  9:30 AM [facility] ECHO ROOM [facility] ECHO Izella Anitra   4/26/2021 10:30 AM MD Javi Islasix [facility]   6/22/2021  9:30 AM LORY Beauchamp CNP [facility]

## 2021-02-08 ENCOUNTER — CARE COORDINATION (OUTPATIENT)
Dept: CARE COORDINATION | Age: 86
End: 2021-02-08

## 2021-02-08 NOTE — CARE COORDINATION
Ambulatory Care Coordination Note  2021  CM Risk Score: 6  Charlson 10 Year Mortality Risk Score: 47%     ACC: Ren Olson, RN    Summary Note:     Phone call attempt today to patient:   -left vm message and requested phone call back:     Wanting to follow up with patient regardin)did ACP Team Call him back to complete documents? 2)Spriitual Care: Did team member call him regarding referral?   3)COPD: how is his breathing? 4)CHF: how is his weights? Any new concerns? 5)Did he get his Covid 23 Vaccine? Left him vm last week regarding [facility] phone number to schedule to receive there if he wanted to. [phone]  6)Is he moving to [place]? Care Coordination Plan of Care: This nurse Care Coordinator will  - await call back from patient, and if no return call; will attempt to reach back out to patient later this week if able. Care Coordination Interventions    Program Enrollment: Complex Care  Referral from Primary Care Provider: Yes  Suggested Interventions and Community Resources  Fall Risk Prevention: Completed  Home Health Services: Completed (Comment: \"but patient not homebound, home care did not admit\". )  Meals on Wheels: Declined  Medication Assistance Program: Declined  Medi Set or Pill Pack: Declined  Registered Dietician: Completed (Comment: ACC dietican)  Social Work: Declined  Specialty Services Referral: Completed (Comment: REferral to spirtual care)  Transportation Support: Declined  Zone Management Tools: Completed (Comment: copd, chf)           Prior to Admission medications    Medication Sig Start Date End Date Taking? Authorizing Provider   metoprolol succinate (TOPROL XL) 50 MG extended release tablet Take 1 tablet by mouth daily 21   LORY Scott CNP   furosemide (LASIX) 20 MG tablet Take 1 pill by mouth twice a week Tuesday and Friday only please for Congestive heart failure.  20   LORY Scott CNP   clopidogrel (PLAVIX) 75 MG tablet Take 1 tablet by mouth daily 12/30/20   LORY Oneill CNP   isosorbide mononitrate (IMDUR) 30 MG extended release tablet Take 0.5 tablets by mouth daily 12/30/20   LORY Oneill CNP   levothyroxine (SYNTHROID) 125 MCG tablet Take 1 tablet by mouth Daily 12/30/20   LORY Oneill CNP   albuterol sulfate HFA (VENTOLIN HFA) 108 (90 Base) MCG/ACT inhaler Inhale 2 puffs into the lungs every 4 hours as needed for Wheezing 11/24/20   LORY Oneill CNP   rosuvastatin (CRESTOR) 20 MG tablet Take 1 tablet by mouth daily 11/24/20   LORY Oneill CNP   fluticasone-vilanterol (BREO ELLIPTA) 100-25 MCG/INH AEPB inhaler Inhale 1 puff into the lungs daily 11/20/20   LORY Oneill CNP   metoprolol (LOPRESSOR) 100 MG tablet Take 1 tablet by mouth 2 times daily  Patient taking differently: Take 100 mg by mouth daily  3/27/20 12/25/20  Ct Calderon MD   aspirin 81 MG tablet Take 81 mg by mouth daily.     Historical Provider, MD       Future Appointments   Date Time Provider Andres Sharma   4/20/2021  9:30 AM [facility] ECHO ROOM [facility] ECHO [facility]   4/26/2021 10:30 AM Ct Calderon MD [facility]   6/22/2021  9:30 AM LORY Oneill CNP [facility]

## 2021-02-11 ENCOUNTER — CARE COORDINATION (OUTPATIENT)
Dept: CARE COORDINATION | Age: 86
End: 2021-02-11

## 2021-02-11 NOTE — CARE COORDINATION
Ambulatory Care Coordination Note  2021  CM Risk Score: 6  Charlson 10 Year Mortality Risk Score: 47%     ACC: Lowell Hsu, RN    Summary Note:     Phone call attempt today to patient: Left vm message and requested phone call back:      Wanting to follow up regardin)did ACP Team Call him back to complete documents? 2)Spriitual Care: Did team member call him regarding referral?   3)COPD: how is his breathing? 4)CHF: how is his weights? Any new concerns? 5)Did he get his Covid 23 Vaccine? Left him vm last week regarding [facility] phone number to schedule to receive there if he wanted to. [phone]  6)Is he moving to [address] [place] [address] Care: This nurse Care Coordinator will  - await call back from patient, and if no return call; will attempt to reach back out to patient next week. Care Coordination Interventions    Program Enrollment: Complex Care  Referral from Primary Care Provider: Yes  Suggested Interventions and Community Resources  Fall Risk Prevention: Completed  Home Health Services: Completed (Comment: \"but patient not homebound, home care did not admit\". )  Meals on Wheels: Declined  Medication Assistance Program: Declined  Medi Set or Pill Pack: Declined  Registered Dietician: Completed (Comment: ACC dietican)  Social Work: Declined  Specialty Services Referral: Completed (Comment: REferral to spirtual care)  Transportation Support: Declined  Zone Management Tools: Completed (Comment: copd, chf)         Prior to Admission medications    Medication Sig Start Date End Date Taking? Authorizing Provider   metoprolol succinate (TOPROL XL) 50 MG extended release tablet Take 1 tablet by mouth daily 21   LORY Rios CNP   furosemide (LASIX) 20 MG tablet Take 1 pill by mouth twice a week Tuesday and Friday only please for Congestive heart failure.  20   LORY Rios CNP   clopidogrel (PLAVIX) 75 MG tablet Take 1 tablet by mouth daily 12/30/20   LORY Chu CNP   isosorbide mononitrate (IMDUR) 30 MG extended release tablet Take 0.5 tablets by mouth daily 12/30/20   LORY Chu CNP   levothyroxine (SYNTHROID) 125 MCG tablet Take 1 tablet by mouth Daily 12/30/20   LORY Chu CNP   albuterol sulfate HFA (VENTOLIN HFA) 108 (90 Base) MCG/ACT inhaler Inhale 2 puffs into the lungs every 4 hours as needed for Wheezing 11/24/20   LORY Chu CNP   rosuvastatin (CRESTOR) 20 MG tablet Take 1 tablet by mouth daily 11/24/20   LORY Chu CNP   fluticasone-vilanterol (BREO ELLIPTA) 100-25 MCG/INH AEPB inhaler Inhale 1 puff into the lungs daily 11/20/20   LORY Chu CNP   metoprolol (LOPRESSOR) 100 MG tablet Take 1 tablet by mouth 2 times daily  Patient taking differently: Take 100 mg by mouth daily  3/27/20 12/25/20  Todd Russell MD   aspirin 81 MG tablet Take 81 mg by mouth daily.     Historical Provider, MD       Future Appointments   Date Time Provider Andres Sharma   4/20/2021  9:30 AM [facility] ECHO [facility]   4/26/2021 10:30 AM MD Soham Hinson [facility]   6/22/2021  9:30 AM LORY Chu CNP [facility]

## 2021-02-12 ENCOUNTER — TELEPHONE (OUTPATIENT)
Dept: CASE MANAGEMENT | Age: 86
End: 2021-02-12

## 2021-02-12 NOTE — ACP (ADVANCE CARE PLANNING)
SW attempted to contact pt regarding ACP referral for a third time this date. No answer received from pt. SW left a message regarding reason for call and requested return call from pt to schedule a meeting if he desired. SW notified that referral would be closed if SW did not hear from pt by the end of next week.  Ashley Pizarro MSW LSW 2/12/2021

## 2021-02-16 ENCOUNTER — CARE COORDINATION (OUTPATIENT)
Dept: CARE COORDINATION | Age: 86
End: 2021-02-16

## 2021-02-16 NOTE — CARE COORDINATION
Phone call back from patient:   -informs that he wasn't able to get through to [facility] or [address] [place] [address]; but he was able to [facility]; and they told him they would call him back.    -patient will, \"keep trying to reach [facility] and [address] [place] [address], and keep this nurse updated
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
## 2021-02-22 ENCOUNTER — CARE COORDINATION (OUTPATIENT)
Dept: CARE COORDINATION | Age: 86
End: 2021-02-22

## 2021-02-22 NOTE — CARE COORDINATION
Ambulatory Care Coordination Note  2/22/2021  CM Risk Score: 6  Charlson 10 Year Mortality Risk Score: 47%     ACC: Raghav Galvez RN    Summary Note:   Phone call attempt today to patient to follow up with her care: Left vm message and requested phone call back:     Wanting to follow up regarding:     CHF: how are weights? Review zone tool/precautions    COPD: how is breathing? Review zone tool/precautions. Covid 19 vaccine: did he get scheduled? AcP Documents: did he get these completed? Spirtual Care: has he talked to sister again? Care Coordination Plan of Care: This nurse Care Coordinator will  - await call back from patient, and if no return call, will attempt to reach back out to patient again later this week if able. Care Coordination Interventions    Program Enrollment: Complex Care  Referral from Primary Care Provider: Yes  Suggested Interventions and Community Resources  Fall Risk Prevention: Completed  Home Health Services: Completed (Comment: \"but patient not homebound, home care did not admit\". )  Meals on Wheels: Declined  Medication Assistance Program: Declined  Medi Set or Pill Pack: Declined  Registered Dietician: Completed (Comment: ACC dietican)  Social Work: Declined  Specialty Services Referral: Completed (Comment: REferral to spirtual care)  Transportation Support: Declined  Zone Management Tools: Completed (Comment: copd, chf)             Prior to Admission medications    Medication Sig Start Date End Date Taking? Authorizing Provider   metoprolol succinate (TOPROL XL) 50 MG extended release tablet Take 1 tablet by mouth daily 1/29/21   LORY Dietrich CNP   furosemide (LASIX) 20 MG tablet Take 1 pill by mouth twice a week Tuesday and Friday only please for Congestive heart failure.  12/30/20   LORY Dietrich CNP   clopidogrel (PLAVIX) 75 MG tablet Take 1 tablet by mouth daily 12/30/20   LORY Dietrich CNP   isosorbide mononitrate (IMDUR) 30 MG extended release tablet Take 0.5 tablets by mouth daily 12/30/20   LORY Dietrich CNP   levothyroxine (SYNTHROID) 125 MCG tablet Take 1 tablet by mouth Daily 12/30/20   LORY Dietrich CNP   albuterol sulfate HFA (VENTOLIN HFA) 108 (90 Base) MCG/ACT inhaler Inhale 2 puffs into the lungs every 4 hours as needed for Wheezing 11/24/20   LORY Dietrich CNP   rosuvastatin (CRESTOR) 20 MG tablet Take 1 tablet by mouth daily 11/24/20   LORY Dietrich CNP   fluticasone-vilanterol (BREO ELLIPTA) 100-25 MCG/INH AEPB inhaler Inhale 1 puff into the lungs daily 11/20/20   LORY Dietrich CNP   metoprolol (LOPRESSOR) 100 MG tablet Take 1 tablet by mouth 2 times daily  Patient taking differently: Take 100 mg by mouth daily  3/27/20 12/25/20  Trent Anderson MD   aspirin 81 MG tablet Take 81 mg by mouth daily.     Historical Provider, MD       Future Appointments   Date Time Provider Andres Sharma   4/20/2021  9:30 AM [facility] ECHO ROOM [facility] ECHO [facility]   4/26/2021 10:30 AM MD Shanelle Singh [facility]   6/22/2021  9:30 AM LORY Dietrich CNP [facility]

## 2021-02-26 ENCOUNTER — CARE COORDINATION (OUTPATIENT)
Dept: CARE COORDINATION | Age: 86
End: 2021-02-26

## 2021-02-26 NOTE — CARE COORDINATION
Ambulatory Care Coordination Note  2/26/2021  CM Risk Score: 6  Charlson 10 Year Mortality Risk Score: 47%     ACC: Antonia Valladares RN    Summary Note:     PHone call today to patient to follow up with his care:   -left vm message and requested phone call back      Wanting to follow up regarding:      CHF: how are weights? Review zone tool/precautions     COPD: how is breathing? Review zone tool/precautions.      Covid 19 vaccine: did he get scheduled?      AcP Documents: did he get these completed?      Spirtual Care: has he talked to sister [address] of Care: This nurse Care Coordinator will  - await call back from patient, and if no return call; will attempt to reach back out to patient next week    Care Coordination Interventions    Program Enrollment: Complex Care  Referral from Primary Care Provider: Yes  Suggested Interventions and Community Resources  Fall Risk Prevention: Completed  Home Health Services: Completed (Comment: \"but patient not homebound, home care did not admit\". )  Meals on Wheels: Declined  Medication Assistance Program: Declined  Medi Set or Pill Pack: Declined  Registered Dietician: Completed (Comment: ACC dietican)  Social Work: Declined  Specialty Services Referral: Completed (Comment: REferral to spirtual care)  Transportation Support: Declined  Zone Management Tools: Completed (Comment: copd, chf)         Prior to Admission medications    Medication Sig Start Date End Date Taking? Authorizing Provider   metoprolol succinate (TOPROL XL) 50 MG extended release tablet Take 1 tablet by mouth daily 1/29/21   LORY Cross CNP   furosemide (LASIX) 20 MG tablet Take 1 pill by mouth twice a week Tuesday and Friday only please for Congestive heart failure.  12/30/20   LORY Cross CNP   clopidogrel (PLAVIX) 75 MG tablet Take 1 tablet by mouth daily 12/30/20   LORY Cross CNP   isosorbide mononitrate (IMDUR) 30 MG extended release tablet Take 0.5 tablets by mouth daily 12/30/20   LORY Finley CNP   levothyroxine (SYNTHROID) 125 MCG tablet Take 1 tablet by mouth Daily 12/30/20   LORY Finley CNP   albuterol sulfate HFA (VENTOLIN HFA) 108 (90 Base) MCG/ACT inhaler Inhale 2 puffs into the lungs every 4 hours as needed for Wheezing 11/24/20   LORY Finley CNP   rosuvastatin (CRESTOR) 20 MG tablet Take 1 tablet by mouth daily 11/24/20   LORY Finley CNP   fluticasone-vilanterol (BREO ELLIPTA) 100-25 MCG/INH AEPB inhaler Inhale 1 puff into the lungs daily 11/20/20   LORY Finley CNP   metoprolol (LOPRESSOR) 100 MG tablet Take 1 tablet by mouth 2 times daily  Patient taking differently: Take 100 mg by mouth daily  3/27/20 12/25/20  Nabil Funk MD   aspirin 81 MG tablet Take 81 mg by mouth daily.     Historical Provider, MD       Future Appointments   Date Time Provider Andres Sharma   4/20/2021  9:30 AM [facility] ECHO ROOM [facility] ECHO [facility]   4/26/2021 10:30 AM MD Radha Herrera [facility]   6/22/2021  9:30 AM LORY Finley CNP [facility]

## 2021-03-02 ENCOUNTER — CARE COORDINATION (OUTPATIENT)
Dept: CARE COORDINATION | Age: 86
End: 2021-03-02

## 2021-03-02 NOTE — CARE COORDINATION
extended release tablet Take 0.5 tablets by mouth daily 12/30/20   LORY Finley CNP   levothyroxine (SYNTHROID) 125 MCG tablet Take 1 tablet by mouth Daily 12/30/20   LORY Finley CNP   albuterol sulfate HFA (VENTOLIN HFA) 108 (90 Base) MCG/ACT inhaler Inhale 2 puffs into the lungs every 4 hours as needed for Wheezing 11/24/20   LORY Finley CNP   rosuvastatin (CRESTOR) 20 MG tablet Take 1 tablet by mouth daily 11/24/20   LORY Finley CNP   fluticasone-vilanterol (BREO ELLIPTA) 100-25 MCG/INH AEPB inhaler Inhale 1 puff into the lungs daily 11/20/20   LORY Finley CNP   metoprolol (LOPRESSOR) 100 MG tablet Take 1 tablet by mouth 2 times daily  Patient taking differently: Take 100 mg by mouth daily  3/27/20 12/25/20  Nabil Funk MD   aspirin 81 MG tablet Take 81 mg by mouth daily.     Historical Provider, MD       Future Appointments   Date Time Provider Andres Sharma   4/20/2021  9:30 AM [facility] ECHO ROOM [facility]   4/26/2021 10:30 AM MD Radha Herrera [facility]   6/22/2021  9:30 AM LORY Finley CNP [facility]

## 2021-03-03 ENCOUNTER — CARE COORDINATION (OUTPATIENT)
Dept: CARE COORDINATION | Age: 86
End: 2021-03-03

## 2021-03-03 NOTE — CARE COORDINATION
Nutrition Care Coordinator Follow-Up visit:    Food Recall: eating 2-3 meals/d    Activity Level:  Sedentary: X  Lightly Active: Moderately Active:  Very Active:    Adult BMI:  Underweight (below 18.5)  Normal Weight (18.5-24.9)  Overweight (25-29. 9) X  Obese (30-39. 9)  Morbidly Obese (>40)    Weight Change: no change    Plan:  Plan was established with patient:  Increase dietary fiber by consuming whole grains, fruits and vegetables: X  Limit dietary cholesterol to >200mg/day: Increase water intake:  Avoid added sugar:  Avoid sweetened beverages:  Choose lean meats: x  <2gm/d of sodium: X      Monitoring: Will monitor weight:  Will monitor adherence to meal plan: X  Will monitor adherence to exercise plan: Will monitor HGA1c:    Handouts Provided :  Low Carb snacking:  Carb counting /individual meal plan:  Portion Control:  Food Labels: X  Physical Activity:  Low Fat/Cholesterol: X  Hypo/Hyperglycemia:  Calorie Controlled Meal Plan:   Low sodium guidelines: X    Goals: Increase water consumption to 8oz. 6-8 times daily:  Manage blood sugars by consuming 3 meals spaced every 4-5 hours with 2-3 snacks daily:  Increase fiber and decrease fat intake by consuming 1-2 fruit servings and 2-3 vegetable servings per day. Increase physical activity by:  Consume less than 2,000mg of sodium/day: reviewed  Avoid consumption of sweetened beverages and added sugar by reading food labels:  Monitor blood sugars by using meter to check blood glucose before morning meal and 2 hours after a meal daily:  Decrease risk of coronary heart disease by consuming fish that contains omega-3 fatty acids at least twice a week, avoiding partially hydrogenated oil/trans fats and limiting saturated fat intake by reading food labels:reviewed    Patient goals set:  1. Reviewed DASH guidelines- lowfat/cholesterol and low sodium.  Reviewed reading food labels-what to look for on labels.  Goal is <15gms of saturated fat/d, <3gms of trans fats/d, <2gm of sodium/day. 2. Reviewed avoiding canned, prepackaged foods, processed meats and cheese. Reviewed processed meats in detail to avoid- sausage, cotto, bologna, ham, ect- patient was eating some of these.    3. Reviewed best ways to cook foods-baking, broiling, grilling or steaming foods. Reviewed healthy fats- using olive or canola oil if adding oil and encouraged to use butter/margarine sparingly.   4. Reviewed shopping the outer rim of the grocery store where fresher foods are found. Reviewed seasonings that can be used that do not contain salt. Encouraged patient to stop adding salt to foods. Patient relays still has not received nutrition information, confirmed address and will re-mail. Patient relays has cut back on adding salt to foods, encouraged him to use other seasonings such as garlic powder, onion powder, ect that do not contain salt. He had no questions at this time. Will follow up in 3-4 weeks to review and answer questions.         Les Daniel

## 2021-03-05 ENCOUNTER — CARE COORDINATION (OUTPATIENT)
Dept: CARE COORDINATION | Age: 86
End: 2021-03-05

## 2021-03-05 RX ORDER — FLUTICASONE FUROATE AND VILANTEROL TRIFENATATE 100; 25 UG/1; UG/1
1 POWDER RESPIRATORY (INHALATION) DAILY
Qty: 2 EACH | Refills: 2 | Status: SHIPPED | OUTPATIENT
Start: 2021-03-05 | End: 2022-06-06 | Stop reason: ALTCHOICE

## 2021-03-05 RX ORDER — ALBUTEROL SULFATE 90 UG/1
2 AEROSOL, METERED RESPIRATORY (INHALATION) EVERY 4 HOURS PRN
Qty: 1 INHALER | Refills: 1 | Status: SHIPPED | OUTPATIENT
Start: 2021-03-05 | End: 2022-10-21

## 2021-03-05 NOTE — CARE COORDINATION
Ambulatory Care Coordination Note  3/5/2021  CM Risk Score: 6  Charlson 10 Year Mortality Risk Score: 47%     ACC: Simeon Patel, RN    Summary Note:     Phone call to patient today to follow up his care:   -StatesPatrizia is doing great\". CHF: how are weights? Review zone tool/precautions   -Informs, \"that his weight is staying right at 181 pounds\". -Denies any fluctuations, swelling, increase in shortness of breath. Reviewed CHF Precautions with patient:  -Patient verbalizes understanding to call MD office with weight increase of three pounds overnight or five pounds in a week. Patient to weigh daily; and record. Should weigh first thing in the morning, wearing the same type of clothes, after you urinate, before you eat breakfast.  Reviewed heart failure zones; and when should update cardiologist.   Denies any questions/concerns. COPD: how is breathing? Review zone tool/precautions.   -Denies any new concerns. Denies any cough/congestion.     Discussed Inhalers:   1)Albuterol and 2)Breo.   -informs that he needs refill on both of these sent to pharmacy. Will forward to PCP to update. Covid 19 vaccine: did he get scheduled?   -StatesPatrizia is happy he is scheduled for 03/08/21\". -will follow up after vaccine to see how he does. AcP Documents: did he get these completed? Discussed with patient getting this completed. -Informed that ACP Specialist has been trying to reach him.   -patient agreeable to have them call again in hopes to get these documents completed.    -notified ACP Specialist that patient is aware she will be calling. Spirtual Care: has he talked to sister again?      -Did receive update from spiritual care that they have been reaching out to patient for grief counseling.      Congestive Heart Failure Assessment    Do you understand a low sodium diet?: Yes (Comment: dietician working with patient to educate)  Do you salt your food before tasting it?: No     No patient-reported symptoms      Symptoms:  CHF associated leg swelling: Neg, CHF associated shortness of breath: Neg, CHF associated weakness: Neg      Symptom course: stable  Weight trend: stable     ,   COPD Assessment    Does the patient understand envrionmental exposure?: Yes  Is the patient able to verbalize Rescue vs. Long Acting medications?: Yes  Does the patient have a nebulizer?: No  Does the patient use a space with inhaled medications?: No     No patient-reported symptoms         Symptoms:     Symptom course: stable  Increase use of rapid acting/rescue inhaled medications?: No  Change in chronic cough?: No/At Baseline  Change in sputum?: No/At Baseline  Self Monitoring - SaO2: No  Have you had a recent diagnosis of pneumonia either by PCP or at a hospital?: No      and   General Assessment    Do you have any symptoms that are causing concern?: No       Care Coordination Plan of Care: This nurse Care Coordinator will  - forward to pcp to request inhaler refills for Breo and Albuterol .   -Notify patient is now scheduled for COVid 19 vaccine.   -Will then follow up in 1 week:   CHF: how are weights? Any swelling? Any concerns? COPD: how is breathing? Did he get his inhaler refills? AcP Documents: Did he talk to ? Spiritual Care: Grief counseling: how is this going? Care Coordination Interventions    Program Enrollment: Complex Care  Referral from Primary Care Provider: Yes  Suggested Interventions and Community Resources  Fall Risk Prevention: Completed  Home Health Services: Completed (Comment: \"but patient not homebound, home care did not admit\".  )  Meals on Wheels: Declined  Medication Assistance Program: Declined  Medi Set or Pill Pack: Declined  Registered Dietician: Completed (Comment: ACC dietican)  Social Work: Declined  Specialty Services Referral: Completed (Comment: REferral to spirtual care)  Transportation Support: Declined  Zone Management Tools: Completed (Comment: copd, chf)       Education Documentation  Educate follow-up care, taught by Ren Olson RN at 3/5/2021 10:34 AM.  Learner: Patient  Readiness: Acceptance  Method: Explanation  Response: Verbalizes Understanding, Needs Reinforcement  Comment: Reviewed with patient CHF precautions/zone tools, COPD precautions/zone tools. Discussed Covid 19 vaccine, now scheduled for 03/08/21. Spiritual care working with pt for grief counseling. ACP team reaching out to complete ACP documents    Diet, taught by Ren Olson RN at 3/5/2021 10:34 AM.  Learner: Patient  Readiness: Acceptance  Method: Explanation  Response: Verbalizes Understanding, Needs Reinforcement  Comment: Reviewed with patient CHF precautions/zone tools, COPD precautions/zone tools. Discussed Covid 19 vaccine, now scheduled for 03/08/21. Spiritual care working with pt for grief counseling. ACP team reaching out to complete ACP documents    Identify Meds, Dose, and Schedule, taught by Ren Olson RN at 3/5/2021 10:34 AM.  Learner: Patient  Readiness: Acceptance  Method: Explanation  Response: Verbalizes Understanding, Needs Reinforcement  Comment: Reviewed with patient CHF precautions/zone tools, COPD precautions/zone tools. Discussed Covid 19 vaccine, now scheduled for 03/08/21. Spiritual care working with pt for grief counseling. ACP team reaching out to complete ACP documents    General medication information, taught by Ren Olson RN at 3/5/2021 10:34 AM.  Learner: Patient  Readiness: Acceptance  Method: Explanation  Response: Verbalizes Understanding, Needs Reinforcement  Comment: Reviewed with patient CHF precautions/zone tools, COPD precautions/zone tools. Discussed Covid 19 vaccine, now scheduled for 03/08/21. Spiritual care working with pt for grief counseling. ACP team reaching out to complete ACP documents    Diuretics, taught by Ren Olson RN at 3/5/2021 10:34 AM.  Learner: Patient  Readiness: Acceptance  Method: Explanation  Response: Verbalizes Understanding, Needs Reinforcement  Comment: Reviewed with patient CHF precautions/zone tools, COPD precautions/zone tools. Discussed Covid 19 vaccine, now scheduled for 03/08/21. Spiritual care working with pt for grief counseling. ACP team reaching out to complete ACP documents    WHEN TO CALL THE DOCTOR CHF, taught by Ren Olson RN at 3/5/2021 10:34 AM.  Learner: Patient  Readiness: Acceptance  Method: Explanation  Response: Verbalizes Understanding, Needs Reinforcement  Comment: Reviewed with patient CHF precautions/zone tools, COPD precautions/zone tools. Discussed Covid 19 vaccine, now scheduled for 03/08/21. Spiritual care working with pt for grief counseling. ACP team reaching out to complete ACP documents    Monitoring Weight, taught by Ren Olson RN at 3/5/2021 10:34 AM.  Learner: Patient  Readiness: Acceptance  Method: Explanation  Response: Verbalizes Understanding, Needs Reinforcement  Comment: Reviewed with patient CHF precautions/zone tools, COPD precautions/zone tools. Discussed Covid 19 vaccine, now scheduled for 03/08/21. Spiritual care working with pt for grief counseling. ACP team reaching out to complete ACP documents    Educate on COPD Zone, taught by Ren Olson RN at 3/5/2021 10:34 AM.  Learner: Patient  Readiness: Acceptance  Method: Explanation  Response: Verbalizes Understanding, Needs Reinforcement  Comment: Reviewed with patient CHF precautions/zone tools, COPD precautions/zone tools. Discussed Covid 19 vaccine, now scheduled for 03/08/21. Spiritual care working with pt for grief counseling. ACP team reaching out to complete ACP documents    Educate chronic obstructive pulmonary disease exacerbation signs and symptoms, taught by Ren Olson RN at 3/5/2021 10:34 AM.  Learner: Patient  Readiness: Acceptance  Method: Explanation  Response: Verbalizes Understanding, Needs Reinforcement  Comment: Reviewed with patient CHF precautions/zone tools, COPD precautions/zone tools. Discussed Covid 19 vaccine, now scheduled for 03/08/21. Spiritual care working with pt for grief counseling. ACP team reaching out to complete ACP documents    Discuss COPD Overview, taught by Guillaume Way RN at 3/5/2021 10:34 AM.  Learner: Patient  Readiness: Acceptance  Method: Explanation  Response: Verbalizes Understanding, Needs Reinforcement  Comment: Reviewed with patient CHF precautions/zone tools, COPD precautions/zone tools. Discussed Covid 19 vaccine, now scheduled for 03/08/21. Spiritual care working with pt for grief counseling. ACP team reaching out to complete ACP documents    Education Comments  No comments found. Goals Addressed                 This Visit's Progress       Care Coordination     Conditions and Symptoms   Improving     I will schedule office visits, as directed by my provider. I will keep my appointment or reschedule if I have to cancel. I will notify my provider of any barriers to my plan of care. I will follow my Zone Management tool to seek urgent or emergent care. I will notify my provider of any symptoms that indicate a worsening of my condition. Barriers: fear of failure, lack of support, and overwhelmed by complexity of regimen  Plan for overcoming my barriers:   -patient to work with ACM to help better manage his care.   -patient to monitor CHF: weigh self daily, take medications as prescribed.   -Patient to monitor COPD: use inhalers as prescribed, let PcP know if needs a refill.   -patient to let PCP know if unable to afford inhalers. Confidence: 7/10  Anticipated Goal Completion Date: 05/03/21              Prior to Admission medications    Medication Sig Start Date End Date Taking?  Authorizing Provider   metoprolol succinate (TOPROL XL) 50 MG extended release tablet Take 1 tablet by mouth daily 1/29/21   LORY Pat - CNP   furosemide (LASIX) 20 MG tablet Take 1 pill by mouth twice a week Tuesday and Friday only please for Congestive heart failure. 12/30/20   LORY Brooks CNP   clopidogrel (PLAVIX) 75 MG tablet Take 1 tablet by mouth daily 12/30/20   LORY Brooks CNP   isosorbide mononitrate (IMDUR) 30 MG extended release tablet Take 0.5 tablets by mouth daily 12/30/20   LORY Brooks CNP   levothyroxine (SYNTHROID) 125 MCG tablet Take 1 tablet by mouth Daily 12/30/20   LORY Brooks CNP   albuterol sulfate HFA (VENTOLIN HFA) 108 (90 Base) MCG/ACT inhaler Inhale 2 puffs into the lungs every 4 hours as needed for Wheezing 11/24/20   LORY Brooks CNP   rosuvastatin (CRESTOR) 20 MG tablet Take 1 tablet by mouth daily 11/24/20   LORY Brooks CNP   fluticasone-vilanterol (BREO ELLIPTA) 100-25 MCG/INH AEPB inhaler Inhale 1 puff into the lungs daily 11/20/20   LORY Brooks CNP   metoprolol (LOPRESSOR) 100 MG tablet Take 1 tablet by mouth 2 times daily  Patient taking differently: Take 100 mg by mouth daily  3/27/20 12/25/20  Ursula Bill MD   aspirin 81 MG tablet Take 81 mg by mouth daily.     Historical Provider, MD       Future Appointments   Date Time Provider Andres Sharma   4/20/2021  9:30 AM [facility] ECHO ROOM [facility]   4/26/2021 10:30 AM MD Letty Perez [facility]   6/22/2021  9:30 AM LORY Brooks CNP [facility]

## 2021-03-12 ENCOUNTER — CARE COORDINATION (OUTPATIENT)
Dept: CARE COORDINATION | Age: 86
End: 2021-03-12

## 2021-03-12 NOTE — CARE COORDINATION
Ambulatory Care Coordination Note  3/12/2021  CM Risk Score: 6  Charlson 10 Year Mortality Risk Score: 47%     ACC: Ellen Ceballos RN    Summary Note:     Phone call attempt today to patient to follow up with his care:   -left vm message. CHF: how are weights? Any swelling? Any concerns? COPD: how is breathing? Did he get his inhaler refills? AcP Documents: Did he talk to ? Spiritual Care: Grief counseling: how is this going?   -Covid 19 vaccine: how did this go? Care Coordination Plan of Care: This nurse Care Coordinator will  - await call back from patient, and if no return call; will attempt to reach back out to patient next week        Care Coordination Interventions    Program Enrollment: Complex Care  Referral from Primary Care Provider: Yes  Suggested Interventions and Community Resources  Fall Risk Prevention: Completed  Home Health Services: Completed (Comment: \"but patient not homebound, home care did not admit\". )  Meals on Wheels: Declined  Medication Assistance Program: Declined  Medi Set or Pill Pack: Declined  Registered Dietician: Completed (Comment: ACC dietican)  Social Work: Declined  Specialty Services Referral: Completed (Comment: [facility]  Transportation Support: Declined  Zone Management Tools: Completed (Comment: copd, chf)         Prior to Admission medications    Medication Sig Start Date End Date Taking?  Authorizing Provider   fluticasone-vilanterol (BREO ELLIPTA) 100-25 MCG/INH AEPB inhaler Inhale 1 puff into the lungs daily For asthma/COPD 3/5/21   LORY Tran CNP   albuterol sulfate HFA (VENTOLIN HFA) 108 (90 Base) MCG/ACT inhaler Inhale 2 puffs into the lungs every 4 hours as needed for Wheezing 3/5/21   LORY Tran CNP   metoprolol succinate (TOPROL XL) 50 MG extended release tablet Take 1 tablet by mouth daily 1/29/21   LORY Tran CNP   furosemide (LASIX) 20 MG tablet Take 1 pill by mouth twice a week Tuesday and Friday only please for Congestive heart failure. 12/30/20   LORY Schmitt CNP   clopidogrel (PLAVIX) 75 MG tablet Take 1 tablet by mouth daily 12/30/20   LORY Schmitt CNP   isosorbide mononitrate (IMDUR) 30 MG extended release tablet Take 0.5 tablets by mouth daily 12/30/20   LORY Schmitt CNP   levothyroxine (SYNTHROID) 125 MCG tablet Take 1 tablet by mouth Daily 12/30/20   LORY Schmitt CNP   rosuvastatin (CRESTOR) 20 MG tablet Take 1 tablet by mouth daily 11/24/20   LORY Schmitt CNP   metoprolol (LOPRESSOR) 100 MG tablet Take 1 tablet by mouth 2 times daily  Patient taking differently: Take 100 mg by mouth daily  3/27/20 12/25/20  Annie Morgan MD   aspirin 81 MG tablet Take 81 mg by mouth daily.     Historical Provider, MD       Future Appointments   Date Time Provider Andres Sharma   4/20/2021  9:30 AM [facility] ECHO ROOM [facility]   4/26/2021 10:30 AM MD Kim Downey [facility]   6/22/2021  9:30 [facility]

## 2021-03-15 ENCOUNTER — TELEPHONE (OUTPATIENT)
Dept: CASE MANAGEMENT | Age: 86
End: 2021-03-15

## 2021-03-15 NOTE — TELEPHONE ENCOUNTER
ACP referral closed at this time. SW has made 4 contacts/ left messages with pt within January, February, and March with no response received. SW will remain available if pt decides he would like to complete in the future.  Bing Hackett MSW LSW 3/15/2021

## 2021-03-17 ENCOUNTER — CARE COORDINATION (OUTPATIENT)
Dept: CARE COORDINATION | Age: 86
End: 2021-03-17

## 2021-03-17 NOTE — CARE COORDINATION
Ambulatory Care Coordination Note  3/17/2021  CM Risk Score: 6  Charlson 10 Year Mortality Risk Score: 47%     ACC: Raghav Galvez RN    Summary Note:     Phone call attempt today to patient to follow up with his care:   -left vm message.   -Attempted to reach patient last week with no success. CHF: how are weights? Any swelling? Any concerns? COPD: how is breathing? Did he get his inhaler refills? AcP Documents: Did he talk to ? Spiritual Care: Grief counseling: how is this going?   -Covid 19 vaccine: how did this go? Care Coordination Plan of Care: This nurse Care Coordinator will  - await call back from patient, and if no return call; will attempt to reach back out to patient next week        Care Coordination Interventions    Program Enrollment: Complex Care  Referral from Primary Care Provider: Yes  Suggested Interventions and Community Resources  Fall Risk Prevention: Completed  Home Health Services: Completed (Comment: \"but patient not homebound, home care did not admit\". )  Meals on Wheels: Declined  Medication Assistance Program: Declined  Medi Set or Pill Pack: Declined  Registered Dietician: Completed (Comment: ACC dietican)  Social Work: Declined  Specialty Services Referral: Completed (Comment: REferral to [facility])  Transportation Support: Declined  Zone Management Tools: Completed (Comment: copd, chf)         Prior to Admission medications    Medication Sig Start Date End Date Taking?  Authorizing Provider   fluticasone-vilanterol (BREO ELLIPTA) 100-25 MCG/INH AEPB inhaler Inhale 1 puff into the lungs daily For asthma/COPD 3/5/21   Lucious Denver, APRN - CNP   albuterol sulfate HFA (VENTOLIN HFA) 108 (90 Base) MCG/ACT inhaler Inhale 2 puffs into the lungs every 4 hours as needed for Wheezing 3/5/21   Lucious Denver, APRN - CNP   metoprolol succinate (TOPROL XL) 50 MG extended release tablet Take 1 tablet by mouth daily 1/29/21   Lucious Denver, APRN - CNP   furosemide (LASIX) 20 MG tablet Take 1 pill by mouth twice a week Tuesday and Friday only please for Congestive heart failure. 12/30/20   LORY Chung CNP   clopidogrel (PLAVIX) 75 MG tablet Take 1 tablet by mouth daily 12/30/20   LORY Chung CNP   isosorbide mononitrate (IMDUR) 30 MG extended release tablet Take 0.5 tablets by mouth daily 12/30/20   LORY Chung CNP   levothyroxine (SYNTHROID) 125 MCG tablet Take 1 tablet by mouth Daily 12/30/20   LORY Chung CNP   rosuvastatin (CRESTOR) 20 MG tablet Take 1 tablet by mouth daily 11/24/20   LORY Chung CNP   metoprolol (LOPRESSOR) 100 MG tablet Take 1 tablet by mouth 2 times daily  Patient taking differently: Take 100 mg by mouth daily  3/27/20 12/25/20  Laney Gao MD   aspirin 81 MG tablet Take 81 mg by mouth daily.     Historical Provider, MD       Future Appointments   Date Time Provider Andres Sharma   4/20/2021  9:30 AM [facility] ECHO ROOM [facility] ECHO [facility]   4/26/2021 10:30 AM Cathalene Meo, MD [facility]   6/22/2021  9:30 AM LORY Chung CNP [facility]

## 2021-03-22 ENCOUNTER — CARE COORDINATION (OUTPATIENT)
Dept: CARE COORDINATION | Age: 86
End: 2021-03-22

## 2021-03-22 NOTE — CARE COORDINATION
furosemide (LASIX) 20 MG tablet Take 1 pill by mouth twice a week Tuesday and Friday only please for Congestive heart failure. 12/30/20   LORY Pat CNP   clopidogrel (PLAVIX) 75 MG tablet Take 1 tablet by mouth daily 12/30/20   LORY Pat CNP   isosorbide mononitrate (IMDUR) 30 MG extended release tablet Take 0.5 tablets by mouth daily 12/30/20   LORY Pat CNP   levothyroxine (SYNTHROID) 125 MCG tablet Take 1 tablet by mouth Daily 12/30/20   LORY Pat CNP   rosuvastatin (CRESTOR) 20 MG tablet Take 1 tablet by mouth daily 11/24/20   LORY Pat CNP   metoprolol (LOPRESSOR) 100 MG tablet Take 1 tablet by mouth 2 times daily  Patient taking differently: Take 100 mg by mouth daily  3/27/20 12/25/20  Rosalie Stapleton MD   aspirin 81 MG tablet Take 81 mg by mouth daily.     Historical Provider, MD       Future Appointments   Date Time Provider Andres Sharma   4/20/2021  9:30 AM [facility] ECHO ROOM [facility] ECHO [facility]   4/26/2021 10:30 AM MD Toni Hernández [facility]   6/22/2021  9:30 AM LORY Pat CNP [facility]

## 2021-03-26 ENCOUNTER — CARE COORDINATION (OUTPATIENT)
Dept: CARE COORDINATION | Age: 86
End: 2021-03-26

## 2021-03-26 NOTE — CARE COORDINATION
Nutrition Care Coordinator Follow-Up visit:    Food Recall: eating 2-3 meals/d    Activity Level:  Sedentary: X  Lightly Active: Moderately Active:  Very Active:    Adult BMI:  Underweight (below 18.5)  Normal Weight (18.5-24.9)  Overweight (25-29. 9) X  Obese (30-39. 9)  Morbidly Obese (>40)    Weight Change: no change    Plan:  Plan was established with patient:  Increase dietary fiber by consuming whole grains, fruits and vegetables: X  Limit dietary cholesterol to >100mg/day: X  Increase water intake:  Avoid added sugar:   Avoid sweetened beverages:   Choose lean meats: X  <2gm of sodium/d: X      Monitoring: Will monitor weight:  Will monitor adherence to meal plan: X  Will monitor adherence to exercise plan: Will monitor HGA1c:     Handouts Provided :  Low Carb snacking:   Carb counting /individual meal plan:  Portion Control:   Food Labels: X  Physical Activity:  Low Fat/Cholesterol:  Hypo/Hyperglycemia:  Calorie Controlled Meal Plan:  Low sodium guidelines: x    Goals: Increase water consumption to 8oz. 6-8 times daily:  Manage blood sugars by consuming 3 meals spaced every 4-5 hours with 2-3 snacks daily: reviewed  Increase fiber and decrease fat intake by consuming 1-2 fruit servings and 2-3 vegetable servings per day. Increase physical activity by:  Consume less than 2,000mg of sodium/day: reviewed  Avoid consumption of sweetened beverages and added sugar by reading food labels:  Monitor blood sugars by using meter to check blood glucose before morning meal and 2 hours after a meal daily:  Decrease risk of coronary heart disease by consuming fish that contains omega-3 fatty acids at least twice a week, avoiding partially hydrogenated oil/trans fats and limiting saturated fat intake by reading food labels: reviewed    Patient goals set:  1. Reviewed DASH guidelines- lowfat/cholesterol and low sodium.  Reviewed reading food labels-what to look for on labels.  Goal is <15gms of saturated fat/d, <3gms of trans fats/d, <2gm of sodium/day. 2. Reviewed avoiding canned, prepackaged foods, processed meats and cheese. Reviewed processed meats in detail to avoid- sausage, cotto, bologna, ham, ect.   3. Reviewed best ways to cook foods-baking, broiling, grilling or steaming foods. Reviewed healthy fats- using olive or canola oil if adding oil and encouraged to use butter/margarine sparingly.   4. Reviewed shopping the outer rim of the grocery store where fresher foods are found. Reviewed seasonings that can be used that do not contain salt. Patient relays still has not received nutrition information- address confirmed again and info re-mailed. Patient relays he continues to work on avoiding processed foods. He had no questions at this time. Will follow up in 3-4 weeks to review and answer questions.      Ashley De Luna

## 2021-03-26 NOTE — CARE COORDINATION
Ambulatory Care Coordination Note  3/26/2021  CM Risk Score: 6  Charlson 10 Year Mortality Risk Score: 47%     ACC: Rich Ellsworth RN    Summary Note:   Phone call today to patient to follow up with his care:   -Spoke to patient directly    CHF: how are weights? Any swelling? Any concerns?   -informs, \"weights staying right around 181 pounds\". Reviewed CHF Precautions with patient:  -Patient verbalizes understanding to call MD office with weight increase of three pounds overnight or five pounds in a week. Patient to weigh daily; and record. Should weigh first thing in the morning, wearing the same type of clothes, after you urinate, before you eat breakfast.  Reviewed heart failure zones; and when should update cardiologist.   Denies any questions/concerns. COPD: how is breathing? Did he get his inhaler refills?   -Denies any concerns.   -Has the Albuterol and can take if needed.   -patient also has the Breo to take daily. Reviewed with patient today proper administration/dosing of Breo. -  AcP Documents: Did he talk to ? --no  -Reviewed  phone number with patient today to reach out and call her to discuss ACP Documents. (Phone #_ [phone]. Patient agreeable to get these completed.: will call .    -Covid 19 vaccine: how did this go?   -States, Mason Koyanagi he gets the second vaccine on April 5th\". -Informs, \"he did good with the first vaccine\" no concerns.   Congestive Heart Failure Assessment    Do you understand a low sodium diet?: Yes (Comment: dietician working with patient to educate)  Do you salt your food before tasting it?: No     No patient-reported symptoms      Symptoms:     Symptom course: stable  Weight trend: stable     ,   COPD Assessment    Does the patient understand envrionmental exposure?: Yes  Is the patient able to verbalize Rescue vs. Long Acting medications?: Yes  Does the patient have a nebulizer?: No  Does the patient use a space with inhaled medications?: No     No patient-reported symptoms         Symptoms:     Symptom course: stable  Change in chronic cough?: No/At Baseline  Have you had a recent diagnosis of pneumonia either by PCP or at a hospital?: No      and   General Assessment    Do you have any symptoms that are causing concern?: No       Goals        Care Coordination     Conditions and Symptoms      I will schedule office visits, as directed by my provider. I will keep my appointment or reschedule if I have to cancel. I will notify my provider of any barriers to my plan of care. I will follow my Zone Management tool to seek urgent or emergent care. I will notify my provider of any symptoms that indicate a worsening of my condition. Barriers: fear of failure, lack of support, and overwhelmed by complexity of regimen  Plan for overcoming my barriers:   -patient to work with ACM to help better manage his care.   -patient to monitor CHF: weigh self daily, take medications as prescribed.   -Patient to monitor COPD: use inhalers as prescribed, let PcP know if needs a refill.   -patient to let PCP know if unable to afford inhalers. Confidence: 7/10  Anticipated Goal Completion Date: 05/03/21         Nutrition Plan      I will work on not adding additional salt to foods and learn to read labels to limit sodium intake to <2gms/day    Barriers: lack of education  Plan for overcoming my barriers: CC dietitian to educate on reading labels to limit sodium intake and encourage to avoid adding salt to foods  Confidence: 6/10  Anticipated Goal Completion Date: 4/19/21            Care Coordination Plan of Care: This nurse Care Coordinator will  -will follow up with patient in 2 weeks:   1)Spirtual care : do you need further follow up? Doing okay? 2)Covid 19 vaccine: received 04/05: how did you do? 3)CHF: how are your weights? Staying stable? 4)COPD: how is  Your breathing ? Using your inhalers?   -Any new concerns?   -Is he moving? Care Coordination Interventions    Program Enrollment: Complex Care  Referral from Primary Care Provider: Yes  Suggested Interventions and Community Resources  Fall Risk Prevention: Completed  Home Health Services: Completed (Comment: \"but patient not homebound, home care did not admit\". )  Meals on Wheels: Declined  Medication Assistance Program: Declined  Medi Set or Pill Pack: Declined  Registered Dietician: Completed (Comment: SHRUTHI toney)  Social Work: Declined  Specialty Services Referral: Completed (Comment: REferral to [facility])  Transportation Support: Declined  Zone Management Tools: Completed (Comment: copd, chf)         Prior to Admission medications    Medication Sig Start Date End Date Taking? Authorizing Provider   fluticasone-vilanterol (BREO ELLIPTA) 100-25 MCG/INH AEPB inhaler Inhale 1 puff into the lungs daily For asthma/COPD 3/5/21   LORY Joseph CNP   albuterol sulfate HFA (VENTOLIN HFA) 108 (90 Base) MCG/ACT inhaler Inhale 2 puffs into the lungs every 4 hours as needed for Wheezing 3/5/21   LORY Joseph CNP   metoprolol succinate (TOPROL XL) 50 MG extended release tablet Take 1 tablet by mouth daily 1/29/21   LORY Joseph CNP   furosemide (LASIX) 20 MG tablet Take 1 pill by mouth twice a week Tuesday and Friday only please for Congestive heart failure.  12/30/20   LORY Joseph CNP   clopidogrel (PLAVIX) 75 MG tablet Take 1 tablet by mouth daily 12/30/20   LORY Joseph CNP   isosorbide mononitrate (IMDUR) 30 MG extended release tablet Take 0.5 tablets by mouth daily 12/30/20   LORY Joseph CNP   levothyroxine (SYNTHROID) 125 MCG tablet Take 1 tablet by mouth Daily 12/30/20   LORY Joseph CNP   rosuvastatin (CRESTOR) 20 MG tablet Take 1 tablet by mouth daily 11/24/20   LORY Joseph CNP   metoprolol (LOPRESSOR) 100 MG tablet Take 1 tablet by mouth 2 times daily  Patient taking differently: Take 100 mg by mouth daily  3/27/20 12/25/20  Rony Monet MD   aspirin 81 MG tablet Take 81 mg by mouth daily.     Historical Provider, MD       Future Appointments   Date Time Provider Andres Sharma   4/20/2021  9:30 AM [facility] ECHO [facility] ECHO [facility]   4/26/2021 10:30 AM MD Zak Choi [facility]   6/22/2021  9:30 AM [facility]

## 2021-03-30 ENCOUNTER — TELEPHONE (OUTPATIENT)
Dept: SPIRITUAL SERVICES | Age: 86
End: 2021-03-30

## 2021-03-30 NOTE — TELEPHONE ENCOUNTER
Was able to talk with patient on the third attempted call. In talking about his losses he kept saying that it was hard and lonely, but it is a part of life. He expressed that he was having a difficult time getting the COVID vaccine. I was able to help him navigate the process and have found out that he has had his first vaccine and will get the second on April 5.

## 2021-04-06 ENCOUNTER — CARE COORDINATION (OUTPATIENT)
Dept: CARE COORDINATION | Age: 86
End: 2021-04-06

## 2021-04-06 NOTE — CARE COORDINATION
Phone call attempt back to patient to offer appointment for today at [facility] clinic for dizziness:   -left vm message and requested phone call back. Care Coordination Plan of Care: This nurse Care Coordinator will  - attempt to reach back out to patient soon.

## 2021-04-06 NOTE — CARE COORDINATION
stable  Weight trend: stable      and   General Assessment    Do you have any symptoms that are causing concern?: No       COPD Assessment    Does the patient understand envrionmental exposure?: Yes  Is the patient able to verbalize Rescue vs. Long Acting medications?: Yes  Does the patient have a nebulizer?: No  Does the patient use a space with inhaled medications?: No     No patient-reported symptoms         Symptoms:     Increase use of rapid acting/rescue inhaled medications?: No  Change in chronic cough?: No/At Baseline  Change in sputum?: No/At Baseline  Have you had a recent diagnosis of pneumonia either by PCP or at a hospital?: No       Care Coordination Plan of Care: This nurse Care Coordinator will  - plan to follow up with patient in 1 week  -follow up CHF: how are weights?   -Follow up COPD: how is breathing?   -Any further dizziness?   -Remind of Echo 04/20 and cardiology visit 04/26  -Covid 19 Vaccine: any other symptoms from thsi?   -AcP documents: did he get ahold of  to complete? Education Reviewed Today: See Module for details. Goals        Care Coordination     Conditions and Symptoms      I will schedule office visits, as directed by my provider. I will keep my appointment or reschedule if I have to cancel. I will notify my provider of any barriers to my plan of care. I will follow my Zone Management tool to seek urgent or emergent care. I will notify my provider of any symptoms that indicate a worsening of my condition. Barriers: fear of failure, lack of support, and overwhelmed by complexity of regimen  Plan for overcoming my barriers:   -patient to work with ACM to help better manage his care.   -patient to monitor CHF: weigh self daily, take medications as prescribed.   -Patient to monitor COPD: use inhalers as prescribed, let PcP know if needs a refill.   -patient to let PCP know if unable to afford inhalers.    Confidence: 7/10  Anticipated Goal Completion Date: 05/03/21         Nutrition Plan      I will work on not adding additional salt to foods and learn to read labels to limit sodium intake to <2gms/day    Barriers: lack of education  Plan for overcoming my barriers: CC dietitian to educate on reading labels to limit sodium intake and encourage to avoid adding salt to foods  Confidence: 6/10  Anticipated Goal Completion Date: 4/19/21            Future Appointments   Date Time Provider Andres Sharma   4/20/2021  9:30 AM [address] [place] [address] MATY Marin   4/26/2021 10:[facility]   6/22/2021  9:30 AM [facility]

## 2021-04-06 NOTE — CARE COORDINATION
Phone call attempt back to patient:   Left  message again requesting phone call back. -Wanted to schedule patient for today at 1220pm with Cindi Freeman for \"Same Day: appointment.   -Will attempt again soon.  -Also attempted Emergency contact, primary healthcare decision maker, Kervin Burnett; and left  requesting phone call back.

## 2021-04-06 NOTE — CARE COORDINATION
Ambulatory Care Coordination Note  2021  CM Risk Score: 6  Charlson 10 Year Mortality Risk Score: 47%     ACC: Nazanin Barba RN    Summary Note:     Phone call to patient today to follow up with his care:     Dizziness:   -informs, Srinivasa Pinon is dizzy this morning\". -\"not sure why? \".   -Denies any other symptoms other than dizziness. -Questioned patient if he has a way to check his blood pressure? Patient does not have his own cuff. He is home alone.   -patient going to call his neighbor and see if she has one, and see if she can come over and check it, and is going to call this nurse back.   -Did check with PCP office to see if there are any \"Same Day\" Appointments: otherwise will request patient please go to Walk in clinic to be seen. Care Coordination Plan of Care: This nurse Care Coordinator will  - await call back from patient, and if no call back; will call back soon to assess dizziness, offer pcp appointment, or advise patient go to walk in clinic to be seen.     -Also wanted to follow up regardin)Spirtual care : do you need further follow up? Doing okay? 2)Covid 19 vaccine: received : how did you do? 3)CHF: how are your weights? Staying stable? 4)COPD: how is  Your breathing ? Using your inhalers?   -Any new concerns?   -Is he moving?   -ACP Documents: did he talk to ? Care Coordination Interventions    Program Enrollment: Complex Care  Referral from Primary Care Provider: Yes  Suggested Interventions and Community Resources  Fall Risk Prevention: Completed  Home Health Services: Completed (Comment: \"but patient not homebound, home care did not admit\".  )  Meals on Wheels: Declined  Medication Assistance Program: Declined  Medi Set or Pill Pack: Declined  Registered Dietician: Completed (Comment: ACC dietican)  Social Work: Declined  Specialty Services Referral: Completed (Comment: REferral to spirtual care)  Transportation Support: Declined  Zone Management Tools: Completed (Comment: copd, chf)           Prior to Admission medications    Medication Sig Start Date End Date Taking? Authorizing Provider   fluticasone-vilanterol (BREO ELLIPTA) 100-25 MCG/INH AEPB inhaler Inhale 1 puff into the lungs daily For asthma/COPD 3/5/21   LORY Goyal CNP   albuterol sulfate HFA (VENTOLIN HFA) 108 (90 Base) MCG/ACT inhaler Inhale 2 puffs into the lungs every 4 hours as needed for Wheezing 3/5/21   LORY Goyal CNP   metoprolol succinate (TOPROL XL) 50 MG extended release tablet Take 1 tablet by mouth daily 1/29/21   LORY Goyal CNP   furosemide (LASIX) 20 MG tablet Take 1 pill by mouth twice a week Tuesday and Friday only please for Congestive heart failure. 12/30/20   LORY Goyal CNP   clopidogrel (PLAVIX) 75 MG tablet Take 1 tablet by mouth daily 12/30/20   LORY Goyal CNP   isosorbide mononitrate (IMDUR) 30 MG extended release tablet Take 0.5 tablets by mouth daily 12/30/20   LORY Goyal CNP   levothyroxine (SYNTHROID) 125 MCG tablet Take 1 tablet by mouth Daily 12/30/20   LORY Goyal CNP   rosuvastatin (CRESTOR) 20 MG tablet Take 1 tablet by mouth daily 11/24/20   LORY Goyal CNP   metoprolol (LOPRESSOR) 100 MG tablet Take 1 tablet by mouth 2 times daily  Patient taking differently: Take 100 mg by mouth daily  3/27/20 12/25/20  Deena Foss MD   aspirin 81 MG tablet Take 81 mg by mouth daily.     Historical Provider, MD       Future Appointments   Date Time Provider Andres Sharma   4/20/2021  9:30 AM [facility] ECHO [facility] ECHO [facility]   4/26/2021 10:30 AM MD Lavonne Galo [facility]   6/22/2021  9:30 AM LORY Goyal CNP [facility]

## 2021-04-13 ENCOUNTER — CARE COORDINATION (OUTPATIENT)
Dept: CARE COORDINATION | Age: 86
End: 2021-04-13

## 2021-04-13 NOTE — CARE COORDINATION
20 MG tablet Take 1 pill by mouth twice a week Tuesday and Friday only please for Congestive heart failure. 12/30/20   LORY Gomez CNP   clopidogrel (PLAVIX) 75 MG tablet Take 1 tablet by mouth daily 12/30/20   LORY Gomez CNP   isosorbide mononitrate (IMDUR) 30 MG extended release tablet Take 0.5 tablets by mouth daily 12/30/20   LORY Gomez CNP   levothyroxine (SYNTHROID) 125 MCG tablet Take 1 tablet by mouth Daily 12/30/20   LORY Gomez CNP   rosuvastatin (CRESTOR) 20 MG tablet Take 1 tablet by mouth daily 11/24/20   LORY Gomez CNP   metoprolol (LOPRESSOR) 100 MG tablet Take 1 tablet by mouth 2 times daily  Patient taking differently: Take 100 mg by mouth daily  3/27/20 12/25/20  Lobito Mcfadden MD   aspirin 81 MG tablet Take 81 mg by mouth daily.     Historical Provider, MD       Future Appointments   Date Time Provider Andres Sharma   4/20/2021  9:30 AM [facility] ECHO ROOM [facility] ECHO Adelina Naranjo   4/26/2021 10:30 AM MD Brittani Tamayo [facility]   6/22/2021  9:30 AM LORY Gomez CNP [facility]

## 2021-04-15 ENCOUNTER — CARE COORDINATION (OUTPATIENT)
Dept: CARE COORDINATION | Age: 86
End: 2021-04-15

## 2021-04-15 NOTE — CARE COORDINATION
Ambulatory Care Coordination Note  4/15/2021  CM Risk Score: 6  Charlson 10 Year Mortality Risk Score: 47%     ACC: Mikaela Green RN    Summary Note:     PHone call attempt today to patient:   -  -Called multiple times; first phone call to patient's home, male answered the phone, and said [place] multiple times, and then hung up the phone.     -Attempted to call back 2 more times, and then had to leave voicemail. Wanting to follow up regarding:  -follow up CHF: how are weights?   -Follow up COPD: how is breathing?   -Any further dizziness?   -Remind of Echo 04/20 and cardiology visit 04/26  -Covid 19 Vaccine: any other symptoms from thsi?   -AcP documents: did he get ahold of  to complete? Care Coordination Plan of Care: This nurse Care Coordinator will  - await call back from patient, and if no return call; will attempt to reach back out to patient again in the next week. Patient has ECHO 04/20/21; will follow up after this. Care Coordination Interventions    Program Enrollment: Complex Care  Referral from Primary Care Provider: Yes  Suggested Interventions and Community Resources  Fall Risk Prevention: Completed  Home Health Services: Completed (Comment: \"but patient not homebound, home care did not admit\". )  Meals on Wheels: Declined  Medication Assistance Program: Declined  Medi Set or Pill Pack: Declined  Registered Dietician: Completed (Comment: ACC dietican)  Social Work: Declined  Specialty Services Referral: Completed ([facility]  Transportation Support: Declined  Zone Management Tools: Completed (Comment: copd, chf)           Prior to Admission medications    Medication Sig Start Date End Date Taking?  Authorizing Provider   fluticasone-vilanterol (BREO ELLIPTA) 100-25 MCG/INH AEPB inhaler Inhale 1 puff into the lungs daily For asthma/COPD 3/5/21   LORY Mixon - CNP   albuterol sulfate HFA (VENTOLIN HFA) 108 (90 Base) MCG/ACT inhaler Inhale 2 puffs into the lungs every 4 hours as needed for Wheezing 3/5/21   LORY Miller CNP   metoprolol succinate (TOPROL XL) 50 MG extended release tablet Take 1 tablet by mouth daily 1/29/21   LORY Miller CNP   furosemide (LASIX) 20 MG tablet Take 1 pill by mouth twice a week Tuesday and Friday only please for Congestive heart failure. 12/30/20   LORY Miller CNP   clopidogrel (PLAVIX) 75 MG tablet Take 1 tablet by mouth daily 12/30/20   LORY Miller CNP   isosorbide mononitrate (IMDUR) 30 MG extended release tablet Take 0.5 tablets by mouth daily 12/30/20   LORY Miller CNP   levothyroxine (SYNTHROID) 125 MCG tablet Take 1 tablet by mouth Daily 12/30/20   LORY Miller CNP   rosuvastatin (CRESTOR) 20 MG tablet Take 1 tablet by mouth daily 11/24/20   LORY Miller CNP   metoprolol (LOPRESSOR) 100 MG tablet Take 1 tablet by mouth 2 times daily  Patient taking differently: Take 100 mg by mouth daily  3/27/20 12/25/20  Lissett Franklin MD   aspirin 81 MG tablet Take 81 mg by mouth daily.     Historical Provider, MD       Future Appointments   Date Time Provider Andres Sharma   4/20/2021  9:30 AM [facility] ECHO ROOM [facility] ECHO Candis Farris   4/26/2021 10:30 AM MD [facility]so Camp [facility]   6/22/2021  9:30 AM LORY Miller CNP [facility]

## 2021-04-20 ENCOUNTER — HOSPITAL ENCOUNTER (OUTPATIENT)
Age: 86
Discharge: HOME | End: 2021-04-20
Payer: MEDICARE

## 2021-04-20 ENCOUNTER — HOSPITAL ENCOUNTER (OUTPATIENT)
Dept: NON INVASIVE DIAGNOSTICS | Age: 86
Discharge: HOME | End: 2021-04-20
Payer: MEDICARE

## 2021-04-20 ENCOUNTER — CARE COORDINATION (OUTPATIENT)
Dept: CARE COORDINATION | Age: 86
End: 2021-04-20

## 2021-04-20 DIAGNOSIS — E78.5 HYPERLIPIDEMIA, UNSPECIFIED HYPERLIPIDEMIA TYPE: ICD-10-CM

## 2021-04-20 DIAGNOSIS — E55.9 VITAMIN D DEFICIENCY DISEASE: ICD-10-CM

## 2021-04-20 DIAGNOSIS — Z95.0 PACEMAKER: ICD-10-CM

## 2021-04-20 DIAGNOSIS — I34.0 MITRAL VALVE INSUFFICIENCY, UNSPECIFIED ETIOLOGY: ICD-10-CM

## 2021-04-20 DIAGNOSIS — R06.02 SOB (SHORTNESS OF BREATH): ICD-10-CM

## 2021-04-20 DIAGNOSIS — I25.10 CORONARY ARTERY DISEASE INVOLVING NATIVE CORONARY ARTERY OF NATIVE HEART WITHOUT ANGINA PECTORIS: ICD-10-CM

## 2021-04-20 DIAGNOSIS — E03.9 ACQUIRED HYPOTHYROIDISM: ICD-10-CM

## 2021-04-20 LAB
ABSOLUTE EOS #: 0.1 K/UL (ref 0–0.4)
ABSOLUTE IMMATURE GRANULOCYTE: ABNORMAL K/UL (ref 0–0.3)
ABSOLUTE LYMPH #: 1.4 K/UL (ref 1–4.8)
ABSOLUTE MONO #: 0.4 K/UL (ref 0–1)
ALBUMIN SERPL-MCNC: 3.9 G/DL (ref 3.5–5.2)
ALBUMIN/GLOBULIN RATIO: ABNORMAL (ref 1–2.5)
ALP BLD-CCNC: 64 U/L (ref 40–129)
ALT SERPL-CCNC: 7 U/L (ref 5–41)
ANION GAP SERPL CALCULATED.3IONS-SCNC: 7 MMOL/L (ref 9–17)
AST SERPL-CCNC: 14 U/L
BASOPHILS # BLD: 0 % (ref 0–2)
BASOPHILS ABSOLUTE: 0 K/UL (ref 0–0.2)
BILIRUB SERPL-MCNC: 0.78 MG/DL (ref 0.3–1.2)
BUN BLDV-MCNC: 12 MG/DL (ref 8–23)
BUN/CREAT BLD: 15 (ref 9–20)
CALCIUM SERPL-MCNC: 9.5 MG/DL (ref 8.6–10.4)
CHLORIDE BLD-SCNC: 104 MMOL/L (ref 98–107)
CHOLESTEROL/HDL RATIO: 5.1
CHOLESTEROL: 152 MG/DL
CO2: 31 MMOL/L (ref 20–31)
CREAT SERPL-MCNC: 0.8 MG/DL (ref 0.7–1.2)
DIFFERENTIAL TYPE: YES
EOSINOPHILS RELATIVE PERCENT: 2 % (ref 0–5)
GFR AFRICAN AMERICAN: >60 ML/MIN
GFR NON-AFRICAN AMERICAN: >60 ML/MIN
GFR SERPL CREATININE-BSD FRML MDRD: ABNORMAL ML/MIN/{1.73_M2}
GFR SERPL CREATININE-BSD FRML MDRD: ABNORMAL ML/MIN/{1.73_M2}
GLUCOSE BLD-MCNC: 102 MG/DL (ref 70–99)
HCT VFR BLD CALC: 37 % (ref 41–53)
HDLC SERPL-MCNC: 30 MG/DL
HEMOGLOBIN: 12.5 G/DL (ref 13.5–17.5)
IMMATURE GRANULOCYTES: ABNORMAL %
LDL CHOLESTEROL: 104 MG/DL (ref 0–130)
LV EF: 38 %
LVEF MODALITY: NORMAL
LYMPHOCYTES # BLD: 27 % (ref 13–44)
MAGNESIUM: 1.5 MG/DL (ref 1.6–2.6)
MCH RBC QN AUTO: 30.9 PG (ref 26–34)
MCHC RBC AUTO-ENTMCNC: 33.7 G/DL (ref 31–37)
MCV RBC AUTO: 91.6 FL (ref 80–100)
MONOCYTES # BLD: 8 % (ref 5–9)
NRBC AUTOMATED: ABNORMAL PER 100 WBC
PATIENT FASTING?: YES
PDW BLD-RTO: 14.4 % (ref 12.1–15.2)
PLATELET # BLD: 171 K/UL (ref 140–450)
PLATELET ESTIMATE: ABNORMAL
PMV BLD AUTO: ABNORMAL FL (ref 6–12)
POTASSIUM SERPL-SCNC: 4 MMOL/L (ref 3.7–5.3)
RBC # BLD: 4.04 M/UL (ref 4.5–5.9)
RBC # BLD: ABNORMAL 10*6/UL
SEG NEUTROPHILS: 63 % (ref 39–75)
SEGMENTED NEUTROPHILS ABSOLUTE COUNT: 3.4 K/UL (ref 2.1–6.5)
SODIUM BLD-SCNC: 142 MMOL/L (ref 135–144)
TOTAL PROTEIN: 7.1 G/DL (ref 6.4–8.3)
TRIGL SERPL-MCNC: 90 MG/DL
TSH SERPL DL<=0.05 MIU/L-ACNC: 0.51 MIU/L (ref 0.3–5)
VITAMIN D 25-HYDROXY: 25.5 NG/ML (ref 30–100)
VLDLC SERPL CALC-MCNC: ABNORMAL MG/DL (ref 1–30)
WBC # BLD: 5.4 K/UL (ref 3.5–11)
WBC # BLD: ABNORMAL 10*3/UL

## 2021-04-20 PROCEDURE — 80053 COMPREHEN METABOLIC PANEL: CPT

## 2021-04-20 PROCEDURE — 93306 TTE W/DOPPLER COMPLETE: CPT

## 2021-04-20 PROCEDURE — 93005 ELECTROCARDIOGRAM TRACING: CPT

## 2021-04-20 PROCEDURE — 85025 COMPLETE CBC W/AUTO DIFF WBC: CPT

## 2021-04-20 PROCEDURE — 84443 ASSAY THYROID STIM HORMONE: CPT

## 2021-04-20 PROCEDURE — 83735 ASSAY OF MAGNESIUM: CPT

## 2021-04-20 PROCEDURE — 82306 VITAMIN D 25 HYDROXY: CPT

## 2021-04-20 PROCEDURE — 80061 LIPID PANEL: CPT

## 2021-04-20 PROCEDURE — 36415 COLL VENOUS BLD VENIPUNCTURE: CPT

## 2021-04-21 ENCOUNTER — CARE COORDINATION (OUTPATIENT)
Dept: CARE COORDINATION | Age: 86
End: 2021-04-21

## 2021-04-21 LAB
EKG ATRIAL RATE: 68 BPM
EKG P-R INTERVAL: 216 MS
EKG Q-T INTERVAL: 536 MS
EKG QRS DURATION: 230 MS
EKG QTC CALCULATION (BAZETT): 569 MS
EKG R AXIS: -75 DEGREES
EKG T AXIS: 103 DEGREES
EKG VENTRICULAR RATE: 68 BPM

## 2021-04-21 PROCEDURE — 93010 ELECTROCARDIOGRAM REPORT: CPT | Performed by: INTERNAL MEDICINE

## 2021-04-21 NOTE — CARE COORDINATION
Ambulatory Care Coordination Note  4/21/2021  CM Risk Score: 6  Charlson 10 Year Mortality Risk Score: 47%     ACC: Vince Ramirez RN    Summary Note:   Phone call today to patient to follow up with his care:   -left vm message and requested phone call back.     -follow up CHF: how are weights?   -Follow up COPD: how is breathing?   -Any further dizziness?   -Remind of cardiology visit 04/26  -Covid 19 Vaccine: any other symptoms from thsi?   -AcP documents: did he get old of  to complete     PLAN:  -Will attempt to reach back out to patient later this week if no return call prior      Care Coordination Interventions    Program Enrollment: Complex Care  Referral from Primary Care Provider: Yes  Suggested Interventions and Community Resources  Fall Risk Prevention: Completed  Home Health Services: Completed (Comment: \"but patient not homebound, home care did not admit\". )  Meals on Wheels: Declined  Medication Assistance Program: Declined  Medi Set or Pill Pack: Declined  Registered Dietician: Completed (Comment: ACC dietican)  Social Work: Declined  Specialty Services Referral: Completed (Comment: REferral to [facility])  Transportation Support: Declined  Zone Management Tools: Completed (Comment: copd, chf)         Prior to Admission medications    Medication Sig Start Date End Date Taking? Authorizing Provider   fluticasone-vilanterol (BREO ELLIPTA) 100-25 MCG/INH AEPB inhaler Inhale 1 puff into the lungs daily For asthma/COPD 3/5/21   LORY Helton - CNP   albuterol sulfate HFA (VENTOLIN HFA) 108 (90 Base) MCG/ACT inhaler Inhale 2 puffs into the lungs every 4 hours as needed for Wheezing 3/5/21   LORY Helton CNP   metoprolol succinate (TOPROL XL) 50 MG extended release tablet Take 1 tablet by mouth daily 1/29/21   Shani Graves APRN - CNP   furosemide (LASIX) 20 MG tablet Take 1 pill by mouth twice a week Tuesday and Friday only please for Congestive heart failure.  12/30/20 LORY Da Silva CNP   clopidogrel (PLAVIX) 75 MG tablet Take 1 tablet by mouth daily 12/30/20   LORY Da Silva CNP   isosorbide mononitrate (IMDUR) 30 MG extended release tablet Take 0.5 tablets by mouth daily 12/30/20   LORY Da Silva CNP   levothyroxine (SYNTHROID) 125 MCG tablet Take 1 tablet by mouth Daily 12/30/20   LORY Da Silva CNP   rosuvastatin (CRESTOR) 20 MG tablet Take 1 tablet by mouth daily 11/24/20   LORY Da Silva CNP   metoprolol (LOPRESSOR) 100 MG tablet Take 1 tablet by mouth 2 times daily  Patient taking differently: Take 100 mg by mouth daily  3/27/20 12/25/20  Honorio Nevarez MD   aspirin 81 MG tablet Take 81 mg by mouth daily.     Historical Provider, MD       Future Appointments   Date Time Provider Andres Sharma   4/26/2021 10:30 AM MD Bharati Whiting [facility]   6/22/2021  9:30 AM LORY Da Silva CNP [facility]

## 2021-04-23 ENCOUNTER — CARE COORDINATION (OUTPATIENT)
Dept: CARE COORDINATION | Age: 86
End: 2021-04-23

## 2021-04-23 NOTE — CARE COORDINATION
Ambulatory Care Coordination Note  4/23/2021  CM Risk Score: 6  Charlson 10 Year Mortality Risk Score: 47%     ACC: Justus Hammond RN    Summary Note:   Phone call today to patient to follow up with his care:   -left vm message and requested phone call back.     -follow up CHF: how are weights?   -Follow up COPD: how is breathing?   -Any further dizziness?   -Remind of cardiology visit 04/26  -Covid 19 Vaccine: any other symptoms from thsi?   -AcP documents: did he get old of  to complete     PLAN:  -will attempt to reach back out to patient next week after cardiology appointment      Care Coordination Interventions    Program Enrollment: Complex Care  Referral from Primary Care Provider: Yes  Suggested Interventions and Community Resources  Fall Risk Prevention: Completed  Home Health Services: Completed (Comment: \"but patient not homebound, home care did not admit\". )  Meals on Wheels: Declined  Medication Assistance Program: Declined  Medi Set or Pill Pack: Declined  Registered Dietician: Completed (Comment: ACC dietican)  Social Work: Declined  Specialty Services Referral: Completed (Comment: REferral to [facility])  Transportation Support: Declined  Zone Management Tools: Completed (Comment: copd, chf)         Prior to Admission medications    Medication Sig Start Date End Date Taking? Authorizing Provider   fluticasone-vilanterol (BREO ELLIPTA) 100-25 MCG/INH AEPB inhaler Inhale 1 puff into the lungs daily For asthma/COPD 3/5/21   LORY Stauffer CNP   albuterol sulfate HFA (VENTOLIN HFA) 108 (90 Base) MCG/ACT inhaler Inhale 2 puffs into the lungs every 4 hours as needed for Wheezing 3/5/21   LORY Stauffer CNP   metoprolol succinate (TOPROL XL) 50 MG extended release tablet Take 1 tablet by mouth daily 1/29/21   LORY Stauffer CNP   furosemide (LASIX) 20 MG tablet Take 1 pill by mouth twice a week Tuesday and Friday only please for Congestive heart failure.  12/30/20 LORY Miller CNP   clopidogrel (PLAVIX) 75 MG tablet Take 1 tablet by mouth daily 12/30/20   LORY Miller CNP   isosorbide mononitrate (IMDUR) 30 MG extended release tablet Take 0.5 tablets by mouth daily 12/30/20   LORY Miller CNP   levothyroxine (SYNTHROID) 125 MCG tablet Take 1 tablet by mouth Daily 12/30/20   LORY Mliler CNP   rosuvastatin (CRESTOR) 20 MG tablet Take 1 tablet by mouth daily 11/24/20   LORY Miller CNP   metoprolol (LOPRESSOR) 100 MG tablet Take 1 tablet by mouth 2 times daily  Patient taking differently: Take 100 mg by mouth daily  3/27/20 12/25/20  Lissett Franklin MD   aspirin 81 MG tablet Take 81 mg by mouth daily.     Historical Provider, MD       Future Appointments   Date Time Provider Andres Sharma   4/26/2021 10:30 AM MD Adelso Camp [facility]   6/22/2021  9:30 AM LORY Miller CNP [facility]

## 2021-04-26 ENCOUNTER — OFFICE VISIT (OUTPATIENT)
Dept: CARDIOLOGY CLINIC | Age: 86
End: 2021-04-26
Payer: MEDICARE

## 2021-04-26 VITALS
SYSTOLIC BLOOD PRESSURE: 108 MMHG | OXYGEN SATURATION: 64 % | HEART RATE: 72 BPM | DIASTOLIC BLOOD PRESSURE: 60 MMHG | WEIGHT: 185 LBS | BODY MASS INDEX: 26.54 KG/M2

## 2021-04-26 DIAGNOSIS — I34.0 MITRAL VALVE INSUFFICIENCY, UNSPECIFIED ETIOLOGY: Primary | ICD-10-CM

## 2021-04-26 DIAGNOSIS — E78.5 HYPERLIPIDEMIA, UNSPECIFIED HYPERLIPIDEMIA TYPE: ICD-10-CM

## 2021-04-26 DIAGNOSIS — I25.10 CORONARY ARTERY DISEASE INVOLVING NATIVE CORONARY ARTERY OF NATIVE HEART WITHOUT ANGINA PECTORIS: ICD-10-CM

## 2021-04-26 DIAGNOSIS — Z95.0 PACEMAKER: ICD-10-CM

## 2021-04-26 DIAGNOSIS — E55.9 VITAMIN D DEFICIENCY DISEASE: ICD-10-CM

## 2021-04-26 PROCEDURE — 4040F PNEUMOC VAC/ADMIN/RCVD: CPT | Performed by: INTERNAL MEDICINE

## 2021-04-26 PROCEDURE — 99214 OFFICE O/P EST MOD 30 MIN: CPT | Performed by: INTERNAL MEDICINE

## 2021-04-26 PROCEDURE — 93288 INTERROG EVL PM/LDLS PM IP: CPT | Performed by: INTERNAL MEDICINE

## 2021-04-26 PROCEDURE — G8417 CALC BMI ABV UP PARAM F/U: HCPCS | Performed by: INTERNAL MEDICINE

## 2021-04-26 PROCEDURE — 1036F TOBACCO NON-USER: CPT | Performed by: INTERNAL MEDICINE

## 2021-04-26 PROCEDURE — 1123F ACP DISCUSS/DSCN MKR DOCD: CPT | Performed by: INTERNAL MEDICINE

## 2021-04-26 PROCEDURE — G8427 DOCREV CUR MEDS BY ELIG CLIN: HCPCS | Performed by: INTERNAL MEDICINE

## 2021-04-26 RX ORDER — CALCIUM CARBONATE 300MG(750)
400 TABLET,CHEWABLE ORAL DAILY
COMMUNITY
End: 2021-04-26 | Stop reason: SDUPTHER

## 2021-04-26 RX ORDER — CALCIUM CARBONATE 300MG(750)
400 TABLET,CHEWABLE ORAL DAILY
Qty: 30 TABLET | Refills: 11 | Status: SHIPPED | OUTPATIENT
Start: 2021-04-26 | End: 2022-05-02 | Stop reason: SDUPTHER

## 2021-04-26 NOTE — PROGRESS NOTES
Ov Dr. Tanya Yeboah for one year follow up   Pacer checked today by [facility] 1   Pt states was on crestor 40 started to get night sweats, cut down to 20 and still had   Them but not as bad, but then stopped taking them and now feels great  Has not taken them in three months  No chest pain heaviness   No sob  No dizziness or lightheadedness  No edema  Energy level good    Does own cooking         Will ADD Magnesium 400mg one tablet daily     Will get echo in 6 month at [facility]     Will follow up in 6 months (after echo)

## 2021-04-26 NOTE — LETTER
Sharon Mejia M.D. [address] [address]  [phone]          2021          Saeed Jose, CNP  [address] [facility] [address]      RE:   Lana Kessler  :  1932      Dear Duc Lam:    CHIEF COMPLAINT:  1. Sick sinus syndrome. 2.  Status post [employer] pacemaker on 2012, approximately 2 years left on generator. 3.  Status post bypass surgery and PTCA. 4.  Aortic stenosis. HISTORY OF PRESENT ILLNESS:  I had the pleasure of seeing Mr. Edward Moreno in our office on 2021. He is a pleasant 63-year-old gentleman who had atrial fibrillation in , associated with shortness of breath. He had a catheterization at [place], with subsequent 4-vessel coronary artery bypass surgery at [place]. On 2012, he had a heart rate of 32 beats per minute. We stopped his beta-blocker, he remained bradycardic, and therefore, I placed a [employer] DDD pacemaker on 2012, still at beginning of life, with approximately 2 years left on battery. He is pacer-dependent. On 2012, he had shortness of breath and a repeat catheterization showed 90% LAD, the intermediate branch of the circumflex was occluded, the OM branch of the circumflex was occluded, the right coronary artery had 95% to 99% disease, vein graft to the right coronary artery was occluded, vein graft to the OM was occluded, vein graft to the high lateral branch of the circumflex was patent, LIMA was patent to the LAD, EF of 40%. We did angioplasty of the right coronary artery, placing 3.0 x 38, 3.0 x 38, 3.5 x 24 and 3.5 x 16 mm drug-eluting ION stents with a good end result. We did not attempt to open the occluded OM branch of the circumflex. He has had no further cardiac catheterization. He does have a history of aortic stenosis.   He had an echocardiogram on 2020, that showed an EF of 35%, with a mean gradient of 33 mmHg, with moderate-to-severe aortic stenosis. We did another echocardiogram on 04/20, that showed EF of 35% to 40% with aortic valve calcification, although it appeared to be in the moderate range rather than moderate-to-severe. His PA pressure was elevated at 50 mmHg. He denies chest pain or chest discomfort. He was having night sweats and cut down his Crestor to 20 mg daily, which improved his night sweats. He stopped it entirely and they subsided entirely. He has fatigue, but it is about the same as before. He has had no chest pain or chest discomfort. No pedal edema. No PND or orthopnea. He has had no hospitalizations or procedures. CARDIAC RISK FACTORS:  Known CAD:  Positive. Bypass Surgery:  Positive. PTCA:  Positive. Hypertension:  Positive. Hyperlipidemia:  Positive. Smoking:  Negative. Diabetes:  Negative. MEDICATIONS AT HOME:  He is currently on albuterol p.r.n., aspirin 81 mg daily, Plavix 75 mg daily, Breo Ellipta 1 puff daily, Lasix 20 mg on Tuesdays and Fridays, Imdur 30 mg half a tablet daily, Synthroid 125 mcg daily, Toprol-XL 50 mg daily. PAST MEDICAL AND SURGICAL HISTORY:  1. Cardiac as above. 2.  Hypertension. 3.  Hyperlipidemia, untreated at this point as he had night sweats on Crestor. 4.  Atrial fibrillation in 2000, for which he had been on Coumadin, which was stopped after his open heart surgery. 5.  Euthyroid, on treatment. 6.  Cataract surgery. 7.  Sick sinus syndrome, status post pacemaker in 2012. FAMILY HISTORY:  Significant for CAD. SOCIAL HISTORY:  He is 80years old. Two children, one in [place]. He lived with [facility] for 29 years. Does not smoke or drink alcohol. He is relatively inactive. He cooks for himself. REVIEW OF SYSTEMS:  Cardiac as above.   Other systems reviewed including constitutional, eyes, ears, nose and throat, cardiovascular, respiratory, GI, , musculoskeletal, integumentary, neurologic, endocrine, hematologic and allergic/immunologic are negative except for what is described above. No weight loss or weight gain. No change in bowel habits. No blood in stools. No fevers, sweats or chills. PHYSICAL EXAMINATION:  VITAL SIGNS:  His blood pressure was 108/60 with a heart rate of 72 and regular. Respiratory rate 18. O2 sat was 94%. Weight 185 pounds. GENERAL:  He is a pleasant 80year-old gentleman. Denied pain. He was oriented to person, place and time. Answered questions appropriately. SKIN:  No unusual skin changes. HEENT:  The pupils are equally round and intact. Mucous membranes were dry. NECK:  No JVD. Good carotid pulses. He had bilateral carotid bruits, which in part was a transmitted murmur. No lymphadenopathy. CARDIOVASCULAR EXAM:  S1 and S2 were normal.  No S3 or S4. He had a loud grade 3/6 systolic ejection murmur. No diastolic murmur. PMI was normal.  No lift, thrust, or pericardial friction rub. LUNGS:  Fairly clear to auscultation and percussion. ABDOMEN:  Soft and nontender. Good bowel sounds. The aorta was not enlarged. No hepatomegaly, splenomegaly. EXTREMITIES:  Good femoral pulses. Good pedal pulses. Mild pedal edema. Skin was warm and dry. No calf tenderness. Nail beds pink. Good cap refill. PULSES:  Bilateral symmetrical radial, brachial and carotid pulses. Bilateral carotid bruits. Good femoral and pedal pulses. NEUROLOGIC EXAM:  Within normal limits. PSYCHIATRIC EXAM:  Within normal limits. LABORATORY DATA:  His sodium was 142, potassium 4.0, BUN was 12, creatinine 0.80, GFR greater than 60, magnesium 1.5, calcium was 9.5. His cholesterol was 152 with an HDL 30, , triglycerides 90. ALT was 7, AST was 14. TSH was 0.51. Vitamin D 25.5. White count 5.4, hemoglobin 12.5 with a platelet count of 344,940. EKG showed atrial sensed, ventricular paced rhythm.     Echocardiogram showed an EF of 35% to 40%, with calcified aortic valve, which appeared to have moderate stenosis and had difficulty getting a good gradient. PA pressure was elevated at 50 mmHg. IMPRESSION:  1. Ejection murmur consistent with aortic stenosis, with moderate to moderate-to-severe aortic stenosis. 2.  Moderate LV dysfunction, EF of 35% to 40%. 3.  Coronary artery disease. 4.  Four-vessel bypass surgery in 2000 at [facility] with a LIMA to the LAD, vein graft to the high lateral circumflex, vein graft to the OM and a vein graft to right coronary artery. 5.  Sick sinus syndrome with second-degree AV block with [employer] DDD pacemaker placed on 08/06/2012, with 2 years left on battery. 6.  Catheterization on 09/09/2012, showed 99% LAD with subtotally occluded intermediate branch of the circumflex, the right coronary artery had 95% to 99% sequential lesion, vein graft to the high lateral circumflex was patent, the vein graft to the right coronary artery was occluded, vein graft to the OM was occluded, native OM was occluded, EF of 40%, with angioplasty of the right coronary artery, placing two 3.0 x 38 mm drug-eluting ION stents along with 3.5 x 24 and 3.5 x 16 mm ION stents. PLAN:  1. Repeat echo in 6 months in [place] to try to get a better gradient across the aortic valve. 2.  I will see him 1 week later. 3.  If he would have any unusual shortness of breath, loss of energy or chest pain prior to this, I would want to see him earlier. DISCUSSION:  Mr. Stephanie Rios clinically is doing well. He is relatively inactive but still he is able to stay at home alone. His murmur is quite loud. We had a difficult time getting a good gradient across the aortic valve, but it appeared to be in the moderate range. In his previous echo, it looked more in moderate-to-severe. I will see him again in 6 months. We will have an echo done in [place] to try to get a better idea of his gradient. I did not make any change in his medications. If he would have any unusual chest pain, shortness of breath or loss of energy, I would want to see him earlier and we would do the echo earlier. Thank you very much for allowing me the privilege of seeing Mr. Kathy Fonseca. If you have any questions on my thoughts, please do not hesitate to contact me.      Sincerely,        Fransico Villalpando    D: 04/26/2021 11:11:45     T: 04/26/2021 11:17:54     [id number]  Job#: [id number]   Doc#: [id number]

## 2021-04-27 ENCOUNTER — CARE COORDINATION (OUTPATIENT)
Dept: CARE COORDINATION | Age: 86
End: 2021-04-27

## 2021-04-27 NOTE — PROGRESS NOTES
Viktoriya Mondragon M.D. [address] [place] [address]  [phone]          2021          Lily Agee, CNP  [address] [address]      RE:   Nuala Hamman  :  1932      Dear Denisha Jay:    CHIEF COMPLAINT:  1. Sick sinus syndrome. 2.  Status post [employer] pacemaker on 2012, approximately 2 years left on generator. 3.  Status post bypass surgery and PTCA. 4.  Aortic stenosis. HISTORY OF PRESENT ILLNESS:  I had the pleasure of seeing Mr. Kathy Fonseca in our office on 2021. He is a pleasant 77-year-old gentleman who had atrial fibrillation in , associated with shortness of breath. He had a catheterization at [facility], with subsequent 4-vessel coronary artery bypass surgery at [facility]. On 2012, he had a heart rate of 32 beats per minute. We stopped his beta-blocker, he remained bradycardic, and therefore, I placed a [employer] DDD pacemaker on 2012, still at beginning of life, with approximately 2 years left on battery. He is pacer-dependent. On 2012, he had shortness of breath and a repeat catheterization showed 90% LAD, the intermediate branch of the circumflex was occluded, the OM branch of the circumflex was occluded, the right coronary artery had 95% to 99% disease, vein graft to the right coronary artery was occluded, vein graft to the OM was occluded, vein graft to the high lateral branch of the circumflex was patent, LIMA was patent to the LAD, EF of 40%. We did angioplasty of the right coronary artery, placing 3.0 x 38, 3.0 x 38, 3.5 x 24 and 3.5 x 16 mm drug-eluting ION stents with a good end result. We did not attempt to open the occluded OM branch of the circumflex. He has had no further cardiac catheterization. He does have a history of aortic stenosis.   He had an echocardiogram on 2020, that showed an EF of 35%, neurologic, endocrine, hematologic and allergic/immunologic are negative except for what is described above. No weight loss or weight gain. No change in bowel habits. No blood in stools. No fevers, sweats or chills. PHYSICAL EXAMINATION:  VITAL SIGNS:  His blood pressure was 108/60 with a heart rate of 72 and regular. Respiratory rate 18. O2 sat was 94%. Weight 185 pounds. GENERAL:  He is a pleasant 80year-old gentleman. Denied pain. He was oriented to person, place and time. Answered questions appropriately. SKIN:  No unusual skin changes. HEENT:  The pupils are equally round and intact. Mucous membranes were dry. NECK:  No JVD. Good carotid pulses. He had bilateral carotid bruits, which in part was a transmitted murmur. No lymphadenopathy. CARDIOVASCULAR EXAM:  S1 and S2 were normal.  No S3 or S4. He had a loud grade 3/6 systolic ejection murmur. No diastolic murmur. PMI was normal.  No lift, thrust, or pericardial friction rub. LUNGS:  Fairly clear to auscultation and percussion. ABDOMEN:  Soft and nontender. Good bowel sounds. The aorta was not enlarged. No hepatomegaly, splenomegaly. EXTREMITIES:  Good femoral pulses. Good pedal pulses. Mild pedal edema. Skin was warm and dry. No calf tenderness. Nail beds pink. Good cap refill. PULSES:  Bilateral symmetrical radial, brachial and carotid pulses. Bilateral carotid bruits. Good femoral and pedal pulses. NEUROLOGIC EXAM:  Within normal limits. PSYCHIATRIC EXAM:  Within normal limits. LABORATORY DATA:  His sodium was 142, potassium 4.0, BUN was 12, creatinine 0.80, GFR greater than 60, magnesium 1.5, calcium was 9.5. His cholesterol was 152 with an HDL 30, , triglycerides 90. ALT was 7, AST was 14. TSH was 0.51. Vitamin D 25.5. White count 5.4, hemoglobin 12.5 with a platelet count of 238,800. EKG showed atrial sensed, ventricular paced rhythm.     Echocardiogram showed an EF of 35% to 40%, with calcified If he would have any unusual chest pain, shortness of breath or loss of energy, I would want to see him earlier and we would do the echo earlier. Thank you very much for allowing me the privilege of seeing Mr. Miriam Cuevas. If you have any questions on my thoughts, please do not hesitate to contact me.      Sincerely,        Viki Villalobos    D: 04/26/2021 11:11:45     T: 04/26/2021 11:17:54     [id number]  Job#: [id number]   Doc#: [id number]

## 2021-04-27 NOTE — CARE COORDINATION
-Pt states he is doing good today. Pt states he saw Dr. Phillip Pickens, 4/26/2021 and appt went well. He does not have any questions about his appt. CHF- pt states his weight in between 181-183 lbs. He states he  does not have any swelling or pain in legs or feet. COPD-pt states his breathing is good. He does not have any concerns. He does use his inhalers as prescribed.    -dizziness- pt denies any dizziness.    -Covid 19 Vaccine- pt does not have any symptoms from vaccine and states he is better now. -ACP documents- pt states he did not receive the ACP documents. Pt advised to reach out to RN ACM or PCP office with any health related concerns. Pt was reminded of appts listed below.     Writer will update RN PAULINAM on pt      FU appts/Provider:    Future Appointments   Date Time Provider Andres Sharma   6/22/2021  9:30 AM LORY Menard - CNP nw pc [facility]   10/11/2021 11:30 AM Marina Plants, MD [facility]

## 2021-04-30 ENCOUNTER — TELEPHONE (OUTPATIENT)
Dept: SPIRITUAL SERVICES | Age: 86
End: 2021-04-30

## 2021-04-30 NOTE — TELEPHONE ENCOUNTER
2-18-21  I was able to talk to him about the death of his wife, his brother and his dog. I felt after talking he was doing ok. He said to me. \"It's hard and lonely, but it is a part of life. \"  He was more frustrated that he was unable to get an appointment for a vaccine shot. I was able to help him navigate that piece and happy to say by April he had both shots.

## 2021-05-04 ENCOUNTER — CARE COORDINATION (OUTPATIENT)
Dept: CARE COORDINATION | Age: 86
End: 2021-05-04

## 2021-05-06 ENCOUNTER — CARE COORDINATION (OUTPATIENT)
Dept: CARE COORDINATION | Age: 86
End: 2021-05-06

## 2021-05-06 NOTE — CARE COORDINATION
Ambulatory Care Coordination Note  5/6/2021  CM Risk Score: 6  Charlson 10 Year Mortality Risk Score: 47%     ACC: Mikaela Green RN    Summary Note:   Phone call attempt today to patient:   -left vm and requested phone call back. Wanting to follow up regarding:   -Dr. Helena Sr, 4/26/2021  appt      CHF- weight has been running between 181-183 lbs. Any swelling to feet? COPD-how is your breathing? Using his inhalers as prescribed?    -dizziness-having any further dizziness?    -Covid 19 Vaccine-patient fully vaccinated. Any symptoms?     -ACP documents- does he want to complete? Care Coordination Plan of Care: This nurse Care Coordinator will  -will await call back from patient, and if no return call; will attempt to reach back out to patient next week    Care Coordination Interventions    Program Enrollment: Complex Care  Referral from Primary Care Provider: Yes  Suggested Interventions and Community Resources  Fall Risk Prevention: Completed  Home Health Services: Completed (Comment: \"but patient not homebound, home care did not admit\". )  Meals on Wheels: Declined  Medication Assistance Program: Declined  Medi Set or Pill Pack: Declined  Registered Dietician: Completed (Comment: ACC dietican)  Social Work: Declined  Specialty Services Referral: Completed (Comment: REferral to [facility])  Transportation Support: Declined  Zone Management Tools: Completed (Comment: copd, chf)           Prior to Admission medications    Medication Sig Start Date End Date Taking?  Authorizing Provider   Magnesium 400 MG TABS Take 400 mg by mouth daily 4/26/21   Clark Garcia MD   fluticasone-vilanterol (BREO ELLIPTA) 100-25 MCG/INH AEPB inhaler Inhale 1 puff into the lungs daily For asthma/COPD 3/5/21   LORY Vega CNP   albuterol sulfate HFA (VENTOLIN HFA) 108 (90 Base) MCG/ACT inhaler Inhale 2 puffs into the lungs every 4 hours as needed for Wheezing 3/5/21   LORY Vega CNP   metoprolol succinate (TOPROL XL) 50 MG extended release tablet Take 1 tablet by mouth daily 1/29/21   LORY Oneill CNP   furosemide (LASIX) 20 MG tablet Take 1 pill by mouth twice a week Tuesday and Friday only please for Congestive heart failure. 12/30/20   LORY Oneill CNP   clopidogrel (PLAVIX) 75 MG tablet Take 1 tablet by mouth daily 12/30/20   LORY Oneill CNP   isosorbide mononitrate (IMDUR) 30 MG extended release tablet Take 0.5 tablets by mouth daily 12/30/20   LORY Oneill CNP   levothyroxine (SYNTHROID) 125 MCG tablet Take 1 tablet by mouth Daily 12/30/20   LORY Oneill CNP   rosuvastatin (CRESTOR) 20 MG tablet Take 1 tablet by mouth daily  Patient not taking: Reported on 4/26/2021 11/24/20   LORY Oneill CNP   metoprolol (LOPRESSOR) 100 MG tablet Take 1 tablet by mouth 2 times daily  Patient taking differently: Take 100 mg by mouth daily  3/27/20 4/26/21  Maci Ayoub MD   aspirin 81 MG tablet Take 81 mg by mouth daily.     Historical Provider, MD       Future Appointments   Date Time Provider Andres Sharma   6/22/2021  9:30 AM LORY Oneill CNP  [facility]   10/11/2021 11:30 AM MD [facility]

## 2021-05-10 ENCOUNTER — CARE COORDINATION (OUTPATIENT)
Dept: CARE COORDINATION | Age: 86
End: 2021-05-10

## 2021-05-11 NOTE — CARE COORDINATION
2nd attempt to reach patient- unsuccessful. Message left requesting  Return call to [phone]. Will attempt to reach again within   1-2 weeks.

## 2021-05-12 ENCOUNTER — CARE COORDINATION (OUTPATIENT)
Dept: CARE COORDINATION | Age: 86
End: 2021-05-12

## 2021-05-12 NOTE — CARE COORDINATION
Ambulatory Care Coordination Note  5/12/2021  CM Risk Score: 6  Charlson 10 Year Mortality Risk Score: 47%     ACC: Jefe Richardson, RN    Summary Note:   Phone call attempt today to patient:   -left vm and requested phone call back. Wanting to follow up regarding:   -Dr. Gill Odonnell, 4/26/2021  appt      CHF- weight has been running between 181-183 lbs. Any swelling to feet? COPD-how is your breathing? Using his inhalers as prescribed?    -dizziness-having any further dizziness?    -Covid 19 Vaccine-patient fully vaccinated. Any symptoms?     -ACP documents- does he want to complete? Care Coordination Plan of Care: This nurse Care Coordinator will  -will await call back from patient, and if no return call; will attempt to reach back out to patient again tomorrow    Care Coordination Interventions    Program Enrollment: Complex Care  Referral from Primary Care Provider: Yes  Suggested Interventions and Community Resources  Fall Risk Prevention: Completed  Home Health Services: Completed (Comment: \"but patient not homebound, home care did not admit\". )  Meals on Wheels: Declined  Medication Assistance Program: Declined  Medi Set or Pill Pack: Declined  Registered Dietician: Completed (Comment: ACC dietican)  Social Work: Declined  Specialty Services Referral: Completed (Comment: REferral to [facility])  Transportation Support: Declined  Zone Management Tools: Completed (Comment: copd, chf)           Prior to Admission medications    Medication Sig Start Date End Date Taking?  Authorizing Provider   Magnesium 400 MG TABS Take 400 mg by mouth daily 4/26/21   Aquiles House MD   fluticasone-vilanterol (BREO ELLIPTA) 100-25 MCG/INH AEPB inhaler Inhale 1 puff into the lungs daily For asthma/COPD 3/5/21   LORY Haney CNP   albuterol sulfate HFA (VENTOLIN HFA) 108 (90 Base) MCG/ACT inhaler Inhale 2 puffs into the lungs every 4 hours as needed for Wheezing 3/5/21   LORY Haney CNP metoprolol succinate (TOPROL XL) 50 MG extended release tablet Take 1 tablet by mouth daily 1/29/21   LORY Summers CNP   furosemide (LASIX) 20 MG tablet Take 1 pill by mouth twice a week Tuesday and Friday only please for Congestive heart failure. 12/30/20   LORY Summers CNP   clopidogrel (PLAVIX) 75 MG tablet Take 1 tablet by mouth daily 12/30/20   LORY Summers CNP   isosorbide mononitrate (IMDUR) 30 MG extended release tablet Take 0.5 tablets by mouth daily 12/30/20   LORY Summers CNP   levothyroxine (SYNTHROID) 125 MCG tablet Take 1 tablet by mouth Daily 12/30/20   LORY Summers CNP   rosuvastatin (CRESTOR) 20 MG tablet Take 1 tablet by mouth daily  Patient not taking: Reported on 4/26/2021 11/24/20   LORY Summers CNP   metoprolol (LOPRESSOR) 100 MG tablet Take 1 tablet by mouth 2 times daily  Patient taking differently: Take 100 mg by mouth daily  3/27/20 4/26/21  Nuvia Ralph MD   aspirin 81 MG tablet Take 81 mg by mouth daily.     Historical Provider, MD       Future Appointments   Date Time Provider Andres Sharma   6/22/2021  9:30 AM [facility]   10/11/2021 11:30 AM N[facility]

## 2021-05-13 ENCOUNTER — CARE COORDINATION (OUTPATIENT)
Dept: CARE COORDINATION | Age: 86
End: 2021-05-13

## 2021-05-13 NOTE — CARE COORDINATION
Ambulatory Care Coordination Note  5/13/2021  CM Risk Score: 6  Charlson 10 Year Mortality Risk Score: 47%     ACC: Mikaela Green RN    Summary Note:   Phone call attempt today to patient:   -left vm and requested phone call back.   -attempted to reach yesterday as well.   -Left message for Emergency contact as well    Unsuccessful attempts to reach patient reviewed:   04/13/21,04/15/21, 04/21/21, 04/23/21  -patient seen Cardiology 04/26/21.  -04/27/21:  spoke to patient  Unsuccessful attempts continued: 05/04, 05/06, 05/10, 05/13    Wanting to follow up regarding:   -Dr. Malik Manual, 4/26/2021  appt      CHF- weight has been running between 181-183 lbs. Any swelling to feet? COPD-how is your breathing? Using his inhalers as prescribed?    -dizziness-having any further dizziness?    -Covid 19 Vaccine-patient fully vaccinated. Any symptoms?     -ACP documents- does he want to complete? Care Coordination Plan of Care: This nurse Care Coordinator will  -will await call back from patient, and if no return call; will attempt to reach back out to patient next week and if no return call; will discharge patient at that time. Care Coordination Interventions    Program Enrollment: Complex Care  Referral from Primary Care Provider: Yes  Suggested Interventions and Community Resources  Fall Risk Prevention: Completed  Home Health Services: Completed (Comment: \"but patient not homebound, home care did not admit\". )  Meals on Wheels: Declined  Medication Assistance Program: Declined  Medi Set or Pill Pack: Declined  Registered Dietician: Completed (Comment: ACC dietican)  Social Work: Declined  Specialty Services Referral: Completed ([facility]  Transportation Support: Declined  Zone Management Tools: Completed (Comment: copd, chf)           Prior to Admission medications    Medication Sig Start Date End Date Taking?  Authorizing Provider   Magnesium 400 MG TABS Take 400 mg by

## 2021-05-20 ENCOUNTER — CARE COORDINATION (OUTPATIENT)
Dept: CARE COORDINATION | Age: 86
End: 2021-05-20

## 2021-05-20 NOTE — CARE COORDINATION
Ambulatory Care Coordination Note  5/20/2021  CM Risk Score: 6  Charlson 10 Year Mortality Risk Score: 47%     ACC: Susanna Knutson RN    Summary Note:   Phone call attempt today to patient:   -left vm and requested phone call back.   -attempted to reach yesterday as well.   -Left message for Emergency contact as well    Unsuccessful attempts to reach patient reviewed:   04/13/21,04/15/21, 04/21/21, 04/23/21  -patient seen Cardiology 04/26/21.  -04/27/21:  spoke to patient  Unsuccessful attempts continued: 05/04, 05/06, 05/10, 05/13, 05/20/21    -unable to reach letter prepared, and  will mail. Wanting to follow up regarding:   -Dr. Katherine Jung, 4/26/2021  appt      CHF- weight has been running between 181-183 lbs. Any swelling to feet? COPD-how is your breathing? Using his inhalers as prescribed?    -dizziness-having any further dizziness?    -Covid 19 Vaccine-patient fully vaccinated. Any symptoms?     -ACP documents- does he want to complete? Care Coordination Plan of Care: This nurse Care Coordinator will  -will await call back from patient, and if no return call; will attempt to reach back out to patient next week and if no return call; will discharge patient at that time.  -sending the unable to reach letter today. Care Coordination Interventions    Program Enrollment: Complex Care  Referral from Primary Care Provider: Yes  Suggested Interventions and Community Resources  Fall Risk Prevention: Completed  Home Health Services: Completed (Comment: \"but patient not homebound, home care did not admit\".  )  Meals on Wheels: Declined  Medication Assistance Program: Declined  Medi Set or Pill Pack: Declined  Registered Dietician: Completed (Comment: ACC dietican)  Social Work: Declined  Specialty Services Referral: Completed (Comment: [facility]  Transportation Support: Declined  Zone Management Tools: Completed (Comment: copd, chf)           Prior to Admission medications    Medication Sig Start Date End Date Taking? Authorizing Provider   Magnesium 400 MG TABS Take 400 mg by mouth daily 4/26/21   Owen Maldonado MD   fluticasone-vilanterol (BREO ELLIPTA) 100-25 MCG/INH AEPB inhaler Inhale 1 puff into the lungs daily For asthma/COPD 3/5/21   LORY Barron - CNP   albuterol sulfate HFA (VENTOLIN HFA) 108 (90 Base) MCG/ACT inhaler Inhale 2 puffs into the lungs every 4 hours as needed for Wheezing 3/5/21   Alex Day, APRN - CNP   metoprolol succinate (TOPROL XL) 50 MG extended release tablet Take 1 tablet by mouth daily 1/29/21   Alex Day, APRN - CNP   furosemide (LASIX) 20 MG tablet Take 1 pill by mouth twice a week Tuesday and Friday only please for Congestive heart failure. 12/30/20   LORY Barron CNP   clopidogrel (PLAVIX) 75 MG tablet Take 1 tablet by mouth daily 12/30/20   Alex Day, LORY - CLAU   isosorbide mononitrate (IMDUR) 30 MG extended release tablet Take 0.5 tablets by mouth daily 12/30/20   LORY Barron - CNP   levothyroxine (SYNTHROID) 125 MCG tablet Take 1 tablet by mouth Daily 12/30/20   Alex Day, LORY - CNP   rosuvastatin (CRESTOR) 20 MG tablet Take 1 tablet by mouth daily  Patient not taking: Reported on 4/26/2021 11/24/20   LORY Barron CNP   metoprolol (LOPRESSOR) 100 MG tablet Take 1 tablet by mouth 2 times daily  Patient taking differently: Take 100 mg by mouth daily  3/27/20 4/26/21  Owen Maldonado MD   aspirin 81 MG tablet Take 81 mg by mouth daily.     Historical Provider, MD       Future Appointments   Date Time Provider Andres Sharma   6/22/2021  9:30 AM LORY Barron CNP [facility]   10/11/2021 11:30 AM Owen Maldonado MD [facility]

## 2021-05-26 ENCOUNTER — CARE COORDINATION (OUTPATIENT)
Dept: CARE COORDINATION | Age: 86
End: 2021-05-26

## 2021-05-27 ENCOUNTER — CARE COORDINATION (OUTPATIENT)
Dept: CARE COORDINATION | Age: 86
End: 2021-05-27

## 2021-05-27 NOTE — CARE COORDINATION
Ambulatory Care Coordination Note  5/27/2021  CM Risk Score: 6  Charlson 10 Year Mortality Risk Score: 47%     ACC: Mini Moscoso RN    Summary Note:     Chart Reviewed:   -have been unable to reach patient in follow up.  -did request  to mail unable to reach letter; letter mailed 05/26/21.   -will allow time for letter to reach patient. PLAN:  -will plan follow up to patient week of June 7th; and if no return call at that point; will remove patient from cc panel. Care Coordination Interventions    Program Enrollment: Complex Care  Referral from Primary Care Provider: Yes  Suggested Interventions and Community Resources  Fall Risk Prevention: Completed  Home Health Services: Completed (Comment: \"but patient not homebound, home care did not admit\". )  Meals on Wheels: Declined  Medication Assistance Program: Declined  Medi Set or Pill Pack: Declined  Registered Dietician: Completed (Comment: ACC dietican)  Social Work: Declined  Specialty Services Referral: Completed (Comment: REferral to [facility])  Transportation Support: Declined  Zone Management Tools: Completed (Comment: copd, chf)         Prior to Admission medications    Medication Sig Start Date End Date Taking? Authorizing Provider   Magnesium 400 MG TABS Take 400 mg by mouth daily 4/26/21   Margoth Muñoz MD   fluticasone-vilanterol (BREO ELLIPTA) 100-25 MCG/INH AEPB inhaler Inhale 1 puff into the lungs daily For asthma/COPD 3/5/21   LORY Mayorga CNP   albuterol sulfate HFA (VENTOLIN HFA) 108 (90 Base) MCG/ACT inhaler Inhale 2 puffs into the lungs every 4 hours as needed for Wheezing 3/5/21   LORY Mayorga CNP   metoprolol succinate (TOPROL XL) 50 MG extended release tablet Take 1 tablet by mouth daily 1/29/21   LORY Mayorga CNP   furosemide (LASIX) 20 MG tablet Take 1 pill by mouth twice a week Tuesday and Friday only please for Congestive heart failure.  12/30/20   LORY Mayorga CNP

## 2021-06-08 ENCOUNTER — CARE COORDINATION (OUTPATIENT)
Dept: CARE COORDINATION | Age: 86
End: 2021-06-08

## 2021-06-08 NOTE — CARE COORDINATION
Ambulatory Care Coordination Note  6/8/2021  CM Risk Score: 6  Charlson 10 Year Mortality Risk Score: 47%     ACC: Purvi Coleman RN    Summary Note:   Phone call to patient again today to follow up with his care:   -left additional voicemail message. Chart Reviewed:   -have been unable to reach patient in follow up. - mailed unable to reach letter; 05/26/21.   -care coordination protocol followed for outreaches, and patient has not returned this nurse's calls. PLAN:  --will forward to pcp, notify that unable to reach for follow up.   -will discharge patient from care coordination today. Care Coordination Interventions    Program Enrollment: Complex Care  Referral from Primary Care Provider: Yes  Suggested Interventions and Community Resources  Fall Risk Prevention: Completed  Home Health Services: Completed (Comment: \"but patient not homebound, home care did not admit\". )  Meals on Wheels: Declined  Medication Assistance Program: Declined  Medi Set or Pill Pack: Declined  Registered Dietician: Completed (Comment: ACC dietican)  Social Work: Declined  Specialty Services Referral: Completed (Comment: REferral to [facility])  Transportation Support: Declined  Zone Management Tools: Completed (Comment: copd, chf)         Prior to Admission medications    Medication Sig Start Date End Date Taking?  Authorizing Provider   Magnesium 400 MG TABS Take 400 mg by mouth daily 4/26/21   Kar Alvarado MD   fluticasone-vilanterol (BREO ELLIPTA) 100-25 MCG/INH AEPB inhaler Inhale 1 puff into the lungs daily For asthma/COPD 3/5/21   LORY Coronel CNP   albuterol sulfate HFA (VENTOLIN HFA) 108 (90 Base) MCG/ACT inhaler Inhale 2 puffs into the lungs every 4 hours as needed for Wheezing 3/5/21   LORY Coronel CNP   metoprolol succinate (TOPROL XL) 50 MG extended release tablet Take 1 tablet by mouth daily 1/29/21   LORY Coronel CNP   furosemide (LASIX) 20 MG tablet Take 1 pill by mouth twice a week Tuesday and Friday only please for Congestive heart failure. 12/30/20   LORY Vega CNP   clopidogrel (PLAVIX) 75 MG tablet Take 1 tablet by mouth daily 12/30/20   LORY Vega CNP   isosorbide mononitrate (IMDUR) 30 MG extended release tablet Take 0.5 tablets by mouth daily 12/30/20   LORY Vega CNP   levothyroxine (SYNTHROID) 125 MCG tablet Take 1 tablet by mouth Daily 12/30/20   LORY Vega CNP   rosuvastatin (CRESTOR) 20 MG tablet Take 1 tablet by mouth daily  Patient not taking: Reported on 4/26/2021 11/24/20   LORY Vega CNP   metoprolol (LOPRESSOR) 100 MG tablet Take 1 tablet by mouth 2 times daily  Patient taking differently: Take 100 mg by mouth daily  3/27/20 4/26/21  Clark Garcia MD   aspirin 81 MG tablet Take 81 mg by mouth daily.     Historical Provider, MD       Future Appointments   Date Time Provider Andres Sharma   6/22/2021  9:30 AM [facility]   10/11/2021 11:30 AM MD Skyler Agustin [facility]

## 2021-08-10 RX ORDER — METOPROLOL SUCCINATE 50 MG/1
50 TABLET, EXTENDED RELEASE ORAL DAILY
Qty: 90 TABLET | Refills: 1 | Status: SHIPPED | OUTPATIENT
Start: 2021-08-10 | End: 2021-12-06 | Stop reason: SDUPTHER

## 2021-08-10 NOTE — TELEPHONE ENCOUNTER
Patient asking for a new script for Metoprolol - patient uses Drug [address] Maintenance   Topic Date Due    COVID-19 Vaccine (1) Never done    DTaP/Tdap/Td vaccine (1 - Tdap) Never done    Shingles Vaccine (1 of 2) Never done    Flu vaccine (1) 09/01/2021    Annual Wellness Visit (AWV)  11/25/2021    Lipid screen  04/20/2022    TSH testing  04/20/2022    Potassium monitoring  04/20/2022    Creatinine monitoring  04/20/2022    Pneumococcal 65+ years Vaccine  Completed    Hepatitis A vaccine  Aged Out    Hepatitis B vaccine  Aged Out    Hib vaccine  Aged Out    Meningococcal (ACWY) vaccine  Aged Out             (applicable per patient's age: Cancer Screenings, Depression Screening, Fall Risk Screening, Immunizations)    LDL Cholesterol (mg/dL)   Date Value   04/20/2021 104     AST (U/L)   Date Value   04/20/2021 14     ALT (U/L)   Date Value   04/20/2021 7     BUN (mg/dL)   Date Value   04/20/2021 12      (goal A1C is < 7)   (goal LDL is <100) need 30-50% reduction from baseline     BP Readings from Last 3 Encounters:   04/26/21 108/60   01/19/21 132/72   01/07/21 136/68    (goal /80)      All Future Testing planned in [facility]:  Lab Frequency Next Occurrence   Pulse oximetry, overnight Once 01/07/2021   Psa screening Once 01/07/2021   Insulin, Free Once 01/07/2021   Cortisol Once 01/07/2021   C-Reactive Protein Once 01/07/2021   Hepatic Function Panel Once 01/07/2021   Urinalysis Reflex to Culture Once 01/07/2021   C-Peptide Once 01/07/2021   Culture, Blood 1 Once 01/07/2021   Mononucleosis Screen Once 01/07/2021   Vitamin B12 & Folate Once 01/15/2021   Psa screening Once 01/15/2021   CBC Auto Differential Once 10/26/2021   Comprehensive Metabolic Panel Once 13/43/0157   Vitamin D 25 Hydroxy Once 10/26/2021   Lipid Panel Once 10/26/2021   TSH with Reflex Once 10/26/2021   Magnesium Once 10/26/2021   EKG 12 Lead Once 10/26/2021   ECHO Complete 2D W Doppler W Color Once 10/26/2021       Next Visit Date:  Future Appointments   Date Time Provider Andres Sharma   10/11/2021 11:30 AM MD Alexa Rojo [facility]            Patient Active Problem List:     Hard of hearing     CAD (coronary artery disease)     Atrial fibrillation ([facility] [address]     Hiatal hernia     Hyperlipidemia     Hypothyroidism     Pacemaker     History of PTCA     Calculus of gallbladder without cholecystitis without obstruction     Umbilical hernia     Vitamin D deficiency

## 2021-09-02 ENCOUNTER — TELEPHONE (OUTPATIENT)
Dept: FAMILY MEDICINE CLINIC | Age: 86
End: 2021-09-02

## 2021-09-02 DIAGNOSIS — E03.9 ACQUIRED HYPOTHYROIDISM: ICD-10-CM

## 2021-09-02 RX ORDER — LEVOTHYROXINE SODIUM 0.12 MG/1
125 TABLET ORAL DAILY
Qty: 90 TABLET | Refills: 1 | Status: SHIPPED | OUTPATIENT
Start: 2021-09-02 | End: 2021-10-11

## 2021-09-02 NOTE — TELEPHONE ENCOUNTER
Levothyroxine 125 mcg    Dm-li    AWV scheduled for 11/29    Health Maintenance   Topic Date Due    COVID-19 Vaccine (1) Never done    DTaP/Tdap/Td vaccine (1 - Tdap) Never done    Shingles Vaccine (1 of 2) Never done    Flu vaccine (1) 09/01/2021    Annual Wellness Visit (AWV)  11/25/2021    Lipid screen  04/20/2022    TSH testing  04/20/2022    Potassium monitoring  04/20/2022    Creatinine monitoring  04/20/2022    Pneumococcal 65+ years Vaccine  Completed    Hepatitis A vaccine  Aged Out    Hepatitis B vaccine  Aged Out    Hib vaccine  Aged Out    Meningococcal (ACWY) vaccine  Aged Out             (applicable per patient's age: Cancer Screenings, Depression Screening, Fall Risk Screening, Immunizations)    LDL Cholesterol (mg/dL)   Date Value   04/20/2021 104     AST (U/L)   Date Value   04/20/2021 14     ALT (U/L)   Date Value   04/20/2021 7     BUN (mg/dL)   Date Value   04/20/2021 12      (goal A1C is < 7)   (goal LDL is <100) need 30-50% reduction from baseline     BP Readings from Last 3 Encounters:   04/26/21 108/60   01/19/21 132/72   01/07/21 136/68    (goal /80)      All Future Testing planned in [facility]:  Lab Frequency Next Occurrence   Pulse oximetry, overnight Once 01/07/2021   Psa screening Once 01/07/2021   Insulin, Free Once 01/07/2021   Cortisol Once 01/07/2021   C-Reactive Protein Once 01/07/2021   Hepatic Function Panel Once 01/07/2021   Urinalysis Reflex to Culture Once 01/07/2021   C-Peptide Once 01/07/2021   Culture, Blood 1 Once 01/07/2021   Mononucleosis Screen Once 01/07/2021   Vitamin B12 & Folate Once 01/15/2021   Psa screening Once 01/15/2021   CBC Auto Differential Once 10/26/2021   Comprehensive Metabolic Panel Once 60/74/1821   Vitamin D 25 Hydroxy Once 10/26/2021   Lipid Panel Once 10/26/2021   TSH with Reflex Once 10/26/2021   Magnesium Once 10/26/2021   EKG 12 Lead Once 10/26/2021   ECHO Complete 2D W Doppler W Color Once 10/26/2021       Next Visit Date:  Future Appointments   Date Time Provider Andres Sharma   10/11/2021 11:30 AM Vasu Hutchison MD Dearl Denton NATHAN   11/29/2021  9:20 AM LORY Hamilton - CNP Claudette Rail [facility]            Patient Active Problem List:     Hard of hearing     CAD (coronary artery disease)     Atrial fibrillation ([address]     Hiatal hernia     Hyperlipidemia     Hypothyroidism     Pacemaker     History of PTCA     Calculus of gallbladder without cholecystitis without obstruction     Umbilical hernia     Vitamin D deficiency

## 2021-09-07 ENCOUNTER — TELEPHONE (OUTPATIENT)
Dept: PRIMARY CARE CLINIC | Age: 86
End: 2021-09-07

## 2021-09-07 ENCOUNTER — NURSE TRIAGE (OUTPATIENT)
Dept: OTHER | Facility: CLINIC | Age: 86
End: 2021-09-07

## 2021-09-07 ENCOUNTER — HOSPITAL ENCOUNTER (EMERGENCY)
Age: 86
Discharge: HOME | End: 2021-09-07
Attending: EMERGENCY MEDICINE
Payer: MEDICARE

## 2021-09-07 ENCOUNTER — APPOINTMENT (OUTPATIENT)
Dept: GENERAL RADIOLOGY | Age: 86
End: 2021-09-07
Payer: MEDICARE

## 2021-09-07 VITALS
HEART RATE: 60 BPM | TEMPERATURE: 98.2 F | OXYGEN SATURATION: 94 % | WEIGHT: 182.5 LBS | DIASTOLIC BLOOD PRESSURE: 45 MMHG | HEIGHT: 70 IN | SYSTOLIC BLOOD PRESSURE: 119 MMHG | RESPIRATION RATE: 18 BRPM | BODY MASS INDEX: 26.13 KG/M2

## 2021-09-07 DIAGNOSIS — S92.324A CLOSED NONDISPLACED FRACTURE OF SECOND METATARSAL BONE OF RIGHT FOOT, INITIAL ENCOUNTER: Primary | ICD-10-CM

## 2021-09-07 PROCEDURE — 73630 X-RAY EXAM OF FOOT: CPT

## 2021-09-07 PROCEDURE — 99284 EMERGENCY DEPT VISIT MOD MDM: CPT

## 2021-09-07 RX ORDER — CEPHALEXIN 500 MG/1
500 CAPSULE ORAL 3 TIMES DAILY
Qty: 15 CAPSULE | Refills: 0 | Status: SHIPPED | OUTPATIENT
Start: 2021-09-07 | End: 2021-10-11

## 2021-09-07 ASSESSMENT — PAIN DESCRIPTION - LOCATION: LOCATION: FOOT

## 2021-09-07 ASSESSMENT — PAIN DESCRIPTION - DESCRIPTORS: DESCRIPTORS: ACHING

## 2021-09-07 ASSESSMENT — PAIN DESCRIPTION - PAIN TYPE: TYPE: ACUTE PAIN

## 2021-09-07 ASSESSMENT — PAIN DESCRIPTION - ONSET: ONSET: ON-GOING

## 2021-09-07 ASSESSMENT — PAIN SCALES - GENERAL: PAINLEVEL_OUTOF10: 5

## 2021-09-07 ASSESSMENT — PAIN DESCRIPTION - ORIENTATION: ORIENTATION: RIGHT

## 2021-09-07 NOTE — ED PROVIDER NOTES
eMERGENCY dEPARTMENT eNCOUnter        [facility]    Chief Complaint   Patient presents with    Foot Pain     Dropped 12 lb anvil on his right foot from work bench. Foot is ecchymotic and edematous with fluid blisters on top of foot       [facility]    Kareem Capps is a 80 y.o. male who presents to ED from home. By car. With complaint of R foot pain injury. Onset since yesterday. Intensity of symptoms moderate to severe with walking. Location of symptoms R foot. Patient states that he dropped an anvil on his foot. Patient presents with blistering and ecchymosis of the right foot. REVIEW OF SYSTEMS    All systems reviewed and positives are in the HPI      PAST MEDICAL HISTORY    Past Medical History:   Diagnosis Date    Atrial fibrillation ([address]     CAD (coronary artery disease)     Hard of hearing     Hiatal hernia     History of PTCA     9/2012    Hyperlipidemia     Hypothyroidism     Pacemaker     [employer]       SURGICAL HISTORY    Past Surgical History:   Procedure Laterality Date    CARDIAC SURGERY      CORONARY ARTERY BYPASS GRAFT  2000    quad bypass [place]    HERNIA REPAIR  04/65/2955    UMBILICAL HERNIA REPAIR    PACEMAKER INSERTION  8/6/12    [employer]    PTCA  49/23/30    UMBILICAL HERNIA REPAIR  11/21/2016    Petrona Iniguez MD       CURRENT MEDICATIONS    Current Outpatient Rx   Medication Sig Dispense Refill    cephALEXin (KEFLEX) 500 MG capsule Take 1 capsule by mouth 3 times daily 15 capsule 0    levothyroxine (SYNTHROID) 125 MCG tablet Take 1 tablet by mouth Daily 90 tablet 1    metoprolol succinate (TOPROL XL) 50 MG extended release tablet Take 1 tablet by mouth daily 90 tablet 1    clopidogrel (PLAVIX) 75 MG tablet Take 1 tablet by mouth daily 90 tablet 1    Magnesium 400 MG TABS Take 400 mg by mouth daily 30 tablet 11    furosemide (LASIX) 20 MG tablet Take 1 pill by mouth twice a week Tuesday and Friday only please for Congestive heart failure.  12 all   Social Connections: Moderately Isolated    Frequency of Communication with Friends and Family: More than three times a week    Frequency of Social Gatherings with Friends and Family: More than three times a week    Attends [employer] Services: More than 4 times per year    Active Member of [employer] or Organizations: No    Attends Club or Organization Meetings: Never    Marital Status:    Intimate Partner Violence:     Fear of Current or Ex-Partner:     Emotionally Abused:     Physically Abused:     Sexually Abused:        PHYSICAL EXAM    VITAL SIGNS: BP (!) 119/45   Pulse 60   Temp 98.2 °F (36.8 °C) (Oral)   Resp 18   Ht 5' 10\" (1.778 m)   Wt 182 lb 8 oz (82.8 kg)   SpO2 94%   BMI 26.19 kg/m²   Constitutional:  Well developed, well nourished, no acute distress, non-toxic appearance   HENT:  Atraumatic, external ears normal, nose normal, oropharynx moist.  Neck- normal range of motion, no tenderness, supple   Respiratory:  No respiratory distress, normal breath sounds. Cardiovascular:  Normal rate, normal rhythm, no murmurs, no gallops, no rubs   GI:  Soft, nondistended, normal bowel sounds, nontender   Musculoskeletal: R foot with swelling,  ecchymosis and blistering   Integument:  Well hydrated, no rash   Neurologic:  negative     RADIOLOGY/PROCEDURES    XR FOOT RIGHT (MIN 3 VIEWS)   Final Result      Fracture distal second metatarsal shaft and neck. Labs  Labs Reviewed - No data to display          Summation      Patient Course: Short leg splint to be placed TTD. Follow-up with Ortho. Patient also has some blistering on the dorsal right foot. ED Medications administered this visit:  Medications - No data to display    New Prescriptions from this visit:    New Prescriptions    CEPHALEXIN (KEFLEX) 500 MG CAPSULE    Take 1 capsule by mouth 3 times daily       Follow-up:  No follow-up provider specified. Final Impression:   1.  Closed nondisplaced fracture of second metatarsal bone of right foot, initial encounter               (Please note that portions of this note were completed with a voice recognition program.  Efforts were made to edit the dictations but occasionally words are mis-transcribed.)        Jordan De La Paz MD  09/07/21 [id number]

## 2021-09-07 NOTE — TELEPHONE ENCOUNTER
----- Message from Anya Goldmanok sent at 9/7/2021  2:43 PM EDT -----  Subject: Message to Provider    QUESTIONS  Information for Provider? Joseluis Valdes RN TRIAGED, INJURY/BREAK IN RIGHT FOOT,   WEEPING, SWOLLEN AND BRUISED, HAPPENED AT HOME WHILE CLEANING GARAGE,   TRANSFERRED TO OFFICE, TECH ISSUE, , [facility]   ---------------------------------------------------------------------------  --------------  CALL BACK INFO  What is the best way for the office to contact you? OK to leave message on   voicemail  Preferred Call Back Phone Number? [phone]  ---------------------------------------------------------------------------  --------------  SCRIPT ANSWERS  Relationship to Patient?  Self

## 2021-09-07 NOTE — TELEPHONE ENCOUNTER
I spoke with patient on phone and he describes having a large device fall off a bench and impacted his right foot causing a crush injury with bruising and all of his toes are black today with a swollen foot he does say he has an open injury to this foot which is oozing he describes as an orange fluid patient is on Plavix medication routinely he is advised to go directly to the ER caution because of this being a right foot injury his driving foot he feels he can safely make it there he is advised if he cannot or feels he cannot operate his vehicle properly to call 911    Report was given to Victor Goodell in the emergency room at [facility]

## 2021-09-07 NOTE — TELEPHONE ENCOUNTER
Received call from Lauro Shetty at [facility] with [employer]. Brief description of triage: foot injury    Triage indicates for patient to be today by pcp , walk in , or ucc if appt not available. Care advice provided, patient verbalizes understanding; denies any other questions or concerns; instructed to call back for any new or worsening symptoms. Writer provided warm transfer to [facility] at [facility] for appointment scheduling. Attention Provider: Thank you for allowing me to participate in the care of your patient. The patient was connected to triage in response to information provided to the [facility]. Please do not respond through this encounter as the response is not directed to a shared pool. Reason for Disposition   No prior tetanus shots (or is not fully vaccinated) and any wound (e.g., cut or scrape)    Answer Assessment - Initial Assessment Questions  1. MECHANISM: \"How did the injury happen? \" (e.g., twisting injury, direct blow)   Object from his garage feel on foot while he was cleaning    2. ONSET: \"When did the injury happen? \" (Minutes or hours ago)      Yesterday    3. LOCATION: \"Where is the injury located? \"    right foot    4. APPEARANCE of INJURY: \"What does the injury look like? \"  (e.g., deformity of leg)  Swelling , bruised , as well as toes, weeping    5. SEVERITY: \"Can you put weight on that leg? \" \"Can you walk? \"     Yes    6. SIZE: For cuts, bruises, or swelling, ask: \"How large is it? \" (e.g., inches or centimeters)   Bruises, entire foot is bruised    7. PAIN: \"Is there pain? \" If so, ask: \"How bad is the pain? \"  (Scale 1-10; or mild, moderate, severe)   yes, pain=7    8. TETANUS: For any breaks in the skin, ask: \"When was the last tetanus booster? \"   unsure, not recent he doesn't think    9. OTHER SYMPTOMS: \"Do you have any other symptoms? \"   No    10. PREGNANCY: \"Is there any chance you are pregnant? \" \"When was your last menstrual period? \" male    Protocols used: LEG INJURY-ADULT-[place]

## 2021-09-08 ENCOUNTER — TELEPHONE (OUTPATIENT)
Dept: PRIMARY CARE CLINIC | Age: 86
End: 2021-09-08

## 2021-09-08 NOTE — TELEPHONE ENCOUNTER
[address]  ED Follow up Call    Reason for ED visit:  Closed nondisplaced fracture of second metatarsal bone of right foot, initial      9/8/2021     Hi Elisa De La Paz , this is [facility] from Dr. Lacho Mitchell office, just calling to see how you are doing after your recent ED visit. Pt to follow with ortho. FU appts/Provider:    Future Appointments   Date Time Provider Andres Sharma   10/11/2021 11:30 AM MD Salome Parker W   11/29/2021  9:20 AM [facility]         VOICEMAIL DOCUMENTATION - ERASE IF NOT USED  Hi, this message is for Elisa De La Paz. This is Lavell Lam MA from Dr.Susan [facility] office. Just calling to see how you are doing after your recent visit to the Emergency Room. Merline Grow wants to make sure you were able to fill any prescriptions and that you understand your discharge instructions. Please return our call if you need to make a follow up appointment with your provider or have any further needs. Our phone number is [phone]. Have a great day.

## 2021-09-17 ENCOUNTER — HOSPITAL ENCOUNTER (OUTPATIENT)
Age: 86
Discharge: HOME | End: 2021-09-19
Payer: MEDICARE

## 2021-09-17 ENCOUNTER — HOSPITAL ENCOUNTER (OUTPATIENT)
Dept: GENERAL RADIOLOGY | Age: 86
Discharge: HOME | End: 2021-09-19
Payer: MEDICARE

## 2021-09-17 DIAGNOSIS — S92.324A CLOSED NONDISPLACED FRACTURE OF SECOND METATARSAL BONE OF RIGHT FOOT, INITIAL ENCOUNTER: ICD-10-CM

## 2021-09-17 PROCEDURE — 73630 X-RAY EXAM OF FOOT: CPT

## 2021-09-24 ENCOUNTER — HOSPITAL ENCOUNTER (OUTPATIENT)
Dept: GENERAL RADIOLOGY | Age: 86
Discharge: HOME | End: 2021-09-26
Payer: MEDICARE

## 2021-09-24 ENCOUNTER — HOSPITAL ENCOUNTER (OUTPATIENT)
Age: 86
Discharge: HOME | End: 2021-09-26
Payer: MEDICARE

## 2021-09-24 DIAGNOSIS — S92.324A CLOSED NONDISPLACED FRACTURE OF SECOND METATARSAL BONE OF RIGHT FOOT, INITIAL ENCOUNTER: ICD-10-CM

## 2021-09-24 PROCEDURE — 73630 X-RAY EXAM OF FOOT: CPT

## 2021-10-07 DIAGNOSIS — I34.0 MITRAL VALVE INSUFFICIENCY, UNSPECIFIED ETIOLOGY: ICD-10-CM

## 2021-10-11 ENCOUNTER — OFFICE VISIT (OUTPATIENT)
Dept: CARDIOLOGY CLINIC | Age: 86
End: 2021-10-11
Payer: MEDICARE

## 2021-10-11 ENCOUNTER — HOSPITAL ENCOUNTER (OUTPATIENT)
Age: 86
Discharge: HOME | End: 2021-10-11
Payer: MEDICARE

## 2021-10-11 VITALS
BODY MASS INDEX: 25.83 KG/M2 | HEART RATE: 68 BPM | WEIGHT: 180 LBS | DIASTOLIC BLOOD PRESSURE: 69 MMHG | SYSTOLIC BLOOD PRESSURE: 114 MMHG | OXYGEN SATURATION: 95 %

## 2021-10-11 DIAGNOSIS — E03.9 ACQUIRED HYPOTHYROIDISM: ICD-10-CM

## 2021-10-11 DIAGNOSIS — E78.5 HYPERLIPIDEMIA, UNSPECIFIED HYPERLIPIDEMIA TYPE: ICD-10-CM

## 2021-10-11 DIAGNOSIS — Z95.0 PACEMAKER: ICD-10-CM

## 2021-10-11 DIAGNOSIS — I34.0 MITRAL VALVE INSUFFICIENCY, UNSPECIFIED ETIOLOGY: ICD-10-CM

## 2021-10-11 DIAGNOSIS — E55.9 VITAMIN D DEFICIENCY DISEASE: ICD-10-CM

## 2021-10-11 DIAGNOSIS — I25.10 CORONARY ARTERY DISEASE INVOLVING NATIVE CORONARY ARTERY OF NATIVE HEART WITHOUT ANGINA PECTORIS: ICD-10-CM

## 2021-10-11 DIAGNOSIS — I25.10 CORONARY ARTERY DISEASE INVOLVING NATIVE CORONARY ARTERY OF NATIVE HEART WITHOUT ANGINA PECTORIS: Primary | ICD-10-CM

## 2021-10-11 LAB
ABSOLUTE EOS #: 0.1 K/UL (ref 0–0.4)
ABSOLUTE IMMATURE GRANULOCYTE: ABNORMAL K/UL (ref 0–0.3)
ABSOLUTE LYMPH #: 1.5 K/UL (ref 1–4.8)
ABSOLUTE MONO #: 0.4 K/UL (ref 0–1)
ALBUMIN SERPL-MCNC: 4.2 G/DL (ref 3.5–5.2)
ALBUMIN/GLOBULIN RATIO: ABNORMAL (ref 1–2.5)
ALP BLD-CCNC: 88 U/L (ref 40–129)
ALT SERPL-CCNC: 6 U/L (ref 5–41)
ANION GAP SERPL CALCULATED.3IONS-SCNC: 9 MMOL/L (ref 9–17)
AST SERPL-CCNC: 15 U/L
BASOPHILS # BLD: 1 % (ref 0–2)
BASOPHILS ABSOLUTE: 0 K/UL (ref 0–0.2)
BILIRUB SERPL-MCNC: 0.85 MG/DL (ref 0.3–1.2)
BUN BLDV-MCNC: 19 MG/DL (ref 8–23)
BUN/CREAT BLD: 20 (ref 9–20)
CALCIUM SERPL-MCNC: 10 MG/DL (ref 8.6–10.4)
CHLORIDE BLD-SCNC: 100 MMOL/L (ref 98–107)
CHOLESTEROL/HDL RATIO: 5.2
CHOLESTEROL: 196 MG/DL
CO2: 28 MMOL/L (ref 20–31)
CREAT SERPL-MCNC: 0.97 MG/DL (ref 0.7–1.2)
DIFFERENTIAL TYPE: YES
EOSINOPHILS RELATIVE PERCENT: 2 % (ref 0–5)
GFR AFRICAN AMERICAN: >60 ML/MIN
GFR NON-AFRICAN AMERICAN: >60 ML/MIN
GFR SERPL CREATININE-BSD FRML MDRD: ABNORMAL ML/MIN/{1.73_M2}
GFR SERPL CREATININE-BSD FRML MDRD: ABNORMAL ML/MIN/{1.73_M2}
GLUCOSE BLD-MCNC: 100 MG/DL (ref 70–99)
HCT VFR BLD CALC: 37.7 % (ref 41–53)
HDLC SERPL-MCNC: 38 MG/DL
HEMOGLOBIN: 12.7 G/DL (ref 13.5–17.5)
IMMATURE GRANULOCYTES: ABNORMAL %
LDL CHOLESTEROL: 136 MG/DL (ref 0–130)
LYMPHOCYTES # BLD: 26 % (ref 13–44)
MAGNESIUM: 2 MG/DL (ref 1.6–2.6)
MCH RBC QN AUTO: 32.1 PG (ref 26–34)
MCHC RBC AUTO-ENTMCNC: 33.5 G/DL (ref 31–37)
MCV RBC AUTO: 95.8 FL (ref 80–100)
MONOCYTES # BLD: 8 % (ref 5–9)
NRBC AUTOMATED: ABNORMAL PER 100 WBC
PATIENT FASTING?: YES
PDW BLD-RTO: 14.4 % (ref 12.1–15.2)
PLATELET # BLD: 178 K/UL (ref 140–450)
PLATELET ESTIMATE: ABNORMAL
PMV BLD AUTO: ABNORMAL FL (ref 6–12)
POTASSIUM SERPL-SCNC: 4.9 MMOL/L (ref 3.7–5.3)
RBC # BLD: 3.94 M/UL (ref 4.5–5.9)
RBC # BLD: ABNORMAL 10*6/UL
SEG NEUTROPHILS: 63 % (ref 39–75)
SEGMENTED NEUTROPHILS ABSOLUTE COUNT: 3.6 K/UL (ref 2.1–6.5)
SODIUM BLD-SCNC: 137 MMOL/L (ref 135–144)
THYROXINE, FREE: 2.02 NG/DL (ref 0.93–1.7)
TOTAL PROTEIN: 7.6 G/DL (ref 6.4–8.3)
TRIGL SERPL-MCNC: 111 MG/DL
TSH SERPL DL<=0.05 MIU/L-ACNC: 0.11 MIU/L (ref 0.3–5)
VITAMIN D 25-HYDROXY: 28.4 NG/ML (ref 30–100)
VLDLC SERPL CALC-MCNC: ABNORMAL MG/DL (ref 1–30)
WBC # BLD: 5.6 K/UL (ref 3.5–11)
WBC # BLD: ABNORMAL 10*3/UL

## 2021-10-11 PROCEDURE — 84443 ASSAY THYROID STIM HORMONE: CPT

## 2021-10-11 PROCEDURE — 80053 COMPREHEN METABOLIC PANEL: CPT

## 2021-10-11 PROCEDURE — 1036F TOBACCO NON-USER: CPT | Performed by: INTERNAL MEDICINE

## 2021-10-11 PROCEDURE — 36415 COLL VENOUS BLD VENIPUNCTURE: CPT

## 2021-10-11 PROCEDURE — 1123F ACP DISCUSS/DSCN MKR DOCD: CPT | Performed by: INTERNAL MEDICINE

## 2021-10-11 PROCEDURE — 82306 VITAMIN D 25 HYDROXY: CPT

## 2021-10-11 PROCEDURE — G8417 CALC BMI ABV UP PARAM F/U: HCPCS | Performed by: INTERNAL MEDICINE

## 2021-10-11 PROCEDURE — 4040F PNEUMOC VAC/ADMIN/RCVD: CPT | Performed by: INTERNAL MEDICINE

## 2021-10-11 PROCEDURE — 85025 COMPLETE CBC W/AUTO DIFF WBC: CPT

## 2021-10-11 PROCEDURE — G8484 FLU IMMUNIZE NO ADMIN: HCPCS | Performed by: INTERNAL MEDICINE

## 2021-10-11 PROCEDURE — 83735 ASSAY OF MAGNESIUM: CPT

## 2021-10-11 PROCEDURE — 80061 LIPID PANEL: CPT

## 2021-10-11 PROCEDURE — 99214 OFFICE O/P EST MOD 30 MIN: CPT | Performed by: INTERNAL MEDICINE

## 2021-10-11 PROCEDURE — 84439 ASSAY OF FREE THYROXINE: CPT

## 2021-10-11 PROCEDURE — 93288 INTERROG EVL PM/LDLS PM IP: CPT | Performed by: INTERNAL MEDICINE

## 2021-10-11 PROCEDURE — G8427 DOCREV CUR MEDS BY ELIG CLIN: HCPCS | Performed by: INTERNAL MEDICINE

## 2021-10-11 RX ORDER — LEVOTHYROXINE SODIUM 0.1 MG/1
100 TABLET ORAL DAILY
Qty: 60 TABLET | Refills: 0 | Status: SHIPPED | OUTPATIENT
Start: 2021-10-11 | End: 2021-12-07 | Stop reason: SDUPTHER

## 2021-10-11 NOTE — LETTER
Rupert Hurt M.D. [address] [place] [address]  [phone]          2021          Angela [place], CNP  [address] [facility] [address]      RE:   Sravani aGspar  :  1932      Dear Ravindra Wong:    CHIEF COMPLAINT:  1. Sick sinus syndrome. 2.  Status post [employer] pacemaker on 2012. 3.  Status post four-vessel bypass surgery in [facility]. 4.  Status post PTCA on 2012. 5.  Moderate aortic stenosis. HISTORY OF PRESENT ILLNESS:  I had the pleasure of seeing Mr. Sravani Gaspar in our office on 10/11/2021. He is a pleasant 70-year-old gentleman who had atrial fibrillation in , associated with shortness of breath. He had a catheterization at [facility] with subsequent four-vessel bypass surgery at [facility] in . On 2012, he had a heart rate of 32 beats per minute. We stopped his beta-blocker and he remained bradycardic, and therefore, I placed a [employer] DDD pacemaker on 2012, still at beginning of life. He is pacer-dependent. On 2012, he had shortness of breath and a repeat catheterization showed 90% LAD, the intermediate branch of the circumflex was occluded, the OM branch of the circumflex was occluded, the right coronary artery had 95% to 99% disease, vein graft to the right coronary artery was occluded, vein graft to the OM was occluded, vein graft to the high lateral circumflex was patent, LIMA was patent to the LAD, EF 40%. We did angioplasty of the right coronary artery, placing 3.0 x 38, 3.0 x 38, 3.5 x 24 and 3.5 x 16 mm drug-eluting ION stents with a good end result. We did not attempt to open the occluded OM branch of the circumflex. He does have moderate aortic stenosis. He had an echocardiogram at [facility] on 10/07/2021 that showed an EF of 35% to 40%.   He had moderate aortic stenosis with a mean gradient of 25 mmHg and aortic valve area of 1.1 cm2. He has done well. He has had no chest pain or chest discomfort or any unusual shortness of breath. He did drop an anvil in his right foot fracturing his fourth metacarpal.  He is followed by Dr. Bipin Martinez and it is healing nicely. He denies any PND or orthopnea. No syncope or near syncope. No lightheadedness or dizziness and again no chest pain. CARDIAC RISK FACTORS:  Known CAD:  Positive. Bypass Surgery:  Positive. PTCA:  Positive. Hypertension:  Positive. Hyperlipidemia:  Positive. Smoking:  Negative. Diabetes:  Negative. MEDICATIONS AT HOME:  He is currently on aspirin 81 mg daily, Plavix 75 mg daily, Lasix 20 mg twice a week on Tuesdays and Fridays, Imdur 30 mg half a tablet daily, Synthroid 125 mcg daily, magnesium daily, Toprol-XL 50 mg daily, Crestor 20 mg daily. PAST MEDICAL AND SURGICAL HISTORY:  1. Cardiac as above. 2.  Hypertension, well controlled. 3.  Hyperlipidemia. 4.  Atrial fibrillation in 2000, for which he had been on Coumadin, stopped after his open heart surgery. 5.  History of hypothyroidism, on supplements. 6.  Sick sinus syndrome, status post [employer] in 2012. FAMILY HISTORY:  Significant for CAD. SOCIAL HISTORY:  He is 80years old. Two children, one in [place]. He lived with Eneida Quiroz for 29 years. She passed away. Does not smoke or drink alcohol. He cooks for himself. He is now much more active mowing the lawn for two of his neighbors plus himself and doing yard work as well as cooking and cleaning. REVIEW OF SYSTEMS:  Cardiac as above. Other systems reviewed including constitutional, eyes, ears, nose and throat, cardiovascular, respiratory, GI, , musculoskeletal, integumentary, neurologic, endocrine, hematologic and allergic/immunologic are negative except for what is described above. No weight loss or weight gain. No change in bowel habits. No blood in stools.   No fevers, sweats or chills. PHYSICAL EXAMINATION:  VITAL SIGNS:  His blood pressure was 114/69 with a heart rate of 68 and regular. Respiratory rate 18. O2 saturation 95%. Weight 180 pounds. GENERAL:  He is a pleasant 80year-old gentleman. Denied pain. He was oriented to person, place and time. Answered questions appropriately. SKIN:  No unusual skin changes. HEENT:  The pupils are equally round and intact. Mucous membranes were dry. NECK:  No JVD. Good carotid pulses. No carotid bruits. No lymphadenopathy or thyromegaly. CARDIOVASCULAR EXAM:  S1 and S2 were normal.  No S3 or S4. Soft systolic blowing type murmur. No diastolic murmur. PMI was normal.  No lift, thrust, or pericardial friction rub. He had a grade 3/6 systolic ejection murmur. ABDOMEN:  Soft and nontender. Good bowel sounds. EXTREMITIES:  Good femoral pulses. Good pedal pulses. He had no pedal edema. Skin was warm and dry. No calf tenderness. Nail beds pink. Good cap refill. PULSES:  Bilateral symmetrical radial, brachial and carotid pulses. No carotid bruits. Good femoral and pedal pulses. NEUROLOGIC EXAM:  Within normal limits. PSYCHIATRIC EXAM:  Within normal limits. LABORATORY DATA:  From 10/11/2021, sodium 137, potassium 4.9, BUN 19, creatinine 0.97. GFR greater than 60. Magnesium 2.0. His glucose was 100. ALT was 6, AST was 15. His TSH was low at 0.11 with a free thyroxine pending. White count 5.6, hemoglobin 12.7 with a platelet count [phone],930. Echocardiogram at [facility] on 10/07 showed an EF of 35% to 40% with left atrium severely enlarged. He had moderate aortic stenosis with a peak gradient of 25 mmHg and aortic valve area of 1.1 cm2. PA pressure estimated at 43 mmHg. IMPRESSION:  1. Moderate aortic stenosis with an aortic valve area of 1.1 cm2 with a mean gradient of 25 mmHg across the aortic valve. 2.  Moderate LV dysfunction, EF of 35% to 40%.   3.  Four-vessel bypass surgery in 2000 at [place] with LIMA to the LAD, vein graft to the high lateral circumflex, vein graft to the OM and a vein graft to right coronary artery. 4.  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 5.  Catheterization on 09/09/2012, showing 99% LAD with subtotally occluded intermediate branch of the circumflex, the right coronary artery had 95% to 99% sequential lesions, vein graft to the high lateral circumflex was patent, vein graft to the right coronary artery was occluded, vein graft to the OM was occluded, native OM was occluded, EF 40%, with angioplasty of the right coronary artery, placing two 3.0 x 38 mm drug-eluting ION stents along with 3.5 x 24 and 3.5 x 16 mm ION stents. PLAN:  1. No change in medications. 2.  Repeat echocardiogram in 1 year since it has been stable in a moderate range of aortic stenosis and aortic valve area of 1.1 cm2.  3.  We will see in 6 months. DISCUSSION:  Mr. Anand Cardozo overall is doing well. He is asymptomatic with no chest pain, shortness of breath or loss of energy. I made no change in medications. His risks are very nicely modified. Thank you very much for allowing me the privilege of seeing Mr. Anand Cardozo. If you have any questions on my thoughts, please do not hesitate to contact me.     Sincerely,        Bernard Vaughn    D: 10/11/2021 12:03:43     T: 10/11/2021 12:07:53     [email or web address]  Job#: [id number]   Doc#: [id number]

## 2021-10-11 NOTE — PROGRESS NOTES
Ov DR Rishi Guevara 6 mth follow up   Echo at [facility], [facility]  No chest pain or sob.   Seen in ED dropped Anvil   Onto of rt foot fracturing foot  Seen DR Nataly Jenkins

## 2021-10-12 NOTE — PROGRESS NOTES
Cyn Nieto M.D. [address] [place] [address]  [phone]          2021          Jessika Callaway, CNP  [address] [address]      RE:   Angelique Katz  :  1932      Dear Rick Akhtar:    CHIEF COMPLAINT:  1. Sick sinus syndrome. 2.  Status post [employer] pacemaker on 2012. 3.  Status post four-vessel bypass surgery in [facility]. 4.  Status post PTCA on 2012. 5.  Moderate aortic stenosis. HISTORY OF PRESENT ILLNESS:  I had the pleasure of seeing Mr. Angelique Katz in our office on 10/11/2021. He is a pleasant 80-year-old gentleman who had atrial fibrillation in , associated with shortness of breath. He had a catheterization at [facility] with subsequent four-vessel bypass surgery at [facility] in . On 2012, he had a heart rate of 32 beats per minute. We stopped his beta-blocker and he remained bradycardic, and therefore, I placed a [employer] DDD pacemaker on 2012, still at beginning of life. He is pacer-dependent. On 2012, he had shortness of breath and a repeat catheterization showed 90% LAD, the intermediate branch of the circumflex was occluded, the OM branch of the circumflex was occluded, the right coronary artery had 95% to 99% disease, vein graft to the right coronary artery was occluded, vein graft to the OM was occluded, vein graft to the high lateral circumflex was patent, LIMA was patent to the LAD, EF 40%. We did angioplasty of the right coronary artery, placing 3.0 x 38, 3.0 x 38, 3.5 x 24 and 3.5 x 16 mm drug-eluting ION stents with a good end result. We did not attempt to open the occluded OM branch of the circumflex. He does have moderate aortic stenosis. He had an echocardiogram at [facility] on 10/07/2021 that showed an EF of 35% to 40%.   He had moderate aortic stenosis with a mean gradient of 25 mmHg and chills. PHYSICAL EXAMINATION:  VITAL SIGNS:  His blood pressure was 114/69 with a heart rate of 68 and regular. Respiratory rate 18. O2 saturation 95%. Weight 180 pounds. GENERAL:  He is a pleasant 80year-old gentleman. Denied pain. He was oriented to person, place and time. Answered questions appropriately. SKIN:  No unusual skin changes. HEENT:  The pupils are equally round and intact. Mucous membranes were dry. NECK:  No JVD. Good carotid pulses. No carotid bruits. No lymphadenopathy or thyromegaly. CARDIOVASCULAR EXAM:  S1 and S2 were normal.  No S3 or S4. Soft systolic blowing type murmur. No diastolic murmur. PMI was normal.  No lift, thrust, or pericardial friction rub. He had a grade 3/6 systolic ejection murmur. ABDOMEN:  Soft and nontender. Good bowel sounds. EXTREMITIES:  Good femoral pulses. Good pedal pulses. He had no pedal edema. Skin was warm and dry. No calf tenderness. Nail beds pink. Good cap refill. PULSES:  Bilateral symmetrical radial, brachial and carotid pulses. No carotid bruits. Good femoral and pedal pulses. NEUROLOGIC EXAM:  Within normal limits. PSYCHIATRIC EXAM:  Within normal limits. LABORATORY DATA:  From 10/11/2021, sodium 137, potassium 4.9, BUN 19, creatinine 0.97. GFR greater than 60. Magnesium 2.0. His glucose was 100. ALT was 6, AST was 15. His TSH was low at 0.11 with a free thyroxine pending. White count 5.6, hemoglobin 12.7 with a platelet count 689,009. Echocardiogram at [facility] on 10/07 showed an EF of 35% to 40% with left atrium severely enlarged. He had moderate aortic stenosis with a peak gradient of 25 mmHg and aortic valve area of 1.1 cm2. PA pressure estimated at 43 mmHg. IMPRESSION:  1. Moderate aortic stenosis with an aortic valve area of 1.1 cm2 with a mean gradient of 25 mmHg across the aortic valve. 2.  Moderate LV dysfunction, EF of 35% to 40%.   3.  Four-vessel bypass surgery in 2000 at [place] with LIMA to the LAD, vein graft to the high lateral circumflex, vein graft to the OM and a vein graft to right coronary artery. 4.  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 5.  Catheterization on 09/09/2012, showing 99% LAD with subtotally occluded intermediate branch of the circumflex, the right coronary artery had 95% to 99% sequential lesions, vein graft to the high lateral circumflex was patent, vein graft to the right coronary artery was occluded, vein graft to the OM was occluded, native OM was occluded, EF 40%, with angioplasty of the right coronary artery, placing two 3.0 x 38 mm drug-eluting ION stents along with 3.5 x 24 and 3.5 x 16 mm ION stents. PLAN:  1. No change in medications. 2.  Repeat echocardiogram in 1 year since it has been stable in a moderate range of aortic stenosis and aortic valve area of 1.1 cm2.  3.  We will see in 6 months. DISCUSSION:  Mr. Dio Villalta overall is doing well. He is asymptomatic with no chest pain, shortness of breath or loss of energy. I made no change in medications. His risks are very nicely modified. Thank you very much for allowing me the privilege of seeing Mr. Dio Villalta. If you have any questions on my thoughts, please do not hesitate to contact me.     Sincerely,        Ashlee Lyon    D: 10/11/2021 12:03:43     T: 10/11/2021 12:07:53     [email or web address]  Job#: [id number]   Doc#: [id number]

## 2021-10-15 RX ORDER — FUROSEMIDE 20 MG/1
TABLET ORAL
Qty: 12 TABLET | Refills: 5 | Status: SHIPPED | OUTPATIENT
Start: 2021-10-15 | End: 2022-06-28 | Stop reason: SDUPTHER

## 2021-10-29 ENCOUNTER — HOSPITAL ENCOUNTER (OUTPATIENT)
Dept: GENERAL RADIOLOGY | Age: 86
Discharge: HOME | End: 2021-10-31
Payer: MEDICARE

## 2021-10-29 ENCOUNTER — HOSPITAL ENCOUNTER (OUTPATIENT)
Age: 86
Discharge: HOME | End: 2021-10-31
Payer: MEDICARE

## 2021-10-29 DIAGNOSIS — S92.301D: ICD-10-CM

## 2021-10-29 DIAGNOSIS — S92.201D: ICD-10-CM

## 2021-10-29 PROCEDURE — 73630 X-RAY EXAM OF FOOT: CPT

## 2021-12-06 ENCOUNTER — OFFICE VISIT (OUTPATIENT)
Dept: FAMILY MEDICINE CLINIC | Age: 86
End: 2021-12-06
Payer: MEDICARE

## 2021-12-06 ENCOUNTER — HOSPITAL ENCOUNTER (OUTPATIENT)
Age: 86
Discharge: HOME | End: 2021-12-06
Payer: MEDICARE

## 2021-12-06 VITALS
DIASTOLIC BLOOD PRESSURE: 60 MMHG | SYSTOLIC BLOOD PRESSURE: 104 MMHG | WEIGHT: 189 LBS | HEIGHT: 70 IN | OXYGEN SATURATION: 90 % | BODY MASS INDEX: 27.06 KG/M2 | HEART RATE: 81 BPM

## 2021-12-06 DIAGNOSIS — Z00.00 ROUTINE GENERAL MEDICAL EXAMINATION AT A HEALTH CARE FACILITY: Primary | ICD-10-CM

## 2021-12-06 DIAGNOSIS — E03.9 ACQUIRED HYPOTHYROIDISM: ICD-10-CM

## 2021-12-06 DIAGNOSIS — I50.43 CHF (CONGESTIVE HEART FAILURE), NYHA CLASS I, ACUTE ON CHRONIC, COMBINED (HCC): ICD-10-CM

## 2021-12-06 DIAGNOSIS — Z12.5 PROSTATE CANCER SCREENING: ICD-10-CM

## 2021-12-06 DIAGNOSIS — Z71.89 ADVANCED DIRECTIVES, COUNSELING/DISCUSSION: ICD-10-CM

## 2021-12-06 DIAGNOSIS — J44.9 STAGE 1 MILD COPD BY GOLD CLASSIFICATION (HCC): ICD-10-CM

## 2021-12-06 LAB
PROSTATE SPECIFIC ANTIGEN: 2.02 UG/L
TSH SERPL DL<=0.05 MIU/L-ACNC: 1.82 MIU/L (ref 0.3–5)

## 2021-12-06 PROCEDURE — 36415 COLL VENOUS BLD VENIPUNCTURE: CPT

## 2021-12-06 PROCEDURE — G0439 PPPS, SUBSEQ VISIT: HCPCS | Performed by: NURSE PRACTITIONER

## 2021-12-06 PROCEDURE — 1123F ACP DISCUSS/DSCN MKR DOCD: CPT | Performed by: NURSE PRACTITIONER

## 2021-12-06 PROCEDURE — G0103 PSA SCREENING: HCPCS

## 2021-12-06 PROCEDURE — 4040F PNEUMOC VAC/ADMIN/RCVD: CPT | Performed by: NURSE PRACTITIONER

## 2021-12-06 PROCEDURE — 84443 ASSAY THYROID STIM HORMONE: CPT

## 2021-12-06 PROCEDURE — G8484 FLU IMMUNIZE NO ADMIN: HCPCS | Performed by: NURSE PRACTITIONER

## 2021-12-06 RX ORDER — CLOPIDOGREL BISULFATE 75 MG/1
75 TABLET ORAL DAILY
Qty: 90 TABLET | Refills: 1 | Status: SHIPPED | OUTPATIENT
Start: 2021-12-06 | End: 2022-04-08

## 2021-12-06 RX ORDER — ISOSORBIDE MONONITRATE 30 MG/1
15 TABLET, EXTENDED RELEASE ORAL DAILY
Qty: 30 TABLET | Refills: 5 | Status: SHIPPED | OUTPATIENT
Start: 2021-12-06 | End: 2022-06-06 | Stop reason: SDUPTHER

## 2021-12-06 RX ORDER — METOPROLOL SUCCINATE 50 MG/1
50 TABLET, EXTENDED RELEASE ORAL DAILY
Qty: 90 TABLET | Refills: 1 | Status: SHIPPED | OUTPATIENT
Start: 2021-12-06 | End: 2022-06-06 | Stop reason: SDUPTHER

## 2021-12-06 RX ORDER — ROSUVASTATIN CALCIUM 20 MG/1
20 TABLET, COATED ORAL DAILY
Qty: 90 TABLET | Refills: 1 | Status: SHIPPED | OUTPATIENT
Start: 2021-12-06 | End: 2022-06-06 | Stop reason: SDUPTHER

## 2021-12-06 ASSESSMENT — PATIENT HEALTH QUESTIONNAIRE - PHQ9
SUM OF ALL RESPONSES TO PHQ QUESTIONS 1-9: 0
SUM OF ALL RESPONSES TO PHQ9 QUESTIONS 1 & 2: 0
SUM OF ALL RESPONSES TO PHQ QUESTIONS 1-9: 0
1. LITTLE INTEREST OR PLEASURE IN DOING THINGS: 0
SUM OF ALL RESPONSES TO PHQ QUESTIONS 1-9: 0
2. FEELING DOWN, DEPRESSED OR HOPELESS: 0

## 2021-12-06 ASSESSMENT — LIFESTYLE VARIABLES: HOW OFTEN DO YOU HAVE A DRINK CONTAINING ALCOHOL: 0

## 2021-12-06 NOTE — PATIENT INSTRUCTIONS
Personalized Preventive Plan for Karan Perkins - 12/6/2021  Medicare offers a range of preventive health benefits. Some of the tests and screenings are paid in full while other may be subject to a deductible, co-insurance, and/or copay. Some of these benefits include a comprehensive review of your medical history including lifestyle, illnesses that may run in your family, and various assessments and screenings as appropriate. After reviewing your medical record and screening and assessments performed today your provider may have ordered immunizations, labs, imaging, and/or referrals for you. A list of these orders (if applicable) as well as your Preventive Care list are included within your After Visit Summary for your review. Other Preventive Recommendations:    · A preventive eye exam performed by an eye specialist is recommended every 1-2 years to screen for glaucoma; cataracts, macular degeneration, and other eye disorders. · A preventive dental visit is recommended every 6 months. · Try to get at least 150 minutes of exercise per week or 10,000 steps per day on a pedometer . · Order or download the FREE \"Exercise & Physical Activity: Your Everyday Guide\" from [employer]. Call 6-452.438.3737 or search [employer] online. · You need 3437-8355 mg of calcium and 5856-7999 IU of vitamin D per day. It is possible to meet your calcium requirement with diet alone, but a vitamin D supplement is usually necessary to meet this goal.  · When exposed to the sun, use a sunscreen that protects against both UVA and UVB radiation with an SPF of 30 or greater. Reapply every 2 to 3 hours or after sweating, drying off with a towel, or swimming. · Always wear a seat belt when traveling in a car. Always wear a helmet when riding a bicycle or motorcycle.

## 2021-12-06 NOTE — PROGRESS NOTES
Medicare Annual Wellness Visit  Name: Rebecca Arevalo Date: 2021   MRN: [id number] Sex: Male   Age: 80 y.o. Ethnicity: Non- / Non    : 1932 Race: White (non-)      Arden Romero is here for No chief complaint on file. Screenings for behavioral, psychosocial and functional/safety risks, and cognitive dysfunction are all negative except as indicated below. These results, as well as other patient data from the [address] form, are documented in Flowsheets linked to this Encounter. Allergies   Allergen Reactions    Menthol (Topical Analgesic)          Prior to Visit Medications    Medication Sig Taking? Authorizing Provider   rosuvastatin (CRESTOR) 20 MG tablet Take 1 tablet by mouth daily Yes LORY Jones CNP   isosorbide mononitrate (IMDUR) 30 MG extended release tablet Take 0.5 tablets by mouth daily Yes LORY Jones CNP   clopidogrel (PLAVIX) 75 MG tablet Take 1 tablet by mouth daily Yes LORY Jones CNP   metoprolol succinate (TOPROL XL) 50 MG extended release tablet Take 1 tablet by mouth daily Yes LORY Jones CNP   furosemide (LASIX) 20 MG tablet Take 1 pill by mouth twice a week Tuesday and Friday only please for Congestive heart failure. Yes LORY Jones CNP   levothyroxine (SYNTHROID) 100 MCG tablet Take 1 tablet by mouth Daily hypothyroidism Yes LORY Jones CNP   Magnesium 400 MG TABS Take 400 mg by mouth daily Yes Tim Bernstein MD   aspirin 81 MG tablet Take 81 mg by mouth daily.  Yes Historical Provider, MD   fluticasone-vilanterol (BREO ELLIPTA) 100-25 MCG/INH AEPB inhaler Inhale 1 puff into the lungs daily For asthma/COPD  Patient not taking: Reported on 10/11/2021  LORY Jones CNP   albuterol sulfate HFA (VENTOLIN HFA) 108 (90 Base) MCG/ACT inhaler Inhale 2 puffs into the lungs every 4 hours as needed for Wheezing  Patient not taking: Reported on 10/11/2021  LORY Jones - CNP   metoprolol (LOPRESSOR) 100 MG tablet Take 1 tablet by mouth 2 times daily  Patient taking differently: Take 100 mg by mouth daily   Nico Lee MD         Past Medical History:   Diagnosis Date    Atrial fibrillation [facility])     CAD (coronary artery disease)     Hard of hearing     Hiatal hernia     History of PTCA     9/2012    Hyperlipidemia     Hypothyroidism     Pacemaker     [employer]       Past Surgical History:   Procedure Laterality Date    CARDIAC SURGERY      CORONARY ARTERY BYPASS GRAFT  2000    quad bypass [facility] HERNIA REPAIR  94/13/1124    UMBILICAL HERNIA REPAIR    PACEMAKER INSERTION  8/6/12    [employer]    PTCA  63/85/96    UMBILICAL HERNIA REPAIR  11/21/2016    Dorinda Childs MD         Family History   Problem Relation Age of Onset    Heart Disease Father        CareTeam (Including outside providers/suppliers regularly involved in providing care):   Patient Care Team:  LORY Oliver CNP as PCP - General (Certified Nurse Practitioner)  [facility]  Mimi Lacy MD as Surgeon (General Surgery)    Wt Readings from Last 3 Encounters:   12/06/21 189 lb (85.7 kg)   10/11/21 180 lb (81.6 kg)   09/07/21 182 lb 8 oz (82.8 kg)     Vitals:    12/06/21 1314   BP: 104/60   Site: Left Upper Arm   Position: Sitting   Pulse: 81   SpO2: 90%   Weight: 189 lb (85.7 kg)   Height: 5' 10\" (1.778 m)     Body mass index is 27.12 kg/m². Based upon direct observation of the patient, evaluation of cognition reveals recent and remote memory intact.     General Appearance: alert and oriented to person, place and time, well developed and well- nourished, in no acute distress  Skin: warm and dry, no rash or erythema  Head: normocephalic and atraumatic  Eyes: pupils equal, round, and reactive to light, extraocular eye movements intact, conjunctivae normal  ENT: tympanic membrane, external ear and ear canal normal bilaterally  Neck: supple and non-tender without mass, no thyromegaly or thyroid nodules, no cervical lymphadenopathy  Pulmonary/Chest: clear to auscultation bilaterally- no wheezes, rales or rhonchi, normal air movement, no respiratory distress  Cardiovascular: normal rate, regular rhythm, normal S1 and S2, no murmurs, rubs, clicks, or gallops, distal pulses intact, no carotid bruits  Abdomen: soft, non-tender, non-distended, normal bowel sounds  Extremities: no cyanosis, clubbing or edema  Musculoskeletal: normal range of motion, no joint swelling, deformity or tenderness  Neurologic:  gait, coordination and speech normal    Patient's complete Health Risk Assessment and screening values have been reviewed and are found in Flowsheets. The following problems were reviewed today and where indicated follow up appointments were made and/or referrals ordered. Positive Risk Factor Screenings with Interventions:            General Health and ACP:  General  In general, how would you say your health is?: Very Good  In the past 7 days, have you experienced any of the following?  New or Increased Pain, New or Increased Fatigue, Loneliness, Social Isolation, Stress or Anger?: None of These  Do you get the social and emotional support that you need?: Yes  Do you have a Living Will?: Yes  Advance Directives     Power of [employer] Will ACP-Advance Directive ACP-Power of     Not on File Not on File Not on File Not on File      General Health Risk Interventions:  · No Living Will: Advance Care Planning addressed with patient today and Patient referred to Hanny (ACP) Physician/NP/PA Conversation    Date of Conversation: 12/6/2021  Conducted with: Patient with Decision Making Capacity    Healthcare Decision Maker:    Primary Decision Maker (Active): RAINA PARIS - Other - [phone]    Primary Decision Maker: Silver Gerald - Brother/Sister - [phone]  Click here to complete Healthcare Decision Makers including selection of the Healthcare Decision Maker Relationship (ie \"Primary\"). Today we documented Decision Maker(s) consistent with Legal Next of Kin hierarchy. Care Preferences:    Hospitalization: \"If your health worsens and it becomes clear that your chance of recovery is unlikely, what would be your preference regarding hospitalization? \"  The patient would prefer hospitalization. Ventilation: \"If you were unable to breath on your own and your chance of recovery was unlikely, what would be your preference about the use of a ventilator (breathing machine) if it was available to you? \"  The patient would desire the use of a ventilator. Resuscitation: \"In the event your heart stopped as a result of an underlying serious health condition, would you want attempts made to restart your heart, or would you prefer a natural death? \"  Yes, attempt to resuscitate.     treatment goals, benefit/burden of treatment options, artificial nutrition, ventilation preferences, hospitalization preferences and resuscitation preferences    Conversation Outcomes / Follow-Up Plan:  ACP incomplete - refer to ACP Clinical Specialist  Reviewed DNR/DNI and patient elects Full Code (Attempt Resuscitation)    Length of Voluntary ACP Conversation in minutes:  <16 minutes (Non-Billable)    Adam Rossi, APRN - CNP               ·   ·       Safety:  Safety  Do you have working smoke detectors?: Yes  Have all throw rugs been removed or fastened?: (!) No  Do you have non-slip mats or surfaces in all bathtubs/showers?: Yes  Do all of your stairways have a railing or banister?: Yes  Are your doorways, halls and stairs free of clutter?: Yes  Do you always fasten your seatbelt when you are in a car?: Yes  Safety Interventions:  · Home safety tips provided     Personalized Preventive Plan   Current Health Maintenance Status  Immunization History   Administered Date(s) Administered    Influenza Vaccine, unspecified formulation 10/16/2015    Influenza Virus Vaccine 11/17/2020    Influenza, High Dose (Fluzone 65 yrs and older) 01/07/2020    Pneumococcal Conjugate 13-valent (Kendrick Conner) 11/17/2016, 01/07/2020    Pneumococcal Polysaccharide ([id number]) 02/13/2018        Health Maintenance   Topic Date Due    COVID-19 Vaccine (1) Never done    DTaP/Tdap/Td vaccine (1 - Tdap) Never done    Shingles Vaccine (1 of 2) Never done    Flu vaccine (1) 09/01/2021    Annual Wellness Visit (AWV)  11/25/2021    Lipid screen  10/11/2022    Potassium monitoring  10/11/2022    Creatinine monitoring  10/11/2022    TSH testing  12/06/2022    Pneumococcal 65+ years Vaccine  Completed    Hepatitis A vaccine  Aged Out    Hepatitis B vaccine  Aged Out    Hib vaccine  Aged Out    Meningococcal (ACWY) vaccine  Aged Out     Recommendations for SPO Medical Due: see orders and patient instructions/AVS.  . Recommended screening schedule for the next 5-10 years is provided to the patient in written form: see Patient Instructions/AVS.    Diagnoses and all orders for this visit:    Routine general medical examination at a health care facility    Stage 1 mild COPD by GOLD classification ([facility] [address]    CHF (congestive heart failure), NYHA class I, acute on chronic, combined ([address]    Acquired hypothyroidism    Prostate cancer screening  -     PSA screening; Future    Advanced directives, counseling/discussion  -     [facility] Referral to ACP Clinical Specialist    Other orders  -     rosuvastatin (CRESTOR) 20 MG tablet; Take 1 tablet by mouth daily  -     isosorbide mononitrate (IMDUR) 30 MG extended release tablet; Take 0.5 tablets by mouth daily  -     clopidogrel (PLAVIX) 75 MG tablet; Take 1 tablet by mouth daily  -     metoprolol succinate (TOPROL XL) 50 MG extended release tablet;  Take 1 tablet by mouth daily

## 2021-12-07 DIAGNOSIS — E03.9 ACQUIRED HYPOTHYROIDISM: ICD-10-CM

## 2021-12-07 RX ORDER — LEVOTHYROXINE SODIUM 0.1 MG/1
100 TABLET ORAL DAILY
Qty: 90 TABLET | Refills: 1 | Status: SHIPPED | OUTPATIENT
Start: 2021-12-07 | End: 2022-06-06 | Stop reason: SDUPTHER

## 2021-12-08 ENCOUNTER — CLINICAL DOCUMENTATION (OUTPATIENT)
Dept: CASE MANAGEMENT | Age: 86
End: 2021-12-08

## 2021-12-08 NOTE — ACP (ADVANCE CARE PLANNING)
Advance Care Planning    Ambulatory ACP Specialist Patient Outreach    Date:  12/8/2021  ACP Specialist:  Panfilo Gupta    Outreach call to patient in follow-up to ACP Specialist referral from: LORY Sapp CNP    [x] PCP  [] Provider   [] Ambulatory Care Management [] Other for Reason:        [] Early ACP Decision-Making   [x] Advance Directive Assistance   [] Discuss Goals of Care   [] Code Status Discussion   [] Completion of Portable DNR order   [] Complete POST/MOST   [] Other (Specify)    Date Referral Received:12/7/2021  Today's Outreach:  [x] First   [] Second  [] Third                               Third outreach made by [x]  phone  [] email []   [email or web address]     Intervention:  [] Spoke with Patient  [x] Left VM requesting return call      Outcome:SW left message for pt requesting return call regarding completing advance directives. SW will await return call. Next Step:   [] ACP scheduled conversation  [x] Outreach again at a later date               [] Email / Mail [address]  [] Email / Mail Advance Directive            [] Close Referral. Routing closure to referring provider/staff and to ACP Specialist .      Thank you for this referral.

## 2022-01-01 RX ORDER — MIDODRINE HYDROCHLORIDE 10 MG/1
10 TABLET ORAL 2 TIMES DAILY
Qty: 60 TABLET | Refills: 11 | Status: ON HOLD | OUTPATIENT
Start: 2022-01-01 | End: 2023-01-01 | Stop reason: HOSPADM

## 2022-01-01 RX ORDER — PROPRANOLOL HYDROCHLORIDE 10 MG/1
10 TABLET ORAL 2 TIMES DAILY
Qty: 60 TABLET | Refills: 11 | Status: ON HOLD | OUTPATIENT
Start: 2022-01-01 | End: 2023-01-01 | Stop reason: HOSPADM

## 2022-01-04 ENCOUNTER — CLINICAL DOCUMENTATION (OUTPATIENT)
Dept: CASE MANAGEMENT | Age: 87
End: 2022-01-04

## 2022-01-04 NOTE — ACP (ADVANCE CARE PLANNING)
Advance Care Planning    Ambulatory ACP Specialist Patient Outreach    Date:  1/4/2022  ACP Specialist:  Panfilo Lindsey    Outreach call to patient in follow-up to ACP Specialist referral from: LORY Cruz CNP    [] PCP  [x] Provider   [] Ambulatory Care Management [] Other for Reason:        [] Early ACP Decision-Making   [x] Advance Directive Assistance   [] Discuss Goals of Care   [] Code Status Discussion   [] Completion of Portable DNR order   [] Complete POST/MOST   [] Other (Specify)    Date Referral Received:12/7/2021    Today's Outreach:  [] First   [x] Second  [] Third                               Third outreach made by [x]  phone  [] email []   [email or web address]     Intervention:  [x] Spoke with friend of patient  [] Left VM requesting return call      Outcome: SW called to pt's phone number listed on referral and recorded message received that pt's number is not working. Called alternative number listed for pt and friend reports that she will let pt know to call SW when she sees him again. Friend is uncertain of a different phone number to contact pt with. SW will attempt contact again in a few weeks if no message received from pt. Next Step:   [] ACP scheduled conversation  [x] Outreach again in a few weeks               [] Email / Mail [address]  [] Email / Mail Advance Directive            [] Close Referral. Routing closure to referring provider/staff and to ACP Specialist .      Thank you for this referral.

## 2022-01-24 ENCOUNTER — CLINICAL DOCUMENTATION (OUTPATIENT)
Dept: CASE MANAGEMENT | Age: 87
End: 2022-01-24

## 2022-01-24 NOTE — ACP (ADVANCE CARE PLANNING)
Advance Care Planning    Ambulatory ACP Specialist Patient Outreach    Date:  1/24/2022  ACP Specialist:  Panfilo Qiu    Outreach call to patient in follow-up to ACP Specialist referral from: LORY Lozano CNP    [x] PCP  [] Provider   [] Ambulatory Care Management [] Other for Reason:        [] Early ACP Decision-Making   [x] Advance Directive Assistance   [] Discuss Goals of Care   [] Code Status Discussion   [] Completion of Portable DNR order   [] Complete POST/MOST   [] Other (Specify)    Date Referral Received:12/7/2021    Today's Outreach:  [] First   [] Second  [x] Third                               Third outreach made by [x]  phone  [] email []   Kaulihart     Intervention:  [x] Unable to speak with Patient; listed phone number disconnected  [] Left VM requesting return call      Outcome: SW completed third attempt to contact pt this date and his phone number remains disconnected and no phone call was ever received after speaking with friend in early January. SW mailed advance directive packet with card for pt to contact when he is ready to complete and wants to set up an appointment. Referral closed at this time. Next Step:   [] ACP scheduled conversation  [] Outreach again in one week               [] Email / Mail ACP Info Sheets  [] Email / Mail Advance Directive            [x] Close Referral. Routing closure to referring provider/staff and to ACP Specialist .      Thank you for this referral.

## 2022-04-08 RX ORDER — CLOPIDOGREL BISULFATE 75 MG/1
75 TABLET ORAL DAILY
Qty: 90 TABLET | Refills: 1 | Status: SHIPPED | OUTPATIENT
Start: 2022-04-08 | End: 2022-07-07 | Stop reason: SDUPTHER

## 2022-04-08 NOTE — TELEPHONE ENCOUNTER
Last OV: 12/6/2021 AWV  Last RX:    Next scheduled apt: Visit date not found      surescript requesting a refill

## 2022-05-02 RX ORDER — CALCIUM CARBONATE 300MG(750)
400 TABLET,CHEWABLE ORAL DAILY
Qty: 30 TABLET | Refills: 11 | Status: SHIPPED | OUTPATIENT
Start: 2022-05-02

## 2022-05-11 ENCOUNTER — OFFICE VISIT (OUTPATIENT)
Dept: PRIMARY CARE CLINIC | Age: 87
End: 2022-05-11
Payer: MEDICARE

## 2022-05-11 VITALS
OXYGEN SATURATION: 96 % | HEIGHT: 70 IN | BODY MASS INDEX: 26.34 KG/M2 | DIASTOLIC BLOOD PRESSURE: 68 MMHG | HEART RATE: 59 BPM | SYSTOLIC BLOOD PRESSURE: 102 MMHG | WEIGHT: 184 LBS

## 2022-05-11 DIAGNOSIS — I50.43 CHF (CONGESTIVE HEART FAILURE), NYHA CLASS I, ACUTE ON CHRONIC, COMBINED (HCC): ICD-10-CM

## 2022-05-11 DIAGNOSIS — I48.0 PAROXYSMAL ATRIAL FIBRILLATION (HCC): ICD-10-CM

## 2022-05-11 DIAGNOSIS — L23.7 ALLERGIC CONTACT DERMATITIS DUE TO PLANTS, EXCEPT FOOD: Primary | ICD-10-CM

## 2022-05-11 DIAGNOSIS — J44.9 STAGE 1 MILD COPD BY GOLD CLASSIFICATION (HCC): ICD-10-CM

## 2022-05-11 PROCEDURE — 1123F ACP DISCUSS/DSCN MKR DOCD: CPT | Performed by: NURSE PRACTITIONER

## 2022-05-11 PROCEDURE — 1036F TOBACCO NON-USER: CPT | Performed by: NURSE PRACTITIONER

## 2022-05-11 PROCEDURE — G8427 DOCREV CUR MEDS BY ELIG CLIN: HCPCS | Performed by: NURSE PRACTITIONER

## 2022-05-11 PROCEDURE — 4040F PNEUMOC VAC/ADMIN/RCVD: CPT | Performed by: NURSE PRACTITIONER

## 2022-05-11 PROCEDURE — 99213 OFFICE O/P EST LOW 20 MIN: CPT | Performed by: NURSE PRACTITIONER

## 2022-05-11 PROCEDURE — 3023F SPIROM DOC REV: CPT | Performed by: NURSE PRACTITIONER

## 2022-05-11 PROCEDURE — G8417 CALC BMI ABV UP PARAM F/U: HCPCS | Performed by: NURSE PRACTITIONER

## 2022-05-11 RX ORDER — FAMOTIDINE 40 MG/1
40 TABLET, FILM COATED ORAL EVERY EVENING
Qty: 30 TABLET | Refills: 0 | Status: SHIPPED | OUTPATIENT
Start: 2022-05-11 | End: 2022-06-06 | Stop reason: ALTCHOICE

## 2022-05-11 RX ORDER — TRIAMCINOLONE ACETONIDE 1 MG/G
CREAM TOPICAL
Qty: 45 G | Refills: 0 | Status: SHIPPED | OUTPATIENT
Start: 2022-05-11 | End: 2022-06-06 | Stop reason: ALTCHOICE

## 2022-05-11 SDOH — ECONOMIC STABILITY: FOOD INSECURITY: WITHIN THE PAST 12 MONTHS, THE FOOD YOU BOUGHT JUST DIDN'T LAST AND YOU DIDN'T HAVE MONEY TO GET MORE.: NEVER TRUE

## 2022-05-11 SDOH — ECONOMIC STABILITY: FOOD INSECURITY: WITHIN THE PAST 12 MONTHS, YOU WORRIED THAT YOUR FOOD WOULD RUN OUT BEFORE YOU GOT MONEY TO BUY MORE.: NEVER TRUE

## 2022-05-11 ASSESSMENT — SOCIAL DETERMINANTS OF HEALTH (SDOH): HOW HARD IS IT FOR YOU TO PAY FOR THE VERY BASICS LIKE FOOD, HOUSING, MEDICAL CARE, AND HEATING?: NOT HARD AT ALL

## 2022-05-11 NOTE — PROGRESS NOTES
HPI Notes    Name: Jaimee Parry  : 1932         Chief Complaint:     Chief Complaint   Patient presents with    Rash     Pt states he has dealt with itching for the past 2 weeks. Mainly on bilateral arms, back of neck and right side hip/back area. History of Present Illness:        HPI  Pt called on call service and c/o itching for about 2 weeks onset on elbows of arms and right lateral abdomen only. No issues in axillae or groin. Has dog but no fleas seen. Pt has been mowing outside a lot lately and doing yardwork, he cares for both his yard and his neighbor's which is amazing. Areas of concern with pruritic feeling with no dominant rash, right lateral abdomen/back with scratches area no vesicles or patches, early dermatitis appearance. Cautioned pt with hx atrial fib and scratching on plavix. breo effective for copd control  No edema no shortness of breath and activity tolerance improving.      Lab Results   Component Value Date    TSH 1.82 2021           Past Medical History:     Past Medical History:   Diagnosis Date    Atrial fibrillation ([address]     CAD (coronary artery disease)     Hard of hearing     Hiatal hernia     History of PTCA     2012    Hyperlipidemia     Hypothyroidism     Pacemaker     [employer]      Reviewed all health maintenance requirements and ordered appropriate tests  Health Maintenance Due   Topic Date Due    COVID-19 Vaccine (1) Never done    DTaP/Tdap/Td vaccine (1 - Tdap) Never done    Shingles vaccine (1 of 2) Never done       Past Surgical History:     Past Surgical History:   Procedure Laterality Date    CARDIAC SURGERY      CORONARY ARTERY BYPASS GRAFT      quad bypass [place]    HERNIA REPAIR      UMBILICAL HERNIA REPAIR    PACEMAKER INSERTION  12    [employer]    PTCA  75/15/98    UMBILICAL HERNIA REPAIR  2016    Berry Arellano MD        Medications:       Prior to Admission medications Medication Sig Start Date End Date Taking? Authorizing Provider   famotidine (PEPCID) 40 MG tablet Take 1 tablet by mouth every evening For itching of skin 5/11/22  Yes LORY Sanchez CNP   triamcinolone (KENALOG) 0.1 % cream Apply topically  to the affected area 2 times a day elbows and right hip/side rash 5/11/22  Yes LORY Sanchez CNP   Magnesium 400 MG TABS Take 400 mg by mouth daily 5/2/22  Yes Farshad Price MD   clopidogrel (PLAVIX) 75 MG tablet Take 1 tablet by mouth daily 4/8/22  Yes LORY Sanchez CNP   levothyroxine (SYNTHROID) 100 MCG tablet Take 1 tablet by mouth Daily hypothyroidism 12/7/21  Yes LORY Sanchez CNP   rosuvastatin (CRESTOR) 20 MG tablet Take 1 tablet by mouth daily 12/6/21  Yes LORY Sanchez CNP   isosorbide mononitrate (IMDUR) 30 MG extended release tablet Take 0.5 tablets by mouth daily 12/6/21  Yes LORY Sanchez CNP   metoprolol succinate (TOPROL XL) 50 MG extended release tablet Take 1 tablet by mouth daily 12/6/21  Yes LORY Sanchez CNP   furosemide (LASIX) 20 MG tablet Take 1 pill by mouth twice a week Tuesday and Friday only please for Congestive heart failure. 10/15/21  Yes LORY Sanchez CNP   aspirin 81 MG tablet Take 81 mg by mouth daily.    Yes Historical Provider, MD   fluticasone-vilanterol (BREO ELLIPTA) 100-25 MCG/INH AEPB inhaler Inhale 1 puff into the lungs daily For asthma/COPD  Patient not taking: Reported on 10/11/2021 3/5/21   LORY Sanchez CNP   albuterol sulfate HFA (VENTOLIN HFA) 108 (90 Base) MCG/ACT inhaler Inhale 2 puffs into the lungs every 4 hours as needed for Wheezing  Patient not taking: Reported on 10/11/2021 3/5/21   LORY Sanchez CNP   metoprolol (LOPRESSOR) 100 MG tablet Take 1 tablet by mouth 2 times daily  Patient taking differently: Take 100 mg by mouth daily  3/27/20 4/26/21  Farshad Price MD        Allergies:       Menthol (topical analgesic)    Social History: Tobacco:    reports that he has never smoked. He has never used smokeless tobacco.  Alcohol:      reports previous alcohol use. Drug Use:  reports no history of drug use. Family History:     Family History   Problem Relation Age of Onset    Heart Disease Father        Review of Systems:         Review of Systems   Constitutional: Negative for activity change, chills and fever. HENT: Negative for congestion and rhinorrhea. Eyes: Negative. Respiratory: Negative for cough. Cardiovascular: Negative for leg swelling. Endocrine: Negative for cold intolerance and heat intolerance. Genitourinary: Negative for difficulty urinating. Musculoskeletal: Negative for arthralgias. Skin: Positive for rash (upper arm dorsal side elbows with itching sensation scratched marks, right lateral abdomen). Neurological: Negative for dizziness and headaches. Physical Exam:     Vitals:  /68   Pulse 59   Ht 5' 10\" (1.778 m)   Wt 184 lb (83.5 kg)   SpO2 96%   BMI 26.40 kg/m²       Physical Exam  Vitals and nursing note reviewed. Constitutional:       General: He is not in acute distress. Appearance: Normal appearance. He is well-developed. HENT:      Head: Normocephalic and atraumatic. Right Ear: Tympanic membrane normal.      Left Ear: Tympanic membrane and external ear normal.      Nose: No congestion. Mouth/Throat:      Mouth: Mucous membranes are moist.      Pharynx: No oropharyngeal exudate. Eyes:      Pupils: Pupils are equal, round, and reactive to light. Neck:      Vascular: No carotid bruit (neg thalia). Cardiovascular:      Rate and Rhythm: Normal rate and regular rhythm. Pulses: Normal pulses. Heart sounds: Normal heart sounds. No murmur heard. Pulmonary:      Effort: Pulmonary effort is normal. No respiratory distress. Breath sounds: Normal breath sounds. Abdominal:      Palpations: Abdomen is soft. There is no mass. Tenderness:  There is no abdominal tenderness. Musculoskeletal:         General: Normal range of motion. Cervical back: Normal range of motion and neck supple. Right lower leg: No edema. Left lower leg: No edema. Lymphadenopathy:      Cervical: No cervical adenopathy. Skin:     Findings: Rash (elbow areas with scratched marks no dominant rash or bite seen, right lateral abdomen and erythematous lichenfication from scratching present no bug bites no dominant area of tenderness. pruritic. ) present. Neurological:      Mental Status: He is alert and oriented to person, place, and time. Psychiatric:         Behavior: Behavior normal.         Thought Content: Thought content normal.         Judgment: Judgment normal.               Data:     Lab Results   Component Value Date     10/11/2021    K 4.9 10/11/2021     10/11/2021    CO2 28 10/11/2021    BUN 19 10/11/2021    CREATININE 0.97 10/11/2021    GLUCOSE 100 10/11/2021    PROT 7.6 10/11/2021    LABALBU 4.2 10/11/2021    BILITOT 0.85 10/11/2021    ALKPHOS 88 10/11/2021    AST 15 10/11/2021    ALT 6 10/11/2021     Lab Results   Component Value Date    WBC 5.6 10/11/2021    RBC 3.94 10/11/2021    HGB 12.7 10/11/2021    HCT 37.7 10/11/2021    MCV 95.8 10/11/2021    MCH 32.1 10/11/2021    MCHC 33.5 10/11/2021    RDW 14.4 10/11/2021     10/11/2021    MPV NOT REPORTED 10/11/2021     Lab Results   Component Value Date    TSH 1.82 12/06/2021     Lab Results   Component Value Date    CHOL 196 10/11/2021    HDL 38 10/11/2021    PSA 2.02 12/06/2021          Assessment & Plan        Diagnosis Orders   1. Allergic contact dermatitis due to plants, except food     2. Stage 1 mild COPD by GOLD classification ([address]     3. CHF (congestive heart failure), NYHA class I, acute on chronic, combined (HCC)     4. Paroxysmal atrial fibrillation (HCC)         Suspect contact exposure with yard care. No shingles  No bite.    Will treat with pepcid and topical kenalog and monitor response. If worsens may need antibiotic ointment or ammonia level    Pt appears that most strong that I have ever seen him smiling and states \"I feel good\"               Completed Refills   Requested Prescriptions     Signed Prescriptions Disp Refills    famotidine (PEPCID) 40 MG tablet 30 tablet 0     Sig: Take 1 tablet by mouth every evening For itching of skin    triamcinolone (KENALOG) 0.1 % cream 45 g 0     Sig: Apply topically  to the affected area 2 times a day elbows and right hip/side rash     No follow-ups on file. Orders Placed This Encounter   Medications    famotidine (PEPCID) 40 MG tablet     Sig: Take 1 tablet by mouth every evening For itching of skin     Dispense:  30 tablet     Refill:  0    triamcinolone (KENALOG) 0.1 % cream     Sig: Apply topically  to the affected area 2 times a day elbows and right hip/side rash     Dispense:  45 g     Refill:  0     No orders of the defined types were placed in this encounter. Patient Instructions   You may be receiving a survey from [employer] regarding your visit today. You may get this in the mail, through your MyChart or in your email. Please complete the survey to enable us to provide the highest quality of care to you and your family. If you cannot score us as very good ( 5 Stars) on any question, please feel free to call the office to discuss how we could have made your experience exceptional.     Thank You! WILBUR Randall  [address] [place]        Phone: [phone]  Fax: [address] [facility] Office Hours:  Monday: [facility] office location [phone] ([phone]) Offering additional late hours the first Monday of the month until 7 pm.   Tuesday: 8-5 Wednesday: 8-5 Thursday:  Additional hours offered 2 Thursdays a month. Please call to inquire those dates.    Fridays: 7:30-4:30        Electronically signed by LORY Randall CNP on 5/13/2022 at 8:42 AM Completed Refills   Requested Prescriptions     Signed Prescriptions Disp Refills    famotidine (PEPCID) 40 MG tablet 30 tablet 0     Sig: Take 1 tablet by mouth every evening For itching of skin    triamcinolone (KENALOG) 0.1 % cream 45 g 0     Sig: Apply topically  to the affected area 2 times a day elbows and right hip/side rash

## 2022-05-11 NOTE — PATIENT INSTRUCTIONS
You may be receiving a survey from [employer] regarding your visit today. You may get this in the mail, through your MyChart or in your email. Please complete the survey to enable us to provide the highest quality of care to you and your family. If you cannot score us as very good ( 5 Stars) on any question, please feel free to call the office to discuss how we could have made your experience exceptional.     Thank You! LORY Martinez-CNP  [address] Kevin Bridges LPN        Phone: [phone]  Fax: [phone] [facility] Office Hours:  Monday: [facility] office location 8-5 ([phone]) Offering additional late hours the first Monday of the month until 7 pm.   Tuesday: 8-5 Wednesday: 8-5 Thursday:  Additional hours offered 2 Thursdays a month. Please call to inquire those dates.    Fridays: 7:30-4:30

## 2022-05-13 ASSESSMENT — ENCOUNTER SYMPTOMS
COUGH: 0
EYES NEGATIVE: 1
RHINORRHEA: 0

## 2022-06-06 ENCOUNTER — OFFICE VISIT (OUTPATIENT)
Dept: FAMILY MEDICINE CLINIC | Age: 87
End: 2022-06-06
Payer: MEDICARE

## 2022-06-06 ENCOUNTER — HOSPITAL ENCOUNTER (OUTPATIENT)
Age: 87
Discharge: HOME | End: 2022-06-06
Payer: MEDICARE

## 2022-06-06 VITALS
BODY MASS INDEX: 25.7 KG/M2 | DIASTOLIC BLOOD PRESSURE: 74 MMHG | SYSTOLIC BLOOD PRESSURE: 122 MMHG | WEIGHT: 183.6 LBS | TEMPERATURE: 97.4 F | HEIGHT: 71 IN

## 2022-06-06 DIAGNOSIS — E03.9 ACQUIRED HYPOTHYROIDISM: ICD-10-CM

## 2022-06-06 DIAGNOSIS — I25.10 CORONARY ARTERY DISEASE INVOLVING NATIVE CORONARY ARTERY OF NATIVE HEART WITHOUT ANGINA PECTORIS: ICD-10-CM

## 2022-06-06 DIAGNOSIS — J44.9 STAGE 1 MILD COPD BY GOLD CLASSIFICATION (HCC): ICD-10-CM

## 2022-06-06 DIAGNOSIS — H90.0 CONDUCTIVE HEARING LOSS, BILATERAL: ICD-10-CM

## 2022-06-06 DIAGNOSIS — K14.4 SMOOTH TONGUE: ICD-10-CM

## 2022-06-06 DIAGNOSIS — Z95.0 PACEMAKER: ICD-10-CM

## 2022-06-06 DIAGNOSIS — I50.43 CHF (CONGESTIVE HEART FAILURE), NYHA CLASS I, ACUTE ON CHRONIC, COMBINED (HCC): Primary | ICD-10-CM

## 2022-06-06 DIAGNOSIS — E78.2 MIXED HYPERLIPIDEMIA: ICD-10-CM

## 2022-06-06 DIAGNOSIS — E55.9 VITAMIN D DEFICIENCY: ICD-10-CM

## 2022-06-06 DIAGNOSIS — I50.43 CHF (CONGESTIVE HEART FAILURE), NYHA CLASS I, ACUTE ON CHRONIC, COMBINED (HCC): ICD-10-CM

## 2022-06-06 DIAGNOSIS — I48.0 PAROXYSMAL ATRIAL FIBRILLATION (HCC): ICD-10-CM

## 2022-06-06 LAB
ABSOLUTE EOS #: 0.2 K/UL (ref 0–0.4)
ABSOLUTE LYMPH #: 1.4 K/UL (ref 1–4.8)
ABSOLUTE MONO #: 0.4 K/UL (ref 0–1)
ALBUMIN SERPL-MCNC: 4.2 G/DL (ref 3.5–5.2)
ALP BLD-CCNC: 103 U/L (ref 40–129)
ALT SERPL-CCNC: 7 U/L (ref 5–41)
ANION GAP SERPL CALCULATED.3IONS-SCNC: 11 MMOL/L (ref 9–17)
AST SERPL-CCNC: 15 U/L
BASOPHILS # BLD: 1 % (ref 0–2)
BASOPHILS ABSOLUTE: 0 K/UL (ref 0–0.2)
BILIRUB SERPL-MCNC: 0.72 MG/DL (ref 0.3–1.2)
BUN BLDV-MCNC: 22 MG/DL (ref 8–23)
BUN/CREAT BLD: 19 (ref 9–20)
CALCIUM SERPL-MCNC: 9.8 MG/DL (ref 8.6–10.4)
CHLORIDE BLD-SCNC: 99 MMOL/L (ref 98–107)
CHOLESTEROL/HDL RATIO: 5.6
CHOLESTEROL: 186 MG/DL
CO2: 27 MMOL/L (ref 20–31)
CREAT SERPL-MCNC: 1.14 MG/DL (ref 0.7–1.2)
DIFFERENTIAL TYPE: YES
EOSINOPHILS RELATIVE PERCENT: 4 % (ref 0–5)
FOLATE: 10.7 NG/ML
GFR AFRICAN AMERICAN: >60 ML/MIN
GFR NON-AFRICAN AMERICAN: >60 ML/MIN
GFR SERPL CREATININE-BSD FRML MDRD: ABNORMAL ML/MIN/{1.73_M2}
GLUCOSE BLD-MCNC: 101 MG/DL (ref 70–99)
HCT VFR BLD CALC: 36.8 % (ref 41–53)
HDLC SERPL-MCNC: 33 MG/DL
HEMOGLOBIN: 12.2 G/DL (ref 13.5–17.5)
LDL CHOLESTEROL: 135 MG/DL (ref 0–130)
LYMPHOCYTES # BLD: 26 % (ref 13–44)
MCH RBC QN AUTO: 30.9 PG (ref 26–34)
MCHC RBC AUTO-ENTMCNC: 33.2 G/DL (ref 31–37)
MCV RBC AUTO: 93.2 FL (ref 80–100)
MONOCYTES # BLD: 7 % (ref 5–9)
PATIENT FASTING?: YES
PDW BLD-RTO: 13.6 % (ref 12.1–15.2)
PLATELET # BLD: 199 K/UL (ref 140–450)
POTASSIUM SERPL-SCNC: 4.5 MMOL/L (ref 3.7–5.3)
PRO-BNP: 3356 PG/ML
RBC # BLD: 3.95 M/UL (ref 4.5–5.9)
SEG NEUTROPHILS: 62 % (ref 39–75)
SEGMENTED NEUTROPHILS ABSOLUTE COUNT: 3.5 K/UL (ref 2.1–6.5)
SODIUM BLD-SCNC: 137 MMOL/L (ref 135–144)
TOTAL PROTEIN: 7.5 G/DL (ref 6.4–8.3)
TRIGL SERPL-MCNC: 92 MG/DL
TSH SERPL DL<=0.05 MIU/L-ACNC: 0.9 UIU/ML (ref 0.3–5)
VITAMIN B-12: 167 PG/ML (ref 232–1245)
VITAMIN D 25-HYDROXY: 20.4 NG/ML
WBC # BLD: 5.6 K/UL (ref 3.5–11)

## 2022-06-06 PROCEDURE — 85025 COMPLETE CBC W/AUTO DIFF WBC: CPT

## 2022-06-06 PROCEDURE — 3023F SPIROM DOC REV: CPT | Performed by: NURSE PRACTITIONER

## 2022-06-06 PROCEDURE — 80053 COMPREHEN METABOLIC PANEL: CPT

## 2022-06-06 PROCEDURE — 1036F TOBACCO NON-USER: CPT | Performed by: NURSE PRACTITIONER

## 2022-06-06 PROCEDURE — 80061 LIPID PANEL: CPT

## 2022-06-06 PROCEDURE — 82746 ASSAY OF FOLIC ACID SERUM: CPT

## 2022-06-06 PROCEDURE — 82607 VITAMIN B-12: CPT

## 2022-06-06 PROCEDURE — 84443 ASSAY THYROID STIM HORMONE: CPT

## 2022-06-06 PROCEDURE — 82306 VITAMIN D 25 HYDROXY: CPT

## 2022-06-06 PROCEDURE — 99214 OFFICE O/P EST MOD 30 MIN: CPT | Performed by: NURSE PRACTITIONER

## 2022-06-06 PROCEDURE — G8417 CALC BMI ABV UP PARAM F/U: HCPCS | Performed by: NURSE PRACTITIONER

## 2022-06-06 PROCEDURE — 36415 COLL VENOUS BLD VENIPUNCTURE: CPT

## 2022-06-06 PROCEDURE — 83880 ASSAY OF NATRIURETIC PEPTIDE: CPT

## 2022-06-06 PROCEDURE — 1123F ACP DISCUSS/DSCN MKR DOCD: CPT | Performed by: NURSE PRACTITIONER

## 2022-06-06 PROCEDURE — G8427 DOCREV CUR MEDS BY ELIG CLIN: HCPCS | Performed by: NURSE PRACTITIONER

## 2022-06-06 RX ORDER — LEVOTHYROXINE SODIUM 0.1 MG/1
100 TABLET ORAL DAILY
Qty: 90 TABLET | Refills: 1 | Status: SHIPPED | OUTPATIENT
Start: 2022-06-06 | End: 2022-10-14 | Stop reason: DRUGHIGH

## 2022-06-06 RX ORDER — METOPROLOL SUCCINATE 50 MG/1
50 TABLET, EXTENDED RELEASE ORAL DAILY
Qty: 90 TABLET | Refills: 1 | Status: SHIPPED | OUTPATIENT
Start: 2022-06-06 | End: 2022-10-20 | Stop reason: ALTCHOICE

## 2022-06-06 RX ORDER — ISOSORBIDE MONONITRATE 30 MG/1
15 TABLET, EXTENDED RELEASE ORAL DAILY
Qty: 30 TABLET | Refills: 5 | Status: SHIPPED | OUTPATIENT
Start: 2022-06-06

## 2022-06-06 RX ORDER — ROSUVASTATIN CALCIUM 20 MG/1
20 TABLET, COATED ORAL DAILY
Qty: 90 TABLET | Refills: 1 | Status: SHIPPED | OUTPATIENT
Start: 2022-06-06

## 2022-06-06 ASSESSMENT — PATIENT HEALTH QUESTIONNAIRE - PHQ9
2. FEELING DOWN, DEPRESSED OR HOPELESS: 0
SUM OF ALL RESPONSES TO PHQ QUESTIONS 1-9: 0
1. LITTLE INTEREST OR PLEASURE IN DOING THINGS: 0
SUM OF ALL RESPONSES TO PHQ QUESTIONS 1-9: 0
SUM OF ALL RESPONSES TO PHQ9 QUESTIONS 1 & 2: 0

## 2022-06-06 NOTE — PROGRESS NOTES
[facility]  [facility]  [address] [facility]  [place] [address]  Dept: [phone]     Subjective:  Toyn Hudson is a 80 y.o. male presenting today for routine follow up of hypertension, hyperlipidemia, hypothyroidism and impaired fasting glucose and recent labs. He denies chest pain, shortness of breath or palpitations. He denies headaches, vision changes and lightheadedness. He denies myalgias. He denies numbness and tingling in the hands and feet. He has been compliant with diet and exercise. He has been compliant with his medications. He is tolerating medications. · Hypertension: well controlled with current medications   · Medications: continue current medication doses   Lab Results   Component Value Date     06/06/2022    K 4.5 06/06/2022    CL 99 06/06/2022    CO2 27 06/06/2022    BUN 22 06/06/2022    CREATININE 1.14 06/06/2022    GLUCOSE 101 (H) 06/06/2022    CALCIUM 9.8 06/06/2022    PROT 7.5 06/06/2022    LABALBU 4.2 06/06/2022    BILITOT 0.72 06/06/2022    ALKPHOS 103 06/06/2022    AST 15 06/06/2022    ALT 7 06/06/2022    LABGLOM >60 06/06/2022    GFRAA >60 06/06/2022    GLOB NOT REPORTED 04/21/2014     ·   · Hyperlipidemia: well controlled with current medications   · Medications: missing crestor medication    · Recommended low cholesterol diet   meds brought in today and crestor missing from meds, pt not taking. Will reorder meds.    Lab Results   Component Value Date    LDLCHOLESTEROL 135 (H) 06/06/2022     · Hypothyroidism:  well controlled with current dose of Synthroid   · Medications: continue current Synthroid dose   · Repeat thyroid levels with next routine lab visit   Lab Results   Component Value Date    TSH 0.90 06/06/2022       · Impaired fasting glucose:  stable  · Recommend low carb diet    · Nutrition Status:  well developed, well nourished   · Recommend continue current healthy lifestyle with diet and regular exercise         CURRENT ALLERGIES: Menthol (topical analgesic)    SOCIAL HISTORY:   Social History     Tobacco Use    Smoking status: Never Smoker    Smokeless tobacco: Never Used   Vaping Use    Vaping Use: Never used   Substance Use Topics    Alcohol use: Not Currently     Comment: 6 monthly    Drug use: No       HPI       Medication List          Accurate as of June 6, 2022 11:59 PM. If you have any questions, ask your nurse or doctor. START taking these medications    Tetanus-Diphth-Acell Pertussis 5-2.5-18.5 LF-MCG/0.5 injection  Commonly known as: BOOSTRIX  Inject 0.5 mLs into the muscle once for 1 dose  Started by: LORY Burger CNP        CONTINUE taking these medications    albuterol sulfate  (90 Base) MCG/ACT inhaler  Commonly known as: Ventolin HFA  Inhale 2 puffs into the lungs every 4 hours as needed for Wheezing     aspirin 81 MG tablet     clopidogrel 75 MG tablet  Commonly known as: PLAVIX  Take 1 tablet by mouth daily     furosemide 20 MG tablet  Commonly known as: Lasix  Take 1 pill by mouth twice a week Tuesday and Friday only please for Congestive heart failure.      isosorbide mononitrate 30 MG extended release tablet  Commonly known as: IMDUR  Take 0.5 tablets by mouth daily     levothyroxine 100 MCG tablet  Commonly known as: SYNTHROID  Take 1 tablet by mouth Daily hypothyroidism     Magnesium 400 MG Tabs  Take 400 mg by mouth daily     metoprolol succinate 50 MG extended release tablet  Commonly known as: TOPROL XL  Take 1 tablet by mouth daily     rosuvastatin 20 MG tablet  Commonly known as: CRESTOR  Take 1 tablet by mouth daily        STOP taking these medications    Breo Ellipta 100-25 MCG/INH Aepb inhaler  Generic drug: fluticasone-vilanterol  Stopped by: LORY Burger CNP     famotidine 40 MG tablet  Commonly known as: PEPCID  Stopped by: LORY Burger CNP     triamcinolone 0.1 % cream  Commonly known as: KENALOG  Stopped by: LORY Burger CNP Where to Get Your Medications      These medications were sent to [address] [place] [address] Gama Camara [phone]  Edin Newsome [address] [place] [id number]    Phone: [phone]   · isosorbide mononitrate 30 MG extended release tablet  · levothyroxine 100 MCG tablet  · metoprolol succinate 50 MG extended release tablet  · rosuvastatin 20 MG tablet  · Tetanus-Diphth-Acell Pertussis 5-2.5-18.5 LF-MCG/0.5 injection            Review of Systems   Constitutional: Negative for activity change and chills. HENT: Negative for congestion, rhinorrhea and sore throat. Eyes: Negative for discharge. Respiratory: Negative for cough. Cardiovascular: Negative for chest pain and leg swelling. Gastrointestinal: Negative for abdominal distention. Endocrine: Negative for cold intolerance and heat intolerance. Genitourinary: Negative for difficulty urinating. Musculoskeletal: Negative for arthralgias. Neurological: Negative for dizziness and headaches. Psychiatric/Behavioral: Negative for agitation. Objective:  /74 (Site: Left Upper Arm, Position: Sitting)   Temp 97.4 °F (36.3 °C)   Ht 5' 11\" (1.803 m)   Wt 183 lb 9.6 oz (83.3 kg)   BMI 25.61 kg/m²   Physical Exam  Vitals and nursing note reviewed. Constitutional:       General: He is not in acute distress. Appearance: Normal appearance. He is well-developed. HENT:      Head: Normocephalic and atraumatic. Right Ear: Tympanic membrane normal.      Left Ear: Tympanic membrane and external ear normal.      Nose: No congestion. Mouth/Throat:      Pharynx: No oropharyngeal exudate. Eyes:      Pupils: Pupils are equal, round, and reactive to light. Neck:      Vascular: No carotid bruit (neg thalia). Cardiovascular:      Rate and Rhythm: Normal rate and regular rhythm. Pulses: Normal pulses. Heart sounds: Normal heart sounds. No murmur heard.       Pulmonary:      Effort: Pulmonary effort is normal. No respiratory distress. Breath sounds: Normal breath sounds. Abdominal:      Palpations: Abdomen is soft. There is no mass. Tenderness: There is no abdominal tenderness. Musculoskeletal:         General: Normal range of motion. Cervical back: Normal range of motion and neck supple. Right lower leg: No edema. Left lower leg: No edema. Lymphadenopathy:      Cervical: No cervical adenopathy. Skin:     General: Skin is warm. Findings: No rash. Neurological:      Mental Status: He is alert and oriented to person, place, and time. Psychiatric:         Behavior: Behavior normal.         Thought Content: Thought content normal.         Judgment: Judgment normal.           Diagnostic Data:  Lab Results   Component Value Date    GLUCOSE 101 (H) 06/06/2022     Lab Results   Component Value Date    BUN 22 06/06/2022    CREATININE 1.14 06/06/2022     06/06/2022    K 4.5 06/06/2022    CALCIUM 9.8 06/06/2022    CL 99 06/06/2022    CO2 27 06/06/2022    LABGLOM >60 06/06/2022     Lab Results   Component Value Date    CHOL 186 06/06/2022    HDL 33 (L) 06/06/2022    LDLCHOLESTEROL 135 (H) 06/06/2022    TRIG 92 06/06/2022    ALT 7 06/06/2022    AST 15 06/06/2022     Lab Results   Component Value Date    TSH 0.90 06/06/2022    THYROXINE 2.02 (H) 10/11/2021    T4FREE 1.25 08/09/2013    FT3 3.32 03/25/2019     1.  CHF (congestive heart failure), NYHA class I, acute on chronic, combined [facility])  Lab Results   Component Value Date     06/06/2022    K 4.5 06/06/2022    CL 99 06/06/2022    CO2 27 06/06/2022    BUN 22 06/06/2022    CREATININE 1.14 06/06/2022    GLUCOSE 101 (H) 06/06/2022    CALCIUM 9.8 06/06/2022    PROT 7.5 06/06/2022    LABALBU 4.2 06/06/2022    BILITOT 0.72 06/06/2022    ALKPHOS 103 06/06/2022    AST 15 06/06/2022    ALT 7 06/06/2022    LABGLOM >60 06/06/2022    GFRAA >60 06/06/2022    GLOB NOT REPORTED 04/21/2014       - CBC with Auto Differential; Future  - Comprehensive Metabolic Panel; Future  - Brain Natriuretic Peptide; Future    2. Paroxysmal atrial fibrillation (HCC)  Rate and rhythms controlled    - BC with Auto Differential; Future  - Comprehensive Metabolic Panel; Future    3. Acquired hypothyroidism  On supplement  - TSH with Reflex; Future  - levothyroxine (SYNTHROID) 100 MCG tablet; Take 1 tablet by mouth Daily hypothyroidism  Dispense: 90 tablet; Refill: 1    4. Conductive hearing loss, bilateral    5. Pacemaker    6. Coronary artery disease involving native coronary artery of native heart without angina pectoris  No chest pain  Able to mow lawns   - CBC with Auto Differential; Future  - Comprehensive Metabolic Panel; Future    7. Mixed hyperlipidemia  On statin  - Lipid Panel; Future    8. Vitamin D deficiency    - Vitamin D 25 Hydroxy; Future    9. Smooth tongue    - Vitamin B12 & Folate; Future    10.  Stage 1 mild COPD by GOLD classification ([facility] [address]          · Health Maintenance:  · Discussed health maintenance issues: patient is up to date for health maintenance items  · Discussed immunization schedule: patient is up to date for vaccinations    · Follow Up  · Labs: done today  ·   · Follow up appt to be scheduled in 6 months to f/u labs    Electronically signed by LORY Bardales CNP on 6/9/2022 at 11:49 PM

## 2022-06-09 ASSESSMENT — ENCOUNTER SYMPTOMS
SORE THROAT: 0
COUGH: 0
RHINORRHEA: 0
ABDOMINAL DISTENTION: 0
EYE DISCHARGE: 0

## 2022-06-27 ENCOUNTER — NURSE ONLY (OUTPATIENT)
Dept: FAMILY MEDICINE CLINIC | Age: 87
End: 2022-06-27
Payer: MEDICARE

## 2022-06-27 VITALS — OXYGEN SATURATION: 96 % | SYSTOLIC BLOOD PRESSURE: 130 MMHG | DIASTOLIC BLOOD PRESSURE: 80 MMHG

## 2022-06-27 DIAGNOSIS — E55.9 VITAMIN D DEFICIENCY: Primary | ICD-10-CM

## 2022-06-27 PROCEDURE — 96372 THER/PROPH/DIAG INJ SC/IM: CPT | Performed by: NURSE PRACTITIONER

## 2022-06-27 RX ORDER — CYANOCOBALAMIN 1000 UG/ML
1000 INJECTION INTRAMUSCULAR; SUBCUTANEOUS ONCE
Status: COMPLETED | OUTPATIENT
Start: 2022-06-27 | End: 2022-06-27

## 2022-06-27 RX ADMIN — CYANOCOBALAMIN 1000 MCG: 1000 INJECTION INTRAMUSCULAR; SUBCUTANEOUS at 14:52

## 2022-06-28 RX ORDER — FUROSEMIDE 20 MG/1
TABLET ORAL
Qty: 12 TABLET | Refills: 5 | Status: SHIPPED | OUTPATIENT
Start: 2022-06-28 | End: 2022-10-20 | Stop reason: ALTCHOICE

## 2022-06-28 NOTE — TELEPHONE ENCOUNTER
Patient called in asking for lasix to be sent in        Last OV: 5/11/2022  Last RX:   Next scheduled apt: Visit date not found

## 2022-07-07 RX ORDER — CLOPIDOGREL BISULFATE 75 MG/1
75 TABLET ORAL DAILY
Qty: 90 TABLET | Refills: 1 | Status: SHIPPED | OUTPATIENT
Start: 2022-07-07

## 2022-07-07 NOTE — TELEPHONE ENCOUNTER
Patient called in requesting refill on clopidogrel       Pending for review       Last OV: 5/11/2022  Last RX: 04/2022  Next scheduled apt:07/27/2022

## 2022-09-16 ENCOUNTER — TELEPHONE (OUTPATIENT)
Dept: PRIMARY CARE CLINIC | Age: 87
End: 2022-09-16

## 2022-09-16 NOTE — TELEPHONE ENCOUNTER
----- Message from Yung Velasco sent at 9/16/2022 10:33 AM EDT -----  Subject: Appointment Request    Reason for Call: Established Patient Appointment needed: Urgent Adult   Urinary Problem    QUESTIONS    Reason for appointment request? No appointments available during search     Additional Information for Provider?  Patient would like to be scheduled an   appt for their frequent urination  ---------------------------------------------------------------------------  --------------  Lukas CH  [phone]; OK to leave message on voicemail  ---------------------------------------------------------------------------  --------------  SCRIPT ANSWERS  COVID Screen: Hemant Chan

## 2022-09-20 ENCOUNTER — OFFICE VISIT (OUTPATIENT)
Dept: PRIMARY CARE CLINIC | Age: 87
End: 2022-09-20
Payer: MEDICARE

## 2022-09-20 ENCOUNTER — HOSPITAL ENCOUNTER (OUTPATIENT)
Age: 87
Setting detail: SPECIMEN
Discharge: HOME | End: 2022-09-20
Payer: MEDICARE

## 2022-09-20 VITALS
BODY MASS INDEX: 25.8 KG/M2 | HEART RATE: 76 BPM | DIASTOLIC BLOOD PRESSURE: 60 MMHG | SYSTOLIC BLOOD PRESSURE: 115 MMHG | TEMPERATURE: 97.5 F | WEIGHT: 185 LBS | OXYGEN SATURATION: 96 %

## 2022-09-20 DIAGNOSIS — I50.22 CHRONIC SYSTOLIC (CONGESTIVE) HEART FAILURE (HCC): Primary | ICD-10-CM

## 2022-09-20 DIAGNOSIS — R35.0 FREQUENT URINATION: ICD-10-CM

## 2022-09-20 DIAGNOSIS — R35.0 BENIGN PROSTATIC HYPERPLASIA WITH URINARY FREQUENCY: ICD-10-CM

## 2022-09-20 DIAGNOSIS — Z12.5 PROSTATE CANCER SCREENING: ICD-10-CM

## 2022-09-20 DIAGNOSIS — N40.1 BENIGN PROSTATIC HYPERPLASIA WITH URINARY FREQUENCY: ICD-10-CM

## 2022-09-20 DIAGNOSIS — E03.9 ACQUIRED HYPOTHYROIDISM: ICD-10-CM

## 2022-09-20 DIAGNOSIS — I48.0 PAROXYSMAL ATRIAL FIBRILLATION (HCC): ICD-10-CM

## 2022-09-20 LAB
BILIRUBIN, POC: NEGATIVE
BLOOD URINE, POC: NEGATIVE
CHP ED QC CHECK: NORMAL
CLARITY, POC: CLEAR
COLOR, POC: YELLOW
GLUCOSE BLD-MCNC: 124 MG/DL
GLUCOSE URINE, POC: NEGATIVE
KETONES, POC: NEGATIVE
LEUKOCYTE EST, POC: NEGATIVE
NITRITE, POC: NEGATIVE
PH, POC: 5.5
PROTEIN, POC: NEGATIVE
SPECIFIC GRAVITY, POC: 1.03
UROBILINOGEN, POC: 0.2

## 2022-09-20 PROCEDURE — G8417 CALC BMI ABV UP PARAM F/U: HCPCS | Performed by: NURSE PRACTITIONER

## 2022-09-20 PROCEDURE — 99213 OFFICE O/P EST LOW 20 MIN: CPT | Performed by: NURSE PRACTITIONER

## 2022-09-20 PROCEDURE — 81003 URINALYSIS AUTO W/O SCOPE: CPT | Performed by: NURSE PRACTITIONER

## 2022-09-20 PROCEDURE — G8427 DOCREV CUR MEDS BY ELIG CLIN: HCPCS | Performed by: NURSE PRACTITIONER

## 2022-09-20 PROCEDURE — 87186 SC STD MICRODIL/AGAR DIL: CPT

## 2022-09-20 PROCEDURE — 82962 GLUCOSE BLOOD TEST: CPT | Performed by: NURSE PRACTITIONER

## 2022-09-20 PROCEDURE — 1123F ACP DISCUSS/DSCN MKR DOCD: CPT | Performed by: NURSE PRACTITIONER

## 2022-09-20 PROCEDURE — 87086 URINE CULTURE/COLONY COUNT: CPT

## 2022-09-20 PROCEDURE — 1036F TOBACCO NON-USER: CPT | Performed by: NURSE PRACTITIONER

## 2022-09-20 PROCEDURE — 87077 CULTURE AEROBIC IDENTIFY: CPT

## 2022-09-20 RX ORDER — TAMSULOSIN HYDROCHLORIDE 0.4 MG/1
0.4 CAPSULE ORAL NIGHTLY
Qty: 30 CAPSULE | Refills: 0 | Status: SHIPPED | OUTPATIENT
Start: 2022-09-20

## 2022-09-20 ASSESSMENT — ENCOUNTER SYMPTOMS
COUGH: 0
ABDOMINAL DISTENTION: 0
EYES NEGATIVE: 1
RHINORRHEA: 0
CHEST TIGHTNESS: 0

## 2022-09-20 NOTE — PROGRESS NOTES
HPI Notes    Name: Bette Walsh  : 1932         Chief Complaint:     Chief Complaint   Patient presents with    Urinary Frequency     Symptoms started 2 months ago       History of Present Illness:        Urinary Frequency   Associated symptoms include frequency and urgency. Pertinent negatives include no chills. 77-year-old male with complaints of history of recent increased frequency of urination especially at night urinalysis today in the office is completely normal has had no thieves fever no body aches states he is up between 2-4 times a night he is on a water pill I do reassure that he is taking this in the morning    Is missing his statin or cholesterol medication was filled in  per pharmacy likely did not refill it after the first 3-month supply will restart medication today    Patient I reviewed the pathophysiology of the prostate gland and likelihood his gland is large restricting the flow he denies any lower pelvic pressure constipation we will trial Flomax once a day at bedtime to see if this helps I do warn him that this could cause some dizziness patient is still active and out mowing his lawn    He is agreeable to call or follow-up in a month or let us know within the next couple weeks if the medication is working or if his symptoms persist may need to see urology with likelihood of enlarged prostate causing issues I did not do a prostate exam today patient declines having that exam done in several years last PSA is normal  Lab Results   Component Value Date    PSA 2.02 2021       Chf with good control on lasix 3 x week , weight stable  Lungs clear no shortness of breath no swelling in ankles. Pt energy feels good spends most of day outside.    Doing well      Past Medical History:     Past Medical History:   Diagnosis Date    Atrial fibrillation ([address]     CAD (coronary artery disease)     Hard of hearing     Hiatal hernia     History of PTCA     2012    Hyperlipidemia Hypothyroidism     Pacemaker     Decisive BI      Reviewed all health maintenance requirements and ordered appropriate tests  Health Maintenance Due   Topic Date Due    COVID-19 Vaccine (1) Never done    DTaP/Tdap/Td vaccine (1 - Tdap) Never done    Shingles vaccine (1 of 2) Never done    Flu vaccine (1) 09/01/2022       Past Surgical History:     Past Surgical History:   Procedure Laterality Date    CARDIAC SURGERY      CORONARY ARTERY BYPASS GRAFT  2000    quad bypass Linda    HERNIA REPAIR  28/43/4166    UMBILICAL HERNIA REPAIR    PACEMAKER INSERTION  8/6/12    Decisive BI    PTCA  36/63/22    UMBILICAL HERNIA REPAIR  11/21/2016    Jem Burnette MD        Medications:       Prior to Admission medications    Medication Sig Start Date End Date Taking? Authorizing Provider   tamsulosin (FLOMAX) 0.4 MG capsule Take 1 capsule by mouth at bedtime Urinary frequency/enlarged prostate, take at bedtime 9/20/22  Yes LORY Whitaker CNP   clopidogrel (PLAVIX) 75 MG tablet Take 1 tablet by mouth daily 7/7/22  Yes Torri Spencer DO   furosemide (LASIX) 20 MG tablet Take 1 pill by mouth twice a week Tuesday and Friday only please for Congestive heart failure. 6/28/22  Yes LORY Whitaker CNP   rosuvastatin (CRESTOR) 20 MG tablet Take 1 tablet by mouth daily 6/6/22  Yes LORY Whitaker CNP   levothyroxine (SYNTHROID) 100 MCG tablet Take 1 tablet by mouth Daily hypothyroidism 6/6/22  Yes LORY Whitaker CNP   isosorbide mononitrate (IMDUR) 30 MG extended release tablet Take 0.5 tablets by mouth daily 6/6/22  Yes LORY Whitaker CNP   metoprolol succinate (TOPROL XL) 50 MG extended release tablet Take 1 tablet by mouth daily 6/6/22  Yes LORY Whitaker CNP   Magnesium 400 MG TABS Take 400 mg by mouth daily 5/2/22  Yes Kashif Edwards MD   aspirin 81 MG tablet Take 81 mg by mouth daily.    Yes Historical Provider, MD   albuterol sulfate HFA (VENTOLIN HFA) 108 (90 Base) MCG/ACT inhaler Inhale 2 puffs into the lungs every 4 hours as needed for Wheezing  Patient not taking: Reported on 10/11/2021 3/5/21   LORY Calderon CNP   metoprolol (LOPRESSOR) 100 MG tablet Take 1 tablet by mouth 2 times daily  Patient taking differently: Take 100 mg by mouth daily  3/27/20 4/26/21  Michael Hoyos MD        Allergies:       Menthol (topical analgesic)    Social History:     Tobacco:    reports that he has never smoked. He has never used smokeless tobacco.  Alcohol:      reports that he does not currently use alcohol. Drug Use:  reports no history of drug use. Family History:     Family History   Problem Relation Age of Onset    Heart Disease Father        Review of Systems:         Review of Systems   Constitutional:  Negative for activity change, chills and fever. HENT:  Negative for congestion and rhinorrhea. Eyes: Negative. Respiratory:  Negative for cough and chest tightness. Cardiovascular:  Negative for chest pain and leg swelling. Gastrointestinal:  Negative for abdominal distention. Genitourinary:  Positive for difficulty urinating, frequency and urgency. Musculoskeletal:  Negative for arthralgias. Skin:  Negative for rash. Neurological:  Negative for dizziness and headaches. Psychiatric/Behavioral:  The patient is not nervous/anxious. Physical Exam:     Vitals:  /60   Pulse 76   Temp 97.5 °F (36.4 °C)   Wt 185 lb (83.9 kg)   SpO2 96%   BMI 25.80 kg/m²       Physical Exam  Vitals and nursing note reviewed. Constitutional:       General: He is not in acute distress. Appearance: Normal appearance. He is well-developed. HENT:      Head: Normocephalic and atraumatic. Right Ear: Tympanic membrane normal.      Left Ear: Tympanic membrane and external ear normal.      Nose: No congestion. Mouth/Throat:      Mouth: Mucous membranes are moist.      Pharynx: No oropharyngeal exudate.    Eyes:      Pupils: Pupils are equal, round, and reactive to light. Cardiovascular:      Rate and Rhythm: Normal rate and regular rhythm. Pulses: Normal pulses. Heart sounds: Normal heart sounds. No murmur heard. Pulmonary:      Effort: Pulmonary effort is normal. No respiratory distress. Breath sounds: Normal breath sounds. Abdominal:      Palpations: Abdomen is soft. There is no mass. Tenderness: There is no abdominal tenderness. Musculoskeletal:         General: Normal range of motion. Cervical back: Normal range of motion and neck supple. Right lower leg: No edema. Left lower leg: No edema. Lymphadenopathy:      Cervical: No cervical adenopathy. Skin:     General: Skin is warm. Findings: No rash. Neurological:      Mental Status: He is alert and oriented to person, place, and time. Psychiatric:         Behavior: Behavior normal.         Thought Content:  Thought content normal.         Judgment: Judgment normal.             Data:     Lab Results   Component Value Date/Time     06/06/2022 10:31 AM    K 4.5 06/06/2022 10:31 AM    CL 99 06/06/2022 10:31 AM    CO2 27 06/06/2022 10:31 AM    BUN 22 06/06/2022 10:31 AM    CREATININE 1.14 06/06/2022 10:31 AM    GLUCOSE 124 09/20/2022 11:49 AM    PROT 7.5 06/06/2022 10:31 AM    LABALBU 4.2 06/06/2022 10:31 AM    BILITOT 0.72 06/06/2022 10:31 AM    ALKPHOS 103 06/06/2022 10:31 AM    AST 15 06/06/2022 10:31 AM    ALT 7 06/06/2022 10:31 AM     Lab Results   Component Value Date/Time    WBC 5.6 06/06/2022 10:31 AM    RBC 3.95 06/06/2022 10:31 AM    HGB 12.2 06/06/2022 10:31 AM    HCT 36.8 06/06/2022 10:31 AM    MCV 93.2 06/06/2022 10:31 AM    MCH 30.9 06/06/2022 10:31 AM    MCHC 33.2 06/06/2022 10:31 AM    RDW 13.6 06/06/2022 10:31 AM     06/06/2022 10:31 AM    MPV NOT REPORTED 10/11/2021 10:31 AM     Lab Results   Component Value Date/Time    TSH 0.90 06/06/2022 10:31 AM     Lab Results   Component Value Date/Time    CHOL 186 06/06/2022 10:31 AM    HDL 33

## 2022-09-22 LAB
CULTURE: ABNORMAL
SPECIMEN DESCRIPTION: ABNORMAL

## 2022-09-22 RX ORDER — CEPHALEXIN 500 MG/1
500 CAPSULE ORAL 3 TIMES DAILY
Qty: 15 CAPSULE | Refills: 0 | Status: SHIPPED | OUTPATIENT
Start: 2022-09-22 | End: 2022-09-27

## 2022-10-14 ENCOUNTER — HOSPITAL ENCOUNTER (INPATIENT)
Age: 87
LOS: 1 days | Discharge: ANOTHER HOSPITAL | DRG: 293 | End: 2022-10-14
Attending: EMERGENCY MEDICINE | Admitting: INTERNAL MEDICINE
Payer: MEDICARE

## 2022-10-14 ENCOUNTER — APPOINTMENT (OUTPATIENT)
Dept: GENERAL RADIOLOGY | Age: 87
DRG: 293 | End: 2022-10-14
Payer: MEDICARE

## 2022-10-14 VITALS
BODY MASS INDEX: 26.05 KG/M2 | SYSTOLIC BLOOD PRESSURE: 117 MMHG | OXYGEN SATURATION: 97 % | DIASTOLIC BLOOD PRESSURE: 62 MMHG | WEIGHT: 182 LBS | TEMPERATURE: 97.5 F | HEIGHT: 70 IN | HEART RATE: 86 BPM | RESPIRATION RATE: 15 BRPM

## 2022-10-14 DIAGNOSIS — Z20.822 LAB TEST NEGATIVE FOR COVID-19 VIRUS: ICD-10-CM

## 2022-10-14 DIAGNOSIS — R79.89 ELEVATED BRAIN NATRIURETIC PEPTIDE (BNP) LEVEL: ICD-10-CM

## 2022-10-14 DIAGNOSIS — R77.8 ELEVATED TROPONIN: ICD-10-CM

## 2022-10-14 DIAGNOSIS — I50.31 ACUTE DIASTOLIC CONGESTIVE HEART FAILURE (HCC): Primary | ICD-10-CM

## 2022-10-14 DIAGNOSIS — N28.9 RENAL INSUFFICIENCY: ICD-10-CM

## 2022-10-14 PROBLEM — I50.23 ACUTE ON CHRONIC SYSTOLIC CHF (CONGESTIVE HEART FAILURE) (HCC): Status: ACTIVE | Noted: 2022-01-01

## 2022-10-14 LAB
ABSOLUTE EOS #: 0.1 K/UL (ref 0–0.4)
ABSOLUTE LYMPH #: 1.5 K/UL (ref 1–4.8)
ABSOLUTE MONO #: 0.4 K/UL (ref 0–1)
ALBUMIN SERPL-MCNC: 4.1 G/DL (ref 3.5–5.2)
ALP BLD-CCNC: 85 U/L (ref 40–129)
ALT SERPL-CCNC: 10 U/L (ref 5–41)
ANION GAP SERPL CALCULATED.3IONS-SCNC: 11 MMOL/L (ref 9–17)
AST SERPL-CCNC: 19 U/L
BASOPHILS # BLD: 0 % (ref 0–2)
BASOPHILS ABSOLUTE: 0 K/UL (ref 0–0.2)
BILIRUB SERPL-MCNC: 0.8 MG/DL (ref 0.3–1.2)
BUN BLDV-MCNC: 18 MG/DL (ref 8–23)
BUN/CREAT BLD: 15 (ref 9–20)
CALCIUM SERPL-MCNC: 9.4 MG/DL (ref 8.6–10.4)
CHLORIDE BLD-SCNC: 102 MMOL/L (ref 98–107)
CO2: 29 MMOL/L (ref 20–31)
CREAT SERPL-MCNC: 1.24 MG/DL (ref 0.7–1.2)
DIFFERENTIAL TYPE: YES
EKG ATRIAL RATE: 106 BPM
EKG Q-T INTERVAL: 456 MS
EKG QRS DURATION: 218 MS
EKG QTC CALCULATION (BAZETT): 605 MS
EKG R AXIS: -77 DEGREES
EKG T AXIS: 110 DEGREES
EKG VENTRICULAR RATE: 106 BPM
EOSINOPHILS RELATIVE PERCENT: 1 % (ref 0–5)
GFR SERPL CREATININE-BSD FRML MDRD: 56 ML/MIN/1.73M2
GLUCOSE BLD-MCNC: 135 MG/DL (ref 70–99)
HCT VFR BLD CALC: 37.8 % (ref 41–53)
HEMOGLOBIN: 12.8 G/DL (ref 13.5–17.5)
INR BLD: 1.1
LYMPHOCYTES # BLD: 20 % (ref 13–44)
MCH RBC QN AUTO: 31.7 PG (ref 26–34)
MCHC RBC AUTO-ENTMCNC: 33.9 G/DL (ref 31–37)
MCV RBC AUTO: 93.5 FL (ref 80–100)
MONOCYTES # BLD: 5 % (ref 5–9)
PARTIAL THROMBOPLASTIN TIME: 29.6 SEC (ref 23.9–33.8)
PARTIAL THROMBOPLASTIN TIME: 96.9 SEC (ref 23.9–33.8)
PDW BLD-RTO: 14.2 % (ref 12.1–15.2)
PLATELET # BLD: 151 K/UL (ref 140–450)
POTASSIUM SERPL-SCNC: 3.5 MMOL/L (ref 3.7–5.3)
PRO-BNP: 3028 PG/ML
PROTHROMBIN TIME: 14.3 SEC (ref 11.5–14.2)
RBC # BLD: 4.04 M/UL (ref 4.5–5.9)
SARS-COV-2, RAPID: NOT DETECTED
SEG NEUTROPHILS: 74 % (ref 39–75)
SEGMENTED NEUTROPHILS ABSOLUTE COUNT: 5.8 K/UL (ref 2.1–6.5)
SODIUM BLD-SCNC: 142 MMOL/L (ref 135–144)
SPECIMEN DESCRIPTION: NORMAL
TOTAL PROTEIN: 7.2 G/DL (ref 6.4–8.3)
TROPONIN, HIGH SENSITIVITY: 130 NG/L (ref 0–22)
TROPONIN, HIGH SENSITIVITY: 45 NG/L (ref 0–22)
TROPONIN, HIGH SENSITIVITY: 75 NG/L (ref 0–22)
WBC # BLD: 7.9 K/UL (ref 3.5–11)

## 2022-10-14 PROCEDURE — 6360000002 HC RX W HCPCS: Performed by: EMERGENCY MEDICINE

## 2022-10-14 PROCEDURE — 99285 EMERGENCY DEPT VISIT HI MDM: CPT

## 2022-10-14 PROCEDURE — 96372 THER/PROPH/DIAG INJ SC/IM: CPT

## 2022-10-14 PROCEDURE — 93010 ELECTROCARDIOGRAM REPORT: CPT | Performed by: INTERNAL MEDICINE

## 2022-10-14 PROCEDURE — 85025 COMPLETE CBC W/AUTO DIFF WBC: CPT

## 2022-10-14 PROCEDURE — 80053 COMPREHEN METABOLIC PANEL: CPT

## 2022-10-14 PROCEDURE — G0378 HOSPITAL OBSERVATION PER HR: HCPCS

## 2022-10-14 PROCEDURE — 83880 ASSAY OF NATRIURETIC PEPTIDE: CPT

## 2022-10-14 PROCEDURE — 85730 THROMBOPLASTIN TIME PARTIAL: CPT

## 2022-10-14 PROCEDURE — 96365 THER/PROPH/DIAG IV INF INIT: CPT

## 2022-10-14 PROCEDURE — 96366 THER/PROPH/DIAG IV INF ADDON: CPT

## 2022-10-14 PROCEDURE — C9803 HOPD COVID-19 SPEC COLLECT: HCPCS

## 2022-10-14 PROCEDURE — 85610 PROTHROMBIN TIME: CPT

## 2022-10-14 PROCEDURE — 36415 COLL VENOUS BLD VENIPUNCTURE: CPT

## 2022-10-14 PROCEDURE — 93005 ELECTROCARDIOGRAM TRACING: CPT | Performed by: EMERGENCY MEDICINE

## 2022-10-14 PROCEDURE — 87635 SARS-COV-2 COVID-19 AMP PRB: CPT

## 2022-10-14 PROCEDURE — 84484 ASSAY OF TROPONIN QUANT: CPT

## 2022-10-14 PROCEDURE — 96375 TX/PRO/DX INJ NEW DRUG ADDON: CPT

## 2022-10-14 PROCEDURE — 1200000000 HC SEMI PRIVATE

## 2022-10-14 PROCEDURE — 6370000000 HC RX 637 (ALT 250 FOR IP): Performed by: FAMILY MEDICINE

## 2022-10-14 PROCEDURE — 6360000002 HC RX W HCPCS: Performed by: FAMILY MEDICINE

## 2022-10-14 PROCEDURE — 96374 THER/PROPH/DIAG INJ IV PUSH: CPT

## 2022-10-14 PROCEDURE — 71045 X-RAY EXAM CHEST 1 VIEW: CPT

## 2022-10-14 RX ORDER — ONDANSETRON 4 MG/1
4 TABLET, ORALLY DISINTEGRATING ORAL EVERY 8 HOURS PRN
Status: CANCELLED | OUTPATIENT
Start: 2022-10-14

## 2022-10-14 RX ORDER — FUROSEMIDE 10 MG/ML
20 INJECTION INTRAMUSCULAR; INTRAVENOUS 2 TIMES DAILY
Status: CANCELLED | OUTPATIENT
Start: 2022-10-14

## 2022-10-14 RX ORDER — TAMSULOSIN HYDROCHLORIDE 0.4 MG/1
0.4 CAPSULE ORAL DAILY
Status: DISCONTINUED | OUTPATIENT
Start: 2022-10-14 | End: 2022-10-14 | Stop reason: HOSPADM

## 2022-10-14 RX ORDER — CALCIUM CARBONATE 300MG(750)
400 TABLET,CHEWABLE ORAL DAILY
Status: CANCELLED | OUTPATIENT
Start: 2022-10-14

## 2022-10-14 RX ORDER — HEPARIN SODIUM 1000 [USP'U]/ML
40 INJECTION, SOLUTION INTRAVENOUS; SUBCUTANEOUS PRN
Status: DISCONTINUED | OUTPATIENT
Start: 2022-10-14 | End: 2022-10-14

## 2022-10-14 RX ORDER — ROSUVASTATIN CALCIUM 20 MG/1
20 TABLET, COATED ORAL DAILY
Status: CANCELLED | OUTPATIENT
Start: 2022-10-14

## 2022-10-14 RX ORDER — CLOPIDOGREL BISULFATE 75 MG/1
75 TABLET ORAL DAILY
Status: CANCELLED | OUTPATIENT
Start: 2022-10-14

## 2022-10-14 RX ORDER — LANOLIN ALCOHOL/MO/W.PET/CERES
1000 CREAM (GRAM) TOPICAL DAILY
COMMUNITY

## 2022-10-14 RX ORDER — ISOSORBIDE MONONITRATE 30 MG/1
15 TABLET, EXTENDED RELEASE ORAL DAILY
Status: CANCELLED | OUTPATIENT
Start: 2022-10-14

## 2022-10-14 RX ORDER — CLOPIDOGREL BISULFATE 75 MG/1
75 TABLET ORAL DAILY
Status: DISCONTINUED | OUTPATIENT
Start: 2022-10-14 | End: 2022-10-14 | Stop reason: HOSPADM

## 2022-10-14 RX ORDER — HEPARIN SODIUM 1000 [USP'U]/ML
80 INJECTION, SOLUTION INTRAVENOUS; SUBCUTANEOUS ONCE
Status: DISCONTINUED | OUTPATIENT
Start: 2022-10-14 | End: 2022-10-14

## 2022-10-14 RX ORDER — HEPARIN SODIUM 1000 [USP'U]/ML
80 INJECTION, SOLUTION INTRAVENOUS; SUBCUTANEOUS PRN
Status: DISCONTINUED | OUTPATIENT
Start: 2022-10-14 | End: 2022-10-14

## 2022-10-14 RX ORDER — ACETAMINOPHEN 325 MG/1
650 TABLET ORAL EVERY 6 HOURS PRN
Status: CANCELLED | OUTPATIENT
Start: 2022-10-14

## 2022-10-14 RX ORDER — HEPARIN SODIUM 10000 [USP'U]/100ML
5-30 INJECTION, SOLUTION INTRAVENOUS CONTINUOUS
Status: DISCONTINUED | OUTPATIENT
Start: 2022-10-14 | End: 2022-10-14 | Stop reason: HOSPADM

## 2022-10-14 RX ORDER — HEPARIN SODIUM 1000 [USP'U]/ML
4000 INJECTION, SOLUTION INTRAVENOUS; SUBCUTANEOUS PRN
Status: DISCONTINUED | OUTPATIENT
Start: 2022-10-14 | End: 2022-10-14 | Stop reason: HOSPADM

## 2022-10-14 RX ORDER — METOPROLOL TARTRATE 50 MG/1
100 TABLET, FILM COATED ORAL DAILY
Status: CANCELLED | OUTPATIENT
Start: 2022-10-14

## 2022-10-14 RX ORDER — SODIUM CHLORIDE 9 MG/ML
INJECTION, SOLUTION INTRAVENOUS PRN
Status: CANCELLED | OUTPATIENT
Start: 2022-10-14

## 2022-10-14 RX ORDER — ASPIRIN 81 MG/1
81 TABLET, CHEWABLE ORAL DAILY
Status: DISCONTINUED | OUTPATIENT
Start: 2022-10-14 | End: 2022-10-14 | Stop reason: HOSPADM

## 2022-10-14 RX ORDER — POLYETHYLENE GLYCOL 3350 17 G/17G
17 POWDER, FOR SOLUTION ORAL DAILY PRN
Status: CANCELLED | OUTPATIENT
Start: 2022-10-14

## 2022-10-14 RX ORDER — LEVOTHYROXINE SODIUM 0.12 MG/1
125 TABLET ORAL DAILY
COMMUNITY
End: 2022-10-19

## 2022-10-14 RX ORDER — LANOLIN ALCOHOL/MO/W.PET/CERES
1000 CREAM (GRAM) TOPICAL DAILY
Status: CANCELLED | OUTPATIENT
Start: 2022-10-14

## 2022-10-14 RX ORDER — SODIUM CHLORIDE 0.9 % (FLUSH) 0.9 %
5-40 SYRINGE (ML) INJECTION PRN
Status: CANCELLED | OUTPATIENT
Start: 2022-10-14

## 2022-10-14 RX ORDER — HEPARIN SODIUM 10000 [USP'U]/100ML
5-30 INJECTION, SOLUTION INTRAVENOUS CONTINUOUS
Status: DISCONTINUED | OUTPATIENT
Start: 2022-10-14 | End: 2022-10-14

## 2022-10-14 RX ORDER — ACETAMINOPHEN 160 MG
2000 TABLET,DISINTEGRATING ORAL DAILY
Status: CANCELLED | OUTPATIENT
Start: 2022-10-14

## 2022-10-14 RX ORDER — FUROSEMIDE 10 MG/ML
20 INJECTION INTRAMUSCULAR; INTRAVENOUS ONCE
Status: COMPLETED | OUTPATIENT
Start: 2022-10-14 | End: 2022-10-14

## 2022-10-14 RX ORDER — SODIUM CHLORIDE 0.9 % (FLUSH) 0.9 %
5-40 SYRINGE (ML) INJECTION EVERY 12 HOURS SCHEDULED
Status: CANCELLED | OUTPATIENT
Start: 2022-10-14

## 2022-10-14 RX ORDER — ONDANSETRON 2 MG/ML
4 INJECTION INTRAMUSCULAR; INTRAVENOUS EVERY 6 HOURS PRN
Status: CANCELLED | OUTPATIENT
Start: 2022-10-14

## 2022-10-14 RX ORDER — HEPARIN SODIUM 1000 [USP'U]/ML
4000 INJECTION, SOLUTION INTRAVENOUS; SUBCUTANEOUS ONCE
Status: COMPLETED | OUTPATIENT
Start: 2022-10-14 | End: 2022-10-14

## 2022-10-14 RX ORDER — METOPROLOL SUCCINATE 50 MG/1
50 TABLET, EXTENDED RELEASE ORAL DAILY
Status: CANCELLED | OUTPATIENT
Start: 2022-10-14

## 2022-10-14 RX ORDER — ENOXAPARIN SODIUM 100 MG/ML
40 INJECTION SUBCUTANEOUS DAILY
Status: CANCELLED | OUTPATIENT
Start: 2022-10-14

## 2022-10-14 RX ORDER — METOPROLOL SUCCINATE 50 MG/1
50 TABLET, EXTENDED RELEASE ORAL ONCE
Status: COMPLETED | OUTPATIENT
Start: 2022-10-14 | End: 2022-10-14

## 2022-10-14 RX ORDER — HEPARIN SODIUM 1000 [USP'U]/ML
2000 INJECTION, SOLUTION INTRAVENOUS; SUBCUTANEOUS PRN
Status: DISCONTINUED | OUTPATIENT
Start: 2022-10-14 | End: 2022-10-14 | Stop reason: HOSPADM

## 2022-10-14 RX ORDER — LEVOTHYROXINE SODIUM 0.1 MG/1
100 TABLET ORAL DAILY
Status: DISCONTINUED | OUTPATIENT
Start: 2022-10-14 | End: 2022-10-14 | Stop reason: DRUGHIGH

## 2022-10-14 RX ORDER — ACETAMINOPHEN 650 MG/1
650 SUPPOSITORY RECTAL EVERY 6 HOURS PRN
Status: CANCELLED | OUTPATIENT
Start: 2022-10-14

## 2022-10-14 RX ORDER — CALCIUM CARBONATE 200(500)MG
500 TABLET,CHEWABLE ORAL ONCE
Status: COMPLETED | OUTPATIENT
Start: 2022-10-14 | End: 2022-10-14

## 2022-10-14 RX ORDER — ACETAMINOPHEN 160 MG
2000 TABLET,DISINTEGRATING ORAL DAILY
COMMUNITY

## 2022-10-14 RX ADMIN — ASPIRIN 81 MG 81 MG: 81 TABLET ORAL at 10:10

## 2022-10-14 RX ADMIN — HEPARIN SODIUM 4000 UNITS: 1000 INJECTION INTRAVENOUS; SUBCUTANEOUS at 11:16

## 2022-10-14 RX ADMIN — LEVOTHYROXINE SODIUM 125 MCG: 25 TABLET ORAL at 10:10

## 2022-10-14 RX ADMIN — CLOPIDOGREL BISULFATE 75 MG: 75 TABLET ORAL at 10:09

## 2022-10-14 RX ADMIN — HEPARIN SODIUM AND DEXTROSE 12 UNITS/KG/HR: 10000; 5 INJECTION INTRAVENOUS at 11:24

## 2022-10-14 RX ADMIN — CALCIUM CARBONATE 500 MG: 500 TABLET, CHEWABLE ORAL at 19:36

## 2022-10-14 RX ADMIN — TAMSULOSIN HYDROCHLORIDE 0.4 MG: 0.4 CAPSULE ORAL at 10:10

## 2022-10-14 RX ADMIN — METOPROLOL SUCCINATE 50 MG: 50 TABLET, EXTENDED RELEASE ORAL at 10:10

## 2022-10-14 RX ADMIN — FUROSEMIDE 20 MG: 10 INJECTION, SOLUTION INTRAMUSCULAR; INTRAVENOUS at 06:52

## 2022-10-14 ASSESSMENT — PAIN - FUNCTIONAL ASSESSMENT: PAIN_FUNCTIONAL_ASSESSMENT: NONE - DENIES PAIN

## 2022-10-14 ASSESSMENT — ENCOUNTER SYMPTOMS
COLOR CHANGE: 0
DIARRHEA: 0
COUGH: 0
SORE THROAT: 0
EYE PAIN: 0
VOMITING: 0
EYE REDNESS: 0
BACK PAIN: 0
SHORTNESS OF BREATH: 1
ABDOMINAL PAIN: 0
TROUBLE SWALLOWING: 0
NAUSEA: 0

## 2022-10-14 NOTE — ED PROVIDER NOTES
Addendum note:    Received patient signout from Dr. James Felipe, ED physician, at 080 0 hours, regarding patient's presentation and current work-up, patient is tentatively excepted pending second troponin remaining stable, the first having come back as 39, Dr. Thea Hernández discussed case with Dr. Rossana Ponce from cardiology feels patient would be safe to keep here, and to discuss case with Dr. Dorie Herzog who requested second opponent to look for any trending. hsTnT 75 (previous 45)    Noting increase in troponin, I did speak with Dr. Dorie Herzog, we discussed again contacting Dr. Rossana Ponce from cardiology, as some concern that there was ischemic event overnight causing the acute heart failure may need to be transferred to higher care hospital.    Patient and patient's friend at bedside updated on current situation, waiting callback, acknowledged. Case was discussed with Dr. Rossana Ponce, cardiology, regarding patient's initial presentation, his elevated troponin and a second draw, he does request getting a third troponin 1 hour after the first, continues to elevate may need transfer, it is now leveled off or is decreasing again can likely keep at this facility. Shortly after received a call from Dr. Dorie Herzog, hospitalist, stated he just spoken with cardiologist, and given the overall picture agrees with need for transfer as cardiology would not be available this weekend. Updated patient and patient's friend at bedside on my conversations with physicians above, we discussed possible locations, patient states he would like to try to stay close, we will contact [facility] and if patient is agreeable at [facility] as patient has been to the [facility] system in the past, however if there are unable to except patient and/or activity anticipated extended release, may need to switch to [facility], patient acknowledges.     Patient has concerns about not taking his morning medications, reviewing patient's medications, we will go ahead and give patient aspirin Plavix, levothyroxine, Flomax, metoprolol, given patient's blood pressure and can hold his Imdur at this time, and noted the most people take their medications imaging will hold at this time, will hold noncritical medication such as vitamins and minerals at this time. Aspirin 81 mg chewable, Plavix 75 mg, levothyroxine 100 mcg, Flomax 0.4 mg, metoprolol succinate 50 mg ordered    ~0929 hrs - Case was discussed with [place] [address] transfer center Yuriy Martinez, regarding patient's presentation, work-up, advised that both [facility] and [facility] are acceptable for transfer locations, advised with they will call us back if patient is accepted    hsTnT 130    ~ 1020 hrs - As of 1020 hrs, [place] [address] and no updates in the intake notes, if we do not receive a call back in a short amount of time, given patient's continued elevating troponins, will have to redirect to another facility    ~1033 hrs - with concern over elevating troponins, will initiate patient on heparin drip     Received a call from [address] [place] [address] Dr [facility] Dr. Dayan Fowler (sp?),  Regarding patient's presentation and current work-up, accepted for stepdown bed, will await bed assignment, I did advise transfer center that we cannot get a bed within reasonable mount of time may need to redirect, they advised that if they cannot get a bed at [facility] they will redirect [facility]    ~1148 hrs -intake notes show patient accepted to [facility], we have not received bed assignment yet, I did advise ED  that if we did not receive bed assignment by 1200 hours, to [place] [address] and inquire, whether we receive bed assignment or they redirect to [facility] [place], with concern that if he do not receive bed assignment by 1500 hrs.  from past experiences we often will not receive any bed and goinging into weekend we would not receive any bed assignment for the next few days, will have to redirect to another facility if we do not have a bed by 1300 hrs.    ~1238 hrs -ED  advises that [facility] states will be the same wait for both [facility] and [place], advised to keep them on as possible locations but to go ahead and contact [facility] to begin transfer process with them as well, for fears of extended delays and then no bed availability as above    ~1241 hrs -ED  advises that [facility] is discharge dependent with a lot of people waiting in the ER, request they [facility] or [facility] next    ~1248 hrs -ED  advises that [facility] in [facility] are also discharged pending, will try [facility] and [facility] next    ~ 1310 hrs -received call from [facility] hospitalist Janet Kurtz, discussed patient's presentation and current work-up, patient is excepted to their facility, will await bed assignment    ~1315 hrs -patient and patient friend at bedside updated on current transfer situation, patient is eating a cardiac lunch tray, acknowledges    Received bed assignment from [facility], initial support estimated 2030 hours however eventually able to get that down to 2030 hrs.  or earlier, patient will be continue to be monitored, heparin as per protocol, acknowledged    On arrival of EMS crew, report given, patient packaged for transport    Diagnosis:( as per Dr. Chris Fernandez notes)  Acute diastolic congestive heart failure  Renal insufficiency  Elevated troponin    Added diagnosis:  Elevated BNP level  Lab test negative for COVID-19 virus           Araceli Shearer MD  10/15/22 5618

## 2022-10-14 NOTE — ED PROVIDER NOTES
[facility] ED  eMERGENCY dEPARTMENT eNCOUnter      Pt Name: Wilman Julien  MRN: [id number]  Begfshawnee 11/27/1932  Date of evaluation: 10/14/2022  Provider: Naheed Junior MD    CHIEF COMPLAINT       Chief Complaint   Patient presents with    Shortness of Breath     Pt presents to ER via EMS. Pt states that he went to bed and was woke up by his heart beating really fast, sweating and SOB. Is an 24-year-old male who presents to the emergency department complaining of shortness of breath. Patient states he noticed his heart was beating fast and he had shortness of breath. Patient has a history of having a CABG in the past as well as a pacemaker and has a history of CHF. Patient does not recall having CHF. Patient denies any fever or chills. Patient denies any chest pain at this time. Patient denies nausea vomiting or other symptoms        Nursing Notes were reviewed. REVIEW OF SYSTEMS    (2-9 systems for level 4, 10 or more for level 5)     Review of Systems   Constitutional:  Negative for chills and fever. HENT:  Negative for ear pain, sore throat and trouble swallowing. Eyes:  Negative for pain and redness. Respiratory:  Positive for shortness of breath. Negative for cough. Cardiovascular:  Positive for palpitations and leg swelling. Negative for chest pain. Gastrointestinal:  Negative for abdominal pain, diarrhea, nausea and vomiting. Genitourinary:  Negative for dysuria and frequency. Musculoskeletal:  Negative for back pain and neck pain. Skin:  Negative for color change and rash. Neurological:  Negative for dizziness, syncope and headaches. Psychiatric/Behavioral:  Negative for hallucinations and suicidal ideas. Except as noted above the remainder of the review of systems was reviewed and negative.        PAST MEDICAL HISTORY     Past Medical History:   Diagnosis Date    Atrial fibrillation ([address]     CAD (coronary artery disease)     Hard of hearing     Hiatal hernia History of PTCA     9/2012    Hyperlipidemia     Hypothyroidism     Pacemaker     [employer]         SURGICAL HISTORY       Past Surgical History:   Procedure Laterality Date    CARDIAC SURGERY      CORONARY ARTERY BYPASS GRAFT  2000    quad bypass Linda    HERNIA REPAIR  01/29/4457    UMBILICAL HERNIA REPAIR    PACEMAKER INSERTION  8/6/12    [employer]    PTCA  44/50/76    UMBILICAL HERNIA REPAIR  11/21/2016    Ashley Francisco MD         ALLERGIES     Menthol (topical analgesic)    FAMILY HISTORY       Family History   Problem Relation Age of Onset    Heart Disease Father           SOCIAL HISTORY       Social History     Socioeconomic History    Marital status:      Spouse name: None    Number of children: None    Years of education: None    Highest education level: None   Occupational History     Employer: RETIRED   Tobacco Use    Smoking status: Never    Smokeless tobacco: Never   Vaping Use    Vaping Use: Never used   Substance and Sexual Activity    Alcohol use: Not Currently     Comment: 6 monthly    Drug use: No    Sexual activity: Not Currently     Social Determinants of Health     Financial Resource Strain: Low Risk     Difficulty of Paying Living Expenses: Not hard at all   Food Insecurity: No Food Insecurity    Worried About [facility] in the Last Year: Never true    Ran Out of Food in the Last Year: Never true           PHYSICAL EXAM    (up to 7 for level 4, 8 ormore for level 5)     ED Triage Vitals   BP Temp Temp src Pulse Resp SpO2 Height Weight   -- -- -- -- -- -- -- --       Physical Exam    Physical    Vital signs and nursing notes were reviewed as well as the social, family, and past medical history. Gen.  appearance: Patient is alert and oriented and in no acute distress    Head: Atraumatic, normocephalic    Neck: Supple, trachea/thyroid normal    EENT: PERRLA, EOMI, conjunctiva normal.    Skin: Warm and dry with no rash    Cardiovascular: Heart RRR, no gallops or rubs, no aortic enlargement or bruits noted. Respiratory: Lungs diminished with crackles in the bases,     Gastrointestinal: Abdomen nontender, bowel sounds normal, no rebound/guarding/distention or mass    Musculoskeletal: No tenderness in the extremities, no back or hip pain. Neurological: Patient is alert and oriented ×3, no focal motor or sensory deficits noted, reflexes normal, NIH = 0      DIAGNOSTIC RESULTS     EKG: All EKG's are interpreted by the Emergency Department Physicianwho either signs or Co-signs this chart in the absence of a cardiologist.    EKG shows ventricular paced rhythm with a rate of 106. RADIOLOGY:         LABS:  Labs Reviewed   CBC WITH AUTO DIFFERENTIAL - Abnormal; Notable for the following components:       Result Value    RBC 4.04 (*)     Hemoglobin 12.8 (*)     Hematocrit 37.8 (*)     All other components within normal limits   COMPREHENSIVE METABOLIC PANEL - Abnormal; Notable for the following components:    Glucose 135 (*)     Creatinine 1.24 (*)     Est, Glom Filt Rate 56 (*)     Potassium 3.5 (*)     All other components within normal limits   PROTIME-INR - Abnormal; Notable for the following components:    Protime 14.3 (*)     All other components within normal limits   TROPONIN - Abnormal; Notable for the following components:    Troponin, High Sensitivity 45 (*)     All other components within normal limits   BRAIN NATRIURETIC PEPTIDE - Abnormal; Notable for the following components:    Pro-BNP 3,028 (*)     All other components within normal limits   TROPONIN       All other labs were within normal range or not returned as of this dictation.     EMERGENCY DEPARTMENT COURSE and DIFFERENTIAL DIAGNOSIS/MDM:   Vitals:    Vitals:    10/14/22 0616   BP: (!) 111/90   Pulse: (!) 104   Resp: 22   Temp: 97.5 °F (36.4 °C)   TempSrc: Oral   SpO2: 97%   Weight: 182 lb (82.6 kg)   Height: 5' 10\" (1.778 m)                 REASSESSMENT      In the ED patient's shortness of breath is improved after Lasix. He states he is feeling a little bit better at the moment. He is continuing to deny any chest pain. Patient's BUN and creatinine were mildly elevated. Patient's troponin is also slightly elevated. I discussed with his cardiologist, Dr. Rafael Mejia and he thinks that the patient likely can be admitted here but wants a second troponin to make sure its not increasing. If it is a patient will be transferred however if it is stable patient will be admitted here. I did speak with Dr. Kusum Krause the hospitalist.    PROCEDURES:  Unless otherwise noted below, none     Procedures    FINAL IMPRESSION      1. Acute diastolic congestive heart failure ([facility] [address]    2. Renal insufficiency    3. Elevated troponin          DISPOSITION/PLAN   DISPOSITION        PATIENT REFERRED TO:  No follow-up provider specified.     DISCHARGE MEDICATIONS:  New Prescriptions    No medications on file          (Please note that portions ofthis note were completed with a voice recognition program.  Efforts were made to edit the dictations but occasionally words are mis-transcribed.)    Nathan Banuelos MD(electronically signed)  Attending Emergency Physician            Nathan Banuelos MD  10/14/22 9133

## 2022-10-14 NOTE — PROGRESS NOTES
Pt arrives via [employer]. Pt was given 125 mg IV Solu Medrol in route and 2 breathing treatments. Pt is 92% on room air and states he feels like he can not breathe. Placed on 2L O2 via nasal cannula with SPO2 97%. RT called for EKG. Dr. Rashaad Hermosillo at bedside.

## 2022-10-19 DIAGNOSIS — E03.9 HYPOTHYROIDISM, UNSPECIFIED: ICD-10-CM

## 2022-10-19 RX ORDER — LEVOTHYROXINE SODIUM 0.12 MG/1
TABLET ORAL
Qty: 90 TABLET | Refills: 1 | Status: SHIPPED | OUTPATIENT
Start: 2022-10-19

## 2022-10-20 ENCOUNTER — CARE COORDINATION (OUTPATIENT)
Dept: CARE COORDINATION | Age: 87
End: 2022-10-20

## 2022-10-20 RX ORDER — SPIRONOLACTONE 25 MG/1
25 TABLET ORAL DAILY
COMMUNITY
Start: 2022-10-17

## 2022-10-20 RX ORDER — FUROSEMIDE 40 MG/1
1 TABLET ORAL DAILY
COMMUNITY
Start: 2022-10-17

## 2022-10-20 RX ORDER — VALSARTAN 80 MG/1
1 TABLET ORAL DAILY
COMMUNITY
Start: 2022-10-17

## 2022-10-20 RX ORDER — METOPROLOL SUCCINATE 25 MG/1
1 TABLET, EXTENDED RELEASE ORAL NIGHTLY
COMMUNITY
Start: 2022-10-17

## 2022-10-20 SDOH — HEALTH STABILITY: PHYSICAL HEALTH: ON AVERAGE, HOW MANY DAYS PER WEEK DO YOU ENGAGE IN MODERATE TO STRENUOUS EXERCISE (LIKE A BRISK WALK)?: 6 DAYS

## 2022-10-20 SDOH — ECONOMIC STABILITY: TRANSPORTATION INSECURITY
IN THE PAST 12 MONTHS, HAS THE LACK OF TRANSPORTATION KEPT YOU FROM MEDICAL APPOINTMENTS OR FROM GETTING MEDICATIONS?: NO

## 2022-10-20 SDOH — HEALTH STABILITY: PHYSICAL HEALTH: ON AVERAGE, HOW MANY MINUTES DO YOU ENGAGE IN EXERCISE AT THIS LEVEL?: 40 MIN

## 2022-10-20 SDOH — ECONOMIC STABILITY: HOUSING INSECURITY: IN THE LAST 12 MONTHS, HOW MANY PLACES HAVE YOU LIVED?: 1

## 2022-10-20 SDOH — ECONOMIC STABILITY: INCOME INSECURITY: IN THE LAST 12 MONTHS, WAS THERE A TIME WHEN YOU WERE NOT ABLE TO PAY THE MORTGAGE OR RENT ON TIME?: NO

## 2022-10-20 SDOH — ECONOMIC STABILITY: TRANSPORTATION INSECURITY
IN THE PAST 12 MONTHS, HAS LACK OF TRANSPORTATION KEPT YOU FROM MEETINGS, WORK, OR FROM GETTING THINGS NEEDED FOR DAILY LIVING?: NO

## 2022-10-20 ASSESSMENT — SOCIAL DETERMINANTS OF HEALTH (SDOH)
IN A TYPICAL WEEK, HOW MANY TIMES DO YOU TALK ON THE PHONE WITH FAMILY, FRIENDS, OR NEIGHBORS?: THREE TIMES A WEEK
HOW OFTEN DO YOU ATTEND CHURCH OR RELIGIOUS SERVICES?: 1 TO 4 TIMES PER YEAR
HOW OFTEN DO YOU GET TOGETHER WITH FRIENDS OR RELATIVES?: THREE TIMES A WEEK
DO YOU BELONG TO ANY CLUBS OR ORGANIZATIONS SUCH AS CHURCH GROUPS UNIONS, FRATERNAL OR ATHLETIC GROUPS, OR SCHOOL GROUPS?: NO
HOW OFTEN DO YOU ATTENT MEETINGS OF THE CLUB OR ORGANIZATION YOU BELONG TO?: NEVER

## 2022-10-20 ASSESSMENT — LIFESTYLE VARIABLES: HOW OFTEN DO YOU HAVE A DRINK CONTAINING ALCOHOL: NEVER

## 2022-10-20 NOTE — CARE COORDINATION
Ambulatory Care Coordination Note  10/20/2022    ACC: Marion Andrade RN    He had ED visit then admission at [facility] 10/14-10/17 for CHF. Enrolled in care management, he is agreeable. Per notes, Lasix increased to 40 mg daily (and additional PRN for weight gain), previously 20 mg twice a week. Also metoprolol dose decreased to 25 mg and started on spironolactone 25 and valsartan 80mg daily. Attempted to review medications but he is not at home, he doesn't remember getting new medications. His friend Virginia Mehta sorts his medications for him, gets them all from [facility]. CHF- he weighs himself daily, always 180-182. No leg swelling, no shortness of breath. COPD- stated breathing is good, hasn't needed inhaler. [place], has to go up and down 76 steps. Sees Dr. Khadijah Abarca, needs to have pacemaker replaced within 6 months, next appt 10/24. Checks pulse manually every day, ranges 60-65. Had heart racing when went to ED but none since. No chest pain. SDOH- lives by himself, no financial concerns. He drives, prepares own meals, independent with hygiene, doesn't use any ambulation assist devices. Denied need for any help or other DME at home. Has cane and walker but doesn't use. Called patient next day when he was home to review all medicaions, he received new Rx from hospital before discharge and is taking all of them.   CC Plan: Follow up in one week to review symptoms, weight, cardio progress notes, offer RPM.     Congestive Heart Failure Assessment    Are you currently restricting fluids?: No Restriction  Do you understand a low sodium diet?: Yes (Comment: dietician working with patient to educate)  Do you understand how to read food labels?: Yes  How many restaurant meals do you eat per week?: 0  Do you salt your food before tasting it?: No         Symptoms:  CHF associated dyspnea on exertion: Pos      Symptom course: stable  Weight trend: stable     ,   COPD Assessment Does the patient understand envrionmental exposure?: Yes  Is the patient able to verbalize Rescue vs. Long Acting medications?: Yes  Does the patient have a nebulizer?: No  Does the patient use a space with inhaled medications?: No            Symptoms:          , and   General Assessment    Do you have any symptoms that are causing concern?: No             Offered patient enrollment in the Remote Patient Monitoring (RPM) program for in-home monitoring: Yes, but did not enroll at this time. Ambulatory Care Coordination Assessment    Care Coordination Protocol  Referral from Primary Care Provider: No  Week 1 - Initial Assessment     Do you have all of your prescriptions and are they filled?: Yes (Comment: he thinks so. Managed by his friend)  Barriers to medication adherence: None  Are you able to afford your medications?: Yes  How often do you have trouble taking your medications the way you have been told to take them?: I always take them as prescribed. Do you have Home O2 Therapy?: No      Ability to seek help/take action for Emergent Urgent situations i.e. fire, crime, inclement weather or health crisis. : Independent  Ability to ambulate to restroom: Independent  Ability handle personal hygeine needs (bathing/dressing/grooming): Independent  Ability to manage Medications: Independent  Ability to prepare Food Preparation: Independent  Ability to maintain home (clean home, laundry): Independent  Ability to drive and/or has transportation: Independent  Ability to do shopping: Independent  Ability to manage finances:  Independent  Is patient able to live independently?: Yes     Current Housing: Private Residence           Frequent urination at night?: No  Do you use rails/bars?: No  Do you have a non-slip tub mat?: Yes     Are you experiencing loss of meaning?: No  Are you experiencing loss of hope and peace?: No     Thinking about your patient's physical health needs, are there any symptoms or problems (risk indicators) you are unsure about that require further investigation?: No identified areas of uncertainly or problems already being investigated   Are the patients physical health problems impacting on their mental well-being?: No identified areas of concern   Are there any problems with your patients lifestyle behaviors (alcohol, drugs, diet, exercise) that are impacting on physical or mental well-being?: No identified areas of concern   Do you have any other concerns about your patients mental well-being? How would you rate their severity and impact on the patient?: Mild problems - don't interfere with function   How would you rate their home environment in terms of safety and stability (including domestic violence, insecure housing, neighbor harassment)?: Consistently safe, supportive, stable, no identified problems   How do daily activities impact on the patient's well-being? (include current or anticipated unemployment, work, caregiving, access to transportation or other): No identified problems or perceived positive benefits   How would you rate their social network (family, work, friends)?: Adequate participation with social networks   How would you rate their financial resources (including ability to afford all required medical care)?: Financially secure, resources adequate, no identified problems   How wells does the patient now understand their health and well-being (symptoms, signs or risk factors) and what they need to do to manage their health?: Reasonable to good understanding and already engages in managing health or is willing to undertake better management   How well do you think your patient can engage in healthcare discussions?  (Barriers include language, deafness, aphasia, alcohol or drug problems, learning difficulties, concentration): Clear and open communication, no identified barriers   Do other services need to be involved to help this patient?: Other care/services not required at this time   Suggested Interventions and Community Resources                  Prior to Admission medications    Medication Sig Start Date End Date Taking? Authorizing Provider   levothyroxine (SYNTHROID) 125 MCG tablet TAKE 1 TABLET BY MOUTH DAILY 10/19/22  Yes LORY Lees CNP   spironolactone (ALDACTONE) 25 MG tablet Take 25 mg by mouth daily 10/17/22   Historical Provider, MD   valsartan (DIOVAN) 80 MG tablet Take 1 tablet by mouth daily Take every evening 10/17/22   Historical Provider, MD   furosemide (LASIX) 40 MG tablet Take 1 tablet by mouth daily Take 1/2 hour after spironolactone.  Take an extra tab in evening if weight up 3 lbs in 24 hours or 5 lbs in 48 hours 10/17/22   Historical Provider, MD   metoprolol succinate (TOPROL XL) 25 MG extended release tablet Take 1 tablet by mouth nightly 10/17/22   Historical Provider, MD   vitamin B-12 (CYANOCOBALAMIN) 1000 MCG tablet Take 1,000 mcg by mouth daily    Historical Provider, MD   Cholecalciferol (VITAMIN D3) 50 MCG (2000 UT) CAPS Take 2,000 Units by mouth daily    Historical Provider, MD   tamsulosin (FLOMAX) 0.4 MG capsule Take 1 capsule by mouth at bedtime Urinary frequency/enlarged prostate, take at bedtime  Patient not taking: Reported on 10/14/2022 9/20/22   LORY Lees CNP   clopidogrel (PLAVIX) 75 MG tablet Take 1 tablet by mouth daily 7/7/22   Rubia Spencer DO   rosuvastatin (CRESTOR) 20 MG tablet Take 1 tablet by mouth daily 6/6/22   LORY Lees CNP   isosorbide mononitrate (IMDUR) 30 MG extended release tablet Take 0.5 tablets by mouth daily 6/6/22   LORY Lees CNP   Magnesium 400 MG TABS Take 400 mg by mouth daily 5/2/22   Waylon Richardson MD   albuterol sulfate HFA (VENTOLIN HFA) 108 (90 Base) MCG/ACT inhaler Inhale 2 puffs into the lungs every 4 hours as needed for Wheezing  Patient not taking: No sig reported 3/5/21   LORY Lees CNP   metoprolol (LOPRESSOR) 100 MG tablet Take 1 tablet by mouth 2 times daily  Patient taking differently: No sig reported 3/27/20 4/26/21  Chon Hernandez MD   aspirin 81 MG tablet Take 81 mg by mouth daily.     Historical Provider, MD       Future Appointments   Date Time Provider Andrse Edith   10/24/2022  1:00 PM MD Nohemi Santo [facility]   12/12/2022 10:00 AM Lukas Dodson APRN - CNP Kathrene [facility]

## 2022-10-24 ENCOUNTER — OFFICE VISIT (OUTPATIENT)
Dept: CARDIOLOGY CLINIC | Age: 87
End: 2022-10-24
Payer: MEDICARE

## 2022-10-24 ENCOUNTER — HOSPITAL ENCOUNTER (OUTPATIENT)
Age: 87
Discharge: HOME | End: 2022-10-24
Payer: MEDICARE

## 2022-10-24 VITALS
SYSTOLIC BLOOD PRESSURE: 98 MMHG | HEART RATE: 63 BPM | OXYGEN SATURATION: 96 % | BODY MASS INDEX: 26.4 KG/M2 | WEIGHT: 184 LBS | DIASTOLIC BLOOD PRESSURE: 50 MMHG

## 2022-10-24 DIAGNOSIS — R35.0 BENIGN PROSTATIC HYPERPLASIA WITH URINARY FREQUENCY: ICD-10-CM

## 2022-10-24 DIAGNOSIS — E78.5 HYPERLIPIDEMIA, UNSPECIFIED HYPERLIPIDEMIA TYPE: ICD-10-CM

## 2022-10-24 DIAGNOSIS — Z95.0 PACEMAKER: Primary | ICD-10-CM

## 2022-10-24 DIAGNOSIS — Z12.5 PROSTATE CANCER SCREENING: ICD-10-CM

## 2022-10-24 DIAGNOSIS — I25.10 CORONARY ARTERY DISEASE INVOLVING NATIVE CORONARY ARTERY OF NATIVE HEART WITHOUT ANGINA PECTORIS: Primary | ICD-10-CM

## 2022-10-24 DIAGNOSIS — I95.0 IDIOPATHIC HYPOTENSION: ICD-10-CM

## 2022-10-24 DIAGNOSIS — I25.10 CORONARY ARTERY DISEASE INVOLVING NATIVE CORONARY ARTERY OF NATIVE HEART WITHOUT ANGINA PECTORIS: ICD-10-CM

## 2022-10-24 DIAGNOSIS — N40.1 BENIGN PROSTATIC HYPERPLASIA WITH URINARY FREQUENCY: ICD-10-CM

## 2022-10-24 LAB
ABSOLUTE EOS #: 0.1 K/UL (ref 0–0.4)
ABSOLUTE LYMPH #: 1.5 K/UL (ref 1–4.8)
ABSOLUTE MONO #: 0.5 K/UL (ref 0–1)
ALBUMIN SERPL-MCNC: 4 G/DL (ref 3.5–5.2)
ALP BLD-CCNC: 70 U/L (ref 40–129)
ALT SERPL-CCNC: 8 U/L (ref 5–41)
ANION GAP SERPL CALCULATED.3IONS-SCNC: 11 MMOL/L (ref 9–17)
AST SERPL-CCNC: 15 U/L
BASOPHILS # BLD: 1 % (ref 0–2)
BASOPHILS ABSOLUTE: 0 K/UL (ref 0–0.2)
BILIRUB SERPL-MCNC: 0.8 MG/DL (ref 0.3–1.2)
BUN BLDV-MCNC: 29 MG/DL (ref 8–23)
BUN/CREAT BLD: 19 (ref 9–20)
CALCIUM SERPL-MCNC: 9.4 MG/DL (ref 8.6–10.4)
CHLORIDE BLD-SCNC: 99 MMOL/L (ref 98–107)
CHOLESTEROL/HDL RATIO: 4
CHOLESTEROL: 137 MG/DL
CO2: 30 MMOL/L (ref 20–31)
CREAT SERPL-MCNC: 1.56 MG/DL (ref 0.7–1.2)
DIFFERENTIAL TYPE: YES
EOSINOPHILS RELATIVE PERCENT: 2 % (ref 0–5)
GFR SERPL CREATININE-BSD FRML MDRD: 42 ML/MIN/1.73M2
GLUCOSE BLD-MCNC: 119 MG/DL (ref 70–99)
HCT VFR BLD CALC: 33.8 % (ref 41–53)
HDLC SERPL-MCNC: 34 MG/DL
HEMOGLOBIN: 11.5 G/DL (ref 13.5–17.5)
LDL CHOLESTEROL: 81 MG/DL (ref 0–130)
LYMPHOCYTES # BLD: 24 % (ref 13–44)
MAGNESIUM: 1.6 MG/DL (ref 1.6–2.6)
MCH RBC QN AUTO: 31.7 PG (ref 26–34)
MCHC RBC AUTO-ENTMCNC: 34 G/DL (ref 31–37)
MCV RBC AUTO: 93.1 FL (ref 80–100)
MONOCYTES # BLD: 9 % (ref 5–9)
PATIENT FASTING?: NO
PDW BLD-RTO: 14.1 % (ref 12.1–15.2)
PLATELET # BLD: 175 K/UL (ref 140–450)
POTASSIUM SERPL-SCNC: 4.2 MMOL/L (ref 3.7–5.3)
RBC # BLD: 3.64 M/UL (ref 4.5–5.9)
SEG NEUTROPHILS: 64 % (ref 39–75)
SEGMENTED NEUTROPHILS ABSOLUTE COUNT: 4.1 K/UL (ref 2.1–6.5)
SODIUM BLD-SCNC: 140 MMOL/L (ref 135–144)
TOTAL PROTEIN: 6.7 G/DL (ref 6.4–8.3)
TRIGL SERPL-MCNC: 111 MG/DL
TSH SERPL DL<=0.05 MIU/L-ACNC: 23.63 UIU/ML (ref 0.3–5)
WBC # BLD: 6.3 K/UL (ref 3.5–11)

## 2022-10-24 PROCEDURE — G0103 PSA SCREENING: HCPCS

## 2022-10-24 PROCEDURE — 83735 ASSAY OF MAGNESIUM: CPT

## 2022-10-24 PROCEDURE — 1123F ACP DISCUSS/DSCN MKR DOCD: CPT | Performed by: INTERNAL MEDICINE

## 2022-10-24 PROCEDURE — 1036F TOBACCO NON-USER: CPT | Performed by: INTERNAL MEDICINE

## 2022-10-24 PROCEDURE — G8427 DOCREV CUR MEDS BY ELIG CLIN: HCPCS | Performed by: INTERNAL MEDICINE

## 2022-10-24 PROCEDURE — 93288 INTERROG EVL PM/LDLS PM IP: CPT | Performed by: INTERNAL MEDICINE

## 2022-10-24 PROCEDURE — G8417 CALC BMI ABV UP PARAM F/U: HCPCS | Performed by: INTERNAL MEDICINE

## 2022-10-24 PROCEDURE — 80053 COMPREHEN METABOLIC PANEL: CPT

## 2022-10-24 PROCEDURE — 85025 COMPLETE CBC W/AUTO DIFF WBC: CPT

## 2022-10-24 PROCEDURE — 80061 LIPID PANEL: CPT

## 2022-10-24 PROCEDURE — G8484 FLU IMMUNIZE NO ADMIN: HCPCS | Performed by: INTERNAL MEDICINE

## 2022-10-24 PROCEDURE — 36415 COLL VENOUS BLD VENIPUNCTURE: CPT

## 2022-10-24 PROCEDURE — 84439 ASSAY OF FREE THYROXINE: CPT

## 2022-10-24 PROCEDURE — 1111F DSCHRG MED/CURRENT MED MERGE: CPT | Performed by: INTERNAL MEDICINE

## 2022-10-24 PROCEDURE — 84443 ASSAY THYROID STIM HORMONE: CPT

## 2022-10-24 PROCEDURE — 99214 OFFICE O/P EST MOD 30 MIN: CPT | Performed by: INTERNAL MEDICINE

## 2022-10-24 NOTE — PATIENT INSTRUCTIONS
HOLD Valsartan (Diovan)  And aldactone (spironolactone)    Get blood pressure cuff take daily different times  Of the day.    Weigh daily

## 2022-10-24 NOTE — LETTER
Lorraine Meyer M.D. [address] [place] [address]  [phone]          2022          Yovany Rubi, CNP  [address] [address]      RE:   Viki Echeverria  :  1932      Dear Tamar Parr:    CHIEF COMPLAINT:  1. Sick sinus syndrome status post [employer] pacemaker on 2012. 2.  Four-vessel coronary artery bypass surgery at [facility] in . 3. Shortness of breath and loss of energy. HISTORY OF PRESENT ILLNESS:  I had the pleasure of seeing Mr. Viki Echeverria in our office on 10/24/2022. He is a pleasant 60-year-old gentleman who had atrial fibrillation in  associated with shortness of breath. He had a catheterization at [facility] with subsequent four-vessel bypass surgery at [facility] in . On 2012, he had a heart rate of 32 beats per minute. We stopped his Lopressor and digoxin, but he remained bradycardic and therefore, I placed a [employer] DDD pacemaker on 2012, still at beginning of life. He is pacer-dependent. On 2012, he had shortness of breath, and I repeated a catheterization that showed 99% LAD, intermediate branch of the circumflex was occluded, OM branch of the circumflex was occluded, the right coronary artery had 95% to 99% disease, vein graft to the right coronary artery was occluded, vein graft to the OM was occluded, vein graft to the high lateral circumflex was patent, LIMA was patent to the LAD, EF of 40%. We did angioplasty of the right coronary artery placing 3.0 x 38, 3.0 x 38, 3.5 x 24 and 3.5 x 16 mm drug-eluting ION stents with a good end result. He does have moderate aortic stenosis. He does complain of dizziness after taking his medications until approximately 2 p.m. and he starts feeling better. His shortness of breath is improved. Denies any chest pain or chest discomfort. No leg edema.     CARDIAC RISK FACTORS:  Known CAD:  Positive. Bypass Surgery:  Positive. PTCA:  Positive. Hypertension:  Positive. Hyperlipidemia:  Positive. Smoking:  Negative. Diabetes:  Negative. MEDICATIONS AT HOME:  He is currently on aspirin 81 mg daily, vitamin D 2000 units daily, Plavix 75 mg daily, Lasix 40 mg daily, Imdur 30 mg half a tablet daily, Synthroid 125 mcg daily, magnesium 400 mg daily, Toprol-XL 25 mg daily, Crestor 20 mg daily, Aldactone 25 mg daily, Flomax 0.4 mg daily, Diovan 80 mg daily, vitamin B12 1000 mcg daily. PAST MEDICAL AND SURGICAL HISTORY:  1. Cardiac as above. 2.  Hypertension, well controlled, in fact is hypotensive at this time. 3.  Hyperlipidemia, well controlled. 4.  Atrial fibrillation in 2000 for which he was on Coumadin, although he stopped this after nose surgery. 5.  Hypoparathyroidism. 6.  Sick sinus syndrome, status post [employer] in 2012. FAMILY HISTORY:  Significant for CAD. SOCIAL HISTORY:  He is 80years old. Two children, one in [facility]. He lived with Yris Dillard for 29 years until she passed away. He does not smoke or drink alcohol. He cooks for himself. He is fairly inactive at this time. REVIEW OF SYSTEMS:  Cardiac as above. Other systems reviewed including constitutional, eyes, ears, nose and throat, cardiovascular, respiratory, GI, , musculoskeletal, integumentary, neurologic, endocrine, hematologic and allergic/immunologic are negative except for what is described above. No weight loss or weight gain. No change in bowel habits. No blood in stools. No fevers, sweats or chills. PHYSICAL EXAMINATION:  VITAL SIGNS:  His blood pressure was 98/50, with a heart rate of 63 and regular. Respiratory rate 18. O2 sat 97%. Weight 184 pounds. GENERAL:  He is a very pleasant 80year-old gentleman. Denied pain. He was oriented to person, place and time. Answered questions appropriately. SKIN:  No unusual skin changes.   HEENT:  The pupils are equally round and intact. Mucous membranes were dry. NECK:  No JVD. Good carotid pulses. No carotid bruits. No lymphadenopathy or thyromegaly. CARDIOVASCULAR EXAM:  S1 and S2 were normal.  No S3 or S4. Soft systolic blowing type murmur. No diastolic murmur. PMI was normal.  No lift, thrust, or pericardial friction rub. LUNGS:  Clear to auscultation and percussion. ABDOMEN:  Soft and nontender. Good bowel sounds. EXTREMITIES:  Good femoral pulses. Good pedal pulses. No pedal edema. Skin was warm and dry. No calf tenderness. Nail beds pink. Good cap refill. PULSES:  Bilateral symmetrical radial, brachial and carotid pulses. No carotid bruits. Good femoral and pedal pulses. NEUROLOGIC EXAM:  Within normal limits. PSYCHIATRIC EXAM:  Within normal limits. LABORATORY DATA:  Sodium was 140, potassium 4.2, BUN 29, creatinine 1.56, GFR was 42, glucose 119, magnesium 1.6. Cholesterol 137, HDL was 34, LDL 81, triglycerides 111. ALT was 8, AST was 15. TSH 23.63 with a free thyroxine of 1.38. White count 6.3, hemoglobin 11.5 with a platelet count of 631,291. EKG showed ventricular paced rhythm. Chest x-ray showed small bilateral pleural effusion. IMPRESSION:  1. Moderate aortic stenosis with an aortic valve area of 1.1 cm2.  2.  Moderate LV dysfunction, EF of 35%. 3.  Four-vessel bypass surgery in 2000 at [facility] with a LIMA to the LAD, vein graft to the high lateral circumflex, vein graft to the OM and a vein graft to the right coronary artery. 4.  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   5.  Catheterization on 09/09/2012, showing 99% LAD, with subtotally occluded intermediate branch of the circumflex, right coronary artery had 95% to 99% sequential lesions, vein graft to the high lateral circumflex was patent, vein graft to the right coronary artery was occluded, vein graft to the OM was occluded, native OM was occluded, EF of 40%, with angioplasty of the right coronary artery placing two 3.0 x 38 mm drug-eluting ION stents along with 3.5 x 24 and 3.5 x 16 mm ION stents. 6.  Hypotension with blood pressure in the 98 to 100 range. PLAN:  1. Hold Diovan. 2.  Hold Aldactone. 3.  Take his blood pressure daily at different times of the day and weigh daily. 3.  We will see in three weeks for blood pressure check. DISCUSSION:  Mr. Cecily Alexis has had marked fatigue. I think part of this is secondary to his hypotension. We will plan on holding his Diovan and his Aldactone as I think he is relatively dry at this time. He will take his blood pressure at home along with his weights since I have stopped his Aldactone. If his blood pressure would just come up, I will start him on a lower dose of Diovan. Thank you very much for allowing me the privilege of seeing [facility]. If you have any questions on my thoughts, please do not hesitate to contact me.      Sincerely,        Maria Dolores Escobedo    D: 10/30/2022 19:08:06     T: 10/30/2022 19:12:53     [email or web address]  Job#: [id number]   Doc#: [id number]

## 2022-10-24 NOTE — PROGRESS NOTES
Ov DR Laura Strickland was in ED on 10/14   Pacemaker check per [place]. For sob was transferred to [place]  For CHF   [place] fine until 2 hrs prior to coming to ED  C/o dizziness after taking meds until  About 2 pm then gets better. No chest pain  No leg edema. Sob improved. Hold diovan and aldactone. Get blood pressure cuff  Take bp daily different   Times of the day   And weigh daily    See in 3 weeks.

## 2022-10-25 ENCOUNTER — CARE COORDINATION (OUTPATIENT)
Dept: CARE COORDINATION | Age: 87
End: 2022-10-25

## 2022-10-25 LAB
PROSTATE SPECIFIC ANTIGEN: 1.85 NG/ML
THYROXINE, FREE: 1.38 NG/DL (ref 0.93–1.7)

## 2022-10-27 ENCOUNTER — CARE COORDINATION (OUTPATIENT)
Dept: CARE COORDINATION | Age: 87
End: 2022-10-27

## 2022-11-01 ENCOUNTER — CARE COORDINATION (OUTPATIENT)
Dept: CARE COORDINATION | Age: 87
End: 2022-11-01

## 2022-11-01 ASSESSMENT — ENCOUNTER SYMPTOMS: DYSPNEA ASSOCIATED WITH: EXERTION

## 2022-11-01 NOTE — CARE COORDINATION
Spoke with patient briefly. Writer started to discuss recent medication changes by cardiology, he didn't know about them, is not at home to write anything down, requested I call back in an hour to review.

## 2022-11-01 NOTE — PROGRESS NOTES
Tushar Donohue M.D. [address] [place] [address]  [phone]          2022          Romana Quick, CLAU  [address] [address]      RE:   Vivienne Negron  :  1932      Dear Jed Khoury:    CHIEF COMPLAINT:  1. Sick sinus syndrome status post [employer] pacemaker on 2012. 2.  Four-vessel coronary artery bypass surgery at [facility] in . 3. Shortness of breath and loss of energy. HISTORY OF PRESENT ILLNESS:  I had the pleasure of seeing Mr. Vivienne Negron in our office on 10/24/2022. He is a pleasant 80-year-old gentleman who had atrial fibrillation in  associated with shortness of breath. He had a catheterization at [facility] with subsequent four-vessel bypass surgery at [facility] in . On 2012, he had a heart rate of 32 beats per minute. We stopped his Lopressor and digoxin, but he remained bradycardic and therefore, I placed a [employer] DDD pacemaker on 2012, still at beginning of life. He is pacer-dependent. On 2012, he had shortness of breath, and I repeated a catheterization that showed 99% LAD, intermediate branch of the circumflex was occluded, OM branch of the circumflex was occluded, the right coronary artery had 95% to 99% disease, vein graft to the right coronary artery was occluded, vein graft to the OM was occluded, vein graft to the high lateral circumflex was patent, LIMA was patent to the LAD, EF of 40%. We did angioplasty of the right coronary artery placing 3.0 x 38, 3.0 x 38, 3.5 x 24 and 3.5 x 16 mm drug-eluting ION stents with a good end result. He does have moderate aortic stenosis. He does complain of dizziness after taking his medications until approximately 2 p.m. and he starts feeling better. His shortness of breath is improved. Denies any chest pain or chest discomfort. No leg edema.     CARDIAC RISK FACTORS:  Known CAD:  Positive. Bypass Surgery:  Positive. PTCA:  Positive. Hypertension:  Positive. Hyperlipidemia:  Positive. Smoking:  Negative. Diabetes:  Negative. MEDICATIONS AT HOME:  He is currently on aspirin 81 mg daily, vitamin D 2000 units daily, Plavix 75 mg daily, Lasix 40 mg daily, Imdur 30 mg half a tablet daily, Synthroid 125 mcg daily, magnesium 400 mg daily, Toprol-XL 25 mg daily, Crestor 20 mg daily, Aldactone 25 mg daily, Flomax 0.4 mg daily, Diovan 80 mg daily, vitamin B12 1000 mcg daily. PAST MEDICAL AND SURGICAL HISTORY:  1. Cardiac as above. 2.  Hypertension, well controlled, in fact is hypotensive at this time. 3.  Hyperlipidemia, well controlled. 4.  Atrial fibrillation in 2000 for which he was on Coumadin, although he stopped this after nose surgery. 5.  Hypoparathyroidism. 6.  Sick sinus syndrome, status post [employer] in 2012. FAMILY HISTORY:  Significant for CAD. SOCIAL HISTORY:  He is 80years old. Two children, one in [place]. He lived with [facility] for 29 years until she passed away. He does not smoke or drink alcohol. He cooks for himself. He is fairly inactive at this time. REVIEW OF SYSTEMS:  Cardiac as above. Other systems reviewed including constitutional, eyes, ears, nose and throat, cardiovascular, respiratory, GI, , musculoskeletal, integumentary, neurologic, endocrine, hematologic and allergic/immunologic are negative except for what is described above. No weight loss or weight gain. No change in bowel habits. No blood in stools. No fevers, sweats or chills. PHYSICAL EXAMINATION:  VITAL SIGNS:  His blood pressure was 98/50, with a heart rate of 63 and regular. Respiratory rate 18. O2 sat 97%. Weight 184 pounds. GENERAL:  He is a very pleasant 80year-old gentleman. Denied pain. He was oriented to person, place and time. Answered questions appropriately. SKIN:  No unusual skin changes.   HEENT:  The pupils are equally round and intact. Mucous membranes were dry. NECK:  No JVD. Good carotid pulses. No carotid bruits. No lymphadenopathy or thyromegaly. CARDIOVASCULAR EXAM:  S1 and S2 were normal.  No S3 or S4. Soft systolic blowing type murmur. No diastolic murmur. PMI was normal.  No lift, thrust, or pericardial friction rub. LUNGS:  Clear to auscultation and percussion. ABDOMEN:  Soft and nontender. Good bowel sounds. EXTREMITIES:  Good femoral pulses. Good pedal pulses. No pedal edema. Skin was warm and dry. No calf tenderness. Nail beds pink. Good cap refill. PULSES:  Bilateral symmetrical radial, brachial and carotid pulses. No carotid bruits. Good femoral and pedal pulses. NEUROLOGIC EXAM:  Within normal limits. PSYCHIATRIC EXAM:  Within normal limits. LABORATORY DATA:  Sodium was 140, potassium 4.2, BUN 29, creatinine 1.56, GFR was 42, glucose 119, magnesium 1.6. Cholesterol 137, HDL was 34, LDL 81, triglycerides 111. ALT was 8, AST was 15. TSH 23.63 with a free thyroxine of 1.38. White count 6.3, hemoglobin 11.5 with a platelet count of 057,167. EKG showed ventricular paced rhythm. Chest x-ray showed small bilateral pleural effusion. IMPRESSION:  1. Moderate aortic stenosis with an aortic valve area of 1.1 cm2.  2.  Moderate LV dysfunction, EF of 35%. 3.  Four-vessel bypass surgery in 2000 at [facility] with a LIMA to the LAD, vein graft to the high lateral circumflex, vein graft to the OM and a vein graft to the right coronary artery. 4.  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   5.  Catheterization on 09/09/2012, showing 99% LAD, with subtotally occluded intermediate branch of the circumflex, right coronary artery had 95% to 99% sequential lesions, vein graft to the high lateral circumflex was patent, vein graft to the right coronary artery was occluded, vein graft to the OM was occluded, native OM was occluded, EF of 40%, with angioplasty of the right coronary artery placing two 3.0 x 38 mm drug-eluting ION stents along with 3.5 x 24 and 3.5 x 16 mm ION stents. 6.  Hypotension with blood pressure in the 98 to 100 range. PLAN:  1. Hold Diovan. 2.  Hold Aldactone. 3.  Take his blood pressure daily at different times of the day and weigh daily. 3.  We will see in three weeks for blood pressure check. DISCUSSION:  Mr. Viv Barboza has had marked fatigue. I think part of this is secondary to his hypotension. We will plan on holding his Diovan and his Aldactone as I think he is relatively dry at this time. He will take his blood pressure at home along with his weights since I have stopped his Aldactone. If his blood pressure would just come up, I will start him on a lower dose of Diovan. Thank you very much for allowing me the privilege of seeing Levell Snooks. If you have any questions on my thoughts, please do not hesitate to contact me.      Sincerely,        Luigi Serrato    D: 10/30/2022 19:08:06     T: 10/30/2022 19:12:53     [email or web address]  Job#: [id number]   Doc#: [id number]

## 2022-11-01 NOTE — CARE COORDINATION
Ambulatory Care Coordination Note  11/1/2022    ACC: Gina Gardner RN    Reviewed all medications with patient. Cardiology is holding valsartan and spironolactone that were prescribed at [facility] during recent admit, continues to take Lasix daily. CHF-weight stable at 182-184. No leg swelling. Some dyspnea with exertion but his baseline, still walking daily and doing regular ADLs. He will get CMP before next cardio visit. Checking BP, reminded per cardio to check at different times of the day, record and bring to next appt. 109/62 this morning. No dizziness, feels ok just \"not much steam like I used to have\". CC Plan: Follow in a week to review BP, weight, symptoms. Offered patient enrollment in the Remote Patient Monitoring (RPM) program for in-home monitoring: Patient declined.     Congestive Heart Failure Assessment    Are you currently restricting fluids?: No Restriction  Do you understand a low sodium diet?: Yes (Comment: dietician working with patient to educate)  Do you understand how to read food labels?: Yes  How many restaurant meals do you eat per week?: 0  Do you salt your food before tasting it?: No         Symptoms:  CHF associated dyspnea on exertion: Pos      Symptom course: stable  Patient-reported weight (lb): 183  Weight trend: stable  Salt intake watch compared to last visit: stable     ,   COPD Assessment    Does the patient understand envrionmental exposure?: Yes  Is the patient able to verbalize Rescue vs. Long Acting medications?: Yes  Does the patient have a nebulizer?: No  Does the patient use a space with inhaled medications?: No            Symptoms:     Symptom course: stable  Breathlessness: exertion  Increase use of rapid acting/rescue inhaled medications?: Not Applicable  Change in chronic cough?: No/At Baseline  Change in sputum?: No/At Baseline  Sputum characteristics: Unable to Specify  Self Monitoring - SaO2: No  Have you had a recent diagnosis of pneumonia either by PCP or If provider orders tests at today's visit, patient would like to be contacted via Telephone.  If to contact patient by phone, patient's preferred phone # is [phone] (home) and it is OK to leave message on voice mail or with family member.  If medications are ordered at today's visit, the pharmacy name/location patient would like them to be sent to is   [id number] - [place], [place] - [address]  [address]  [facility] [address]  Phone: [phone] Fax: [phone]    [facility] - [place], [place] - [address]  [address] [address]  Phone: [phone] Fax: [phone]       at a hospital?: No     , and   General Assessment    Do you have any symptoms that are causing concern?: No           Lab Results       None            Care Coordination Interventions    Referral from Primary Care Provider: No  Suggested Interventions and Community Resources          Goals Addressed                   This Visit's Progress     Conditions and Symptoms   On track     I will schedule office visits, as directed by my provider. I will keep my appointment or reschedule if I have to cancel. I will notify my provider of any barriers to my plan of care. I will follow my Zone Management tool to seek urgent or emergent care. I will notify my provider of any symptoms that indicate a worsening of my condition. Barriers: overwhelmed by complexity of regimen and lack of education  Plan for overcoming my barriers: ACM calls, education about medications, conditions (CHF, COPD)  Confidence: 7/10  Anticipated Goal Completion Date: 1/30/23                Prior to Admission medications    Medication Sig Start Date End Date Taking?  Authorizing Provider   furosemide (LASIX) 40 MG tablet Take 1 tablet by mouth daily 10/17/22  Yes Historical Provider, MD   metoprolol succinate (TOPROL XL) 25 MG extended release tablet Take 1 tablet by mouth nightly 10/17/22  Yes Historical Provider, MD   levothyroxine (SYNTHROID) 125 MCG tablet TAKE 1 TABLET BY MOUTH DAILY 10/19/22  Yes LORY Mayorga CNP   vitamin B-12 (CYANOCOBALAMIN) 1000 MCG tablet Take 1,000 mcg by mouth daily   Yes Historical Provider, MD   Cholecalciferol (VITAMIN D3) 50 MCG (2000 UT) CAPS Take 2,000 Units by mouth daily   Yes Historical Provider, MD   clopidogrel (PLAVIX) 75 MG tablet Take 1 tablet by mouth daily 7/7/22  Yes Ming Garcia DO   rosuvastatin (CRESTOR) 20 MG tablet Take 1 tablet by mouth daily 6/6/22  Yes LORY Mayorga CNP   isosorbide mononitrate (IMDUR) 30 MG extended release tablet Take 0.5 tablets by mouth daily 6/6/22 Yes LORY Clarke CNP   Magnesium 400 MG TABS Take 400 mg by mouth daily 5/2/22  Yes Meng Dang MD   aspirin 81 MG tablet Take 81 mg by mouth daily.    Yes Historical Provider, MD   spironolactone (ALDACTONE) 25 MG tablet Take 25 mg by mouth daily HOLD  Patient not taking: Reported on 11/1/2022 10/17/22   Historical Provider, MD   valsartan (DIOVAN) 80 MG tablet Take 1 tablet by mouth daily HOLD  Patient not taking: Reported on 11/1/2022 10/17/22   Historical Provider, MD   tamsulosin (FLOMAX) 0.4 MG capsule Take 1 capsule by mouth at bedtime Urinary frequency/enlarged prostate, take at bedtime  Patient not taking: Reported on 11/1/2022 9/20/22   LORY Clarke CNP   metoprolol (LOPRESSOR) 100 MG tablet Take 1 tablet by mouth 2 times daily  Patient taking differently: No sig reported 3/27/20 4/26/21  Meng Dang MD       Future Appointments   Date Time Provider Andres Sharma   11/10/2022  1:00 PM MD Gregoria Davis [facility]   12/12/2022 10:00 AM LORY Clarke CNP [facility]

## 2022-11-08 ENCOUNTER — CARE COORDINATION (OUTPATIENT)
Dept: CARE COORDINATION | Age: 87
End: 2022-11-08

## 2022-11-08 NOTE — CARE COORDINATION
2022- spoke with Laurita Boogie. His stated he is doing well. Wt 183# (runs 182-184#). Denied swelling feet and ankles, SOB denied. He is following low salt diet. Sp02 96%% on room air. /65, HR 65. He walks his dog daily- half an hour. He has upcoming apt and will take medications, BP readings, weights, and BP monitor to have them review usage with him. Heart Failure Education outreach Date/Time: 2022 10:37 AM    Ambulatory Care Manager (ACM) contacted the patient by telephone to perform Ambulatory Care Coordination. Verified name and  with patient as identifiers. Provided introduction to self, and explanation of the Ambulatory Care Manager's role. ACM reviewed that a Health Healthy tips for the Holiday packet has been mailed to the them. ACM reviewed CHF zones, daily weights, fluid restriction, the importance of low sodium diet, and healthy tips packet with the patient. Instructed patient to call their cardiology if they have a weight gain of 3 lbs in 2 days or 5 lbs in a week. Patient reminded that there is a physician on call 24 hours a day / 7 days a week should the patient have questions or concerns. The patient verbalized understanding.      Future Appointments   Date Time Provider Andres Sharma   11/10/2022  1:00 PM MD Leighton Sun [facility]   2022 10:00 AM LORY Antony - CLAU PUENTES [facility] 58 yo female with hx of ESRD on HD MWF, chronic HFrEF (EF 35-40% TTE 02/11/19), NICM (cardiac cath 03/18 non-obstructive CAD), paroxysmal Afib (no AC due to GI bleed), uremic pericarditis s/p pericardiocentesis, right sided pleural effusion s/p pigtail catheter, IDDM, and HTN with recent hospitalization in [facility] 2/2 positive Influenza A complicated with right pleural effusion requiring thoracocentesis and discharged home on supplemental O2 3L/min, currently presenting to ED referred from Pulmonologist after finding of loculated R pleural effusion on CT chest of unclear etiology, associated with dyspnea on exertion and noted peripheral edema, with moderate ascites.   Pt received consecutive HD sessions x 2 to offload fluid 2/11 and 2/12 with improvement in sob.     CT sx/ IR and pulm consulted. As per CT sx need drainage of ascitic fluid prior to draining R loculated effusion for further evaluation of etiology, but given overall fluid overload state, offloading of fluid with HD was continued and paracentesis was performed on 02/15/19 draining 3550mL of fluid. Both interventions resulting in return of respiratory status and O2 requirements to patient's baseline.      TTE completed and noted drop in EF from 55% to 35-40% and mobile density noted on aortic valve leaflets, upon review of prior TTE, the latter finding was already present and EF might have been overestimated as per cardiology. 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     Dyspnea on exertion likely multifactorial 2/2 loculated R pleural effusion with concomitant fluid overload noted peripheral edema/ ascites in the setting ESRD/ CHF. Clinical concern for infectious process given recent influenza infection  -  leukopenia is unchanged  - Patient  remains afebrile, blood cultures negative.   - SOB is improved and at her baseline O2 requirement of 3l via NC   - S/p Vanco   - C/w cefepime, per ID. Will need to clarify antibiotic therapy since initial plan was to steer therapy based on culture of pleural fluid but as per CT surgery, thoracentesis is not indicated.  - c/w Florastor while on ABX  - c/w offloading excess fluid with HD, next session deferred to 02/18 as per Nephro  - CXR shows overall improvement.  - Pulmonology f/u noted and appreciated .   - CT surgery: No further intervention from thoracic standpoint    Chronic systolic HFrEF: 35-40%   - Improved peripheral edema, ascites and crackles in lungs with slight improvement.   - Non obstructive CAD by cath in 3/2018   - Previous 11/2018: EF 55-65%  -- > 02/2019 35- 40%  - BNP: > 70,000  - c/w Strict I/o   -Fluid restriction    - daily weight   - c/w entresto po bid, coreg po bid   - c/w offloading fluid with HD, , next session deferred to 02/18 as per Nephro    ESRD on HD (MWF via Left AVF) with normocytic anemia   - s/p x2 consecutive HD sessions for offloading fluid, HD as stated above  - SAAG 1.4  - Pending Cytology report  - c/w calcitriol 0.25mg po qd   - Nephro f/u noted and appreciated     DM2  - Last HbA1C: 6.2 on 01/01/2019  - fs low normal  and had episode of hypoglycemia on 2/10/19   - c/w hypoglycemia protocol     HTN  - bp stable   -c/w Coreg 12.5mg BID PO, Amlodipine 2.5 QD PO, Entresto 49/51 PO QD    Chronic constipation  - c/w Miralax qd with holding parameters     Prophylactic measure:  Holding AC in anticipation of IR guided intervention in the am   Florastor while on Abx  Omeprazole 40mg PO    Advanced Directive: Full code     Dispo: Pt at baseline walks independently and has chronic O2 at home. Lives with her son. PT consulted and recommended home w/ assist but since evaluation pt has been independently ambulating to the bathroom and around her room. 58 yo female with hx of ESRD on HD MWF, chronic HFrEF (EF 35-40% TTE 02/11/19), NICM (cardiac cath 03/18 non-obstructive CAD), paroxysmal Afib (no AC due to GI bleed), uremic pericarditis s/p pericardiocentesis, right sided pleural effusion s/p pigtail catheter, IDDM, and HTN with recent hospitalization in [facility] 2/2 positive Influenza A complicated with right pleural effusion requiring thoracocentesis and discharged home on supplemental O2 3L/min, currently presenting to ED referred from Pulmonologist after finding of loculated R pleural effusion on CT chest of unclear etiology, associated with dyspnea on exertion and noted peripheral edema, with moderate ascites.   Pt received consecutive HD sessions x 2 to offload fluid 2/11 and 2/12 with improvement in sob.     CT sx/ IR and pulm consulted. As per CT sx need drainage of ascitic fluid prior to draining R loculated effusion for further evaluation of etiology, but given overall fluid overload state, offloading of fluid with HD was continued and paracentesis was performed on 02/15/19 draining 3550mL of fluid. Both interventions resulting in return of respiratory status and O2 requirements to patient's baseline.      TTE completed and noted drop in EF from 55% to 35-40% and mobile density noted on aortic valve leaflets, upon review of prior TTE, the latter finding was already present and EF might have been overestimated as per cardiology. 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     Dyspnea on exertion likely multifactorial 2/2 loculated R pleural effusion with concomitant fluid overload noted peripheral edema/ ascites in the setting ESRD/ CHF. Clinical concern for infectious process given recent influenza infection  -  leukopenia is unchanged  - Patient  remains afebrile, blood cultures negative.   - SOB is improved. O2 saturation at rest 95% at room air.   - S/p Vanco   - C/w cefepime, per ID. Will need to clarify antibiotic therapy since initial plan was to steer therapy based on culture of pleural fluid but as per CT surgery, thoracentesis is not indicated. ID will discuss with Pulmonology if c/w antibiotics considering patient remain afebrile and thoracocentesis will not be performed.   - c/w Florastor while on ABX  - c/w offloading excess fluid with HD, next session deferred to 02/18 as per Nephro  - CXR shows overall improvement.  - Pulmonology f/u noted and appreciated .   - CT surgery: No further intervention from thoracic standpoint    Chronic systolic HFrEF: 35-40%   - Improved peripheral edema, ascites and crackles in lungs with slight improvement.   - Non obstructive CAD by cath in 3/2018   - Previous 11/2018: EF 55-65%  -- > 02/2019 35- 40%  - BNP: > 70,000  - c/w Strict I/o   -Fluid restriction    - daily weight   - c/w entresto po bid, coreg po bid   - c/w offloading fluid with HD, , next session deferred to 02/18 as per Nephro    ESRD on HD (MWF via Left AVF) with normocytic anemia   - s/p x2 consecutive HD sessions for offloading fluid, HD as stated above  - SAAG 1.4  - HD scheduled for 02/18 and 02/19   - Pending Cytology report  - c/w calcitriol 0.25mg po qd   - Nephro f/u noted and appreciated     DM2  - Last HbA1C: 6.2 on 01/01/2019  - fs low normal  and had episode of hypoglycemia on 2/10/19   - c/w hypoglycemia protocol     HTN  - bp stable   -c/w Coreg 12.5mg BID PO, Amlodipine 2.5 QD PO, Entresto 49/51 PO QD    Chronic constipation  - c/w Miralax qd with holding parameters     Prophylactic measure:  Holding AC in anticipation of IR guided intervention in the am   Florastor while on Abx  Omeprazole 40mg PO    Advanced Directive: Full code     Dispo: Pt at baseline walks independently and has chronic O2 at home. Lives with her son. PT consulted and recommended home w/ assist but since evaluation pt has been independently ambulating to the bathroom and around her room. Will request new PT consult. After scheduled HD will discuss with renal probable discharge home. 58 yo female with hx of ESRD on HD MWF, chronic HFrEF (EF 35-40% TTE 02/11/19), NICM (cardiac cath 03/18 non-obstructive CAD), paroxysmal Afib (no AC due to GI bleed), uremic pericarditis s/p pericardiocentesis, right sided pleural effusion s/p pigtail catheter, IDDM, and HTN with recent hospitalization in [facility] 2/2 positive Influenza A complicated with right pleural effusion requiring thoracocentesis and discharged home on supplemental O2 3L/min, currently presenting to ED referred from Pulmonologist after finding of loculated R pleural effusion on CT chest of unclear etiology, associated with dyspnea on exertion and noted peripheral edema, with moderate ascites.   Pt received consecutive HD sessions x 2 to offload fluid 2/11 and 2/12 with improvement in sob.     CT sx/ IR and pulm consulted. As per CT sx need drainage of ascitic fluid prior to draining R loculated effusion for further evaluation of etiology, but given overall fluid overload state, offloading of fluid with HD was continued and paracentesis was performed on 02/15/19 draining 3550mL of fluid. Both interventions resulting in return of respiratory status and O2 requirements to patient's baseline.      TTE completed and noted drop in EF from 55% to 35-40% and mobile density noted on aortic valve leaflets, upon review of prior TTE, the latter finding was already present and EF might have been overestimated as per cardiology. 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     Volume Overload  -characterized by HAYES  -likely multifactorial 2/2 loculated R pleural effusion, ascites in the setting ESRD/ CHF exac  - Patient remains afebrile, blood cultures negative.   - SOB is improved and at her baseline O2 requirement of 3l via NC   - S/p Vanco   - C/w cefepime, per ID for pleural effusion, has now completed prolonged abx course. Will need to clarify antibiotic therapy since initial plan was to steer therapy based on culture of pleural fluid but as per CT surgery thoracentesis is not indicated.  - c/w Florastor while on ABX  - c/w offloading excess fluid with HD, next session deferred to 02/19 as per Nephro  - CXR shows overall improvement.  - sp paracentesis, SAAG 1.4, hepatic us w/ doppler unremarkable- budd chiari ruled out, no hepatic failure or cirrhosis or clear malignancy, suspect volume overload likely related to HF exac.   - Pulmonology f/u noted and appreciated .   - CT surgery: No further intervention from thoracic standpoint  - dc home when pt volume status back to euvolemia, likely w/i next 24-48hrs    Acute on chronic combined HF  - ef 35-40%, grade 2 dd   - Improved peripheral edema, ascites and crackles in lungs with slight improvement.   - Non obstructive CAD by cath in 3/2018  - c/w Strict I/o   -Fluid restriction    - daily weight   - c/w entresto po bid, coreg po bid   - c/w offloading fluid with HD, sched per renal    ESRD on HD   -(MWF via Left AVF as outpt) with normocytic anemia   - c/w calcitriol 0.25mg po qd   - Nephro f/u noted and appreciated, HD sched per them while inpt    DM2 on long term insulin complicated by hyperglycemia asymptomatic + ep of hypoglycemia in 50s  - Last HbA1C: 6.2 on 01/01/2019, controlled, goal < 7  - episode of hypoglycemia on 2/10/19   - cont holding long + short acting insulin, resume short acting ss coverage if BG remains >/= 180  - c/w hypoglycemia protocol     HTN  - bp stable, goal bp < 140/90, suoptimally controlled w/ sbp into 150s at times  -c/w Coreg 12.5mg BID PO, Amlodipine 2.5 QD PO, Entresto 49/51 PO QD  -bp control w/ HD for volume removal as well    Chronic constipation  - stable  - c/w Miralax qd with holding parameters     Prophylactic measure:  dvt ppx w/ sqh  Florastor while on Abx  ppi daily    Advanced Directive: Full code     Dispo: Pt at baseline walks independently and has chronic O2 at home. Lives with her son. PT consulted and recommended home w/ assist but since evaluation pt has been independently ambulating to the bathroom and around her room. pending dc home once euvolemic, likely dc soon

## 2022-11-10 ENCOUNTER — OFFICE VISIT (OUTPATIENT)
Dept: CARDIOLOGY CLINIC | Age: 87
End: 2022-11-10
Payer: MEDICARE

## 2022-11-10 ENCOUNTER — HOSPITAL ENCOUNTER (OUTPATIENT)
Age: 87
Discharge: HOME | End: 2022-11-10
Payer: MEDICARE

## 2022-11-10 ENCOUNTER — TELEPHONE (OUTPATIENT)
Dept: FAMILY MEDICINE CLINIC | Age: 87
End: 2022-11-10

## 2022-11-10 VITALS
OXYGEN SATURATION: 94 % | DIASTOLIC BLOOD PRESSURE: 50 MMHG | HEART RATE: 66 BPM | BODY MASS INDEX: 25.83 KG/M2 | WEIGHT: 180 LBS | SYSTOLIC BLOOD PRESSURE: 110 MMHG

## 2022-11-10 DIAGNOSIS — E78.5 HYPERLIPIDEMIA, UNSPECIFIED HYPERLIPIDEMIA TYPE: ICD-10-CM

## 2022-11-10 DIAGNOSIS — I25.10 CORONARY ARTERY DISEASE INVOLVING NATIVE CORONARY ARTERY OF NATIVE HEART WITHOUT ANGINA PECTORIS: Primary | ICD-10-CM

## 2022-11-10 DIAGNOSIS — Z95.0 PACEMAKER: ICD-10-CM

## 2022-11-10 DIAGNOSIS — I95.0 IDIOPATHIC HYPOTENSION: ICD-10-CM

## 2022-11-10 LAB
ALBUMIN SERPL-MCNC: 4.4 G/DL (ref 3.5–5.2)
ALP BLD-CCNC: 91 U/L (ref 40–129)
ALT SERPL-CCNC: 9 U/L (ref 5–41)
ANION GAP SERPL CALCULATED.3IONS-SCNC: 11 MMOL/L (ref 9–17)
AST SERPL-CCNC: 17 U/L
BILIRUB SERPL-MCNC: 0.9 MG/DL (ref 0.3–1.2)
BUN BLDV-MCNC: 22 MG/DL (ref 8–23)
BUN/CREAT BLD: 17 (ref 9–20)
CALCIUM SERPL-MCNC: 9.7 MG/DL (ref 8.6–10.4)
CHLORIDE BLD-SCNC: 104 MMOL/L (ref 98–107)
CO2: 29 MMOL/L (ref 20–31)
CREAT SERPL-MCNC: 1.27 MG/DL (ref 0.7–1.2)
GFR SERPL CREATININE-BSD FRML MDRD: 54 ML/MIN/1.73M2
GLUCOSE BLD-MCNC: 100 MG/DL (ref 70–99)
POTASSIUM SERPL-SCNC: 4.1 MMOL/L (ref 3.7–5.3)
SODIUM BLD-SCNC: 144 MMOL/L (ref 135–144)
TOTAL PROTEIN: 7.6 G/DL (ref 6.4–8.3)

## 2022-11-10 PROCEDURE — 1123F ACP DISCUSS/DSCN MKR DOCD: CPT | Performed by: INTERNAL MEDICINE

## 2022-11-10 PROCEDURE — 1111F DSCHRG MED/CURRENT MED MERGE: CPT | Performed by: INTERNAL MEDICINE

## 2022-11-10 PROCEDURE — 1036F TOBACCO NON-USER: CPT | Performed by: INTERNAL MEDICINE

## 2022-11-10 PROCEDURE — G8484 FLU IMMUNIZE NO ADMIN: HCPCS | Performed by: INTERNAL MEDICINE

## 2022-11-10 PROCEDURE — G8428 CUR MEDS NOT DOCUMENT: HCPCS | Performed by: INTERNAL MEDICINE

## 2022-11-10 PROCEDURE — 99214 OFFICE O/P EST MOD 30 MIN: CPT | Performed by: INTERNAL MEDICINE

## 2022-11-10 PROCEDURE — 36415 COLL VENOUS BLD VENIPUNCTURE: CPT

## 2022-11-10 PROCEDURE — 80053 COMPREHEN METABOLIC PANEL: CPT

## 2022-11-10 PROCEDURE — G8417 CALC BMI ABV UP PARAM F/U: HCPCS | Performed by: INTERNAL MEDICINE

## 2022-11-10 RX ORDER — TAMSULOSIN HYDROCHLORIDE 0.4 MG/1
0.4 CAPSULE ORAL NIGHTLY
Qty: 90 CAPSULE | Refills: 1 | Status: SHIPPED | OUTPATIENT
Start: 2022-11-10

## 2022-11-10 NOTE — TELEPHONE ENCOUNTER
Flomax 0.4 mng    DM-li    Health Maintenance   Topic Date Due    COVID-19 Vaccine (1) Never done    DTaP/Tdap/Td vaccine (1 - Tdap) Never done    Shingles vaccine (1 of 2) Never done    Flu vaccine (1) 08/01/2022    Annual Wellness Visit (AWV)  12/07/2022    Depression Screen  06/06/2023    Lipids  10/24/2023    Pneumococcal 65+ years Vaccine  Completed    Hepatitis A vaccine  Aged Out    Hib vaccine  Aged Out    Meningococcal (ACWY) vaccine  Aged Out             (applicable per patient's age: Cancer Screenings, Depression Screening, Fall Risk Screening, Immunizations)    LDL Cholesterol (mg/dL)   Date Value   10/24/2022 81     AST (U/L)   Date Value   11/10/2022 17     ALT (U/L)   Date Value   11/10/2022 9     BUN (mg/dL)   Date Value   11/10/2022 22      (goal A1C is < 7)   (goal LDL is <100) need 30-50% reduction from baseline     BP Readings from Last 3 Encounters:   11/10/22 (!) 110/50   10/24/22 (!) 98/50   10/14/22 117/62    (goal /80)      All Future Testing planned in [facility]:  Lab Frequency Next Occurrence   Hemoglobin and Hematocrit Once 12/12/2022   Comprehensive Metabolic Panel Once 63/43/8316       Next Visit Date:  Future Appointments   Date Time Provider Andres Sharma   12/12/2022  9:00 AM MD Munir Jones [facility]   12/12/2022 10:00 AM LORY Hess - CLAU PUENTES [facility]            Patient Active Problem List:     Hard of hearing     CAD (coronary artery disease)     Atrial fibrillation ([address]     Hiatal hernia     Hyperlipidemia     Hypothyroidism     Pacemaker     History of PTCA     Calculus of gallbladder without cholecystitis without obstruction     Umbilical hernia     Vitamin D deficiency     Chronic systolic (congestive) heart failure     Acute on chronic systolic CHF (congestive heart failure) ([address]

## 2022-11-10 NOTE — PATIENT INSTRUCTIONS
No changes    Call us back to make sure you   Are taking FUROSEMIDE     Follow up in dec with H&H and BMP prior

## 2022-11-10 NOTE — PROGRESS NOTES
Ov Dr Sherly Myles for 3 week follow up    Last.  Hold Diovan.   2.  Hold Aldactone visit:   No cp  No sob   No falls  No dizziness      No changes    Call us back to make sure you   Are taking FUROSEMIDE     Follow up in dec with H&H and CMP prior on BiPAP

## 2022-11-10 NOTE — LETTER
Anand Patel MD  [facility]  [address], [place]  [phone]      November 10, 2022      Brett Peterson, CNP  [address] , [place] [address]      RE:   Evelyn Garza  :  1932      Dear Ankit Gu:    CHIEF COMPLAINT:  1. Hypotension. 2.  Sick sinus syndrome. 3.  Shortness of breath. HISTORY OF PRESENT ILLNESS:  I had the pleasure of seeing Sukhi Beth in our office on 11/10/2022. I trust you received my full H and P from 10/24/2022. At that time, he was having marked fatigue. He was also fairly hypotensive with the blood pressure in the 95 to 98 over 50 range. I felt that he was hypotensive due to his Diovan and his Aldactone. We had him hold both Diovan and Aldactone and take his blood pressures at different times during the day. I brought him back today for reevaluation. He does look much better as I see him today. He is more alert. He has had no dizziness, no falls. He seems to be eating better. He states that overall, he is \"feeling good. \"  (I think that is a stretch. ..)    MEDICATIONS:  His medications today are aspirin 81 mg daily, vitamin D 2000 units daily, Plavix 75 mg daily, Imdur 30 mg half a tablet daily, Synthroid 125 mcg daily, magnesium 400 mg daily, metoprolol 25 mg nightly, Crestor 20 mg daily, Flomax 0.4 mg daily, vitamin B12 1000 mcg daily. (He is not sure if he is taking Lasix, there is one bottle at home that he is going to call us to let us know what medication that this bottle contains.)    PHYSICAL EXAMINATION:  VITAL SIGNS:  His blood pressure today was 110/70 with a heart rate of 70. GENERAL:  He is a pleasant 80year-old gentleman. Denied pain. He was oriented to person, place and time. Answered questions appropriately. SKIN:  No unusual skin changes. HEENT:  The pupils are equally round and intact. Mucous membranes were dry. NECK:  No JVD. Good carotid pulses.   No carotid bruits. No lymphadenopathy or thyromegaly. CARDIOVASCULAR EXAM:  S1 and S2 were normal.  No S3 or S4. Soft systolic murmur. No diastolic murmur. PMI was normal.  No lift, thrust, or pericardial friction rub. LUNGS:  Clear to auscultation and percussion. ABDOMEN:  Soft and nontender. Good bowel sounds. EXTREMITIES:  Good femoral pulses. Good pedal pulses. No pedal edema. Skin was warm and dry. No calf tenderness. Nail beds pink. Good cap refill. PULSES:  Bilateral symmetrical radial, brachial and carotid pulses. Bilateral carotid bruits. Good femoral and pedal pulses. NEUROLOGIC EXAM:  Within normal limits. PSYCHIATRIC EXAM:  Within normal limits. LABORATORY DATA:  His sodium was 144, potassium 4.1, BUN was 22, creatinine 1.27, calcium 9.7. IMPRESSION:  1. Blood pressure better in the 110 range after holding Diovan and Aldactone. 2.  Renal insufficiency about at baseline, with creatinine improved at 1.27 from 1.56.  3.  Moderate LV dysfunction, EF in the 35%. 4.  Moderate aortic stenosis. PLAN:  1. No change in medications. 2.  We will see in December and recheck blood work at that time. DISCUSSION:  Mr. Brett Morton overall looks better than he did on 10/24. He is much more alert and he has had no dizziness. His blood pressure is also improved from the mid 90s to 110 at this time. He does have one bottle at home that he is not sure what it contains. He will call us when he gets home; however, his medications that he is taking right now seem optimum. Therefore, I made no change in medications. I will see him in the middle of December to reevaluate. Thank you very much for allowing me the privilege of seeing Mr. Brett Morton. If you have any questions on my thoughts, please do not hesitate to contact me.      Sincerely,        Desiree Kessler    D: 11/10/2022 8:26:10     T: 11/10/2022 23:47:29     [email or web address]  Job#: [id number]   Doc#: [id number]

## 2022-11-11 RX ORDER — CLOPIDOGREL BISULFATE 75 MG/1
75 TABLET ORAL DAILY
Qty: 90 TABLET | Refills: 1 | Status: SHIPPED | OUTPATIENT
Start: 2022-11-11

## 2022-11-14 RX ORDER — FUROSEMIDE 40 MG/1
40 TABLET ORAL DAILY
Qty: 30 TABLET | Refills: 5 | Status: SHIPPED | OUTPATIENT
Start: 2022-11-14

## 2022-11-14 NOTE — PROGRESS NOTES
Jhony Fajardo MD  [facility]  [address], [place]  [phone]      November 10, 2022      Rajiv nAgeles, CNP  [address] , [place] [address]      RE:   Ken Gaitan  :  1932      Dear Eli Miller:    CHIEF COMPLAINT:  1. Hypotension. 2.  Sick sinus syndrome. 3.  Shortness of breath. HISTORY OF PRESENT ILLNESS:  I had the pleasure of seeing Salbador Martinez in our office on 11/10/2022. I trust you received my full H and P from 10/24/2022. At that time, he was having marked fatigue. He was also fairly hypotensive with the blood pressure in the 95 to 98 over 50 range. I felt that he was hypotensive due to his Diovan and his Aldactone. We had him hold both Diovan and Aldactone and take his blood pressures at different times during the day. I brought him back today for reevaluation. He does look much better as I see him today. He is more alert. He has had no dizziness, no falls. He seems to be eating better. He states that overall, he is \"feeling good. \"  (I think that is a stretch. ..)    MEDICATIONS:  His medications today are aspirin 81 mg daily, vitamin D 2000 units daily, Plavix 75 mg daily, Imdur 30 mg half a tablet daily, Synthroid 125 mcg daily, magnesium 400 mg daily, metoprolol 25 mg nightly, Crestor 20 mg daily, Flomax 0.4 mg daily, vitamin B12 1000 mcg daily. (He is not sure if he is taking Lasix, there is one bottle at home that he is going to call us to let us know what medication that this bottle contains.)    PHYSICAL EXAMINATION:  VITAL SIGNS:  His blood pressure today was 110/70 with a heart rate of 70. GENERAL:  He is a pleasant 80year-old gentleman. Denied pain. He was oriented to person, place and time. Answered questions appropriately. SKIN:  No unusual skin changes. HEENT:  The pupils are equally round and intact. Mucous membranes were dry. NECK:  No JVD. Good carotid pulses.   No carotid bruits. No lymphadenopathy or thyromegaly. CARDIOVASCULAR EXAM:  S1 and S2 were normal.  No S3 or S4. Soft systolic murmur. No diastolic murmur. PMI was normal.  No lift, thrust, or pericardial friction rub. LUNGS:  Clear to auscultation and percussion. ABDOMEN:  Soft and nontender. Good bowel sounds. EXTREMITIES:  Good femoral pulses. Good pedal pulses. No pedal edema. Skin was warm and dry. No calf tenderness. Nail beds pink. Good cap refill. PULSES:  Bilateral symmetrical radial, brachial and carotid pulses. Bilateral carotid bruits. Good femoral and pedal pulses. NEUROLOGIC EXAM:  Within normal limits. PSYCHIATRIC EXAM:  Within normal limits. LABORATORY DATA:  His sodium was 144, potassium 4.1, BUN was 22, creatinine 1.27, calcium 9.7. IMPRESSION:  1. Blood pressure better in the 110 range after holding Diovan and Aldactone. 2.  Renal insufficiency about at baseline, with creatinine improved at 1.27 from 1.56.  3.  Moderate LV dysfunction, EF in the 35%. 4.  Moderate aortic stenosis. PLAN:  1. No change in medications. 2.  We will see in December and recheck blood work at that time. DISCUSSION:  Mr. Junito Petersen overall looks better than he did on 10/24. He is much more alert and he has had no dizziness. His blood pressure is also improved from the mid 90s to 110 at this time. He does have one bottle at home that he is not sure what it contains. He will call us when he gets home; however, his medications that he is taking right now seem optimum. Therefore, I made no change in medications. I will see him in the middle of December to reevaluate. Thank you very much for allowing me the privilege of seeing Mr. Junito Petersen. If you have any questions on my thoughts, please do not hesitate to contact me.      Sincerely,        Samara Mendoza    D: 11/10/2022 8:26:10     T: 11/10/2022 23:47:29     [email or web address]  Job#: [id number]   Doc#: [id number]

## 2022-11-14 NOTE — TELEPHONE ENCOUNTER
Patient stopped by office to request refill for his Furosemide 40mg  1 tab daily to [facility]

## 2022-11-16 ENCOUNTER — CARE COORDINATION (OUTPATIENT)
Dept: CARE COORDINATION | Age: 87
End: 2022-11-16

## 2022-11-16 NOTE — CARE COORDINATION
Ambulatory Care Coordination Note  11/16/2022    ACC: Morris Yu, RN    He feels well. Saw cardiology last week, will remain off Diovan and aldactone. No dizziness, palpitations or chest pain. No leg swelling, weight stable 180-183. /55 this morning. Sees cardio again next month. Offered patient enrollment in the Remote Patient Monitoring (RPM) program for in-home monitoring: Patient declined. CC Plan:   -Follow up in a month to review symptoms, BP, weight. Congestive Heart Failure Assessment    Are you currently restricting fluids?: No Restriction  Do you understand a low sodium diet?: Yes (Comment: dietician working with patient to educate)  Do you understand how to read food labels?: Yes  How many restaurant meals do you eat per week?: 0  Do you salt your food before tasting it?: No         Symptoms:     Symptom course: stable  Patient-reported weight (lb): 182  Weight trend: stable  Salt intake watch compared to last visit: stable      and   General Assessment    Do you have any symptoms that are causing concern?: No           Lab Results       None            Care Coordination Interventions    Referral from Primary Care Provider: No  Suggested Interventions and Community Resources          Goals Addressed                   This Visit's Progress     Conditions and Symptoms   On track     I will schedule office visits, as directed by my provider. I will keep my appointment or reschedule if I have to cancel. I will notify my provider of any barriers to my plan of care. I will follow my Zone Management tool to seek urgent or emergent care. I will notify my provider of any symptoms that indicate a worsening of my condition.     Barriers: overwhelmed by complexity of regimen and lack of education  Plan for overcoming my barriers: ACM calls, education about medications, conditions (CHF, COPD)  Confidence: 7/10  Anticipated Goal Completion Date: 1/30/23                Prior to Admission medications Medication Sig Start Date End Date Taking? Authorizing Provider   furosemide (LASIX) 40 MG tablet Take 1 tablet by mouth daily 11/14/22   Daren Abrams MD   clopidogrel (PLAVIX) 75 MG tablet TAKE 1 TABLET BY MOUTH DAILY 11/11/22   LORY Franklin CNP   tamsulosin [facility]) 0.4 MG capsule Take 1 capsule by mouth at bedtime Urinary frequency/enlarged prostate, take at bedtime 11/10/22   LORY Franklin CNP   spironolactone (ALDACTONE) 25 MG tablet Take 25 mg by mouth daily HOLD  Patient not taking: No sig reported 10/17/22   Historical Provider, MD   valsartan (DIOVAN) 80 MG tablet Take 1 tablet by mouth daily HOLD  Patient not taking: No sig reported 10/17/22   Historical Provider, MD   metoprolol succinate (TOPROL XL) 25 MG extended release tablet Take 1 tablet by mouth nightly 10/17/22   Historical Provider, MD   levothyroxine (SYNTHROID) 125 MCG tablet TAKE 1 TABLET BY MOUTH DAILY 10/19/22   LORY Franklin CNP   vitamin B-12 (CYANOCOBALAMIN) 1000 MCG tablet Take 1,000 mcg by mouth daily    Historical Provider, MD   Cholecalciferol (VITAMIN D3) 50 MCG (2000 UT) CAPS Take 2,000 Units by mouth daily    Historical Provider, MD   rosuvastatin (CRESTOR) 20 MG tablet Take 1 tablet by mouth daily 6/6/22   LORY Franklin CNP   isosorbide mononitrate (IMDUR) 30 MG extended release tablet Take 0.5 tablets by mouth daily 6/6/22   LORY Franklin CNP   Magnesium 400 MG TABS Take 400 mg by mouth daily 5/2/22   Daren Abrams MD   metoprolol (LOPRESSOR) 100 MG tablet Take 1 tablet by mouth 2 times daily  Patient taking differently: No sig reported 3/27/20 4/26/21  Daren Abrams MD   aspirin 81 MG tablet Take 81 mg by mouth daily.     Historical Provider, MD       Future Appointments   Date Time Provider Andres Sharma   12/12/2022  9:00 AM MD Angel Vargas [facility]   12/12/2022 10:00 AM LORY Franklin CNP [facility]

## 2022-12-12 ENCOUNTER — OFFICE VISIT (OUTPATIENT)
Dept: CARDIOLOGY CLINIC | Age: 87
End: 2022-12-12
Payer: MEDICARE

## 2022-12-12 ENCOUNTER — OFFICE VISIT (OUTPATIENT)
Dept: FAMILY MEDICINE CLINIC | Age: 87
End: 2022-12-12
Payer: MEDICARE

## 2022-12-12 ENCOUNTER — HOSPITAL ENCOUNTER (OUTPATIENT)
Age: 87
Discharge: HOME | End: 2022-12-12
Payer: MEDICARE

## 2022-12-12 VITALS
DIASTOLIC BLOOD PRESSURE: 58 MMHG | SYSTOLIC BLOOD PRESSURE: 78 MMHG | HEART RATE: 60 BPM | BODY MASS INDEX: 25.62 KG/M2 | HEIGHT: 70 IN | OXYGEN SATURATION: 95 % | WEIGHT: 179 LBS

## 2022-12-12 VITALS
OXYGEN SATURATION: 98 % | BODY MASS INDEX: 25.54 KG/M2 | WEIGHT: 178 LBS | HEART RATE: 76 BPM | SYSTOLIC BLOOD PRESSURE: 88 MMHG | DIASTOLIC BLOOD PRESSURE: 40 MMHG

## 2022-12-12 DIAGNOSIS — I25.10 CORONARY ARTERY DISEASE INVOLVING NATIVE CORONARY ARTERY OF NATIVE HEART WITHOUT ANGINA PECTORIS: ICD-10-CM

## 2022-12-12 DIAGNOSIS — I50.43 CHF (CONGESTIVE HEART FAILURE), NYHA CLASS I, ACUTE ON CHRONIC, COMBINED (HCC): ICD-10-CM

## 2022-12-12 DIAGNOSIS — E78.5 HYPERLIPIDEMIA, UNSPECIFIED HYPERLIPIDEMIA TYPE: ICD-10-CM

## 2022-12-12 DIAGNOSIS — E03.9 ACQUIRED HYPOTHYROIDISM: ICD-10-CM

## 2022-12-12 DIAGNOSIS — Z71.89 ADVANCED DIRECTIVES, COUNSELING/DISCUSSION: ICD-10-CM

## 2022-12-12 DIAGNOSIS — Z00.00 MEDICARE ANNUAL WELLNESS VISIT, SUBSEQUENT: Primary | ICD-10-CM

## 2022-12-12 DIAGNOSIS — I95.2 HYPOTENSION DUE TO DRUGS: Primary | ICD-10-CM

## 2022-12-12 LAB
ALBUMIN SERPL-MCNC: 4.1 G/DL (ref 3.5–5.2)
ALP BLD-CCNC: 77 U/L (ref 40–129)
ALT SERPL-CCNC: 8 U/L (ref 5–41)
ANION GAP SERPL CALCULATED.3IONS-SCNC: 9 MMOL/L (ref 9–17)
AST SERPL-CCNC: 16 U/L
BILIRUB SERPL-MCNC: 0.8 MG/DL (ref 0.3–1.2)
BUN BLDV-MCNC: 23 MG/DL (ref 8–23)
BUN/CREAT BLD: 18 (ref 9–20)
CALCIUM SERPL-MCNC: 9.7 MG/DL (ref 8.6–10.4)
CHLORIDE BLD-SCNC: 102 MMOL/L (ref 98–107)
CO2: 30 MMOL/L (ref 20–31)
CREAT SERPL-MCNC: 1.26 MG/DL (ref 0.7–1.2)
GFR SERPL CREATININE-BSD FRML MDRD: 54 ML/MIN/1.73M2
GLUCOSE BLD-MCNC: 104 MG/DL (ref 70–99)
HCT VFR BLD CALC: 34.6 % (ref 41–53)
HEMOGLOBIN: 11.6 G/DL (ref 13.5–17.5)
POTASSIUM SERPL-SCNC: 4.1 MMOL/L (ref 3.7–5.3)
SODIUM BLD-SCNC: 141 MMOL/L (ref 135–144)
TOTAL PROTEIN: 7.1 G/DL (ref 6.4–8.3)

## 2022-12-12 PROCEDURE — G8417 CALC BMI ABV UP PARAM F/U: HCPCS | Performed by: INTERNAL MEDICINE

## 2022-12-12 PROCEDURE — 36415 COLL VENOUS BLD VENIPUNCTURE: CPT

## 2022-12-12 PROCEDURE — 1036F TOBACCO NON-USER: CPT | Performed by: INTERNAL MEDICINE

## 2022-12-12 PROCEDURE — G8427 DOCREV CUR MEDS BY ELIG CLIN: HCPCS | Performed by: INTERNAL MEDICINE

## 2022-12-12 PROCEDURE — 1123F ACP DISCUSS/DSCN MKR DOCD: CPT | Performed by: INTERNAL MEDICINE

## 2022-12-12 PROCEDURE — G0439 PPPS, SUBSEQ VISIT: HCPCS | Performed by: NURSE PRACTITIONER

## 2022-12-12 PROCEDURE — 1123F ACP DISCUSS/DSCN MKR DOCD: CPT | Performed by: NURSE PRACTITIONER

## 2022-12-12 PROCEDURE — 85018 HEMOGLOBIN: CPT

## 2022-12-12 PROCEDURE — 80053 COMPREHEN METABOLIC PANEL: CPT

## 2022-12-12 PROCEDURE — 85014 HEMATOCRIT: CPT

## 2022-12-12 PROCEDURE — 99214 OFFICE O/P EST MOD 30 MIN: CPT | Performed by: INTERNAL MEDICINE

## 2022-12-12 PROCEDURE — G8484 FLU IMMUNIZE NO ADMIN: HCPCS | Performed by: NURSE PRACTITIONER

## 2022-12-12 PROCEDURE — G8484 FLU IMMUNIZE NO ADMIN: HCPCS | Performed by: INTERNAL MEDICINE

## 2022-12-12 RX ORDER — CALCIUM CARBONATE 300MG(750)
400 TABLET,CHEWABLE ORAL DAILY
Qty: 90 TABLET | Refills: 3 | Status: SHIPPED | OUTPATIENT
Start: 2022-12-12

## 2022-12-12 RX ORDER — ROSUVASTATIN CALCIUM 20 MG/1
20 TABLET, COATED ORAL DAILY
Qty: 90 TABLET | Refills: 3 | Status: SHIPPED | OUTPATIENT
Start: 2022-12-12

## 2022-12-12 ASSESSMENT — PATIENT HEALTH QUESTIONNAIRE - PHQ9
SUM OF ALL RESPONSES TO PHQ QUESTIONS 1-9: 0
SUM OF ALL RESPONSES TO PHQ9 QUESTIONS 1 & 2: 0
SUM OF ALL RESPONSES TO PHQ QUESTIONS 1-9: 0
SUM OF ALL RESPONSES TO PHQ QUESTIONS 1-9: 0
1. LITTLE INTEREST OR PLEASURE IN DOING THINGS: 0
SUM OF ALL RESPONSES TO PHQ QUESTIONS 1-9: 0
2. FEELING DOWN, DEPRESSED OR HOPELESS: 0

## 2022-12-12 ASSESSMENT — LIFESTYLE VARIABLES
HOW MANY STANDARD DRINKS CONTAINING ALCOHOL DO YOU HAVE ON A TYPICAL DAY: PATIENT DOES NOT DRINK
HOW OFTEN DO YOU HAVE A DRINK CONTAINING ALCOHOL: NEVER

## 2022-12-12 NOTE — LETTER
Teresa Lopez MD  [facility]  [facility]  [address], [place] [address]  [phone]      2022      Mathew Don, CNP  [address] [facility] [place] [address]      RE:   Kathie Burk  :  1932      Dear Hugo Handy:    CHIEF COMPLAINT:  1. Shortness of breath. 2.  Sick sinus syndrome status post permanent pacemaker with a [employer] working appropriately. HISTORY OF PRESENT ILLNESS:  I had the pleasure of seeing Mr. Perry Templeton in our office on 2022. As you know, he is a pleasant 24-year-old gentleman with a moderate LV dysfunction, EF in the 35% range with a moderate aortic stenosis. He has chronic renal insufficiency, which at this point is about at baseline. He has had hypotension and we have been lowering his medications. He denies any chest pain or chest discomfort. No dizziness. His blood pressure is lower in the morning and he does not feel \"right\" in the morning. Through the day, it seems to improve. He denies any pedal edema. Denies any PND or orthopnea. MEDICATIONS:  His medications today are aspirin 81 mg daily, vitamin D 2000 units daily, Plavix 75 mg daily, Lasix 40 mg daily, Synthroid 125 mcg daily, Lopressor 25 mg daily, Crestor 20 mg daily, Flomax 0.4 mg daily, vitamin B12 1000 mcg daily and Imdur 30 mg half a tablet daily. PHYSICAL EXAMINATION:  VITAL SIGNS:  His blood pressure today was 90/40 with a heart rate of 60 and regular. Respiratory rate 18. O2 sat was 94%. GENERAL:  He is a pleasant 24-year-old gentleman. Denied pain. He was oriented to person, place and time. Answered questions appropriately. SKIN:  No unusual skin changes. HEENT:  The pupils are equally round and intact. Mucous membranes were dry. NECK:  No JVD. Good carotid pulses. No carotid bruits. No lymphadenopathy or thyromegaly. CARDIOVASCULAR EXAM:  S1 and S2 were normal.  No S3 or S4.   Soft systolic blowing type ejection murmur. No diastolic murmur. PMI was normal.  No lift, thrust, or pericardial friction rub. LUNGS:  Clear to auscultation and percussion. ABDOMEN:  Soft and nontender. Good bowel sounds. EXTREMITIES:  Good femoral pulses. Good pedal pulses. No pedal edema. LABORATORY DATA:  His sodium was 141, potassium 4.1, BUN 23, creatinine 1.26, glucose 104, calcium 9.7. His ALT was 8, AST was 16. Hemoglobin 11.6. IMPRESSION:  1. Moderate LV dysfunction, EF of 35%. 2.  Hypotension. 3.  Renal insufficiency, about at baseline at 1.26.  4.  Moderate aortic stenosis. PLAN:  1. We will stop metoprolol. 2.  We will stop Imdur both due to hypotension. 3.  We will see in 6 weeks. 4.  If he starts having chest pain or shortness of breath, he will call us. DISCUSSION:  Mr. Jonathan Boswell is very weak. His blood pressure is still low, especially in the mornings. He does take his metoprolol, Crestor and Flomax at night with the rest of the medications at 8 a.m. We will stop Imdur and metoprolol to see if his blood pressure will improve. His labs at this point look good and his hemoglobin is good. I made no other changes in medications. I will see him in 6 weeks in followup. Thank you very much.      Sincerely,        Cas Fraga    D: 12/12/2022 9:32:21     T: 12/13/2022 6:20:58     [email or web address]  Job#: [id number]   Doc#: [id number]

## 2022-12-12 NOTE — PROGRESS NOTES
(like a brisk walk)?: 7 days  Have you lost any weight without trying in the past 3 months?: No  Body mass index: (!) 25.68  Dentist Screen:  Have you seen the dentist within the past year?: (!) No   Vision Screen:  Do you have difficulty driving, watching TV, or doing any of your daily activities because of your eyesight?: No  Have you had an eye exam within the past year?: (!) No  No results found. Safety:  Do you have any tripping hazards - loose or unsecured carpets or rugs?: (!) Yes  Do you always fasten your seatbelt when you are in a car?: (!) No   Advanced Directives:  Do you have a Living Will?: (!) No       CV Risk Counseling:  Patient was asked about his current diet and exercise habits, and personalized advice was provided regarding recommended lifestyle changes. Patient's individual cardiovascular disease risk factors, including advanced age (> 54 for men, > 72 for women), dyslipidemia, hypertension, male gender, and sedentary lifestyle, were discussed, as well as the likely benefits of lifestyle changes. Based upon patient's motivation to change his behavior, the following plan was agreed upon to work toward lowering cardiovascular disease risk: low saturated fat, low cholesterol diet, DASH diet, and increase physical activity, as tolerated. Aspirin use for primary prevention of cardiovascular disease for men 45-79 and women 55-79: Indicated- continue daily aspirin. Educational materials for lifestyle changes were provided. Patient will follow-up in 6 month(s) with PCP. Provider spent 15 minutes counseling patient. 15                Objective   Vitals:    12/12/22 0955 12/12/22 1005   BP: (!) 80/58 (!) 78/58   Site: Right Upper Arm Right Upper Arm   Position: Sitting Sitting   Pulse: 60    SpO2: 95%    Weight: 179 lb (81.2 kg)    Height: 5' 10\" (1.778 m)       Body mass index is 25.68 kg/m².       Physical Exam  General Appearance: alert and oriented to person, place and time, well developed and well- nourished, in no acute distress  Skin: warm and dry, no rash or erythema  Head: normocephalic and atraumatic  Eyes: pupils equal, round, and reactive to light, extraocular eye movements intact, conjunctivae normal  ENT: tympanic membrane, external ear and ear canal normal bilaterally, nose without deformity,   Neck: supple and non-tender without mass, no thyromegaly or thyroid nodules, no cervical lymphadenopathy  Pulmonary/Chest: clear to auscultation bilaterally- no wheezes, rales or rhonchi, normal air movement, no respiratory distress  Cardiovascular: normal rate, regular rhythm, normal S1 and S2, no murmurs, rubs, clicks, or gallops, distal pulses intact, no carotid bruits  Abdomen: soft, non-tender, non-distended, normal bowel sounds  Extremities: no cyanosis, clubbing or edema  Musculoskeletal: normal range of motion, no joint swelling, deformity or tenderness  Neurologic: gait, coordination and speech normal       Allergies   Allergen Reactions    Menthol (Topical Analgesic)      Prior to Visit Medications    Medication Sig Taking? Authorizing Provider   rosuvastatin (CRESTOR) 20 MG tablet Take 1 tablet by mouth daily Yes Kenisha Phan MD   Magnesium 400 MG TABS Take 400 mg by mouth daily Yes Kenisha Phan MD   furosemide (LASIX) 40 MG tablet Take 1 tablet by mouth daily Yes Kenisha Phan MD   clopidogrel (PLAVIX) 75 MG tablet TAKE 1 TABLET BY MOUTH DAILY Yes OLRY Sam CNP   tamsulosin (FLOMAX) 0.4 MG capsule Take 1 capsule by mouth at bedtime Urinary frequency/enlarged prostate, take at bedtime Yes LORY Sam CNP   levothyroxine (SYNTHROID) 125 MCG tablet TAKE 1 TABLET BY MOUTH DAILY Yes LORY Sam CNP   vitamin B-12 (CYANOCOBALAMIN) 1000 MCG tablet Take 1,000 mcg by mouth daily Yes Historical Provider, MD   Cholecalciferol (VITAMIN D3) 50 MCG (2000 UT) CAPS Take 2,000 Units by mouth daily Yes Historical Provider, MD   aspirin 81 MG tablet Take 81 mg by mouth daily. Yes Historical Provider, MD   metoprolol (LOPRESSOR) 100 MG tablet Take 1 tablet by mouth 2 times daily  Patient taking differently: No sig reported  Meng Dang MD       [facility] (Including outside providers/suppliers regularly involved in providing care):   Patient Care Team:  LORY Clarke CNP as PCP - General (Certified Nurse Practitioner)  LORY Clarke CNP as PCP - [facility] Empaneled Provider  Tamanna Yhe MD as Surgeon (General Surgery)  Rodrigo Quezada RN as Ambulatory Care Manager     Reviewed and updated this visit:  Tobacco  Allergies  Meds  Problems  Med Hx  Surg Hx  Soc Hx  Fam Hx

## 2022-12-12 NOTE — PROGRESS NOTES
Ov Dr Kelvin Bah follow up  No chest pain or sob  No dizziness   Bp is lower in the morning   No dizziness but  Doesn't feel right   When its low. No ankle edema/  Take metoprolol, crestor and   Tamsulosin at 8pm   Rest of med at 8am.    Stop Imdur and metoprolol  Due to hypotension. See in 6 weeks.

## 2022-12-12 NOTE — PATIENT INSTRUCTIONS
Phone: [phone]  Fax: [address] [facility] [place] Office Hours:  Monday: [place] office location 8-5 ([phone]) Offering additional late hours the first Monday of the month until 7 pm.   Tuesday: 8-5 Wednesday: 8-5 Thursday:  Additional hours offered 2 Thursdays a month. Please call to inquire those dates. Fridays: 7:30-4:30   SURVEY:    You may be receiving a survey from [employer] regarding your visit today. Please complete the survey to enable us to provide the highest quality of care to you and your family. If you cannot score us a very good on any question, please call the office to discuss how we could have made your experience a very good one. Thank you. Advance Directives: Care Instructions  Overview  An advance directive is a legal way to state your wishes at the end of your life. It tells your family and your doctor what to do if you can't say what you want. There are two main types of advance directives. You can change them any time your wishes change. Living will. This form tells your family and your doctor your wishes about life support and other treatment. The form is also called a declaration. Medical power of . This form lets you name a person to make treatment decisions for you when you can't speak for yourself. This person is called a health care agent (health care proxy, health care surrogate). The form is also called a durable power of  for health care. If you do not have an advance directive, decisions about your medical care may be made by a family member, or by a doctor or a  who doesn't know you. It may help to think of an advance directive as a gift to the people who care for you. If you have one, they won't have to make tough decisions by themselves. For more information, including forms for your state, see the [address] website (www.caringinfo.org/planning/advance-directives/).   Follow-up care is a key part of your treatment and copay.    Some of these benefits include a comprehensive review of your medical history including lifestyle, illnesses that may run in your family, and various assessments and screenings as appropriate. After reviewing your medical record and screening and assessments performed today your provider may have ordered immunizations, labs, imaging, and/or referrals for you. A list of these orders (if applicable) as well as your Preventive Care list are included within your After Visit Summary for your review. Other Preventive Recommendations:    A preventive eye exam performed by an eye specialist is recommended every 1-2 years to screen for glaucoma; cataracts, macular degeneration, and other eye disorders. A preventive dental visit is recommended every 6 months. Try to get at least 150 minutes of exercise per week or 10,000 steps per day on a pedometer . Order or download the FREE \"Exercise & Physical Activity: Your Everyday Guide\" from [employer]. Call 1-695.182.4382 or search [employer] online. You need 0434-9151 mg of calcium and 4615-0150 IU of vitamin D per day. It is possible to meet your calcium requirement with diet alone, but a vitamin D supplement is usually necessary to meet this goal.  When exposed to the sun, use a sunscreen that protects against both UVA and UVB radiation with an SPF of 30 or greater. Reapply every 2 to 3 hours or after sweating, drying off with a towel, or swimming. Always wear a seat belt when traveling in a car. Always wear a helmet when riding a bicycle or motorcycle.

## 2022-12-15 ENCOUNTER — CARE COORDINATION (OUTPATIENT)
Dept: CARE COORDINATION | Age: 87
End: 2022-12-15

## 2022-12-15 NOTE — CARE COORDINATION
Attempted to call patient for care management follow up, CHF Initiative call, unable to leave a message. He recently saw cardiologist, isosorbide and metoprolol stopped due to hypotension, has follow up next month. Will transfer care to [address] [place] [address] for continued care management.     Future Appointments   Date Time Provider Andres Sharma   12/19/2022 10:00 AM SCHEDULE, [facility]   1/25/2023 11:30 AM MD Nasir Peng [facility]   3/13/2023 10:40 AM LORY Marinelli CNP [facility]   12/18/2023 10:00 AM LORY Marinelli CNP [facility]

## 2022-12-15 NOTE — PROGRESS NOTES
Anand Patel MD  [facility]  [facility]  [address], [place] [address]  [phone]      2022      Brett Peterson, CNP  [address] [facility], [facility] [address]      RE:   Evelyn Garza  :  1932      Dear Ankit Gu:    CHIEF COMPLAINT:  1. Shortness of breath. 2.  Sick sinus syndrome status post permanent pacemaker with a [employer] working appropriately. HISTORY OF PRESENT ILLNESS:  I had the pleasure of seeing Mr. Dylan Alves in our office on 2022. As you know, he is a pleasant 19-year-old gentleman with a moderate LV dysfunction, EF in the 35% range with a moderate aortic stenosis. He has chronic renal insufficiency, which at this point is about at baseline. He has had hypotension and we have been lowering his medications. He denies any chest pain or chest discomfort. No dizziness. His blood pressure is lower in the morning and he does not feel \"right\" in the morning. Through the day, it seems to improve. He denies any pedal edema. Denies any PND or orthopnea. MEDICATIONS:  His medications today are aspirin 81 mg daily, vitamin D 2000 units daily, Plavix 75 mg daily, Lasix 40 mg daily, Synthroid 125 mcg daily, Lopressor 25 mg daily, Crestor 20 mg daily, Flomax 0.4 mg daily, vitamin B12 1000 mcg daily and Imdur 30 mg half a tablet daily. PHYSICAL EXAMINATION:  VITAL SIGNS:  His blood pressure today was 90/40 with a heart rate of 60 and regular. Respiratory rate 18. O2 sat was 94%. GENERAL:  He is a pleasant 19-year-old gentleman. Denied pain. He was oriented to person, place and time. Answered questions appropriately. SKIN:  No unusual skin changes. HEENT:  The pupils are equally round and intact. Mucous membranes were dry. NECK:  No JVD. Good carotid pulses. No carotid bruits. No lymphadenopathy or thyromegaly. CARDIOVASCULAR EXAM:  S1 and S2 were normal.  No S3 or S4.   Soft systolic blowing type ejection murmur. No diastolic murmur. PMI was normal.  No lift, thrust, or pericardial friction rub. LUNGS:  Clear to auscultation and percussion. ABDOMEN:  Soft and nontender. Good bowel sounds. EXTREMITIES:  Good femoral pulses. Good pedal pulses. No pedal edema. LABORATORY DATA:  His sodium was 141, potassium 4.1, BUN 23, creatinine 1.26, glucose 104, calcium 9.7. His ALT was 8, AST was 16. Hemoglobin 11.6. IMPRESSION:  1. Moderate LV dysfunction, EF of 35%. 2.  Hypotension. 3.  Renal insufficiency, about at baseline at 1.26.  4.  Moderate aortic stenosis. PLAN:  1. We will stop metoprolol. 2.  We will stop Imdur both due to hypotension. 3.  We will see in 6 weeks. 4.  If he starts having chest pain or shortness of breath, he will call us. DISCUSSION:  Mr. Rao Hearing is very weak. His blood pressure is still low, especially in the mornings. He does take his metoprolol, Crestor and Flomax at night with the rest of the medications at 8 a.m. We will stop Imdur and metoprolol to see if his blood pressure will improve. His labs at this point look good and his hemoglobin is good. I made no other changes in medications. I will see him in 6 weeks in followup. Thank you very much.      Sincerely,        Yue Valderrama    D: 12/12/2022 9:32:21     T: 12/13/2022 6:20:58     [email or web address]  Job#: [id number]   Doc#: [id number]

## 2022-12-19 ENCOUNTER — HOSPITAL ENCOUNTER (EMERGENCY)
Age: 87
Discharge: HOME | End: 2022-12-19
Attending: EMERGENCY MEDICINE
Payer: MEDICARE

## 2022-12-19 ENCOUNTER — CARE COORDINATION (OUTPATIENT)
Dept: CARE COORDINATION | Age: 87
End: 2022-12-19

## 2022-12-19 VITALS
BODY MASS INDEX: 25.35 KG/M2 | SYSTOLIC BLOOD PRESSURE: 103 MMHG | OXYGEN SATURATION: 95 % | HEART RATE: 104 BPM | WEIGHT: 177.1 LBS | TEMPERATURE: 96.8 F | DIASTOLIC BLOOD PRESSURE: 77 MMHG | RESPIRATION RATE: 20 BRPM | HEIGHT: 70 IN

## 2022-12-19 DIAGNOSIS — R00.0 SINUS TACHYCARDIA: Primary | ICD-10-CM

## 2022-12-19 LAB
EKG ATRIAL RATE: 54 BPM
EKG Q-T INTERVAL: 448 MS
EKG QRS DURATION: 196 MS
EKG QTC CALCULATION (BAZETT): 592 MS
EKG R AXIS: -71 DEGREES
EKG T AXIS: 106 DEGREES
EKG VENTRICULAR RATE: 105 BPM

## 2022-12-19 PROCEDURE — 93005 ELECTROCARDIOGRAM TRACING: CPT | Performed by: EMERGENCY MEDICINE

## 2022-12-19 PROCEDURE — 93010 ELECTROCARDIOGRAM REPORT: CPT | Performed by: INTERNAL MEDICINE

## 2022-12-19 PROCEDURE — 99282 EMERGENCY DEPT VISIT SF MDM: CPT

## 2022-12-19 PROCEDURE — 93280 PM DEVICE PROGR EVAL DUAL: CPT | Performed by: INTERNAL MEDICINE

## 2022-12-19 ASSESSMENT — PAIN - FUNCTIONAL ASSESSMENT: PAIN_FUNCTIONAL_ASSESSMENT: NONE - DENIES PAIN

## 2022-12-19 ASSESSMENT — ENCOUNTER SYMPTOMS
COLOR CHANGE: 0
CHEST TIGHTNESS: 0
SHORTNESS OF BREATH: 0
ABDOMINAL PAIN: 0
VOICE CHANGE: 0

## 2022-12-19 NOTE — PROGRESS NOTES
Cardiology    He came to ER because of tachycardia. We interrogated his ppm and found he was in a sinus tachycardia at rate of 105 bpm, with ventricular pacing following his p waves. We had seen him on 12-12-22 and stopped lopressor and Imdur due to hypotension. Will start Inderal 10 mg bid and Midodrine 10 mg bid to see if we can maintain bp and still slow sinus rhythm. He will take his bp and HR at home and keep our office informed.     Thanks, Charles Ramirez MD

## 2022-12-19 NOTE — CARE COORDINATION
Ambulatory Care Coordination Note  12/19/2022    ACC: Jose Zacarias, RN    Attempted to reach patient today for CC outreach/ ED follow up call. Unable to reach patient. Unable to leave a message as voicemail has not been set up. Future Appointments   Date Time Provider Andres Sharma   1/25/2023 11:30 AM MD Kade A[facility]quita [facility]   3/13/2023 10:40 AM LORY Nash CNP [facility]   12/18/2023 10:00 AM LORY Nash CNP [facility]     Care Coordination Plan of Care: This nurse Care Coordinator will  - await call back from patient, and if no return call; will attempt to reach patient back again this week if able.    -ED follow up  -review medication changes  -CHF initiative call

## 2022-12-19 NOTE — ED PROVIDER NOTES
[facility] ED  EMERGENCY DEPARTMENT ENCOUNTER      Pt Name: Virgilio Baker  MRN: [id number]  [place] 11/27/1932  Date of evaluation: 12/19/2022  Provider: Andrea Calero, [address] [place] [address]       Chief Complaint   Patient presents with    Tachycardia     Patient presents stating \"I need something to slow my heart rate down\". Patient states he felt like his heart was racing when he woke up at 0700. Patient reports he was supposed to see his PCP this morning but didn't make it to appt. Sees Dr. Kanwal Crews for cardiology. HISTORY OF PRESENT ILLNESS   (Location/Symptom, Timing/Onset, Context/Setting, Quality, Duration, Modifying Factors, Severity)  Note limiting factors. Virgilio Baker is a 80 y.o. male who presents to the emergency department with complaints of rapid heartbeat without chest pain or shortness of breath. Patient's had no recent fever or chills and no productive cough. Patient has a pacemaker and is followed by local cardiologist.      HPI    Nursing Notes were reviewed. REVIEW OF SYSTEMS    (2-9 systems for level 4, 10 or more for level 5)     Review of Systems   Constitutional:  Negative for diaphoresis, fatigue and fever. HENT:  Negative for voice change. Respiratory:  Negative for chest tightness and shortness of breath. Cardiovascular:  Negative for chest pain. Gastrointestinal:  Negative for abdominal pain. Musculoskeletal:  Positive for joint swelling. Skin:  Negative for color change. Neurological:  Negative for syncope. Psychiatric/Behavioral:  Negative for agitation. Except as noted above the remainder of the review of systems was reviewed and negative.        PAST MEDICAL HISTORY     Past Medical History:   Diagnosis Date    Atrial fibrillation [facility])     CAD (coronary artery disease)     Hard of hearing     Hiatal hernia     History of PTCA     9/2012    Hyperlipidemia     Hypothyroidism     Pacemaker     [employer]         SURGICAL HISTORY Past Surgical History:   Procedure Laterality Date    CARDIAC SURGERY      CORONARY ARTERY BYPASS GRAFT  2000    quad bypass [place]    HERNIA REPAIR  75/17/4463    UMBILICAL HERNIA REPAIR    PACEMAKER INSERTION  8/6/12    [employer]    PTCA  39/72/64    UMBILICAL HERNIA REPAIR  11/21/2016    Kailee Marrero MD         CURRENT MEDICATIONS       Previous Medications    ASPIRIN 81 MG TABLET    Take 81 mg by mouth daily.     CHOLECALCIFEROL (VITAMIN D3) 50 MCG (2000 UT) CAPS    Take 2,000 Units by mouth daily    CLOPIDOGREL (PLAVIX) 75 MG TABLET    TAKE 1 TABLET BY MOUTH DAILY    FUROSEMIDE (LASIX) 40 MG TABLET    Take 1 tablet by mouth daily    LEVOTHYROXINE (SYNTHROID) 125 MCG TABLET    TAKE 1 TABLET BY MOUTH DAILY    MAGNESIUM 400 MG TABS    Take 400 mg by mouth daily    ROSUVASTATIN (CRESTOR) 20 MG TABLET    Take 1 tablet by mouth daily    TAMSULOSIN (FLOMAX) 0.4 MG CAPSULE    Take 1 capsule by mouth at bedtime Urinary frequency/enlarged prostate, take at bedtime    VITAMIN B-12 (CYANOCOBALAMIN) 1000 MCG TABLET    Take 1,000 mcg by mouth daily       ALLERGIES     Menthol (topical analgesic)    FAMILY HISTORY       Family History   Problem Relation Age of Onset    Heart Disease Father           SOCIAL HISTORY       Social History     Socioeconomic History    Marital status:      Spouse name: None    Number of children: None    Years of education: None    Highest education level: None   Occupational History     Employer: RETIRED   Tobacco Use    Smoking status: Never    Smokeless tobacco: Never   Vaping Use    Vaping Use: Never used   Substance and Sexual Activity    Alcohol use: Not Currently     Comment: 6 monthly    Drug use: No    Sexual activity: Not Currently     Social Determinants of Health     Financial Resource Strain: Low Risk     Difficulty of Paying Living Expenses: Not hard at all   Food Insecurity: No Food Insecurity    Worried About Running Out of Food in the Last Year: Never true    Ran Out of Food in the Last Year: Never true   Transportation Needs: No Transportation Needs    Lack of Transportation (Medical): No    Lack of Transportation (Non-Medical): No   Physical Activity: Sufficiently Active    Days of Exercise per Week: 7 days    Minutes of Exercise per Session: 60 min   Stress: No Stress Concern Present    Feeling of Stress : Only a little   Social Connections: Moderately Isolated    Frequency of Communication with Friends and Family: Three times a week    Frequency of Social Gatherings with Friends and Family: Three times a week    Attends [employer]oration[employer] Services: 1 to 4 times per year    Active Member of Clubs or Organizations: No    Attends Club or Organization Meetings: Never    Marital Status:    Housing Stability: Low Risk     Unable to Pay for Housing in the Last Year: No    Number of [place] in the Last Year: 1    Unstable Housing in the Last Year: No       SCREENINGS         Fletcher Coma Scale  Eye Opening: Spontaneous  Best Verbal Response: Oriented  Best Motor Response: Obeys commands  Randy Coma Scale Score: 15                     CIWA Assessment  BP: 103/77  Heart Rate: (!) 104                 PHYSICAL EXAM    (up to 7 for level 4, 8 or more for level 5)     ED Triage Vitals [12/19/22 0849]   BP Temp Temp Source Heart Rate Resp SpO2 Height Weight   103/77 96.8 °F (36 °C) Temporal (!) 104 20 95 % 5' 10\" (1.778 m) 177 lb 1.6 oz (80.3 kg)       Physical Exam  Constitutional:       Appearance: Normal appearance. HENT:      Head: Normocephalic. Mouth/Throat:      Mouth: Mucous membranes are moist.   Cardiovascular:      Rate and Rhythm: Regular rhythm. Tachycardia present. Pulmonary:      Effort: Pulmonary effort is normal. No respiratory distress. Breath sounds: Normal breath sounds. No wheezing or rales. Abdominal:      General: Abdomen is flat. Palpations: Abdomen is soft.    Musculoskeletal:      Comments: Patient can lift both arms over his head by due to hypotension but he plans to restart a very low-dose of Inderal 10 mg twice a day and increase his midodrine and they will take care of those prescriptions in the cardiology office. Otherwise patient can be discharged home per cardiology instructions. REASSESSMENT          CRITICAL CARE TIME   Total Critical Care time was  minutes, excluding separately reportable procedures. There was a high probability of clinically significant/life threatening deterioration in the patient's condition which required my urgent intervention. CONSULTS:  None    PROCEDURES:  Unless otherwise noted below, none     Procedures      FINAL IMPRESSION      1. Sinus tachycardia Stable         DISPOSITION/PLAN   DISPOSITION Decision To Discharge 12/19/2022 09:46:41 AM      PATIENT REFERRED TO:  MD Haresh Magana [facility] [id number]  [phone]    Go today  Office, As needed    DISCHARGE MEDICATIONS:  New Prescriptions    MIDODRINE (PROAMATINE) 10 MG TABLET    Take 1 tablet by mouth in the morning and 1 tablet in the evening. PROPRANOLOL (INDERAL) 10 MG TABLET    Take 1 tablet by mouth 2 times daily     Controlled Substances Monitoring:     No flowsheet data found. Summation      Patient Course:     ED Medications administered this visit:  Medications - No data to display    New Prescriptions from this visit:    New Prescriptions    MIDODRINE (PROAMATINE) 10 MG TABLET    Take 1 tablet by mouth in the morning and 1 tablet in the evening. PROPRANOLOL (INDERAL) 10 MG TABLET    Take 1 tablet by mouth 2 times daily       Follow-up:  MD Haresh Magana [address] [place] [id number] [phone]    Go today  Office, As needed      Final Impression:   1.  Sinus tachycardia Stable                (Please note that portions of this note were completed with a voice recognition program.  Efforts were made to edit the dictations but occasionally words are mis-transcribed.)    Riley Steiner DO (electronically signed)  Attending Emergency Physician           Fahad Mitchell DO  12/19/22 7133

## 2022-12-22 ENCOUNTER — CARE COORDINATION (OUTPATIENT)
Dept: CARE COORDINATION | Age: 87
End: 2022-12-22

## 2022-12-22 NOTE — CARE COORDINATION
Ambulatory Care Coordination Note  12/22/2022    ACC: Marilou Rothman, RN    Attempted to reach patient today for CC outreach. Unable to reach patient. Unable to leave a message as voicemail has not been set up.     -3rd unsuccessful outreach call by care coordination     Future Appointments   Date Time Provider Andres Sharma   1/25/2023 11:30 AM MD Norah Mann [facility]   3/13/2023 10:40 AM Yamile Russell, APRN - CNP [facility]   12/18/2023 10:00 AM Yamile Russell APRN - CLAU PUENTES [facility]     Care Coordination Plan of Care: This nurse Care Coordinator will  - await call back from patient, and if no return call; will attempt to reach patient back again this week if able.    -review medication changes from Cardiology  -CHF initiative call

## 2022-12-27 ENCOUNTER — TELEPHONE (OUTPATIENT)
Dept: PRIMARY CARE CLINIC | Age: 87
End: 2022-12-27

## 2022-12-27 NOTE — TELEPHONE ENCOUNTER
Pt called in stating he feels very weak, heart racing, SOB. Pt states Dr Lily Conley made some med changes a few weeks ago, and he is out of the office this week.  This nurse instructed pt to go the ER for evaluation, pt voiced understanding

## 2022-12-28 ENCOUNTER — HOSPITAL ENCOUNTER (OUTPATIENT)
Age: 87
Discharge: HOME | End: 2022-12-30
Payer: MEDICARE

## 2022-12-28 ENCOUNTER — HOSPITAL ENCOUNTER (OUTPATIENT)
Dept: GENERAL RADIOLOGY | Age: 87
Discharge: HOME | End: 2022-12-30
Payer: MEDICARE

## 2022-12-28 ENCOUNTER — OFFICE VISIT (OUTPATIENT)
Dept: FAMILY MEDICINE CLINIC | Age: 87
End: 2022-12-28
Payer: MEDICARE

## 2022-12-28 VITALS
SYSTOLIC BLOOD PRESSURE: 102 MMHG | DIASTOLIC BLOOD PRESSURE: 66 MMHG | WEIGHT: 178.4 LBS | OXYGEN SATURATION: 98 % | RESPIRATION RATE: 20 BRPM | TEMPERATURE: 98.6 F | HEIGHT: 70 IN | BODY MASS INDEX: 25.54 KG/M2 | HEART RATE: 89 BPM

## 2022-12-28 DIAGNOSIS — R05.9 COUGH IN ADULT: ICD-10-CM

## 2022-12-28 DIAGNOSIS — R05.9 COUGH IN ADULT: Primary | ICD-10-CM

## 2022-12-28 DIAGNOSIS — I50.22 CHRONIC SYSTOLIC (CONGESTIVE) HEART FAILURE (HCC): ICD-10-CM

## 2022-12-28 DIAGNOSIS — R09.89 RESPIRATORY CRACKLES AT RIGHT LUNG BASE: ICD-10-CM

## 2022-12-28 DIAGNOSIS — R53.83 OTHER FATIGUE: ICD-10-CM

## 2022-12-28 PROCEDURE — 1123F ACP DISCUSS/DSCN MKR DOCD: CPT | Performed by: STUDENT IN AN ORGANIZED HEALTH CARE EDUCATION/TRAINING PROGRAM

## 2022-12-28 PROCEDURE — G8484 FLU IMMUNIZE NO ADMIN: HCPCS | Performed by: STUDENT IN AN ORGANIZED HEALTH CARE EDUCATION/TRAINING PROGRAM

## 2022-12-28 PROCEDURE — 71046 X-RAY EXAM CHEST 2 VIEWS: CPT

## 2022-12-28 PROCEDURE — G8417 CALC BMI ABV UP PARAM F/U: HCPCS | Performed by: STUDENT IN AN ORGANIZED HEALTH CARE EDUCATION/TRAINING PROGRAM

## 2022-12-28 PROCEDURE — 1036F TOBACCO NON-USER: CPT | Performed by: STUDENT IN AN ORGANIZED HEALTH CARE EDUCATION/TRAINING PROGRAM

## 2022-12-28 PROCEDURE — G8427 DOCREV CUR MEDS BY ELIG CLIN: HCPCS | Performed by: STUDENT IN AN ORGANIZED HEALTH CARE EDUCATION/TRAINING PROGRAM

## 2022-12-28 PROCEDURE — 99213 OFFICE O/P EST LOW 20 MIN: CPT | Performed by: STUDENT IN AN ORGANIZED HEALTH CARE EDUCATION/TRAINING PROGRAM

## 2022-12-28 ASSESSMENT — ENCOUNTER SYMPTOMS
SORE THROAT: 0
COUGH: 1
NAUSEA: 0
WHEEZING: 0
ABDOMINAL PAIN: 0
VOMITING: 0
SHORTNESS OF BREATH: 1
DIARRHEA: 0
BACK PAIN: 0
SINUS PAIN: 0

## 2022-12-28 NOTE — PROGRESS NOTES
HPI Notes    Name: Morris Leon  : 1932         Chief Complaint:     Chief Complaint   Patient presents with    Cough     States he has had this cough for a couple months and has got worse has no power (energy)       History of Present Illness:        HPI    This is a 80year old man presenting to discuss a bothersome cough. He reports that this has been bothersome for several months and is not improving. He also feels notably fatigued. He endorses productive cough with thick mucous, orthopnea and paroxysmal nocturnal dyspnea. He does have a history of chronic heart failure with reduced ejection fraction, most recently measured at 35 to 40% in 2021. He is currently managed using a loop diuretic, as well as propranolol, Crestor, aspirin and does see Dr. Ankit Disla. He actually has a scheduled follow-up with Dr. Ankit Disla on 2023. Past Medical History:     Past Medical History:   Diagnosis Date    Atrial fibrillation ([address]     CAD (coronary artery disease)     Hard of hearing     Hiatal hernia     History of PTCA     2012    Hyperlipidemia     Hypothyroidism     Pacemaker     [employer]      Reviewed all health maintenance requirements and ordered appropriate tests  Health Maintenance Due   Topic Date Due    COVID-19 Vaccine (1) Never done    DTaP/Tdap/Td vaccine (1 - Tdap) Never done    Shingles vaccine (1 of 2) Never done    Flu vaccine (1) 2022       Past Surgical History:     Past Surgical History:   Procedure Laterality Date    CARDIAC SURGERY      CORONARY ARTERY BYPASS GRAFT      quad bypass [address]      UMBILICAL HERNIA REPAIR    PACEMAKER INSERTION  12    [employer]    PTCA  29/62/55    UMBILICAL HERNIA REPAIR  2016    Chacha Dickson MD        Medications:       Prior to Admission medications    Medication Sig Start Date End Date Taking?  Authorizing Provider   midodrine (PROAMATINE) 10 MG tablet Take 1 tablet by mouth in the morning and 1 tablet in the evening. 12/19/22   Waylon Richardson MD   propranolol (INDERAL) 10 MG tablet Take 1 tablet by mouth 2 times daily 12/19/22   Waylon Richardson MD   rosuvastatin (CRESTOR) 20 MG tablet Take 1 tablet by mouth daily 12/12/22   Waylon Richardson MD   Magnesium 400 MG TABS Take 400 mg by mouth daily 12/12/22   Waylon Richardson MD   furosemide (LASIX) 40 MG tablet Take 1 tablet by mouth daily 11/14/22   Waylon Richardson MD   clopidogrel (PLAVIX) 75 MG tablet TAKE 1 TABLET BY MOUTH DAILY 11/11/22   LORY Lees CNP   tamsulosin [facility]) 0.4 MG capsule Take 1 capsule by mouth at bedtime Urinary frequency/enlarged prostate, take at bedtime 11/10/22   LORY Lees CNP   levothyroxine (SYNTHROID) 125 MCG tablet TAKE 1 TABLET BY MOUTH DAILY 10/19/22   LORY Lees CNP   vitamin B-12 (CYANOCOBALAMIN) 1000 MCG tablet Take 1,000 mcg by mouth daily    Historical Provider, MD   Cholecalciferol (VITAMIN D3) 50 MCG (2000 UT) CAPS Take 2,000 Units by mouth daily    Historical Provider, MD   metoprolol (LOPRESSOR) 100 MG tablet Take 1 tablet by mouth 2 times daily  Patient taking differently: No sig reported 3/27/20 4/26/21  Waylon Richardson MD   aspirin 81 MG tablet Take 81 mg by mouth daily. Historical Provider, MD        Allergies:       Menthol (topical analgesic)    Social History:     Tobacco:    reports that he has never smoked. He has never used smokeless tobacco.  Alcohol:      reports that he does not currently use alcohol. Drug Use:  reports no history of drug use. Family History:     Family History   Problem Relation Age of Onset    Heart Disease Father        Review of Systems:         Review of Systems   Constitutional:  Positive for fatigue. Negative for fever. HENT:  Negative for sinus pain, sneezing and sore throat. Respiratory:  Positive for cough and shortness of breath. Negative for wheezing. Cardiovascular:  Negative for chest pain.    Gastrointestinal: Negative for abdominal pain, diarrhea, nausea and vomiting. Musculoskeletal:  Negative for back pain. Skin:  Negative for rash. Hematological:  Negative for adenopathy. Psychiatric/Behavioral:  Negative for sleep disturbance. Physical Exam:     Vitals:  /66 (Site: Right Upper Arm, Position: Sitting, Cuff Size: Medium Adult)   Pulse 89   Temp 98.6 °F (37 °C)   Resp 20   Ht 5' 10\" (1.778 m)   Wt 178 lb 6.4 oz (80.9 kg)   SpO2 98%   BMI 25.60 kg/m²     Physical Exam  Vitals and nursing note reviewed. Constitutional:       General: He is not in acute distress. Appearance: Normal appearance. He is normal weight. Cardiovascular:      Rate and Rhythm: Normal rate and regular rhythm. Pulses: Normal pulses. Heart sounds: Normal heart sounds. No murmur heard. Comments: Palpable pacemaker in left anterior chest  Pulmonary:      Effort: Pulmonary effort is normal.      Comments: Respiratory crackles appreciated in the right lower lung fields, particularly the posterior aspect  Musculoskeletal:         General: No swelling or tenderness. Normal range of motion. Skin:     General: Skin is warm and dry. Capillary Refill: Capillary refill takes less than 2 seconds. Neurological:      General: No focal deficit present. Mental Status: He is alert and oriented to person, place, and time. Mental status is at baseline. Psychiatric:         Mood and Affect: Mood normal.         Behavior: Behavior normal.         Thought Content:  Thought content normal.       Data:     Lab Results   Component Value Date/Time     12/12/2022 08:41 AM    K 4.1 12/12/2022 08:41 AM     12/12/2022 08:41 AM    CO2 30 12/12/2022 08:41 AM    BUN 23 12/12/2022 08:41 AM    CREATININE 1.26 12/12/2022 08:41 AM    GLUCOSE 104 12/12/2022 08:41 AM    PROT 7.1 12/12/2022 08:41 AM    LABALBU 4.1 12/12/2022 08:41 AM    BILITOT 0.8 12/12/2022 08:41 AM    ALKPHOS 77 12/12/2022 08:41 AM    AST 16 12/12/2022 08:41 AM    ALT 8 12/12/2022 08:41 AM     Lab Results   Component Value Date/Time    WBC 6.3 10/24/2022 12:19 PM    RBC 3.64 10/24/2022 12:19 PM    HGB 11.6 12/12/2022 08:41 AM    HCT 34.6 12/12/2022 08:41 AM    MCV 93.1 10/24/2022 12:19 PM    MCH 31.7 10/24/2022 12:19 PM    MCHC 34.0 10/24/2022 12:19 PM    RDW 14.1 10/24/2022 12:19 PM     10/24/2022 12:19 PM    MPV NOT REPORTED 10/11/2021 10:31 AM     Lab Results   Component Value Date/Time    TSH 23.63 10/24/2022 12:19 PM     Lab Results   Component Value Date/Time    CHOL 137 10/24/2022 12:19 PM    HDL 34 10/24/2022 12:19 PM    PSA 1.85 10/24/2022 12:19 PM          Assessment & Plan        Diagnosis Orders   1. Cough in adult  Basic Metabolic Panel    CBC with Auto Differential    XR CHEST STANDARD (2 VW)    Brain Natriuretic Peptide      2. Other fatigue        3. Respiratory crackles at right lung base        4. Chronic systolic (congestive) heart failure            My principal concern is for an acute exacerbation of his heart failure. I would recommend obtaining a BMP, CBC, BNP as well as a two-view chest x-ray to evaluate for a pleural effusion, on the right side. If present, I do anticipate increasing his Lasix, at least temporarily and ordering echocardiogram.  He may benefit from a more prompt follow-up with cardiology. Follow up PRN depending on results      Completed Refills   Requested Prescriptions      No prescriptions requested or ordered in this encounter     Return if symptoms worsen or fail to improve. No orders of the defined types were placed in this encounter.     Orders Placed This Encounter   Procedures    XR CHEST STANDARD (2 VW)     Standing Status:   Future     Standing Expiration Date:   12/28/2023     Order Specific Question:   Reason for exam:     Answer:   crackles in right lung base concern for effusion    Basic Metabolic Panel     Standing Status:   Future     Standing Expiration Date:   12/28/2023    CBC with Auto Differential     Standing Status:   Future     Standing Expiration Date:   12/28/2023    Brain Natriuretic Peptide     Standing Status:   Future     Standing Expiration Date:   12/28/2023           Patient Instructions     SURVEY:    You may be receiving a survey from [employer] regarding your visit today. Please complete the survey to enable us to provide the highest quality of care to you and your family. If you cannot score us a very good on any question, please call the office to discuss how we could of made your experience a very good one. Thank you. Clinical Care Team:     Dr. Shan Guerrero, [facility]      ClericalTeam:     [id number] [facility].   Electronically signed by Adrian Mauro DO on 12/28/2022 at 3:36 PM     Completed Refills   Requested Prescriptions      No prescriptions requested or ordered in this encounter

## 2022-12-28 NOTE — PATIENT INSTRUCTIONS
SURVEY:    You may be receiving a survey from [employer] regarding your visit today. Please complete the survey to enable us to provide the highest quality of care to you and your family. If you cannot score us a very good on any question, please call the office to discuss how we could of made your experience a very good one. Thank you. Clinical Care Team:     Dr. Russ Pollard, [facility]      ClericalTeam:     Erin Heck.

## 2022-12-29 ENCOUNTER — CARE COORDINATION (OUTPATIENT)
Dept: CARE COORDINATION | Age: 87
End: 2022-12-29

## 2022-12-29 NOTE — CARE COORDINATION
Ambulatory Care Coordination Note  12/29/2022    ACC: Sohail Burks, RN    Attempted to reach patient today for CC outreach. Unable to reach patient. Unable to leave a message as voicemail has not been set up.     -4th unsuccessful outreach call by care coordination     Future Appointments   Date Time Provider Andres Sharma   1/25/2023 11:30 AM MD Sadi Oliveros [facility]   3/13/2023 10:40 AM Ronelle Leon, APRN - CNP [facility]   12/18/2023 10:00 AM Ronelle Leon, APRN - CNP [place] [facility]     Care Coordination Plan of Care: This nurse Care Coordinator will  - await call back from patient, and if no return call; will resolve current care coordination episode due to unable to reach patient.

## 2023-01-04 ENCOUNTER — OFFICE VISIT (OUTPATIENT)
Dept: PRIMARY CARE CLINIC | Age: 88
End: 2023-01-04

## 2023-01-04 ENCOUNTER — HOSPITAL ENCOUNTER (OUTPATIENT)
Age: 88
Setting detail: SPECIMEN
Discharge: HOME | End: 2023-01-04
Payer: MEDICARE

## 2023-01-04 VITALS
WEIGHT: 180 LBS | DIASTOLIC BLOOD PRESSURE: 60 MMHG | OXYGEN SATURATION: 92 % | BODY MASS INDEX: 25.77 KG/M2 | HEIGHT: 70 IN | HEART RATE: 97 BPM | SYSTOLIC BLOOD PRESSURE: 98 MMHG

## 2023-01-04 DIAGNOSIS — I50.43 CHF (CONGESTIVE HEART FAILURE), NYHA CLASS I, ACUTE ON CHRONIC, COMBINED (HCC): Primary | ICD-10-CM

## 2023-01-04 DIAGNOSIS — J41.8 MIXED SIMPLE AND MUCOPURULENT CHRONIC BRONCHITIS (HCC): ICD-10-CM

## 2023-01-04 DIAGNOSIS — I50.22 CHRONIC SYSTOLIC (CONGESTIVE) HEART FAILURE (HCC): ICD-10-CM

## 2023-01-04 DIAGNOSIS — J01.00 ACUTE MAXILLARY SINUSITIS, RECURRENCE NOT SPECIFIED: ICD-10-CM

## 2023-01-04 DIAGNOSIS — I50.43 CHF (CONGESTIVE HEART FAILURE), NYHA CLASS I, ACUTE ON CHRONIC, COMBINED (HCC): ICD-10-CM

## 2023-01-04 DIAGNOSIS — I48.0 PAROXYSMAL ATRIAL FIBRILLATION (HCC): ICD-10-CM

## 2023-01-04 LAB
ALBUMIN SERPL-MCNC: 4 G/DL (ref 3.5–5.2)
ALBUMIN/GLOBULIN RATIO: 1.3 (ref 1–2.5)
ALP BLD-CCNC: 74 U/L (ref 40–129)
ALT SERPL-CCNC: 6 U/L (ref 5–41)
ANION GAP SERPL CALCULATED.3IONS-SCNC: 10 MMOL/L (ref 9–17)
AST SERPL-CCNC: 16 U/L
BILIRUB SERPL-MCNC: 0.7 MG/DL (ref 0.3–1.2)
BUN BLDV-MCNC: 24 MG/DL (ref 8–23)
BUN/CREAT BLD: 20 (ref 9–20)
CALCIUM SERPL-MCNC: 10.3 MG/DL (ref 8.6–10.4)
CHLORIDE BLD-SCNC: 97 MMOL/L (ref 98–107)
CO2: 31 MMOL/L (ref 20–31)
CREAT SERPL-MCNC: 1.2 MG/DL (ref 0.7–1.2)
GFR SERPL CREATININE-BSD FRML MDRD: 57 ML/MIN/1.73M2
GLUCOSE BLD-MCNC: 113 MG/DL (ref 70–99)
POTASSIUM SERPL-SCNC: 4 MMOL/L (ref 3.7–5.3)
PRO-BNP: ABNORMAL PG/ML
SODIUM BLD-SCNC: 138 MMOL/L (ref 135–144)
TOTAL PROTEIN: 7 G/DL (ref 6.4–8.3)

## 2023-01-04 PROCEDURE — 80053 COMPREHEN METABOLIC PANEL: CPT

## 2023-01-04 PROCEDURE — 83880 ASSAY OF NATRIURETIC PEPTIDE: CPT

## 2023-01-04 RX ORDER — DOXYCYCLINE HYCLATE 100 MG
100 TABLET ORAL 2 TIMES DAILY
Qty: 14 TABLET | Refills: 0 | Status: ON HOLD | OUTPATIENT
Start: 2023-01-04 | End: 2023-01-11

## 2023-01-04 RX ORDER — PREDNISONE 20 MG/1
40 TABLET ORAL DAILY
Qty: 6 TABLET | Refills: 0 | Status: SHIPPED | OUTPATIENT
Start: 2023-01-04 | End: 2023-01-07

## 2023-01-04 NOTE — PROGRESS NOTES
HPI Notes    Name: Guillermina Garcia  : 1932         Chief Complaint:     Chief Complaint   Patient presents with    Fatigue    Cough     Ongoing x 3 weeks. Coughing up mucus     Shortness of Breath       History of Present Illness:        HPI  Pt here today with c/o shortness of breath, worsening congestion with productive cough, copious nasal drainage, hoarse voice. Pt did not do labs advised but recent CXR okay. Mostly nasal drainage, cannot lay down well, appears unrested with allergic shiners under both eyes. Pt states \"I don't feel well, haven't for awhile now. No fever  Lives alone      Wt Readings from Last 3 Encounters:   23 180 lb (81.6 kg)   22 178 lb 6.4 oz (80.9 kg)   22 177 lb 1.6 oz (80.3 kg)       22 CXR     Impression   1. Pacemaker prior sternotomy and mild stable cardiomegaly. 2. Lungs clear. 3. No effusion. Past Medical History:     Past Medical History:   Diagnosis Date    Atrial fibrillation ([address]     CAD (coronary artery disease)     Hard of hearing     Hiatal hernia     History of PTCA     2012    Hyperlipidemia     Hypothyroidism     Pacemaker     [employer]      Reviewed all health maintenance requirements and ordered appropriate tests  Health Maintenance Due   Topic Date Due    COVID-19 Vaccine (1) Never done    DTaP/Tdap/Td vaccine (1 - Tdap) Never done    Shingles vaccine (1 of 2) Never done    Flu vaccine (1) 2022       Past Surgical History:     Past Surgical History:   Procedure Laterality Date    CARDIAC SURGERY      CORONARY ARTERY BYPASS GRAFT      quad bypass [address]      UMBILICAL HERNIA REPAIR    PACEMAKER INSERTION  12    [employer]    PTCA      UMBILICAL HERNIA REPAIR  2016    Janelle Elias MD        Medications:       Prior to Admission medications    Medication Sig Start Date End Date Taking?  Authorizing Provider   predniSONE (DELTASONE) 20 MG tablet Take 2 tablets by mouth daily for 3 days Take with food STEROID to help with cough and breathing 1/4/23 1/7/23 Yes LORY Taylor CNP   doxycycline hyclate (VIBRA-TABS) 100 MG tablet Take 1 tablet by mouth 2 times daily for 7 days Copd/sinusitis 1/4/23 1/11/23 Yes LORY Taylor CNP   midodrine (PROAMATINE) 10 MG tablet Take 1 tablet by mouth in the morning and 1 tablet in the evening. 12/19/22  Yes Amaris Metcalf MD   propranolol (INDERAL) 10 MG tablet Take 1 tablet by mouth 2 times daily 12/19/22  Yes Amaris Metcalf MD   rosuvastatin (CRESTOR) 20 MG tablet Take 1 tablet by mouth daily 12/12/22  Yes Amaris Metcalf MD   Magnesium 400 MG TABS Take 400 mg by mouth daily 12/12/22  Yes Amaris Metcalf MD   furosemide (LASIX) 40 MG tablet Take 1 tablet by mouth daily 11/14/22  Yes Amaris Metcalf MD   clopidogrel (PLAVIX) 75 MG tablet TAKE 1 TABLET BY MOUTH DAILY 11/11/22  Yes LORY Taylor CNP   tamsulosin (FLOMAX) 0.4 MG capsule Take 1 capsule by mouth at bedtime Urinary frequency/enlarged prostate, take at bedtime 11/10/22  Yes LORY Taylor CNP   levothyroxine (SYNTHROID) 125 MCG tablet TAKE 1 TABLET BY MOUTH DAILY 10/19/22  Yes LORY Taylor CNP   vitamin B-12 (CYANOCOBALAMIN) 1000 MCG tablet Take 1,000 mcg by mouth daily   Yes Historical Provider, MD   Cholecalciferol (VITAMIN D3) 50 MCG (2000 UT) CAPS Take 2,000 Units by mouth daily   Yes Historical Provider, MD   aspirin 81 MG tablet Take 81 mg by mouth daily. Yes Historical Provider, MD   spironolactone (ALDACTONE) 25 MG tablet Take 1 tablet by mouth daily For congestive heart failure 1/5/23   LORY Taylor CNP   metoprolol (LOPRESSOR) 100 MG tablet Take 1 tablet by mouth 2 times daily  Patient taking differently: No sig reported 3/27/20 4/26/21  Amaris Metcalf MD        Allergies:       Menthol (topical analgesic)    Social History:     Tobacco:    reports that he has never smoked.  He has never used smokeless tobacco.  Alcohol:      reports that he does not currently use alcohol. Drug Use:  reports no history of drug use. Family History:     Family History   Problem Relation Age of Onset    Heart Disease Father        Review of Systems:         Review of Systems   Constitutional:  Positive for activity change, appetite change and fatigue. Negative for chills and fever. HENT:  Positive for congestion, postnasal drip, rhinorrhea, sinus pain and voice change. Negative for sore throat. Eyes: Negative. Respiratory:  Positive for cough, shortness of breath and wheezing. Gastrointestinal:  Negative for abdominal distention, constipation and diarrhea. Endocrine: Negative for cold intolerance and heat intolerance. Genitourinary:  Negative for difficulty urinating. Musculoskeletal:  Negative for arthralgias. Skin:  Negative for rash. Neurological:  Negative for dizziness and headaches. Psychiatric/Behavioral:  Positive for sleep disturbance. Negative for self-injury and suicidal ideas. The patient is not nervous/anxious. Physical Exam:     Vitals:  BP 98/60 (Site: Right Upper Arm, Position: Sitting)   Pulse 97   Ht 5' 10\" (1.778 m)   Wt 180 lb (81.6 kg)   SpO2 92%   BMI 25.83 kg/m²       Physical Exam  Vitals and nursing note reviewed. Constitutional:       General: He is not in acute distress. Appearance: He is well-developed. He is ill-appearing. HENT:      Head: Normocephalic and atraumatic. Right Ear: Tympanic membrane normal.      Left Ear: Tympanic membrane and external ear normal.      Nose: Congestion and rhinorrhea present. Mouth/Throat:      Mouth: Mucous membranes are moist.      Pharynx: Posterior oropharyngeal erythema present. No oropharyngeal exudate. Eyes:      Pupils: Pupils are equal, round, and reactive to light. Neck:      Vascular: No carotid bruit (neg thalia). Cardiovascular:      Rate and Rhythm: Normal rate and regular rhythm.       Pulses: Normal pulses. Heart sounds: Normal heart sounds. No murmur heard. Pulmonary:      Effort: Pulmonary effort is normal. No respiratory distress. Breath sounds: Rales (RUL) present. Abdominal:      Palpations: Abdomen is soft. There is no mass. Tenderness: There is no abdominal tenderness. Musculoskeletal:         General: Normal range of motion. Cervical back: Normal range of motion and neck supple. Right lower leg: No edema. Left lower leg: No edema. Lymphadenopathy:      Cervical: No cervical adenopathy. Skin:     General: Skin is warm. Findings: No rash. Neurological:      Mental Status: He is alert and oriented to person, place, and time. Psychiatric:         Behavior: Behavior normal.         Thought Content:  Thought content normal.         Judgment: Judgment normal.             Data:     Lab Results   Component Value Date/Time     01/04/2023 07:14 PM    K 4.0 01/04/2023 07:14 PM    CL 97 01/04/2023 07:14 PM    CO2 31 01/04/2023 07:14 PM    BUN 24 01/04/2023 07:14 PM    CREATININE 1.20 01/04/2023 07:14 PM    GLUCOSE 113 01/04/2023 07:14 PM    PROT 7.0 01/04/2023 07:14 PM    LABALBU 4.0 01/04/2023 07:14 PM    BILITOT 0.7 01/04/2023 07:14 PM    ALKPHOS 74 01/04/2023 07:14 PM    AST 16 01/04/2023 07:14 PM    ALT 6 01/04/2023 07:14 PM     Lab Results   Component Value Date/Time    WBC 6.3 10/24/2022 12:19 PM    RBC 3.64 10/24/2022 12:19 PM    HGB 11.6 12/12/2022 08:41 AM    HCT 34.6 12/12/2022 08:41 AM    MCV 93.1 10/24/2022 12:19 PM    MCH 31.7 10/24/2022 12:19 PM    MCHC 34.0 10/24/2022 12:19 PM    RDW 14.1 10/24/2022 12:19 PM     10/24/2022 12:19 PM    MPV NOT REPORTED 10/11/2021 10:31 AM     Lab Results   Component Value Date/Time    TSH 23.63 10/24/2022 12:19 PM     Lab Results   Component Value Date/Time    CHOL 137 10/24/2022 12:19 PM    HDL 34 10/24/2022 12:19 PM    PSA 1.85 10/24/2022 12:19 PM          Assessment & Plan        Diagnosis Orders 1. CHF (congestive heart failure), NYHA class I, acute on chronic, combined (HCC)  Comprehensive Metabolic Panel    Brain Natriuretic Peptide      2. Acute maxillary sinusitis, recurrence not specified  predniSONE (DELTASONE) 20 MG tablet    CBC with Auto Differential    doxycycline hyclate (VIBRA-TABS) 100 MG tablet      3. Mixed simple and mucopurulent chronic bronchitis (HCC)  predniSONE (DELTASONE) 20 MG tablet    CBC with Auto Differential    Comprehensive Metabolic Panel    doxycycline hyclate (VIBRA-TABS) 100 MG tablet      4. Paroxysmal atrial fibrillation (HCC)            Pt called with labs and asked to restart spironolactone, already on daily lasix. Will repeat labs on Monday  Need to watch K level closely       Needs closer monitoring of CHF             Completed Refills   Requested Prescriptions     Signed Prescriptions Disp Refills    predniSONE (DELTASONE) 20 MG tablet 6 tablet 0     Sig: Take 2 tablets by mouth daily for 3 days Take with food STEROID to help with cough and breathing    doxycycline hyclate (VIBRA-TABS) 100 MG tablet 14 tablet 0     Sig: Take 1 tablet by mouth 2 times daily for 7 days Copd/sinusitis     No follow-ups on file.   Orders Placed This Encounter   Medications    predniSONE (DELTASONE) 20 MG tablet     Sig: Take 2 tablets by mouth daily for 3 days Take with food STEROID to help with cough and breathing     Dispense:  6 tablet     Refill:  0    doxycycline hyclate (VIBRA-TABS) 100 MG tablet     Sig: Take 1 tablet by mouth 2 times daily for 7 days Copd/sinusitis     Dispense:  14 tablet     Refill:  0     Orders Placed This Encounter   Procedures    CBC with Auto Differential     Standing Status:   Future     Standing Expiration Date:   1/4/2024    Comprehensive Metabolic Panel     Standing Status:   Future     Number of Occurrences:   1     Standing Expiration Date:   1/4/2024    Brain Natriuretic Peptide     Standing Status:   Future     Number of Occurrences:   1 Standing Expiration Date:   1/4/2024         Patient Instructions   Phone: [phone]  Fax: [phone] Office Hours:  Monday: [facility] office location 8-5 ([phone]) Offering additional late hours the first Monday of the month until 7 pm.   Tuesday: 8-5 Wednesday: 8-5 Thursday:  Additional hours offered 2 Thursdays a month. Please call to inquire those dates. Fridays: 7:30-4:30   SURVEY:    You may be receiving a survey from [email or web address] regarding your visit today. Please complete the survey to enable us to provide the highest quality of care to you and your family. If you cannot score us a very good on any question, please call the office to discuss how we could have made your experience a very good one. Thank you.    Electronically signed by LORY Ugalde CNP on 1/7/2023 at 12:05 AM           Completed Refills   Requested Prescriptions     Signed Prescriptions Disp Refills    predniSONE (DELTASONE) 20 MG tablet 6 tablet 0     Sig: Take 2 tablets by mouth daily for 3 days Take with food STEROID to help with cough and breathing    doxycycline hyclate (VIBRA-TABS) 100 MG tablet 14 tablet 0     Sig: Take 1 tablet by mouth 2 times daily for 7 days Copd/sinusitis

## 2023-01-04 NOTE — PATIENT INSTRUCTIONS
Phone: [phone]  Fax: [phone] Office Hours:  [facility] ([phone]) Offering additional late hours the first Monday of the month until 7 pm.   Tuesday: 8-5 Wednesday: 8-5 Thursday:  Additional hours offered 2 Thursdays a month. Please call to inquire those dates. Fridays: 7:30-4:30   SURVEY:    You may be receiving a survey from [employer] regarding your visit today. Please complete the survey to enable us to provide the highest quality of care to you and your family. If you cannot score us a very good on any question, please call the office to discuss how we could have made your experience a very good one. Thank you.

## 2023-01-05 DIAGNOSIS — I50.43 CHF (CONGESTIVE HEART FAILURE), NYHA CLASS I, ACUTE ON CHRONIC, COMBINED (HCC): Primary | ICD-10-CM

## 2023-01-05 RX ORDER — SPIRONOLACTONE 25 MG/1
25 TABLET ORAL DAILY
Qty: 30 TABLET | Refills: 0 | Status: ON HOLD | OUTPATIENT
Start: 2023-01-05

## 2023-01-06 ENCOUNTER — CARE COORDINATION (OUTPATIENT)
Dept: CARE COORDINATION | Age: 88
End: 2023-01-06

## 2023-01-06 DIAGNOSIS — I50.23 ACUTE ON CHRONIC SYSTOLIC CHF (CONGESTIVE HEART FAILURE) (HCC): Primary | ICD-10-CM

## 2023-01-06 NOTE — CARE COORDINATION
Remote Patient Kit Ordering Note      Date/Time:  1/6/2023 11:33 AM      [x] [facility] confirmed patient shipping address-  [address]  [x] Patient will receive package over the next 2-4 business days. Someone 21 years or older must be present to sign for UPS delivery. [x] Patient to contact virtual installation-specific phone number listed in the patient instructions. [] If the patient does not contact HRS within 24 hours, an [address] will call the patient directly: If the patient does not answer, HRS will follow up with the clinical team notifying them about the unsuccessful attempt to contact the patient. HRS will make three call attempts to the patient. [] LPN will contact patient once equipment is active to welcome them to the program.                                                         [] Hours of RPM monitoring - Monday-Friday [id number]                     All questions answered at this time. ACM made aware the RPM kit has been ordered. [facility] notified patient of RPM equipment order.

## 2023-01-06 NOTE — PROGRESS NOTES
Pt needs daily CHF monitoring started in home- please connect with care coordinator.      Jacinda HENLEY CNP

## 2023-01-06 NOTE — PROGRESS NOTES
1/6/23 11:18 AM       Remote Patient Monitoring Treatment Plan    Received request from ACGIULIANA/NAHED Estrada RN to order remote patient monitoring for in home monitoring of CHF and order completed. Patient will be monitoring blood pressure   pulse ox   weight. Patient will engage in Remote Patient Monitoring each day to develop the skills necessary for self management. RPM Care Team Responsibilities:   Alerts will be reviewed daily and addressed within 2-4 hours during operational hours (Monday -Friday 9 am-4 pm)  Alert response and intervention documented in patient medical record  Alert response escalated to PCP per protocol and documented in patient medical record  Patient monitored over approximately  days  Discharge from program based on self-management readiness    See care coordination encounters for additional details.       Rayna Cash DNP, FNP-C, Remote Patient Monitoring NP, () [phone]

## 2023-01-06 NOTE — CARE COORDINATION
Ambulatory Care Coordination Note  1/6/2023    ACC: Brant Vargas RN    Received PCP referral requesting patient be enrolled in to [facility] program for CHF monitoring. Offered patient enrollment in the Remote Patient Monitoring (RPM) program for in-home monitoring: Yes, patient enrolled. Remote Patient Monitoring Enrollment Note      Date/Time:  1/6/2023 10:08 AM    Offered patient enrollment in the [facility] Remote Patient Monitoring (RPM) program for in home monitoring for CHF. Patient accepted RPM services. Patient will be monitoring the following daily:  blood pressure reading  pulse ox reading  pulse ox heart rate  weight    ACM reviewed the information below with patient:    Emergency Contact (name and contact number): Richelle Maguire (friend) [phone]    [x] A member from the care coordination team will reach out to notify the patient once the RPM kit is ordered. [x] Once the kit is delivered, the [facility] team will contact the patient after UPS deliver to assist with set up. [x] Determined BP cuff size: regular (9.05\"-15.74\")      [x] Determined weight scale: regular (<330lbs)                                                 [x] Hours of ACM monitoring - Monday-Friday [id number]                         All questions about RPM program answered at this time.

## 2023-01-07 ASSESSMENT — ENCOUNTER SYMPTOMS
ABDOMINAL DISTENTION: 0
SINUS PAIN: 1
CONSTIPATION: 0
SHORTNESS OF BREATH: 1
EYES NEGATIVE: 1
WHEEZING: 1
SORE THROAT: 0
VOICE CHANGE: 1
DIARRHEA: 0
COUGH: 1
RHINORRHEA: 1

## 2023-01-08 ENCOUNTER — HOSPITAL ENCOUNTER (INPATIENT)
Age: 88
LOS: 6 days | Discharge: HOME HEALTH | End: 2023-01-14
Attending: FAMILY MEDICINE | Admitting: INTERNAL MEDICINE
Payer: MEDICARE

## 2023-01-08 ENCOUNTER — APPOINTMENT (OUTPATIENT)
Dept: GENERAL RADIOLOGY | Age: 88
End: 2023-01-08
Payer: MEDICARE

## 2023-01-08 DIAGNOSIS — Z20.822 LAB TEST NEGATIVE FOR COVID-19 VIRUS: ICD-10-CM

## 2023-01-08 DIAGNOSIS — R53.1 GENERALIZED WEAKNESS: ICD-10-CM

## 2023-01-08 DIAGNOSIS — I50.23 ACUTE ON CHRONIC SYSTOLIC CONGESTIVE HEART FAILURE (HCC): Primary | ICD-10-CM

## 2023-01-08 DIAGNOSIS — I50.23 ACUTE ON CHRONIC SYSTOLIC (CONGESTIVE) HEART FAILURE (HCC): ICD-10-CM

## 2023-01-08 LAB
ABSOLUTE EOS #: 0 K/UL (ref 0–0.4)
ABSOLUTE LYMPH #: 0.9 K/UL (ref 1–4.8)
ABSOLUTE MONO #: 0.7 K/UL (ref 0–1)
ANION GAP SERPL CALCULATED.3IONS-SCNC: 13 MMOL/L (ref 9–17)
BASOPHILS # BLD: 0 % (ref 0–2)
BASOPHILS ABSOLUTE: 0 K/UL (ref 0–0.2)
BUN BLDV-MCNC: 49 MG/DL (ref 8–23)
BUN/CREAT BLD: 28 (ref 9–20)
CALCIUM SERPL-MCNC: 10 MG/DL (ref 8.6–10.4)
CHLORIDE BLD-SCNC: 97 MMOL/L (ref 98–107)
CO2: 31 MMOL/L (ref 20–31)
CREAT SERPL-MCNC: 1.77 MG/DL (ref 0.7–1.2)
DIFFERENTIAL TYPE: YES
EOSINOPHILS RELATIVE PERCENT: 1 % (ref 0–5)
GFR SERPL CREATININE-BSD FRML MDRD: 36 ML/MIN/1.73M2
GLUCOSE BLD-MCNC: 134 MG/DL (ref 70–99)
HCT VFR BLD CALC: 37.8 % (ref 41–53)
HEMOGLOBIN: 12.4 G/DL (ref 13.5–17.5)
LYMPHOCYTES # BLD: 10 % (ref 13–44)
MCH RBC QN AUTO: 31 PG (ref 26–34)
MCHC RBC AUTO-ENTMCNC: 32.9 G/DL (ref 31–37)
MCV RBC AUTO: 94.2 FL (ref 80–100)
MONOCYTES # BLD: 7 % (ref 5–9)
PDW BLD-RTO: 13.9 % (ref 12.1–15.2)
PLATELET # BLD: 221 K/UL (ref 140–450)
POTASSIUM SERPL-SCNC: 4.2 MMOL/L (ref 3.7–5.3)
PRO-BNP: ABNORMAL PG/ML
RBC # BLD: 4.01 M/UL (ref 4.5–5.9)
SARS-COV-2, RAPID: NOT DETECTED
SEG NEUTROPHILS: 82 % (ref 39–75)
SEGMENTED NEUTROPHILS ABSOLUTE COUNT: 7.8 K/UL (ref 2.1–6.5)
SODIUM BLD-SCNC: 141 MMOL/L (ref 135–144)
SPECIMEN DESCRIPTION: NORMAL
TROPONIN, HIGH SENSITIVITY: 60 NG/L (ref 0–22)
TROPONIN, HIGH SENSITIVITY: 61 NG/L (ref 0–22)
TROPONIN, HIGH SENSITIVITY: 62 NG/L (ref 0–22)
TROPONIN, HIGH SENSITIVITY: 71 NG/L (ref 0–22)
WBC # BLD: 9.5 K/UL (ref 3.5–11)

## 2023-01-08 PROCEDURE — 84484 ASSAY OF TROPONIN QUANT: CPT

## 2023-01-08 PROCEDURE — 83880 ASSAY OF NATRIURETIC PEPTIDE: CPT

## 2023-01-08 PROCEDURE — 96374 THER/PROPH/DIAG INJ IV PUSH: CPT

## 2023-01-08 PROCEDURE — C9803 HOPD COVID-19 SPEC COLLECT: HCPCS

## 2023-01-08 PROCEDURE — 6370000000 HC RX 637 (ALT 250 FOR IP): Performed by: INTERNAL MEDICINE

## 2023-01-08 PROCEDURE — 36415 COLL VENOUS BLD VENIPUNCTURE: CPT

## 2023-01-08 PROCEDURE — 85025 COMPLETE CBC W/AUTO DIFF WBC: CPT

## 2023-01-08 PROCEDURE — 6360000002 HC RX W HCPCS: Performed by: FAMILY MEDICINE

## 2023-01-08 PROCEDURE — 93005 ELECTROCARDIOGRAM TRACING: CPT | Performed by: FAMILY MEDICINE

## 2023-01-08 PROCEDURE — 1200000000 HC SEMI PRIVATE

## 2023-01-08 PROCEDURE — 80048 BASIC METABOLIC PNL TOTAL CA: CPT

## 2023-01-08 PROCEDURE — 6360000002 HC RX W HCPCS: Performed by: INTERNAL MEDICINE

## 2023-01-08 PROCEDURE — 99285 EMERGENCY DEPT VISIT HI MDM: CPT

## 2023-01-08 PROCEDURE — 87635 SARS-COV-2 COVID-19 AMP PRB: CPT

## 2023-01-08 PROCEDURE — 71045 X-RAY EXAM CHEST 1 VIEW: CPT

## 2023-01-08 RX ORDER — TAMSULOSIN HYDROCHLORIDE 0.4 MG/1
0.4 CAPSULE ORAL NIGHTLY
Status: DISCONTINUED | OUTPATIENT
Start: 2023-01-08 | End: 2023-01-14 | Stop reason: HOSPADM

## 2023-01-08 RX ORDER — FUROSEMIDE 10 MG/ML
20 INJECTION INTRAMUSCULAR; INTRAVENOUS 2 TIMES DAILY
Status: DISCONTINUED | OUTPATIENT
Start: 2023-01-08 | End: 2023-01-14 | Stop reason: HOSPADM

## 2023-01-08 RX ORDER — ASPIRIN 81 MG/1
81 TABLET ORAL DAILY
Status: DISCONTINUED | OUTPATIENT
Start: 2023-01-08 | End: 2023-01-14 | Stop reason: HOSPADM

## 2023-01-08 RX ORDER — DOXYCYCLINE HYCLATE 100 MG
100 TABLET ORAL 2 TIMES DAILY
Status: DISCONTINUED | OUTPATIENT
Start: 2023-01-08 | End: 2023-01-09

## 2023-01-08 RX ORDER — FUROSEMIDE 10 MG/ML
20 INJECTION INTRAMUSCULAR; INTRAVENOUS ONCE
Status: COMPLETED | OUTPATIENT
Start: 2023-01-08 | End: 2023-01-08

## 2023-01-08 RX ORDER — LANOLIN ALCOHOL/MO/W.PET/CERES
400 CREAM (GRAM) TOPICAL DAILY
Status: DISCONTINUED | OUTPATIENT
Start: 2023-01-09 | End: 2023-01-14 | Stop reason: HOSPADM

## 2023-01-08 RX ORDER — LANOLIN ALCOHOL/MO/W.PET/CERES
1000 CREAM (GRAM) TOPICAL DAILY
Status: DISCONTINUED | OUTPATIENT
Start: 2023-01-08 | End: 2023-01-14 | Stop reason: HOSPADM

## 2023-01-08 RX ORDER — SPIRONOLACTONE 25 MG/1
25 TABLET ORAL DAILY
Status: DISCONTINUED | OUTPATIENT
Start: 2023-01-08 | End: 2023-01-14 | Stop reason: HOSPADM

## 2023-01-08 RX ORDER — FUROSEMIDE 40 MG/1
40 TABLET ORAL DAILY
Status: DISCONTINUED | OUTPATIENT
Start: 2023-01-08 | End: 2023-01-14 | Stop reason: HOSPADM

## 2023-01-08 RX ORDER — MIDODRINE HYDROCHLORIDE 5 MG/1
10 TABLET ORAL 2 TIMES DAILY
Status: DISCONTINUED | OUTPATIENT
Start: 2023-01-08 | End: 2023-01-10

## 2023-01-08 RX ORDER — LEVOTHYROXINE SODIUM 0.1 MG/1
100 TABLET ORAL DAILY
Status: DISCONTINUED | OUTPATIENT
Start: 2023-01-09 | End: 2023-01-14 | Stop reason: HOSPADM

## 2023-01-08 RX ORDER — LEVOTHYROXINE SODIUM 0.03 MG/1
25 TABLET ORAL DAILY
Status: DISCONTINUED | OUTPATIENT
Start: 2023-01-09 | End: 2023-01-14 | Stop reason: HOSPADM

## 2023-01-08 RX ORDER — ROSUVASTATIN CALCIUM 20 MG/1
10 TABLET, COATED ORAL DAILY
Status: DISCONTINUED | OUTPATIENT
Start: 2023-01-08 | End: 2023-01-14 | Stop reason: HOSPADM

## 2023-01-08 RX ORDER — PROPRANOLOL HYDROCHLORIDE 10 MG/1
10 TABLET ORAL 2 TIMES DAILY
Status: DISCONTINUED | OUTPATIENT
Start: 2023-01-08 | End: 2023-01-14 | Stop reason: HOSPADM

## 2023-01-08 RX ORDER — VITAMIN B COMPLEX
2000 TABLET ORAL DAILY
Status: DISCONTINUED | OUTPATIENT
Start: 2023-01-08 | End: 2023-01-14 | Stop reason: HOSPADM

## 2023-01-08 RX ORDER — CLOPIDOGREL BISULFATE 75 MG/1
75 TABLET ORAL DAILY
Status: DISCONTINUED | OUTPATIENT
Start: 2023-01-08 | End: 2023-01-14 | Stop reason: HOSPADM

## 2023-01-08 RX ORDER — ENOXAPARIN SODIUM 100 MG/ML
30 INJECTION SUBCUTANEOUS DAILY
Status: DISCONTINUED | OUTPATIENT
Start: 2023-01-08 | End: 2023-01-10

## 2023-01-08 RX ADMIN — CLOPIDOGREL BISULFATE 75 MG: 75 TABLET ORAL at 18:36

## 2023-01-08 RX ADMIN — TAMSULOSIN HYDROCHLORIDE 0.4 MG: 0.4 CAPSULE ORAL at 20:27

## 2023-01-08 RX ADMIN — CYANOCOBALAMIN TAB 1000 MCG 1000 MCG: 1000 TAB at 18:35

## 2023-01-08 RX ADMIN — MIDODRINE HYDROCHLORIDE 10 MG: 5 TABLET ORAL at 18:35

## 2023-01-08 RX ADMIN — SPIRONOLACTONE 25 MG: 25 TABLET, FILM COATED ORAL at 18:36

## 2023-01-08 RX ADMIN — ASPIRIN 81 MG: 81 TABLET, COATED ORAL at 18:35

## 2023-01-08 RX ADMIN — CHOLECALCIFEROL TAB 25 MCG (1000 UNIT) 2000 UNITS: 25 TAB at 18:36

## 2023-01-08 RX ADMIN — DOXYCYCLINE HYCLATE 100 MG: 100 TABLET, COATED ORAL at 20:27

## 2023-01-08 RX ADMIN — ENOXAPARIN SODIUM 30 MG: 100 INJECTION SUBCUTANEOUS at 20:27

## 2023-01-08 RX ADMIN — FUROSEMIDE 20 MG: 10 INJECTION, SOLUTION INTRAMUSCULAR; INTRAVENOUS at 15:02

## 2023-01-08 RX ADMIN — ROSUVASTATIN CALCIUM 10 MG: 20 TABLET, COATED ORAL at 18:35

## 2023-01-08 ASSESSMENT — PAIN - FUNCTIONAL ASSESSMENT: PAIN_FUNCTIONAL_ASSESSMENT: NONE - DENIES PAIN

## 2023-01-08 NOTE — ED PROVIDER NOTES
[facility]  eMERGENCY dEPARTMENT eNCOUnter          [facility]       Chief Complaint   Patient presents with    Shortness of Breath     Shortness of breath for the past few weeks. Coughing up white phlegm. Worsened today. Nurses Notes reviewed and I agree except as noted in the HPI. HISTORY OF PRESENT ILLNESS    Reginald Bernardo is a 80 y.o. male who presents to the emergency room via private vehicle, patient planing of shortness of breath, states onset over the past few weeks, denies any chest pain or palpitations denies any fever denies any known sick contacts. Patient states he has been coughing up some white phlegm like product. States compliant with all medications. Denies any swelling in his legs or abdomen. Denies pain. PCP: FRANCE Pires  Cards: Dr Benitez Li     Review of Systems   All other systems reviewed and are negative. PAST MEDICAL HISTORY    has a past medical history of Atrial fibrillation ([facility] [address] CAD (coronary artery disease), Hard of hearing, Hiatal hernia, History of PTCA, Hyperlipidemia, Hypothyroidism, and Pacemaker. SURGICAL HISTORY      has a past surgical history that includes Coronary artery bypass graft (2000); Percutaneous Transluminal Coronary Angio (09/19/12); Pacemaker insertion (8/6/12); Cardiac surgery; hernia repair (68/72/1198); and Umbilical hernia repair (11/21/2016). CURRENT MEDICATIONS       Previous Medications    ASPIRIN 81 MG TABLET    Take 81 mg by mouth daily.     CHOLECALCIFEROL (VITAMIN D3) 50 MCG (2000 UT) CAPS    Take 2,000 Units by mouth daily    CLOPIDOGREL (PLAVIX) 75 MG TABLET    TAKE 1 TABLET BY MOUTH DAILY    DOXYCYCLINE HYCLATE (VIBRA-TABS) 100 MG TABLET    Take 1 tablet by mouth 2 times daily for 7 days Copd/sinusitis    FUROSEMIDE (LASIX) 40 MG TABLET    Take 1 tablet by mouth daily    LEVOTHYROXINE (SYNTHROID) 125 MCG TABLET    TAKE 1 TABLET BY MOUTH DAILY    MAGNESIUM 400 MG TABS Take 400 mg by mouth daily    MIDODRINE (PROAMATINE) 10 MG TABLET    Take 1 tablet by mouth in the morning and 1 tablet in the evening. PROPRANOLOL (INDERAL) 10 MG TABLET    Take 1 tablet by mouth 2 times daily    ROSUVASTATIN (CRESTOR) 20 MG TABLET    Take 1 tablet by mouth daily    SPIRONOLACTONE (ALDACTONE) 25 MG TABLET    Take 1 tablet by mouth daily For congestive heart failure    TAMSULOSIN (FLOMAX) 0.4 MG CAPSULE    Take 1 capsule by mouth at bedtime Urinary frequency/enlarged prostate, take at bedtime    VITAMIN B-12 (CYANOCOBALAMIN) 1000 MCG TABLET    Take 1,000 mcg by mouth daily       ALLERGIES     is allergic to menthol (topical analgesic). FAMILY HISTORY     He indicated that his mother is . He indicated that his father is . He indicated that both of his sisters are alive. He indicated that three of his six brothers are alive. He indicated that his maternal grandmother is . He indicated that his maternal grandfather is . He indicated that his paternal grandmother is . He indicated that his paternal grandfather is . He indicated that two of his three sons are alive. family history includes Heart Disease in his father. SOCIAL HISTORY      reports that he has never smoked. He has never used smokeless tobacco. He reports that he does not currently use alcohol. He reports that he does not use drugs. PHYSICAL EXAM     INITIAL VITALS:  height is 5' 10\" (1.778 m) and weight is 180 lb (81.6 kg). His temporal temperature is 96.9 °F (36.1 °C). His blood pressure is 90/61 and his pulse is 68. His respiration is 18 and oxygen saturation is 96%. Physical Exam   Constitutional: Patient is oriented to person, place, and time. Patient appears well-developed and well-nourished. Patient is active and cooperative. HENT:   Head: Normocephalic and atraumatic. Head is without contusion. Right Ear: Minto,  external ear normal. No drainage.    Left Ear: Minto, external ear normal. No drainage. Nose: Nose normal. No nasal deformity. No epistaxis. Mouth/Throat: Mucous membranes are not dry. Eyes: EOMI. Conjunctivae, sclera, and lids are normal. Right eye exhibits no discharge. Left eye exhibits no discharge. Neck: Full passive range of motion without pain and phonation normal.  Negative JVD, negative tracheal deviation  Cardiovascular:  Normal rate, regular rhythm and intact distal pulses. Trace lower extremity edema without marked pitting  Pulses: Right radial pulse  2+   Pulmonary/Chest: Effort normal. No tachypnea and no bradypnea. Trace Rales bilateral bases without gross rhonchi stridor or wheezing. No appreciated increased work of breathing. Abdominal: Soft. Patient without distension or tenderness  Musculoskeletal:   Negative acute trauma or deformity,  apparent full range of motion and normal strength all extremities appropriate to age. Neurological: Patient is alert and oriented to person, place, and time. patient displays no tremor. Patient displays no seizure activity. Skin: Skin is warm and dry. Patient is not diaphoretic. Psychiatric: Patient has a normal mood and affect. Patient speech is normal and behavior is normal. Cognition and memory are normal.     DIFFERENTIAL DIAGNOSIS:   CHF exacerbation, pneumonia bronchitis URI effusion    DIAGNOSTIC RESULTS     EKG: All EKG's are interpreted by the Emergency Department Physician who either signs or Co-signs this chart in the absence of a cardiologist.  EKG    The patient had an EKG which is interpreted by me in the absence of a Cardiologist.   [] Without comparison to previous.    [x] With comparison to a previous EKG Dated 10/14/2022    EKG @ 1308 hrs -ventricular paced rhythm rate 78, left axis,   as per computer read      RADIOLOGY: non-plain film images(s) such as CT, Ultrasound and MRI are read by the radiologist.  XR CHEST PORTABLE   Final Result   Median sternotomy sutures are noted. Graph impression:   1. Cardiac enlargement with pacer in place. 2. Prominence of the bronchovascular markings. Findings could be chronic. Bronchitis, an interstitial pneumonitis, or early failure is also possibility. 3. Small bilateral effusions. 4. Bibasilar atelectasis. LABS:   Labs Reviewed   BASIC METABOLIC PANEL - Abnormal; Notable for the following components:       Result Value    Glucose 134 (*)     BUN 49 (*)     Creatinine 1.77 (*)     Est, Glom Filt Rate 36 (*)     Bun/Cre Ratio 28 (*)     Chloride 97 (*)     All other components within normal limits   CBC WITH AUTO DIFFERENTIAL - Abnormal; Notable for the following components:    RBC 4.01 (*)     Hemoglobin 12.4 (*)     Hematocrit 37.8 (*)     Seg Neutrophils 82 (*)     Lymphocytes 10 (*)     Segs Absolute 7.80 (*)     Absolute Lymph # 0.90 (*)     All other components within normal limits   TROPONIN - Abnormal; Notable for the following components:    Troponin, High Sensitivity 71 (*)     All other components within normal limits   BRAIN NATRIURETIC PEPTIDE - Abnormal; Notable for the following components:    Pro-BNP 18,837 (*)     All other components within normal limits   TROPONIN - Abnormal; Notable for the following components:    Troponin, High Sensitivity 61 (*)     All other components within normal limits   COVID-19, RAPID       MIPS    Not applicable    MEDICAL DECISION MAKING   Vitals:    Vitals:    01/08/23 1309 01/08/23 1319 01/08/23 1344 01/08/23 1445   BP: 96/65 100/64 92/62 90/61   Pulse: 77 75 74 68   Resp: 21 21 20 18   Temp:       TempSrc:       SpO2: 96% 94% 97% 96%   Weight:       Height:         Patient history and physical exam taken at bedside, discussed patient symptoms and exam findings, discussed initial work-up to include EKG chest x-ray IV access blood work. Patient placed on cardiac monitor, pulse ox, sitting high semi-Sky's in bed.     EKG as above    EMR reviewed, noting history HLD, systolic heart failure, CAD, A. fib, sick sinus syndrome with noted pacer. Patient has a [address] pacer as per cardiology notes, which cannot be interpreted in the emergency room, however was noted on 12/12/2022 to be working appropriately per cardiology notes    EMR reviewed,  including last cardiology note dated 12/12/2022 with Dr. Aleksandr Choe, noted LV dysfunction with EF 35%, RI with SCR 1.26, moderate aortic stenosis, some hypotension, metoprolol was stopped, notes that patient's pacer was working fine as per the chief complaint section, Imdur was stopped due to hypotension, follow-up 6 weeks    Chest x-ray reviewed, no gross consolidation, small effusions, noted vascular congestion, noted pacer left mid chest    Rapid COVID-19 test negative    Lab work-up reviewed, SCR 1.77, BUN 49, , K4.2, BBC 9.5 with 82% PMNs no bands, H&H 12.4/37.8, hsTnT 71, pBNP 18,837    1 hour troponin ordered    Updated patient on initial work-up, explained reason for getting second lab test, afterwards we will try to contact his cardiologist and hospitalist for possible admission, acknowledged    Case was discussed with Dr. Aleksandr Choe, cardiology, regarding patient's presentation and current work-up, will see patient on consult if needed    hsTnT 61    Case discussed with Dr. Marina Medina, hospitalist, regarding patient's presentation and current work-up including my conversation with cardiology as above, accepted for admission    Furosemide 20 mg IV x1 ordered    Critical Care Time: 25 minutes, Including direct patient interaction exclusive of any separately billed procedures, including review of EMR, conversation with cardiology Dr. Aleksandr Choe, conversation with hospitalist Dr. Davina Levine      1. Acute on chronic systolic congestive heart failure (HCC)    2. Lab test negative for COVID-19 virus          DISPOSITION/PLAN   Admit    PATIENT REFERRED TO:  No follow-up provider specified.     DISCHARGE MEDICATIONS:  New Prescriptions    No medications on file           Summation          ED Medications administered this visit:    Medications   furosemide (LASIX) injection 20 mg (has no administration in time range)       New Prescriptions from this visit:    New Prescriptions    No medications on file       Follow-up:  No follow-up provider specified. Final Impression:   1.  Acute on chronic systolic congestive heart failure ([facility] [address]    2. Lab test negative for COVID-19 virus               (Please note that portions of this note were completed with a voice recognition program.  Efforts were made to edit the dictations but occasionally words are mis-transcribed.)    MD Hira Shah MD  01/08/23 0568

## 2023-01-08 NOTE — ED TRIAGE NOTES
Patient unsure of medications. Will call pharmacy to verify medications. Patient alert and oriented who drove self to ED.

## 2023-01-08 NOTE — PROGRESS NOTES
Pt arrives to room 256 via cart, able to ambulate self SBA into bed. Pt is A&O x4 and able to complete all admission questions. VS stable. Pt has BLE +1 pitting edema and c/o phlegm stuck in throat, denies any pain. Pt states he lives alone at home and drove self to ED. Writer orients patient to room, call light, white board and staff. All questions and concerns answered at this time regarding plan of care. Bed alarm placed on.

## 2023-01-09 ENCOUNTER — APPOINTMENT (OUTPATIENT)
Dept: CT IMAGING | Age: 88
End: 2023-01-09
Payer: MEDICARE

## 2023-01-09 ENCOUNTER — TELEPHONE (OUTPATIENT)
Dept: FAMILY MEDICINE CLINIC | Age: 88
End: 2023-01-09

## 2023-01-09 ENCOUNTER — CARE COORDINATION (OUTPATIENT)
Dept: CARE COORDINATION | Age: 88
End: 2023-01-09

## 2023-01-09 PROBLEM — E44.1 MILD MALNUTRITION (HCC): Status: ACTIVE | Noted: 2023-01-09

## 2023-01-09 LAB
-: NORMAL
ANION GAP SERPL CALCULATED.3IONS-SCNC: 10 MMOL/L (ref 9–17)
BILIRUBIN URINE: NEGATIVE
BUN BLDV-MCNC: 47 MG/DL (ref 8–23)
BUN/CREAT BLD: 28 (ref 9–20)
CALCIUM SERPL-MCNC: 9.4 MG/DL (ref 8.6–10.4)
CHLORIDE BLD-SCNC: 98 MMOL/L (ref 98–107)
CO2: 30 MMOL/L (ref 20–31)
COLOR: YELLOW
COMMENT UA: ABNORMAL
CREAT SERPL-MCNC: 1.65 MG/DL (ref 0.7–1.2)
GFR SERPL CREATININE-BSD FRML MDRD: 39 ML/MIN/1.73M2
GLUCOSE BLD-MCNC: 102 MG/DL (ref 70–99)
GLUCOSE URINE: NEGATIVE
KETONES, URINE: NEGATIVE
LEUKOCYTE ESTERASE, URINE: ABNORMAL
LV EF: 28 %
LVEF MODALITY: NORMAL
MAGNESIUM: 1.9 MG/DL (ref 1.6–2.6)
NITRITE, URINE: NEGATIVE
PH UA: 5 (ref 5–8)
POTASSIUM SERPL-SCNC: 3.8 MMOL/L (ref 3.7–5.3)
PROTEIN UA: ABNORMAL
RBC UA: NORMAL /HPF (ref 0–2)
SODIUM BLD-SCNC: 138 MMOL/L (ref 135–144)
SPECIFIC GRAVITY UA: 1.02 (ref 1–1.03)
TURBIDITY: ABNORMAL
URINE HGB: ABNORMAL
UROBILINOGEN, URINE: NORMAL
WBC UA: NORMAL /HPF

## 2023-01-09 PROCEDURE — 6360000002 HC RX W HCPCS: Performed by: INTERNAL MEDICINE

## 2023-01-09 PROCEDURE — 81001 URINALYSIS AUTO W/SCOPE: CPT

## 2023-01-09 PROCEDURE — 83735 ASSAY OF MAGNESIUM: CPT

## 2023-01-09 PROCEDURE — 36415 COLL VENOUS BLD VENIPUNCTURE: CPT

## 2023-01-09 PROCEDURE — 87040 BLOOD CULTURE FOR BACTERIA: CPT

## 2023-01-09 PROCEDURE — 6370000000 HC RX 637 (ALT 250 FOR IP): Performed by: INTERNAL MEDICINE

## 2023-01-09 PROCEDURE — 71250 CT THORAX DX C-: CPT

## 2023-01-09 PROCEDURE — 87086 URINE CULTURE/COLONY COUNT: CPT

## 2023-01-09 PROCEDURE — 2580000003 HC RX 258: Performed by: INTERNAL MEDICINE

## 2023-01-09 PROCEDURE — 80048 BASIC METABOLIC PNL TOTAL CA: CPT

## 2023-01-09 PROCEDURE — 1200000000 HC SEMI PRIVATE

## 2023-01-09 PROCEDURE — 97166 OT EVAL MOD COMPLEX 45 MIN: CPT

## 2023-01-09 PROCEDURE — 97161 PT EVAL LOW COMPLEX 20 MIN: CPT

## 2023-01-09 PROCEDURE — 93306 TTE W/DOPPLER COMPLETE: CPT

## 2023-01-09 RX ORDER — SODIUM CHLORIDE 9 MG/ML
INJECTION, SOLUTION INTRAVENOUS CONTINUOUS
Status: DISCONTINUED | OUTPATIENT
Start: 2023-01-09 | End: 2023-01-12

## 2023-01-09 RX ADMIN — TAMSULOSIN HYDROCHLORIDE 0.4 MG: 0.4 CAPSULE ORAL at 21:31

## 2023-01-09 RX ADMIN — ASPIRIN 81 MG: 81 TABLET, COATED ORAL at 09:19

## 2023-01-09 RX ADMIN — CHOLECALCIFEROL TAB 25 MCG (1000 UNIT) 2000 UNITS: 25 TAB at 09:18

## 2023-01-09 RX ADMIN — MIDODRINE HYDROCHLORIDE 10 MG: 5 TABLET ORAL at 09:19

## 2023-01-09 RX ADMIN — SODIUM CHLORIDE: 9 INJECTION, SOLUTION INTRAVENOUS at 05:36

## 2023-01-09 RX ADMIN — CYANOCOBALAMIN TAB 1000 MCG 1000 MCG: 1000 TAB at 09:19

## 2023-01-09 RX ADMIN — LEVOTHYROXINE SODIUM 100 MCG: 100 TABLET ORAL at 06:42

## 2023-01-09 RX ADMIN — CEFTRIAXONE SODIUM 1000 MG: 1 INJECTION, POWDER, FOR SOLUTION INTRAMUSCULAR; INTRAVENOUS at 06:46

## 2023-01-09 RX ADMIN — PIPERACILLIN SODIUM AND TAZOBACTAM SODIUM 3375 MG: 3; 375 INJECTION, POWDER, FOR SOLUTION INTRAVENOUS at 21:44

## 2023-01-09 RX ADMIN — CLOPIDOGREL BISULFATE 75 MG: 75 TABLET ORAL at 09:19

## 2023-01-09 RX ADMIN — Medication 400 MG: at 13:49

## 2023-01-09 RX ADMIN — MIDODRINE HYDROCHLORIDE 10 MG: 5 TABLET ORAL at 18:09

## 2023-01-09 RX ADMIN — LEVOTHYROXINE SODIUM 25 MCG: 25 TABLET ORAL at 06:42

## 2023-01-09 RX ADMIN — ROSUVASTATIN CALCIUM 10 MG: 20 TABLET, COATED ORAL at 09:19

## 2023-01-09 RX ADMIN — ENOXAPARIN SODIUM 30 MG: 100 INJECTION SUBCUTANEOUS at 09:19

## 2023-01-09 RX ADMIN — SODIUM CHLORIDE: 9 INJECTION, SOLUTION INTRAVENOUS at 21:42

## 2023-01-09 ASSESSMENT — PAIN SCALES - GENERAL: PAINLEVEL_OUTOF10: 0

## 2023-01-09 NOTE — PROGRESS NOTES
Pt is symptomatically hypotensive with lightheadedness and dizziness. Dr Lauren White notified. New orders in place.

## 2023-01-09 NOTE — PROGRESS NOTES
Pt has similar blood pressure after fluids started, but is asymptomatic. Cardiology consulted and to determine next steps. More blood noted on meatus and hematuria is present. Pt comfortable without needs at this moment.

## 2023-01-09 NOTE — PLAN OF CARE
[facility]    Inpatient Occupational Therapy  Plan of Care  OT Orders Received and Evaluation Complete  Date: 2023  Patient Name: Phan Gaviria        MRN: [id number]    : 1932  (80 y.o.)  Referring Practitioner: Dr. Gracie Laurent     Referral Date: 23  Diagnosis: Acute on chronic systolic heart failure  Treatment Diagnosis: Acute on chronic systolic heart failure    Identified Problem Areas  Performance deficits / Impairments: Decreased functional mobility , Decreased ADL status, Decreased safe awareness, Decreased balance, Decreased posture     Justification for Skilled Services:  [x]  Complete Daily Tasks Safely  [x] Improve Balance   [x]  Return to Prior Level of Function  [x]  Family/Caregiver Education    [x] Improve UE strength  [x] Patient Education: [x]Adaptive Equipment   [x]Home Exercise Program and Progression    Treatment Plan  Occupational Therapy Plan  Times Per Week: 3x  Times Per Day: Once a day (1x/day)  Current Treatment Recommendations: Balance training, Safety education & training, Patient/Caregiver education & training, Equipment evaluation, education, & procurement, Self-Care / ADL, Home management training  [] Modalities:  [x] Therapeutic Exercise   [x] Therapeutic Activity  [] Splinting:     [] Home Safety Evaluation         [x] ADL Retraining                       [] Muscle Re-education [] Cognitive Retraining            [] Sensory Integration  [x] Patient Education [x] Home Exercise Program [] Fine Motor Coordination       GOALS  Short Term Goals  Time Frame for Short Term Goals: 4 days (23)  Short Term Goal 1: Patient will complete upper body dressing and/or bathing tasks while using adaptive equipment/techniques PRN with SET-UP. Short Term Goal 2: Patient will complete lower body dressing and/or bathing tasks while using adaptive equipment/techniques PRN with SUP. Short Term Goal 3: Patient will complete a commode transfer while using DME PRN with SUP.   Short Term Goal 4: To increase independence with grooming tasks, patient will tolerate static/dynamic standing x2 minutes with no more than SUP. Short Term Goal 5: To increase activity tolerance for ADLs/IADLs, patient will tolerate 5-10 minutes of ther ex/act with no more than 1 rest break. Long Term Goals  Time Frame for Long Term Goals : STG=LTG    Upon Swingbed Transfer this Plan of Care will be followed until revision is completed.     Sweta Anthony, OTR/L                    Date: 1/9/2023

## 2023-01-09 NOTE — PLAN OF CARE
[facility]  Inpatient Physical Therapy  Plan of Care  Date: 2023  Patient Name: Hugo Tee        : 1932  (80 y.o.)  Referring Practitioner: Dr. Mio Baumann  Admission Date: 2023  Referral Date : 23  Diagnosis: CHF  Treatment Diagnosis: generalized weakness  PT Orders Received and Evaluation Complete  Identified Problem Areas:  Therapy Prognosis: Good  Performance Deficits/Impairments: Decreased functional mobility , Decreased balance    Justification for Skilled Services:  [] Reduce Falls   [x] Improve Ambulation  [x]  Complete Daily Tasks Safely   [x] Improve Balance   [] Improve LE strength  [x]  Return to Prior Level of Function  [x] Improve Functional Mobility   []  Family/Caregiver Education  [] Patient Education: []Assistive Devices []Home Exercise Program and Progression    Plan  Frequency: 1x/day Daily    Duration: 3 days  [] Modalities:  [x] Therapeutic Exercise   [x] Gait Training  [x] Therapeutic Activity    [] Home Safety Evaluation         [] Massage                        [x] Neuromuscular Re-education [] Back Education             [x] Patient Education [] Home Exercise Program       Rehab Potential:  [x]  Good [] Fair   []  Poor    Goals  Short Term Goals  Time Frame for Short Term Goals: NA    Long Term Goals  Time Frame for Long Term Goals : 3 days  Long Term Goal 1: Patient to ambulate 100 ftx1 without device independently  Long Term Goal 2: Patient to have good dynamic standing balance to complete ADLs safely  Long Term Goal 3: Patient to complete supine to sit transfer independently with head of bed flat    Upon Swingbed Transfer this Plan of Care will be followed until revision is complete.     Zay Pruett, PT, PT   Date: 2023

## 2023-01-09 NOTE — ACP (ADVANCE CARE PLANNING)
Advance Care Planning     Advance Care Planning Activator (Inpatient)  Conversation Note      Date of ACP Conversation: 1/9/2023     Conversation Conducted with: Patient with Decision Making Capacity    ACP Activator: Nic Kc, [address] [facility] Decision Maker:     Current Designated Health Care Decision Maker:     Primary Decision Maker (Active): Daiana Beard [phone]  Click here to complete Healthcare Decision Makers including section of the Healthcare Decision Maker Relationship (ie \"Primary\")  Today we documented who pt wanted to be his decision maker and discussed that he should have these in writing. Care Preferences    Ventilation: \"If you were in your present state of health and suddenly became very ill and were unable to breathe on your own, what would your preference be about the use of a ventilator (breathing machine) if it were available to you? \"      Would the patient desire the use of ventilator (breathing machine)?: yes    \"If your health worsens and it becomes clear that your chance of recovery is unlikely, what would your preference be about the use of a ventilator (breathing machine) if it were available to you? \"     Would the patient desire the use of ventilator (breathing machine)?: No      Resuscitation  \"CPR works best to restart the heart when there is a sudden event, like a heart attack, in someone who is otherwise healthy. Unfortunately, CPR does not typically restart the heart for people who have serious health conditions or who are very sick. \"    \"In the event your heart stopped as a result of an underlying serious health condition, would you want attempts to be made to restart your heart (answer \"yes\" for attempt to resuscitate) or would you prefer a natural death (answer \"no\" for do not attempt to resuscitate)? \" yes       [] Yes   [x] No   Educated Patient / Lizette Sharp regarding differences between Advance Directives and portable DNR orders.     Length of ACP Conversation in minutes:  5    Conversation Outcomes:  [x] ACP discussion completed  [] Existing advance directive reviewed with patient; no changes to patient's previously recorded wishes  [] New Advance Directive completed  [] Portable Do Not Rescitate prepared for Provider review and signature  [] POLST/POST/MOLST/MOST prepared for Provider review and signature      Follow-up plan:    [] Schedule follow-up conversation to continue planning  [] Referred individual to Provider for additional questions/concerns   [] Advised patient/agent/surrogate to review completed ACP document and update if needed with changes in condition, patient preferences or care setting    [x] This note routed to one or more involved healthcare providers

## 2023-01-09 NOTE — CARE COORDINATION
Ambulatory Care Coordination Note  1/9/2023    ACC: Carolyn Dawkins RN    Received notification that patient was in [facility] ED on 1/8 and was admitted to the medical-surgical floor at [facility] for CHF exacerbation. Care Coordination Plan of Care:    This nurse Care Coordinator will  - defer all planned calls at this time   -wait on notification of patients discharge from hospital         Future Appointments   Date Time Provider Andres Sharma   1/25/2023 11:30 AM Leeanne Davis MD [place] [address]   2/16/2023 11:00 AM LORY Gandhi CNP pc [facility]   3/13/2023 10:40 AM LORY Gandhi CNP MED [facility]   12/18/2023 10:00 AM LORY Gandhi CNP MED [facility]

## 2023-01-09 NOTE — PROGRESS NOTES
[facility]    Date: 2023  Patient Name: Elliot Arango        : 1932       [] Pt Refusal            [x]Pt Unavailable due to: Attempted PT evaluation at .28.69.59 however patient requested to rest in bed due to SOB. PT agreeable to return at later time prior to lunch to assist to chair when completing evaluation due to present fatigue/SOB. Returned at [phone] and found patient undergoing echocardiogram and unavailable dud to testing.   Will attempt at later time        Aleah Javier, PT,  Date: 2023

## 2023-01-09 NOTE — PLAN OF CARE
Problem: Discharge Planning  Goal: Discharge to home or other facility with appropriate resources  Outcome: Progressing  Flowsheets (Taken 1/8/2023 2030)  Discharge to home or other facility with appropriate resources:   Identify barriers to discharge with patient and caregiver   Arrange for needed discharge resources and transportation as appropriate   Identify discharge learning needs (meds, wound care, etc)   Arrange for interpreters to assist at discharge as needed   Refer to discharge planning if patient needs post-hospital services based on physician order or complex needs related to functional status, cognitive ability or social support system

## 2023-01-09 NOTE — TELEPHONE ENCOUNTER
FYI patient is admitted to [facility] for CHF. Dr Indigo Terrazas is following.      Health Maintenance   Topic Date Due    COVID-19 Vaccine (1) Never done    DTaP/Tdap/Td vaccine (1 - Tdap) Never done    Shingles vaccine (1 of 2) Never done    Flu vaccine (1) 08/01/2022    Lipids  10/24/2023    Depression Screen  12/12/2023    Annual Wellness Visit (AWV)  12/13/2023    Pneumococcal 65+ years Vaccine  Completed    Hepatitis A vaccine  Aged Out    Hib vaccine  Aged Out    Meningococcal (ACWY) vaccine  Aged Out             (applicable per patient's age: Cancer Screenings, Depression Screening, Fall Risk Screening, Immunizations)    LDL Cholesterol (mg/dL)   Date Value   10/24/2022 81     AST (U/L)   Date Value   01/04/2023 16     ALT (U/L)   Date Value   01/04/2023 6     BUN (mg/dL)   Date Value   01/09/2023 47 (H)      (goal A1C is < 7)   (goal LDL is <100) need 30-50% reduction from baseline     BP Readings from Last 3 Encounters:   01/09/23 (!) 90/54   01/04/23 98/60   12/28/22 102/66    (goal /80)      All Future Testing planned in [facility]:  Lab Frequency Next Occurrence   Basic Metabolic Panel Once 61/72/3971   CBC with Auto Differential Once 93/59/6684   Basic Metabolic Panel Once 42/03/8566   Intake and output EVERY 8 HOURS    Basic Metabolic Panel Daily (Lab)    Magnesium Daily (Lab)        Next Visit Date:  Future Appointments   Date Time Provider Andres Sharma   1/9/2023  2:00 PM [facility] ECHO ROOM [facility] ECHO Cirstel Harper   1/25/2023 11:30 AM MD Carmelita Jones [facility]   2/16/2023 11:00 AM LORY Almaraz CNP pc [facility]   3/13/2023 10:40 AM LORY Almaraz - CNP [facility]   12/18/2023 10:00 AM LORY Almaraz CNP [facility] MED [facility]            Patient Active Problem List:     Hard of hearing     CAD (coronary artery disease)     Atrial fibrillation ([address]     Hiatal hernia     Hyperlipidemia     Hypothyroidism     Pacemaker     History of PTCA     Calculus of gallbladder without cholecystitis without obstruction     Umbilical hernia     Vitamin D deficiency     Chronic systolic (congestive) heart failure     Acute on chronic systolic CHF (congestive heart failure) (HCC)     Acute on chronic systolic (congestive) heart failure (HCC)     Mild malnutrition ([address]

## 2023-01-09 NOTE — PROGRESS NOTES
Comprehensive Nutrition Assessment    Type and Reason for Visit:  Initial, Consult, Patient Education    Nutrition Recommendations/Plan:   Low sodium in home diet  Education on low sodium provided. Malnutrition Assessment:  Malnutrition Status:  Mild malnutrition (01/09/23 0754)    Context:  Acute Illness     Findings of the 6 clinical characteristics of malnutrition:  Energy Intake:  Mild decrease in energy intake (Comment)  Weight Loss:  No significant weight loss     Body Fat Loss:  No significant body fat loss     Muscle Mass Loss:  Mild muscle mass loss Hand (interosseous)  Fluid Accumulation:  Mild Extremities   Strength:  Not Performed    Nutrition Assessment:    Mild malnutrition and knowledge deficit r/t altered cardiac status, AEB low PO, mild muscle losses, edema, and no prior knowledge of lower sodium in diet. Reports has been feeling tired for some time and home diet does contain processed foods (canned soups/pizza). Improving renal indices. Appreciative of discussion and materials for lower sodium in home diet. History of low vit D, on supplement. Nutrition Related Findings:    +1 BLE edema. Wound Type: None       Current Nutrition Intake & Therapies:    Average Meal Intake: Unable to assess (No PO records)  Average Supplements Intake: None Ordered  ADULT DIET; Regular; No Added Salt (3-4 gm)    Anthropometric Measures:  Height: 5' 10\" (177.8 cm)  Ideal Body Weight (IBW): 166 lbs (75 kg)    Admission Body Weight: 180 lb (81.6 kg)  Current Body Weight: 176 lb 5.9 oz (80 kg), 106.2 % IBW. Weight Source: Bed Scale  Current BMI (kg/m2): 25.3  Usual Body Weight: 177 lb 1.6 oz (80.3 kg) (low weight in December)  % Weight Change (Calculated): -0.4  Weight Adjustment For: No Adjustment                 BMI Categories: Overweight (BMI 25.0-29. 9)    Estimated Daily Nutrient Needs:  Energy Requirements Based On: Kcal/kg  Weight Used for Energy Requirements: Current  Energy (kcal/day): 4132-1246 (18-22)  Weight Used for Protein Requirements: Ideal  Protein (g/day):  (1.2-1.4)  Method Used for Fluid Requirements: 1 ml/kcal  Fluid (ml/day): 1800    Nutrition Diagnosis:   Other (Comment) (mild malnutrition) related to inadequate protein-energy intake as evidenced by poor intake prior to admission, mild muscle loss, localized or generalized fluid accumulation  Food & Nutrition-related knowledge deficit related to cardiac dysfunction as evidenced by other (comment) (home diet)    Lab Results   Component Value Date     01/09/2023    K 3.8 01/09/2023    CL 98 01/09/2023    CO2 30 01/09/2023    BUN 47 (H) 01/09/2023    CREATININE 1.65 (H) 01/09/2023    GLUCOSE 102 (H) 01/09/2023    CALCIUM 9.4 01/09/2023    PROT 7.0 01/04/2023    LABALBU 4.0 01/04/2023    BILITOT 0.7 01/04/2023    ALKPHOS 74 01/04/2023    AST 16 01/04/2023    ALT 6 01/04/2023    LABGLOM 39 (L) 01/09/2023    GFRAA >60 06/06/2022    GLOB NOT REPORTED 04/21/2014     No results found for: LABA1C  Lab Results   Component Value Date    VITD25 20.4 (L) 06/06/2022     Nutrition Interventions:   Food and/or Nutrient Delivery: Continue Current Diet  Nutrition Education/Counseling: Education initiated  Coordination of Nutrition Care: Continue to monitor while inpatient  Plan of Care discussed with: patient    Goals:     Goals: Meet at least 75% of estimated needs       Nutrition Monitoring and Evaluation:   Behavioral-Environmental Outcomes: None Identified  Food/Nutrient Intake Outcomes: Food and Nutrient Intake  Physical Signs/Symptoms Outcomes: Biochemical Data, Weight    Discharge Planning:    Continue current diet     Sergio Hodge, [address],   Contact: [phone]

## 2023-01-09 NOTE — PROGRESS NOTES
Pt states \"it feels like fire\" while urinating. There was very little output. Pts genitals were assessed and there was some blood coming from the meatus. Patient states there has been no recent trauma or injury to his penis and says this has happened before. Pt stated it had happened \"about a month ago\" but is unsure what his healthcare provider did to \"fix it\". Blood was bright red and was cleaned off. There has not been any more blood or complaints from the patient.

## 2023-01-09 NOTE — CARE COORDINATION
Case Management Assessment  Initial Evaluation    Date/Time of Evaluation: 1/9/2023 1:55 PM  Assessment Completed by: Yue Yao RN    If patient is discharged prior to next notation, then this note serves as note for discharge by case management. Patient Name: Cheryle Ojeda                   YOB: 1932  Diagnosis: Acute on chronic systolic congestive heart failure ([facility] [address] [I50.23]  Acute on chronic systolic (congestive) heart failure ([facility] [address] [I50.23]  Lab test negative for COVID-19 virus [Z20.822]                   Date / Time: 1/8/2023 12:59 PM    Patient Admission Status: Inpatient   Readmission Risk (Low < 19, Mod (19-27), High > 27): Readmission Risk Score: 16.6    Current PCP: LORY Lemon - CNP  PCP verified by CM? (P) Yes Mounika Mcleod CNP)    Chart Reviewed: Yes      History Provided by: (P) Patient  Patient Orientation: (P) Alert and Oriented, Person, Place, Situation, Self    Patient Cognition: (P) Alert    Hospitalization in the last 30 days (Readmission):  No    If yes, Readmission Assessment in CM Navigator will be completed.     Advance Directives:      Code Status: Full Code   Patient's Primary Decision Maker is: (P) Named in [address] [place] [address]    Primary Decision Maker (Active): Sharmila Montgomery - [facility] - [phone]    Primary Decision Maker: Duncan Moreno - Brother/Sister - [phone]    Discharge Planning:    Patient lives with: (P) Alone Type of Home: (P) House  Primary Care Giver: (P) Self  Patient Support Systems include: (P) Friends/Neighbors   Current Financial resources: (P) Medicare  Current community resources: (P) None  Current services prior to admission: (P) None            Current DME:              Type of Home Care services:  (P) None    ADLS  Prior functional level: (P) Independent in ADLs/IADLs  Current functional level: (P) Independent in ADLs/IADLs    PT AM-PAC:   /24  OT AM-PAC:   /24    Family can provide assistance at DC: (P) No  Would you like Case Management to discuss the discharge plan with any other family members/significant others, and if so, who? (P) No  Plans to Return to Present Housing: (P) Yes  Other Identified Issues/Barriers to RETURNING to current housing: none  Potential Assistance needed at discharge: (P) N/A            Potential DME:    Patient expects to discharge to: (P) [facility] for transportation at discharge:      Financial    Payor: Christina Locus / Plan: Katie Mitchell / Product Type: *No Product type* /     Does insurance require precert for SNF: Yes    Potential assistance Purchasing Medications: (P) No  Meds-to-Beds request:        [address] [facility] [address] [place] [address]  [place] [address]  Phone: [phone] Fax: [phone]    [facility] ([facility]) - [address] [facility] [phone] - F [phone]  [address] [place] [id number]  Phone: [phone] Fax: [phone]      Notes:    Factors facilitating achievement of predicted outcomes: Friend support, Cooperative, Pleasant, and Has needed Durable Medical Equipment at home    Barriers to discharge: none    Additional Case Management Notes: n/a    The Plan for Transition of Care is related to the following treatment goals of Acute on chronic systolic congestive heart failure (HCC) [I50.23]  Acute on chronic systolic (congestive) heart failure ([address] [I50.23]  Lab test negative for COVID-19 virus [P26.993]    IF APPLICABLE: The Patient and/or patient representative Pilar Montes and his family were provided with a choice of provider and agrees with the discharge plan.  Freedom of choice list with basic dialogue that supports the patient's individualized plan of care/goals and shares the quality data associated with the providers was provided to: (P) Patient   Patient Representative Name:       The Patient and/or Patient Representative Agree with the Discharge Plan?  (P) Yes    Aleja Garcia RN  Case Management Department  Ph: [phone] Fax: [phone]

## 2023-01-09 NOTE — PROGRESS NOTES
[facility]  Occupational Therapy  Evaluation  Date: 2023  Patient Name: Diogo Peace        MRN: [id number]    : 1932  (80 y.o.)  Gender: male   Referring Practitioner: Dr. Jaime Holt  Diagnosis: Acute on chronic systolic heart failure  Additional Pertinent Hx: Admitted to [address] [place] [address] on 23 due to acute on chronic systolic heart failure. Referred to OT services to assess ability to care for self. Past Medical History:   Diagnosis Date    Atrial fibrillation ([address]     CAD (coronary artery disease)     Hard of hearing     Hiatal hernia     History of PTCA     2012    Hyperlipidemia     Hypothyroidism     Pacemaker     [employer]     Past Surgical History:   Procedure Laterality Date    CARDIAC SURGERY      CORONARY ARTERY BYPASS GRAFT      quad bypass Linda    HERNIA REPAIR      UMBILICAL HERNIA REPAIR    PACEMAKER INSERTION  12    [employer]    PTCA      UMBILICAL HERNIA REPAIR  2016    Grecia Jeff MD     Subjective  Subjective: Patient was lying supine in bed upon OT arrival. Was agreeable to OT evaluation.     Social/Functional History  Lives With: Alone  Type of Home: House  Home Layout: One level  Home Access: Stairs to enter with rails  Entrance Stairs - Number of Steps: 1  Bathroom Shower/Tub: Tub/Shower unit  Bathroom Toilet: Standard  Bathroom Equipment: Shower chair, Grab bars in shower, Grab bars around toilet  Home Equipment: Nicholette Hopes, standard    Prior [employer] Help From: Friend(s)  ADL Assistance: Independent  Homemaking Assistance: Independent  Ambulation Assistance: Independent  Transfer Assistance: Independent    Objective  ADLs:  Feeding: Setup  Grooming: Contact guard assistance (in standing for safety)  UE Bathing: Stand by assistance  LE Bathing: Contact guard assistance  UE Dressing: Stand by assistance  LE Dressing: Contact guard assistance, Stand by assistance (SBA for bilateral socks using figure-4 technique)  Toileting: Contact guard assistance     Transfers:  Supine to Sit: Minimal assistance   Sit to stand: Contact guard assistance  Stand to sit: Stand by assistance    Assessment: Patient was lying supine in bed upon OT arrival. Was agreeable to OT evaluation. Completed ADLs and functional transfers as documented above (based on clinical reasoning and observation). Completed a supine > sit bed transfer while supporting himself on bed rail PRN with MIN A. Completed a sit to stand transfer from EOB while demonstrating proper hand placement with CGA. Denied any dizziness/lightheadedness upon achieving standing position. Performed functional mobility into hallway without an AD with CGA; did not experience any LOB nor require any rest breaks during this ambulation bout. See PT evaluation for additional details re: ambulation bout. Declined bathroom need upon returning to hospital room and proceeded to complete a stand to sit transfer onto recliner with SBA. Instructed patient to fully back up to the chair prior to sitting to reduce the risk of a fall. Simulated doffing bilateral slipper socks while sitting in recliner and using figure-4 technique with SBA. Minimal SOB was noted during simulation. Patient would benefit from OT services during hospital stay to increase independence and maximize safety with functional activities. Patient lives alone and stated that he does not have any family nearby. Patient remains in recliner with call light/personal items within reach and chair alarm activated upon OT departure. Goals  Short Term Goals  Time Frame for Short Term Goals: 4 days (1/12/23)  Short Term Goal 1: Patient will complete upper body dressing and/or bathing tasks while using adaptive equipment/techniques PRN with SET-UP. Short Term Goal 2: Patient will complete lower body dressing and/or bathing tasks while using adaptive equipment/techniques PRN with SUP.   Short Term Goal 3: Patient will complete a commode transfer while using DME PRN with SUP. Short Term Goal 4: To increase independence with grooming tasks, patient will tolerate static/dynamic standing x2 minutes with no more than SUP. Short Term Goal 5: To increase activity tolerance for ADLs/IADLs, patient will tolerate 5-10 minutes of ther ex/act with no more than 1 rest break. Long Term Goals  Time Frame for Long Term Goals : STG=LTG    Plan  Times Per Week: 3x  Times Per Day:  Once a day (1x/day)    Time In: 1441  Time Out: 1453  Timed Coded Minutes: 0  Total Treatment Time: [address] [facility], OTR/L     1/9/2023

## 2023-01-09 NOTE — PROGRESS NOTES
SW and RN case manager met with pt to complete assessment this morning during quality rounds. Pt is alert and oriented and cooperative with assessment. Pt is a 80year old male admitted for CHF. Pt lives alone in his home in [facility]. Pt has a cane, walker, grab bars and a shower chair to use at home. Pt was not using any community services at home. Pt drives and is able to get himself to appointments. Pt is a full code and follows with Loc Shankar CNP as PCP. ACP note completed with pt. Pt does not have advance directives but SW left paperwork with him and will meet with him at a later time to complete these during his hospital stay. Pt reports that his friend Breana Tim will be his decision maker if needed. Pt reports that his medications are affordable. Pt plans to return home at discharge. Pt identifies no discharge needs or concerns at this time. SW will continue to follow and help pt with any discharge planning as needs arise.  Delmar BARTHOLOMEW LSW  1/9/2023

## 2023-01-09 NOTE — PROGRESS NOTES
[facility]    Date: 2023  Patient Name: Samira Lovett        : 1932          [x] Pt Unavailable due to: Initiated OT evaluation at .28.69.59, however patient requested to rest in bed due to being SOB. Re-attempted evaluation at [phone], however patient was undergoing an echocardiogram and had a \"do not enter\" sign on the door. Will re-attempt OT evaluation this afternoon as able.          Keith Cordero, OTR/L Date: 2023

## 2023-01-09 NOTE — PROGRESS NOTES
Cardiology    SOB  CHF vs pneumonia  UTI  Moderate-severe LV dysfunction with EF 35%  Hypotension  Acute on chronic renal insufficiency  Detectable trop, not indicative of ischemia  7. UTI    He has had marked SALAS and fatigue more in the last week. No PND or orthopnea. No significant pedal edema. His xray shows effusion/infiltrate, more on right than left. I'm not sure whether this represents chf or infectious process. Will get CT of lungs to evaluate. Fluid status difficult to determine. His creatinine is elevating, more consistent with dehydration, although poor CO could also contribute. Will do echo to evaluate. He is on Rocephen which will cover possible pneumonia along with his UTI.     Thanks, Maricruz Salinas MD

## 2023-01-09 NOTE — PROGRESS NOTES
[facility]  Physical Therapy  Evaluation  Date: 2023  Patient Name: Phan Gaviria        MRN: [id number]    : 1932  (80 y.o.)  Gender: male   Referring Practitioner: Dr. Gracie Laurent  Diagnosis: CHF  Additional Pertinent Hx: Patient to ED on 23 due to SOB. Patient admit due to CHF.    Past Medical History:   Diagnosis Date    Atrial fibrillation ([address]     CAD (coronary artery disease)     Hard of hearing     Hiatal hernia     History of PTCA     2012    Hyperlipidemia     Hypothyroidism     Pacemaker     [employer]     Past Surgical History:   Procedure Laterality Date    CARDIAC SURGERY      CORONARY ARTERY BYPASS GRAFT      quad bypass Linda    HERNIA REPAIR      UMBILICAL HERNIA REPAIR    PACEMAKER INSERTION  12    [employer]    PTCA      UMBILICAL HERNIA REPAIR  2016    Thierno Bergman MD       Restrictions         Subjective         Pain Level: 0    Orientation  Overall Orientation Status: Within Functional Limits    Home Living  Type of Home: House  Home Layout: One level  Entrance Stairs - Number of Steps: 1  Home Access: Stairs to enter with rails  Home Equipment: liv Goddard  Social/Functional History  Lives With: Alone  Type of Home: House  Home Layout: One level  Home Access: Stairs to enter with rails  Entrance Stairs - Number of Steps: 1  Bathroom Shower/Tub: Tub/Shower unit  Bathroom Toilet: Standard  Bathroom Equipment: Shower chair, Grab bars in shower, Grab bars around toilet  Home Equipment: liv Goddard  Receives Help From: Friend(s)  ADL Assistance: Independent  Homemaking Assistance: Independent  Ambulation Assistance: Independent  Transfer Assistance: Independent  Active : Yes  Mode of Transportation: Car  Occupation: Retired    Prior Level of Function  Prior Function  Receives Help From: Friend(s)  ADL Assistance: Independent  Homemaking Assistance: Independent  Ambulation Assistance: Independent  Transfer Assistance: Independent      Objective  Strength RLE  Strength RLE: WFL  Strength LLE  Strength LLE: WFL                Sit to Stand: Contact guard assistance, Stand by assistance  Stand to Sit: Contact guard assistance, Stand by assistance           Ambulation  Surface: Level tile  Device: No Device  Assistance: Contact guard assistance  Distance: 50 ft x2  Comments: mild SOB  Balance  Sitting - Dynamic: Good  Standing - Static: Good  Standing - Dynamic: Fair, +    Assessment      Chart Reviewed: Yes  Assessment: OT and PT evaluation together. Patient in bed on arrival.  Patient alert and oriented, but is hard of hearing. Patient transfered supine to sit with min assist.  He sat bedside with strength and ROM assessment. Patient transfered sit to stand with CGA/ SBA. He ambulated without device 50 ft x2 with CGA for safety. Patient had mild SOB. Patient sat up in chair afterward with call bell and alarm.   Therapy Prognosis: Good           Plan  Frequency: 1x/day Daily    Duration: 3 days     Goals  Short Term Goals  Time Frame for Short Term Goals: NA    Long Term Goals  Time Frame for Long Term Goals : 3 days  Long Term Goal 1: Patient to ambulate 100 ftx1 without device independently  Long Term Goal 2: Patient to have good dynamic standing balance to complete ADLs safely  Long Term Goal 3: Patient to complete supine to sit transfer independently with head of bed flat       PT Individual Minutes  Time In: 0601  Time Out: 202-206 [facility]  Minutes: 12    Zay Pruett PT, PT 1/9/2023

## 2023-01-09 NOTE — H&P
History & Physical    Patient:  Radha Rao  YOB: 1932  Date of Service: 1/9/2023  MRN: [id number]   Acct:   [de-identified]   Primary Care Physician: LORY Reynoso CNP    Chief Complaint:   Chief Complaint   Patient presents with    Shortness of Breath     Shortness of breath for the past few weeks. Coughing up white phlegm. Worsened today. History of Present Illness: The patient is a 80 y.o. male who presented to ER by a private car for evaluation of SOB started a couple of weeks ago. Patient states that he feels fine when wakes up but as soon as he starts moving around he feels like \"my power goes out\". He becomes very short of breath and generally weak. He reports associated cough with small amount of whitish sputum. He reports no chest pain, no dizziness, no palpitations, no nausea or vomiting. He reports no wheezing. He had no fevers or chills. Was seen by his PCP Darius Sender on 1/4/2023 for symptoms of fatigue, cough and shortness of breath. Was prescribed prednisone and doxycycline for acute sinusitis. Was recommended to restart his spironolactone and continue on Lasix. .  Patient states that his symptoms did not improve and he had to come to the emergency room for evaluation. Patient has a h/o severe CAD, s/p CABG x 4 in 2000, SSS s/p pacemaker placed 8/16/12, CHF with EF around 35%, mod AS with valve area 1.1 cm2, CKD, hypotension. He follows up with Dr. Araseli Morales. recently his meds have been adjusted due to Hypotension and Imdur, Lopressor were stopped. Work up in [place] [address] revealed BP 98/65, HR 78, RR 20, sats 95 % on RA. BMP showed sodium 141, potassium 4.2, Cr 1.77, BUN 49. Initial troponin was 71, repeat 61, 62. Pro-BNP was 18.837. WBC 9.5, H/H 12.4/37.8, platelets 431. EKG paced rhythm, rate 78, pCXR cardiomegaly, prominence of the bronchovascular markings, small BL pleural effusions. It was felt the patient's SOB was related to CHF exacerbation.  He was treated in ER with IV Lasix 20 mg x 1. Later his blood pressure dropped 87/53 and no additional Lasix was given. This morning he has no additional complaints. Last night the nurse noticed some bloody drainage from his penis and urinalysis was ordered to rule out UTI. Past Medical History:             Diagnosis Date    Atrial fibrillation ([address]      CAD (coronary artery disease)      Hard of hearing      Hiatal hernia      History of PTCA       9/2012    Hyperlipidemia      Hypothyroidism      Pacemaker       Chief Trunk         Past Surgical History:              Procedure Laterality Date    CARDIAC SURGERY        CORONARY ARTERY BYPASS GRAFT   2000     quad bypass [place]    HERNIA REPAIR   80/80/9707     UMBILICAL HERNIA REPAIR    PACEMAKER INSERTION   8/6/12     [employer]    PTCA   43/82/69    UMBILICAL HERNIA REPAIR   11/21/2016     Shawn Holbrook MD         Home Medications:   No current facility-administered medications on file prior to encounter. Current Outpatient Medications on File Prior to Encounter   Medication Sig Dispense Refill    spironolactone (ALDACTONE) 25 MG tablet Take 1 tablet by mouth daily For congestive heart failure 30 tablet 0    doxycycline hyclate (VIBRA-TABS) 100 MG tablet Take 1 tablet by mouth 2 times daily for 7 days Copd/sinusitis (Patient not taking: Reported on 1/8/2023) 14 tablet 0    midodrine (PROAMATINE) 10 MG tablet Take 1 tablet by mouth in the morning and 1 tablet in the evening.  60 tablet 11    propranolol (INDERAL) 10 MG tablet Take 1 tablet by mouth 2 times daily 60 tablet 11    rosuvastatin (CRESTOR) 20 MG tablet Take 1 tablet by mouth daily 90 tablet 3    Magnesium 400 MG TABS Take 400 mg by mouth daily 90 tablet 3    furosemide (LASIX) 40 MG tablet Take 1 tablet by mouth daily 30 tablet 5    clopidogrel (PLAVIX) 75 MG tablet TAKE 1 TABLET BY MOUTH DAILY 90 tablet 1    tamsulosin (FLOMAX) 0.4 MG capsule Take 1 capsule by mouth at bedtime Urinary frequency/enlarged prostate, take at bedtime 90 capsule 1    levothyroxine (SYNTHROID) 125 MCG tablet TAKE 1 TABLET BY MOUTH DAILY 90 tablet 1    vitamin B-12 (CYANOCOBALAMIN) 1000 MCG tablet Take 1,000 mcg by mouth daily        Cholecalciferol (VITAMIN D3) 50 MCG (2000 UT) CAPS Take 2,000 Units by mouth daily        [DISCONTINUED] metoprolol (LOPRESSOR) 100 MG tablet Take 1 tablet by mouth 2 times daily (Patient taking differently: No sig reported) 60 tablet 11    aspirin 81 MG tablet Take 81 mg by mouth daily. Allergies:  Menthol (topical analgesic)     Social History:    reports that he has never smoked. He has never used smokeless tobacco. He reports that he does not currently use alcohol. He reports that he does not use drugs. Family History:             Problem Relation Age of Onset    Heart Disease Father           Review of systems:  Constitutional: no fever, no night sweats, positive fatigue  Head: no headache, no head injury  Eye: no blurring of vision, no double vision. Ears: no hearing difficulty, no tinnitus  Mouth/throat: no sore throat, dysphagia  Lungs: positive for  cough, shortness of breath, no wheeze  CVS: no palpitation, no chest pain, positive for  shortness of breath  GI: no abdominal pain, no nausea , no vomiting, no constipation  MALENA: no dysuria, frequency and urgency  Musculoskeletal: no joint pain, swelling , stiffness  Endocrine: no polyuria, polydypsia, no cold or heat intolerence  Hematology: no anemia, no easy brusing or bleeding,  Dermatology: no skin rash,   Psychiatry: no depression, no anxiety  Neurology: no syncope, no seizures, no numbness or tingling of hands, no numbness or tingling of feet, no paresis           Vitals:       Vitals:     01/08/23 1915   BP: (!) 87/53   Pulse:     Resp: 20   Temp: 97.4 °F (36.3 °C)   SpO2: 96%      BMI: Body mass index is 25.31 kg/m².      Physical Exam:  General Appearance: alert and oriented to person, place and time, in no acute distress  Cardiovascular: normal rate, regular rhythm, normal S1 and S2, 3/6 systolic murmur  Pulmonary/Chest: Bibasilar crackles, no wheezing,,normal air movement, no respiratory distress  Abdomen: soft, non-tender, non-distended, normal bowel sounds  Extremities: no cyanosis, clubbing or edema  Skin: warm and dry, no rash   Head: normocephalic and atraumatic  Eyes: pupils equal, round, and reactive to light  Neck: supple and non-tender without mass, no thyromegaly   Musculoskeletal: normal range of motion, no joint swelling, deformity or tenderness  Neurological: alert, oriented, normal speech, no focal findings or movement disorder noted     Review of Labs and Diagnostic Testing:           Recent Results (from the past 24 hour(s))   BMP     Collection Time: 01/08/23  1:11 PM   Result Value Ref Range     Glucose 134 (H) 70 - 99 mg/dL     BUN 49 (H) 8 - 23 mg/dL     Creatinine 1.77 (H) 0.70 - 1.20 mg/dL     Est, Glom Filt Rate 36 (L) >60 mL/min/1.73m2     Bun/Cre Ratio 28 (H) 9 - 20     Calcium 10.0 8.6 - 10.4 mg/dL     Sodium 141 135 - 144 mmol/L     Potassium 4.2 3.7 - 5.3 mmol/L     Chloride 97 (L) 98 - 107 mmol/L     CO2 31 20 - 31 mmol/L     Anion Gap 13 9 - 17 mmol/L   CBC with Auto Differential     Collection Time: 01/08/23  1:11 PM   Result Value Ref Range     WBC 9.5 3.5 - 11.0 k/uL     RBC 4.01 (L) 4.5 - 5.9 m/uL     Hemoglobin 12.4 (L) 13.5 - 17.5 g/dL     Hematocrit 37.8 (L) 41 - 53 %     MCV 94.2 80 - 100 fL     MCH 31.0 26 - 34 pg     MCHC 32.9 31 - 37 g/dL     RDW 13.9 12.1 - 15.2 %     Platelets 540 163 - 548 k/uL     Differential Type YES       Seg Neutrophils 82 (H) 39 - 75 %     Lymphocytes 10 (L) 13 - 44 %     Monocytes 7 5 - 9 %     Eosinophils % 1 0 - 5 %     Basophils 0 0 - 2 %     Segs Absolute 7.80 (H) 2.1 - 6.5 k/uL     Absolute Lymph # 0.90 (L) 1.0 - 4.8 k/uL     Absolute Mono # 0.70 0.0 - 1.0 k/uL     Absolute Eos # 0.00 0.0 - 0.4 k/uL     Basophils Absolute 0.00 0.0 - 0.2 k/uL   Troponin     Collection Time: 01/08/23  1:11 PM   Result Value Ref Range     Troponin, High Sensitivity 71 (HH) 0 - 22 ng/L   Brain Natriuretic Peptide     Collection Time: 01/08/23  1:11 PM   Result Value Ref Range     Pro-BNP 18,837 (H) <300 pg/mL   COVID-19, Rapid     Collection Time: 01/08/23  1:15 PM     Specimen: Nasopharyngeal Swab   Result Value Ref Range     Specimen Description . NASOPHARYNGEAL SWAB       SARS-CoV-2, Rapid Not Detected Not Detected   Troponin     Collection Time: 01/08/23  2:21 PM   Result Value Ref Range     Troponin, High Sensitivity 61 (HH) 0 - 22 ng/L   Troponin     Collection Time: 01/08/23  4:30 PM   Result Value Ref Range     Troponin, High Sensitivity 62 (HH) 0 - 22 ng/L         Radiology:      XR CHEST PORTABLE     Result Date: 1/8/2023  EXAM: XR CHEST PORTABLE HISTORY: . Shortness of breath COMPARISON: [10/14/2022 TECHNIQUE: Single view of the chest FINDINGS: Is mildly to moderately enlarged. Bipolar pacing device is noted. There is prominence of the vascularity and interstitial markings. Small bilateral effusions are noted. Bibasilar atelectasis is noted. Atherosclerotic changes of the thoracic aorta are noted. Median sternotomy sutures are noted. Graph impression: 1. Cardiac enlargement with pacer in place. 2. Prominence of the bronchovascular markings. Findings could be chronic. Bronchitis, an interstitial pneumonitis, or early failure is also possibility. 3. Small bilateral effusions. 4. Bibasilar atelectasis. EKG:         Medical Decision Making (MDM) Data:     External documents reviewed: My CXR interpretation:   My EKG interpretation:   Discussed with:  ER physician   Tests considered but not ordered:    Heart score:    Social Determinants of Health that impact treatment or disposition:             Assessment and Plan      Acute on chronic systolic CHF - started on IV Lasix, echo pending, consulted cardiology Dr. Kathleen Dunn.   Continue with daily weights, close monitoring of renal function and electrolytes. Hypotension - his Imdur and Lopressor were discontinued per Dr. Laura Strickland on 12/12/22. Started on Midodrine, monitor   CKD stage 3B - some worsening n renal function noted, monitor while on IV Lasix  H/O CAD, s/p CABGx4 -asymptomatic. Troponin levels elevated most likely secondary to reduced renal function. H/O SSS, s/p [employer] in 2012. Hypothyroidism - on Synthroid  Generalized weakness  - c/s PT/OT,  for discharge plan  H/O Afib in the past  - not on Coumadin by his choice        Differential Diagnosis:  CHF exacerbation , pneumonia,   Condition is improving / unchanged / worsening: Unchanged  Condition is a treatment goal:  no  Chronic condition is / is not having mild / moderate, severe exacerbation, progression or side effects of treatment:  exacerbation of CHF     Shared decision making:       Code status and discussions:  FULL     Medical Necessity:  Inpatient is appropriate for this patient due to need for IV Lasix, cardiology evaluation      Estimated length of stay: 2 days. The beneficiary may reasonably be expected to be discharged or transferred to a hospital within 96 hours after admission.             Patient Active Problem List   Diagnosis Code    Hard of hearing H91.90    CAD (coronary artery disease) I25.10    Atrial fibrillation (HCC) I48.91    Hiatal hernia K44.9    Hyperlipidemia E78.5    Hypothyroidism E03.9    Pacemaker Z95.0    History of PTCA Z98.61    Calculus of gallbladder without cholecystitis without obstruction R07.03    Umbilical hernia U38.5    Vitamin D deficiency E55.9    Chronic systolic (congestive) heart failure I50.22    Acute on chronic systolic CHF (congestive heart failure) (HCC) I50.23    Acute on chronic systolic (congestive) heart failure (HCC) I50.23         DVT prophylaxis:              [] Lovenox              [] SCDs              [] SQ Heparin              [] Encourage ambulation, low risk for DVT, no chemical or mechanical                          prophylaxis necessary                 [] Already on Anticoagulation        Documentation of the Current Medications in the Medical Record    ( x)  I have utilized all available immediate resources to obtain, update, or review the patient's current medications (including all prescriptions, over-the-counter products, herbals, cannabis / cannabidiol products, vitamin / mineral / dietary (nutritional) supplements). (Satisfies MIPS Performance)  If Yes, Stop Here  ( )  The patient is not eligible for medication reconciliation; the patient is in an emergent medical situation where delaying treatment would jeopardize the patient's health. (MIPS Performance exception / exclusion)  ( )  I did not confirm, update or review the patient's current list of medications today. (Does not satisfy MIPS Performance)     Heart Failure     ( x)  The patient has current or prior documentation of left ventricular ejection fraction (LVEF) less than or equal to 40%, or moderate or severely depressed left ventricular systolic function. Answer both:              ( ) The patient was prescribed or already taking an Angiotensin -Converting Enzyme (ACE) Inhibitor, or Angiotensin Receptor Blocker (ARB). ( x) The patient was prescribed or already taking a beta - blocker. If Yes to both, stop here. ( x)  Patient not prescribed / taking:              ( x) ACE or ARB for medical/patient reason(s) including hypotension               ( ) Beta - blocker for medical/patient reason(s) include ( ) (ex.   Allergy, intolerance, contraindication)  ( )  Patient not prescribed / taking:              ( ) ACE or ARB, no reason given (does not satisfy MIPS performance)              ( ) Beta - blocker, no reason given (does not satisfy MIPS performance)           Advanced Care Plan    ( x)  I confirmed that the patient's [facility] is present, code status documented, or surrogate decision maker is listed in the patient's medical record. ( )  The patient's advanced care plan is not present because:  (select)              ( ) I confirmed today that the patient does not wish or was not able to name a surrogate decision maker or provide an [address] ( ) Hospice care is currently being provided or has been provided this calender year. ( )  I did not confirm today the presence of an [address] or surrogate decision maker documented within the patient's medical record. (Does not satisfy MIPS performance).                      Brayden Ragsdale MD, MD  Admitting Hospitalist

## 2023-01-10 LAB
ANION GAP SERPL CALCULATED.3IONS-SCNC: 9 MMOL/L (ref 9–17)
BUN BLDV-MCNC: 40 MG/DL (ref 8–23)
BUN/CREAT BLD: 28 (ref 9–20)
CALCIUM SERPL-MCNC: 9 MG/DL (ref 8.6–10.4)
CHLORIDE BLD-SCNC: 101 MMOL/L (ref 98–107)
CO2: 29 MMOL/L (ref 20–31)
CREAT SERPL-MCNC: 1.45 MG/DL (ref 0.7–1.2)
CULTURE: NORMAL
EKG ATRIAL RATE: 84 BPM
EKG Q-T INTERVAL: 500 MS
EKG QRS DURATION: 226 MS
EKG QTC CALCULATION (BAZETT): 570 MS
EKG R AXIS: -43 DEGREES
EKG T AXIS: 115 DEGREES
EKG VENTRICULAR RATE: 78 BPM
GFR SERPL CREATININE-BSD FRML MDRD: 46 ML/MIN/1.73M2
GLUCOSE BLD-MCNC: 101 MG/DL (ref 70–99)
MAGNESIUM: 1.8 MG/DL (ref 1.6–2.6)
POTASSIUM SERPL-SCNC: 3.5 MMOL/L (ref 3.7–5.3)
SODIUM BLD-SCNC: 139 MMOL/L (ref 135–144)
SPECIMEN DESCRIPTION: NORMAL

## 2023-01-10 PROCEDURE — 99222 1ST HOSP IP/OBS MODERATE 55: CPT | Performed by: INTERNAL MEDICINE

## 2023-01-10 PROCEDURE — 83735 ASSAY OF MAGNESIUM: CPT

## 2023-01-10 PROCEDURE — 97535 SELF CARE MNGMENT TRAINING: CPT

## 2023-01-10 PROCEDURE — 80048 BASIC METABOLIC PNL TOTAL CA: CPT

## 2023-01-10 PROCEDURE — 93010 ELECTROCARDIOGRAM REPORT: CPT | Performed by: INTERNAL MEDICINE

## 2023-01-10 PROCEDURE — 6370000000 HC RX 637 (ALT 250 FOR IP): Performed by: INTERNAL MEDICINE

## 2023-01-10 PROCEDURE — 97116 GAIT TRAINING THERAPY: CPT

## 2023-01-10 PROCEDURE — 6360000002 HC RX W HCPCS: Performed by: INTERNAL MEDICINE

## 2023-01-10 PROCEDURE — 36415 COLL VENOUS BLD VENIPUNCTURE: CPT

## 2023-01-10 PROCEDURE — 1200000000 HC SEMI PRIVATE

## 2023-01-10 PROCEDURE — 2580000003 HC RX 258: Performed by: INTERNAL MEDICINE

## 2023-01-10 RX ORDER — POTASSIUM CHLORIDE 750 MG/1
40 TABLET, FILM COATED, EXTENDED RELEASE ORAL ONCE
Status: COMPLETED | OUTPATIENT
Start: 2023-01-10 | End: 2023-01-10

## 2023-01-10 RX ORDER — MIDODRINE HYDROCHLORIDE 5 MG/1
10 TABLET ORAL 3 TIMES DAILY
Status: DISCONTINUED | OUTPATIENT
Start: 2023-01-10 | End: 2023-01-12

## 2023-01-10 RX ORDER — ACETAMINOPHEN 325 MG/1
650 TABLET ORAL EVERY 4 HOURS PRN
Status: DISCONTINUED | OUTPATIENT
Start: 2023-01-10 | End: 2023-01-14 | Stop reason: HOSPADM

## 2023-01-10 RX ORDER — ENOXAPARIN SODIUM 100 MG/ML
40 INJECTION SUBCUTANEOUS DAILY
Status: DISCONTINUED | OUTPATIENT
Start: 2023-01-11 | End: 2023-01-14 | Stop reason: HOSPADM

## 2023-01-10 RX ADMIN — LEVOTHYROXINE SODIUM 25 MCG: 25 TABLET ORAL at 06:48

## 2023-01-10 RX ADMIN — PIPERACILLIN SODIUM AND TAZOBACTAM SODIUM 3375 MG: 3; 375 INJECTION, POWDER, FOR SOLUTION INTRAVENOUS at 02:47

## 2023-01-10 RX ADMIN — CHOLECALCIFEROL TAB 25 MCG (1000 UNIT) 2000 UNITS: 25 TAB at 07:57

## 2023-01-10 RX ADMIN — ACETAMINOPHEN 650 MG: 325 TABLET, FILM COATED ORAL at 10:42

## 2023-01-10 RX ADMIN — MIDODRINE HYDROCHLORIDE 10 MG: 5 TABLET ORAL at 17:31

## 2023-01-10 RX ADMIN — CLOPIDOGREL BISULFATE 75 MG: 75 TABLET ORAL at 07:58

## 2023-01-10 RX ADMIN — MIDODRINE HYDROCHLORIDE 10 MG: 5 TABLET ORAL at 21:08

## 2023-01-10 RX ADMIN — LEVOTHYROXINE SODIUM 100 MCG: 100 TABLET ORAL at 07:57

## 2023-01-10 RX ADMIN — PIPERACILLIN SODIUM AND TAZOBACTAM SODIUM 3375 MG: 3; 375 INJECTION, POWDER, FOR SOLUTION INTRAVENOUS at 17:34

## 2023-01-10 RX ADMIN — ASPIRIN 81 MG: 81 TABLET, COATED ORAL at 07:58

## 2023-01-10 RX ADMIN — SODIUM CHLORIDE: 9 INJECTION, SOLUTION INTRAVENOUS at 08:02

## 2023-01-10 RX ADMIN — CYANOCOBALAMIN TAB 1000 MCG 1000 MCG: 1000 TAB at 07:57

## 2023-01-10 RX ADMIN — ROSUVASTATIN CALCIUM 10 MG: 20 TABLET, COATED ORAL at 07:58

## 2023-01-10 RX ADMIN — ENOXAPARIN SODIUM 30 MG: 100 INJECTION SUBCUTANEOUS at 07:57

## 2023-01-10 RX ADMIN — POTASSIUM CHLORIDE 40 MEQ: 750 TABLET, FILM COATED, EXTENDED RELEASE ORAL at 07:57

## 2023-01-10 RX ADMIN — MIDODRINE HYDROCHLORIDE 10 MG: 5 TABLET ORAL at 07:57

## 2023-01-10 RX ADMIN — PIPERACILLIN SODIUM AND TAZOBACTAM SODIUM 3375 MG: 3; 375 INJECTION, POWDER, FOR SOLUTION INTRAVENOUS at 10:44

## 2023-01-10 RX ADMIN — TAMSULOSIN HYDROCHLORIDE 0.4 MG: 0.4 CAPSULE ORAL at 21:08

## 2023-01-10 RX ADMIN — Medication 400 MG: at 07:57

## 2023-01-10 ASSESSMENT — PAIN DESCRIPTION - DESCRIPTORS: DESCRIPTORS: ACHING

## 2023-01-10 ASSESSMENT — PAIN - FUNCTIONAL ASSESSMENT: PAIN_FUNCTIONAL_ASSESSMENT: ACTIVITIES ARE NOT PREVENTED

## 2023-01-10 ASSESSMENT — PAIN SCALES - GENERAL
PAINLEVEL_OUTOF10: 4
PAINLEVEL_OUTOF10: 0

## 2023-01-10 ASSESSMENT — PAIN DESCRIPTION - ORIENTATION: ORIENTATION: RIGHT

## 2023-01-10 ASSESSMENT — PAIN DESCRIPTION - LOCATION: LOCATION: NECK;SHOULDER

## 2023-01-10 NOTE — PROGRESS NOTES
Hospitalist Progress Note  1/10/2023 7:38 AM  Subjective:   Admit Date: 1/8/2023  PCP: LORY Rodriguez - CNP    Interval History:   Carrie Forte states he is feeling better, shortness of breath and cough improved. He has no new complaints. Denies having chest pain, abdominal pain, nausea/vomiting/diarrhea    Diet: ADULT DIET; Regular; No Added Salt (3-4 gm)  Medications:   Scheduled Meds:   piperacillin-tazobactam  3,375 mg IntraVENous Q8H    aspirin  81 mg Oral Daily    vitamin B-12  1,000 mcg Oral Daily    Vitamin D  2,000 Units Oral Daily    levothyroxine  100 mcg Oral Daily    clopidogrel  75 mg Oral Daily    tamsulosin  0.4 mg Oral Nightly    [Held by provider] furosemide  40 mg Oral Daily    rosuvastatin  10 mg Oral Daily    magnesium oxide  400 mg Oral Daily    midodrine  10 mg Oral BID    propranolol  10 mg Oral BID    [Held by provider] spironolactone  25 mg Oral Daily    [Held by provider] furosemide  20 mg IntraVENous BID    levothyroxine  25 mcg Oral Daily    enoxaparin  30 mg SubCUTAneous Daily     Continuous Infusions:   sodium chloride 75 mL/hr at 01/10/23 0704     PRN Medications:     Objective:   Vitals: BP (!) 83/50   Pulse 74   Temp 97 °F (36.1 °C) (Temporal)   Resp 18   Ht 5' 10\" (1.778 m)   Wt 171 lb 15.3 oz (78 kg)   SpO2 94%   BMI 24.67 kg/m²   BMI: Body mass index is 24.67 kg/m².     CBC:   Recent Labs     01/08/23  1311   WBC 9.5   HGB 12.4*        BMP:    Recent Labs     01/08/23  1311 01/09/23  0510 01/10/23  0550    138 139   K 4.2 3.8 3.5*   CL 97* 98 101   CO2 31 30 29   BUN 49* 47* 40*   CREATININE 1.77* 1.65* 1.45*   GLUCOSE 134* 102* 101*       Physical Exam:      General Appearance: alert and oriented to person, place and time, in no acute distress  Cardiovascular: normal rate, regular rhythm, normal S1 and S2, 3/6 systolic murmur  Pulmonary/Chest: Bibasilar crackles, no wheezing,,normal air movement, no respiratory distress  Abdomen: soft, non-tender, non-distended, normal bowel sounds  Extremities: no cyanosis, clubbing or edema  Skin: warm and dry, no rash   Neurological: alert, oriented, normal speech, no focal findings or movement disorder noted      Medical Decision Making (MDM) Data:    External documents reviewed: My CXR interpretation:   My EKG interpretation:   Discussed with:    Tests considered but not ordered:    Heart score:    Social Determinants of Health that impact treatment or disposition:      Assessment and Plan:     Right lower lobe pneumonia -patient started on IV Zosyn, blood cultures ordered and pending  Chronic systolic CHF -patient's echo reveals decline in EF to 25 -30% and severe aortic stenosis. Patient evaluated by Dr. Tariq Huerta. Patient is euvolemic. Holding Lasix due to hypotension. Continue with daily weights, close monitoring of renal function and electrolytes. Hypotension -on gentle IV hydration, holding diuretics,  Imdur and Lopressor were discontinued per Dr. Tariq Huerta on 12/12/22. Started on Midodrine, monitor   Hypokalemia -replace  CKD stage 3B - some worsening n renal function noted, on IV fluids  H/O CAD, s/p CABGx4 -asymptomatic. Troponin levels elevated most likely secondary to reduced renal function. H/O SSS, s/p [employer] in 2012. Hypothyroidism - on Synthroid  Generalized weakness  - c/s PT/OT,  for discharge plan  H/O Afib in the past  - not on Coumadin by his choice        Differential Diagnosis: CHF exacerbation, pneumonia  Condition is improving / unchanged / worsening: Improving  Condition is a treatment goal: No  Chronic condition is / is not having mild / moderate, severe exacerbation, progression or side effects of treatment:      Shared decision making:      Code status and discussions:      Medical Necessity:   In patient is appropriate for this patient due to need for IV antibiotics, close monitoring of clinical condition, vitals      DVT prophylaxis:   [] Lovenox   [] SCDs   [] SQ Heparin   [] Encourage ambulation, low risk for DVT, no chemical or mechanical    prophylaxis necessary      [] Already on Anticoagulation        Sharon Urbina MD, MD  Rounding Hospitalist

## 2023-01-10 NOTE — PROGRESS NOTES
Physical Therapy  Phone: [place]  Date: 1/10/2023  Fax: [phone]      Physical Therapy    Daily Note    Patient Name: Morris Leon      : 1932  (80 y.o.)  MRN: [id number]     Pt is PROGRESSING toward goals and increased independence of mobility this treatment session        Assessment                     Sit to Stand: Contact guard assistance, Stand by assistance  Stand to Sit: Contact guard assistance, Stand by assistance                   Ambulation  Surface: Level tile  Device: No Device  Assistance: Contact guard assistance, Stand by assistance  Distance: 75 ft x2  Comments: Has IV pole             Assessment: Patient in chair upon arrival to room. He agrees to ambulate with PTA. Sit to stand from chair is SBA/CGA. Ambulates without device but with CGA/SBA  ~75 ft x2. Returns to room to sit back up in chair. Declines any exercises at this time. Up in chair following with call light in reach and chair alarm attached.   Safety Devices  Type of Devices: Call light within reach, Chair alarm in place, Gait belt, Left in chair          Call light in reach, Phone in reach, Use of Gait belt, Chair alarm, and Left in chair  Time In: 1345  Time Out: 1400  Timed Coded Minutes: 15  Total Treatment Time: 15          Plan  Cont Per [facility] [place]  Time Frame for Short Term Goals: NA                   Long Term Goals  Time Frame for Long Term Goals : 3 days ( Exp 23)  Long Term Goal 1: Patient to ambulate 100 ftx1 without device independently  Long Term Goal 2: Patient to have good dynamic standing balance to complete ADLs safely  Long Term Goal 3: Patient to complete supine to sit transfer independently with head of bed flat          [facility] Card Therapy License Number: [id number]    Date: 1/10/2023

## 2023-01-10 NOTE — PROGRESS NOTES
Cardiology    His echo has changed with EF in 25-30% range with severe aortic stenosis. However, CT did not show pleural effusions. He has very poor cardiac output, but I don't think he is significantly fluid overloaded at his point. Would treat for pneumonia and watch for fluid overload. He will have no CO reserve and can develop CHF very quickly. Very difficult situation. ..     Eli Claude MD

## 2023-01-10 NOTE — CONSULTS
[place] [address]                                  CONSULTATION    PATIENT NAME: Collin Lucio                     :        1932  MED REC NO:   [id number]                              ROOM:       [id number]  ACCOUNT NO:   [de-identified]                           ADMIT DATE: 2023  PROVIDER:     Brinda Rodriguez. Marcie Degroot MD    CONSULT DATE:  2023    CHIEF COMPLAINT:  Shortness of breath, CHF versus pneumonia. HISTORY OF PRESENT ILLNESS:  The patient is a 80-year-old gentleman who  is very complex. He had atrial fibrillation in  associated with shortness of breath  and he had a catheterization at [facility] with subsequent four-vessel  bypass surgery in . On 2012, he had a heart rate of 32 and we stopped his Lopressor  and digoxin but remained bradycardic and therefore, I placed a [email or web address] DDD pacemaker on 2012. He is pacer dependent  but is still at beginning of life. On 2012, I did a catheterization because of shortness of breath  that showed 99% LAD, occluded intermediate branch of the circumflex,  occluded OM, the right coronary artery had 99% disease. The vein graft  to the right coronary artery was occluded, vein graft to the OM was  occluded, vein graft to the high lateral circumflex was patent, LIMA was  patient to the LAD, EF of 40% and we did an angioplasty of the right  coronary artery placing 3.0 x 38, 3.0 x 38, 3.5 x 24 and 3.5 x 16 mm  drug-eluting ION stents with a good end result. He has a history of  moderate aortic stenosis. He did have hypotension and I stopped his metoprolol and Imdur. I last  saw him on , and at that time, he was very weak but still  maintaining at home. His blood work at that time looked good. Approximately a week ago, he began developing increasing shortness of  breath.   He also developed a cough that was productive of yellow sputum. He denied any PND or orthopnea and he had no unusual pedal edema. No  abdominal swelling, consistent with ascites. He was very weak and had difficulty with ambulation, was very short of  breath with any attempted ambulation. He therefore came to the  emergency room and was admitted. His chest x-ray was consistent with  interstitial pneumonitis or early failure. I could not tell whether  there was any effusion in the right lower lung or whether this was more  of an infiltrate. After seeing him, I ordered a CT without contrast that showed trace  right pleural effusion with small to moderate airspace in the right  lower lobe consistent for pneumonia. He had trace left pleural  effusion. It was more indicative of pneumonitis or pneumonia-type  picture rather than CHF and pulmonary effusions. He has had a thick yellow sputum. He is not sure whether he had any  fever, sweats or chills at home. When I see him, he is very weak. He is awake and alert, answers  questions appropriately as usual.    His blood work showed renal insufficiency with his creatinine at 1.65. He did complain of burning with urination for the last several days and  was found to have a UTI, which I think is an incidental finding. He has had no syncope or near syncope. Denies any chest pain or chest  discomfort. CARDIAC RISK FACTORS:  Known CAD:  Positive. Bypass Surgery:  Positive. PTCA:  Positive. Hypertension:  Positive. Hyperlipidemia:  Positive. Smoking:  Negative. Diabetes:  Negative. MEDICATIONS AT HOME:  He was on spironolactone 25 mg daily, midodrine 10  mg b.i.d., Crestor 20 mg daily, Lasix 40 mg daily, Plavix 75 mg daily,  Flomax 0.4 mg daily, Synthroid 125 mcg daily, aspirin 81 mg daily. PAST MEDICAL AND SURGICAL HISTORY:  1. Cardiac as above. 2.  Hypertension, well controlled, in fact has been hypotensive at home.   3.  Hyperlipidemia, controlled. 4.  Atrial fibrillation, on Coumadin initially which he stopped after  nose surgery. 5.  Hyperparathyroidism. 6.  Sick sinus syndrome status post [employer] in 2012. FAMILY HISTORY:  Significant for CAD. SOCIAL HISTORY:  He is 80years old. Two children, one in [place]. He  lived with [facility] until she passed away. He lives now alone. Very inactive. REVIEW OF SYSTEMS:  Cardiac as above. Other systems reviewed including  constitutional, eyes, ears, nose and throat, cardiovascular,  respiratory, GI, , musculoskeletal, integumentary, neurologic,  endocrine, hematologic and allergic/immunologic are negative except for  what is described above. He has a very poor appetite and has become  very weak. He does have a severe cough productive of thick white  sputum. PHYSICAL EXAMINATION:  VITAL SIGNS:  His blood pressure was 96/56 with a heart rate of 79 and  regular. Respiratory rate 18. Weight 171 pounds. His sat was 95%. GENERAL:  He is a fairly weak 44-year-old gentleman who answered  questions appropriately. Denied pain. Coughing thick sputum. SKIN:  No unusual skin changes. His tongue was dry. HEENT:  The pupils are equally round and intact. Mucous membranes were  dry. NECK:  No JVD. Good carotid pulses. No carotid bruits. No  lymphadenopathy or thyromegaly. CARDIOVASCULAR EXAM:  S1 and S2 were normal.  No S3 or S4. He had a  grade 3/6 systolic ejection murmur. LUNGS:  Had coarse crackles bilaterally. ABDOMEN:  Soft and nontender. Could not feel liver, spleen or aorta. EXTREMITIES:  He had minimal edema in both lower extremities. LABORATORY DATA:  His sodium was 138, potassium 3.8, BUN 47, creatinine  1.65 with a magnesium 1.9. Troponin was 60, this did not change. Urinalysis was consistent with a UTI. White count was 9.5, hemoglobin  12.4 with a platelet count 972,887.     His echocardiogram done on 01/09, showed change in LV function with EF  in the 25% to 30% range. He also had severe aortic stenosis with an  aortic valve area estimated in the 0.5 to 0.6 range with a 40 mmHg mean  gradient across the aortic valve. His PA pressure was also elevated  with a PAP of 55 mmHg. IMPRESSION:  1.  Marked shortness of breath with chest x-ray consistent with either  pneumonitis/pneumonia versus CHF, which I think is, with his clinical  picture, more consistent with pneumonitis or pneumonia. 2.  CT scan showing trace pleural effusions and possible right lower  lobe infiltrate. 3.  Severe LV dysfunction, EF in the 25% to 30% range. 4.  Severe aortic stenosis with a mean gradient of 40 mmHg and an aortic  valve area of 0.5 to 0.6 cm2.  5.  Severe pulmonary hypertension with a PA pressure of 55 mmHg. 6.  Renal insufficiency, which I think is indicative of his dehydration,  although certainly poor cardiac output could be contributing. 7.  Urinary tract infection, which is an incidental finding. 8.  Sick sinus syndrome status post [employer] DDD  pacemaker placed on 08/06/2012, pacer-dependent, still at beginning of  life. 9.  Severe coronary artery disease with a catheterization on 09/09/2012,  showing 99% LAD, subtotally occluded intermediate branch of the  circumflex, 99% right coronary artery, with bypass surgery in 2000 at  [facility] with a LIMA to the LAD, a vein graft to the high lateral  circumflex, a vein graft to the OM, and a vein graft to the right  coronary artery. 9.  Catheterization on 09/09/2012, showing vein graft to the high  lateral circumflex was patent, vein graft to the right coronary artery  was occluded, vein graft to the OM occluded with the native OM occluded,  EF of 40%, with angioplasty of the right coronary artery placing two 3.0  x 38 mm drug-eluting ION stents along with 3.5 x 24 and 3.5 x 16 mm ION  stents. 10.  Hypotension secondary to dehydration plus his severe aortic  stenosis.     PLAN:  1.  I would treat for pneumonia as well as his UTI at this time. 2.  I do not think he is fluid overloaded at this time although could  certainly change rapidly and develop CHF with his severe LV dysfunction  and severe aortic stenosis. 3.  Continue midodrine. 4.  Not a surgical candidate for his aortic stenosis with his severe  comorbidities. DISCUSSION:  The patient has multiple severe issues. He did have  shortness of breath for approximately one to two weeks prior to  admission. He was coughing thick sputum and really denied any PND or  orthopnea, had no unusual pedal edema. However, with any activity, he  is markedly short of breath. I could not tell from his chest x-ray whether he had an effusion and was  more consistent with CHF or whether he had a pneumonia or infiltrate in  his right lower lobe especially. His CT scan pointed more to possible infiltrate or pneumonia rather than  CHF with trace effusions bilaterally. However, his echocardiograms appear to have significantly changed with  his EF now in the 25% range, down from 35% to 40%. In addition, he has  had progression of his aortic stenosis and is now in the severe  category. His PA pressure is also severely elevated at 55 mmHg. At this time, I do not however think that he is in significant CHF. I  think his shortness of breath and his lack of PND, orthopnea or pedal  edema points more to an infiltrate rather than CHF. However, he will have low cardiac output syndrome and can change very  rapidly into severe CHF with his cardiac status. His prognosis is extremely poor; however, he is awake and alert and  still is attempting to live alone at home. We will watch his cardiac status carefully. I agree with Dr. Sidhu Been  holding his medications including diuretics at this time.   We will have  to let his creatinine guide his fluid status, although again with his  very poor cardiac output, he may develop renal insufficiency secondary  to his low cardiac output in the future. Thank you very much for allowing me the privilege of seeing the patient. If you have any questions on my thoughts, please do not hesitate to  contact me.         Tariq Yarbrough MD    D: 01/10/2023 5:04:57       T: 01/10/2023 5:10:51     [email or web address]  Job#: [id number]     Doc#: [id number]    CC:

## 2023-01-10 NOTE — PROGRESS NOTES
[place]  Occupational Therapy    Date: 1/10/2023  Patient Name: Teetee Covington        : 1932       [] Pt Refusal           [x] Pt Unavailable due to:  MAYRA enters room and hears patient's pull alarm going off. Reports he used the urinal (seated) and pulled the cord. The cord is also caught in the crease of the chair. Patient apologized for interruption. Educated patient on MAYRA's role and plan for today. Verbalized understanding. Wishes to complete ADLs following lunch, as lunch meal was arriving in 10-15 minutes. Will plan to address ADLs this afternoon. Remains in recliner w/ call light and other personal items within reach. Urinal dumped with patient's output of 150mL, RN notified.     Time in: 1139  Time out: NELSON Ro  Date: 1/10/2023

## 2023-01-10 NOTE — PROGRESS NOTES
SW met with pt this morning to complete advance directives and pt states that he is not feeling up to it now and would prefer SW come back this afternoon. SW met with pt this afternoon and he was willing to complete advance directives. SW and pt went through documents and completed these together. SW to call friend and get her address to complete documents fully and SW left her a message. Pt plans to return home at discharge still and denies any needs or services for home. SW following.  Marii BARTHOLOMEW LSW 1/10/2023

## 2023-01-11 LAB
ANION GAP SERPL CALCULATED.3IONS-SCNC: 11 MMOL/L (ref 9–17)
BUN BLDV-MCNC: 34 MG/DL (ref 8–23)
BUN/CREAT BLD: 24 (ref 9–20)
CALCIUM SERPL-MCNC: 9.1 MG/DL (ref 8.6–10.4)
CHLORIDE BLD-SCNC: 105 MMOL/L (ref 98–107)
CO2: 27 MMOL/L (ref 20–31)
CREAT SERPL-MCNC: 1.43 MG/DL (ref 0.7–1.2)
GFR SERPL CREATININE-BSD FRML MDRD: 47 ML/MIN/1.73M2
GLUCOSE BLD-MCNC: 106 MG/DL (ref 70–99)
POTASSIUM SERPL-SCNC: 3.8 MMOL/L (ref 3.7–5.3)
SODIUM BLD-SCNC: 143 MMOL/L (ref 135–144)

## 2023-01-11 PROCEDURE — 1200000000 HC SEMI PRIVATE

## 2023-01-11 PROCEDURE — 97110 THERAPEUTIC EXERCISES: CPT

## 2023-01-11 PROCEDURE — 80048 BASIC METABOLIC PNL TOTAL CA: CPT

## 2023-01-11 PROCEDURE — 2580000003 HC RX 258: Performed by: INTERNAL MEDICINE

## 2023-01-11 PROCEDURE — 6360000002 HC RX W HCPCS: Performed by: INTERNAL MEDICINE

## 2023-01-11 PROCEDURE — 36415 COLL VENOUS BLD VENIPUNCTURE: CPT

## 2023-01-11 PROCEDURE — 6370000000 HC RX 637 (ALT 250 FOR IP): Performed by: INTERNAL MEDICINE

## 2023-01-11 PROCEDURE — 97116 GAIT TRAINING THERAPY: CPT

## 2023-01-11 RX ADMIN — CYANOCOBALAMIN TAB 1000 MCG 1000 MCG: 1000 TAB at 09:16

## 2023-01-11 RX ADMIN — SODIUM CHLORIDE: 9 INJECTION, SOLUTION INTRAVENOUS at 02:59

## 2023-01-11 RX ADMIN — ASPIRIN 81 MG: 81 TABLET, COATED ORAL at 09:16

## 2023-01-11 RX ADMIN — PIPERACILLIN SODIUM AND TAZOBACTAM SODIUM 3375 MG: 3; 375 INJECTION, POWDER, FOR SOLUTION INTRAVENOUS at 02:58

## 2023-01-11 RX ADMIN — SODIUM CHLORIDE: 9 INJECTION, SOLUTION INTRAVENOUS at 23:10

## 2023-01-11 RX ADMIN — PROPRANOLOL HYDROCHLORIDE 10 MG: 10 TABLET ORAL at 10:45

## 2023-01-11 RX ADMIN — ENOXAPARIN SODIUM 40 MG: 100 INJECTION SUBCUTANEOUS at 09:16

## 2023-01-11 RX ADMIN — LEVOTHYROXINE SODIUM 100 MCG: 100 TABLET ORAL at 09:17

## 2023-01-11 RX ADMIN — MIDODRINE HYDROCHLORIDE 10 MG: 5 TABLET ORAL at 13:32

## 2023-01-11 RX ADMIN — LEVOTHYROXINE SODIUM 25 MCG: 25 TABLET ORAL at 06:36

## 2023-01-11 RX ADMIN — PIPERACILLIN SODIUM AND TAZOBACTAM SODIUM 3375 MG: 3; 375 INJECTION, POWDER, FOR SOLUTION INTRAVENOUS at 18:51

## 2023-01-11 RX ADMIN — PIPERACILLIN SODIUM AND TAZOBACTAM SODIUM 3375 MG: 3; 375 INJECTION, POWDER, FOR SOLUTION INTRAVENOUS at 09:14

## 2023-01-11 RX ADMIN — ROSUVASTATIN CALCIUM 10 MG: 20 TABLET, COATED ORAL at 09:17

## 2023-01-11 RX ADMIN — SPIRONOLACTONE 25 MG: 25 TABLET, FILM COATED ORAL at 09:18

## 2023-01-11 RX ADMIN — Medication 400 MG: at 09:17

## 2023-01-11 RX ADMIN — MIDODRINE HYDROCHLORIDE 10 MG: 5 TABLET ORAL at 09:16

## 2023-01-11 RX ADMIN — CLOPIDOGREL BISULFATE 75 MG: 75 TABLET ORAL at 09:16

## 2023-01-11 RX ADMIN — ACETAMINOPHEN 650 MG: 325 TABLET, FILM COATED ORAL at 21:55

## 2023-01-11 RX ADMIN — TAMSULOSIN HYDROCHLORIDE 0.4 MG: 0.4 CAPSULE ORAL at 21:55

## 2023-01-11 RX ADMIN — PROPRANOLOL HYDROCHLORIDE 10 MG: 10 TABLET ORAL at 21:58

## 2023-01-11 RX ADMIN — CHOLECALCIFEROL TAB 25 MCG (1000 UNIT) 2000 UNITS: 25 TAB at 09:16

## 2023-01-11 RX ADMIN — MIDODRINE HYDROCHLORIDE 10 MG: 5 TABLET ORAL at 21:55

## 2023-01-11 ASSESSMENT — PAIN SCALES - GENERAL
PAINLEVEL_OUTOF10: 0
PAINLEVEL_OUTOF10: 1
PAINLEVEL_OUTOF10: 0

## 2023-01-11 ASSESSMENT — PAIN DESCRIPTION - LOCATION: LOCATION: BACK

## 2023-01-11 ASSESSMENT — PAIN DESCRIPTION - DESCRIPTORS: DESCRIPTORS: DISCOMFORT

## 2023-01-11 ASSESSMENT — PAIN - FUNCTIONAL ASSESSMENT: PAIN_FUNCTIONAL_ASSESSMENT: ACTIVITIES ARE NOT PREVENTED

## 2023-01-11 ASSESSMENT — PAIN DESCRIPTION - ORIENTATION: ORIENTATION: ANTERIOR

## 2023-01-11 NOTE — PLAN OF CARE
[facility]  Inpatient Physical Therapy  Plan of Care  Date: 2023  Patient Name: Arnie Ayers        : 1932  (66 y.o.)  Referring Practitioner: Dr. Neisha Malagon  Admission Date: 2023  Referral Date : 23  Diagnosis: CHF  Treatment Diagnosis: generalized weakness  PT Orders Received and Evaluation Complete  Identified Problem Areas:  Therapy Prognosis: Good  Performance Deficits/Impairments: Decreased functional mobility , Decreased balance  Patient currently completes basic transfers with SBA and ambulates 75 ft without device holding onto IV pole and SB/CGA. He exhibits moderate SOB and decreased endurance. As patient lives alone, feel he would benefit from additional short term PT to progress with strengthening and functional mobility to improve safety and physical mobility upon discharge home.     Justification for Skilled Services:  [] Reduce Falls   [x] Improve Ambulation  [x]  Complete Daily Tasks Safely   [x] Improve Balance   [x] Improve LE strength  []  Return to Prior Level of Function  [x] Improve Functional Mobility   []  Family/Caregiver Education  [x] Patient Education: []Assistive Devices []Home Exercise Program and Progression    Plan  Frequency: 1-2x/day Daily M-F, 1x/day Sat-Sun    Duration: 5 days  [] Modalities:  [x] Therapeutic Exercise   [x] Gait Training  [] Therapeutic Activity    [] Home Safety Evaluation         [] Massage                        [] Neuromuscular Re-education [] Back Education             [] Patient Education [] Home Exercise Program       Rehab Potential:  [x]  Good [] Fair   []  Poor    Goals  Short Term Goals  Time Frame for Short Term Goals: NA    Long Term Goals  Time Frame for Long Term Goals : 5 days- epxires 1/15/23  Long Term Goal 1: Patient to ambulate 75ft x 2 without device independently  Long Term Goal 2: Patient to have good dynamic standing balance to complete ADLs safely  Long Term Goal 3: Patient to complete supine to sit transfer independently with head of bed flat  Long Term Goal 4: Fair+ endurance to complete functional mobility within home and self-care tasks    Upon Swingbed Transfer this Plan of Care will be followed until revision is complete.     Lugene Kawasaki, PT, PT   Date: 1/11/2023

## 2023-01-11 NOTE — PROGRESS NOTES
Cardiology    He is still very weak, but appears to be breathing better. BP still very low in 80's and 90's. Denies pain, pnd or orthopnea. He is beginning to have mild edema in lower extremities. Will start Aldactone 25 mg daily without the lasix at this time. Remains very tenuous, but relatively stable. ..     Thanks, Charles Ramirez MD

## 2023-01-11 NOTE — DISCHARGE INSTR - COC
Continuity of Care Form    Patient Name: Shruti Cary   :  1932  MRN:  [id number]    Admit date:  2023  Discharge date:  23      Code Status Order: DNR-CCA  Advance Directives:     Admitting Physician:  Alberto Lewis MD  PCP: LORY Sam CNP    Discharging Nurse: [facility] Unit/Room#: 6301/7590-59  Discharging Unit Phone Number: [phone]    Emergency Contact:   Extended Emergency Contact Information  Primary Emergency Contact: Sharmila Montgomery  Home Phone: [phone]  Relation: Other  Preferred language: English   needed? No  Secondary Emergency Contact: [address] Phone: [phone]  Relation: Brother/Sister  Preferred language: English   needed?  No    Past Surgical History:  Past Surgical History:   Procedure Laterality Date    CARDIAC SURGERY      CORONARY ARTERY BYPASS GRAFT      quad bypass [place]    HERNIA REPAIR      UMBILICAL HERNIA REPAIR    PACEMAKER INSERTION  12    [employer]    PTCA      UMBILICAL HERNIA REPAIR  2016    Kailee Marrero MD       Immunization History:   Immunization History   Administered Date(s) Administered    Influenza Vaccine, unspecified formulation 10/16/2015    Influenza Virus Vaccine 2020    Influenza, High Dose (Fluzone 65 yrs and older) 2020    Pneumococcal Conjugate 13-valent (Radha Morale) 2016, 2020    Pneumococcal Polysaccharide (Llbcdydcd40) 2018       Active Problems:  Patient Active Problem List   Diagnosis Code    Hard of hearing H91.90    CAD (coronary artery disease) I25.10    Atrial fibrillation (HCC) I48.91    Hiatal hernia K44.9    Hyperlipidemia E78.5    Hypothyroidism E03.9    Pacemaker Z95.0    History of PTCA Z98.61    Calculus of gallbladder without cholecystitis without obstruction R69.33    Umbilical hernia V90.6    Vitamin D deficiency E55.9    Chronic systolic (congestive) heart failure I50.22    Acute on chronic systolic CHF (congestive heart failure) ([facility]) I50.23    Acute on chronic systolic (congestive) heart failure ([facility]) I50.23    Mild malnutrition ([facility]) E44.1       Isolation/Infection:   Isolation            No Isolation          Patient Infection Status       Infection Onset Added Last Indicated Last Indicated By Review Planned Expiration Resolved Resolved By    None active    Resolved    COVID-19 (Rule Out) 01/08/23 01/08/23 01/08/23 COVID-19, Rapid (Ordered)   01/08/23 Rule-Out Test Resulted    COVID-19 (Rule Out) 10/05/20 10/05/20 10/05/20 COVID-19 (Ordered)   10/05/20 Rule-Out Test Resulted            Nurse Assessment:  Last Vital Signs: BP (!) 98/58   Pulse 76   Temp 98 °F (36.7 °C) (Temporal)   Resp 16   Ht 5' 10\" (1.778 m)   Wt 178 lb 9.2 oz (81 kg)   SpO2 95%   BMI 25.62 kg/m²     Last documented pain score (0-10 scale): Pain Level: 0  Last Weight:   Wt Readings from Last 1 Encounters:   01/11/23 178 lb 9.2 oz (81 kg)     Mental Status:  oriented and alert    IV Access:  - None    Nursing Mobility/ADLs:  Walking   Independent  Transfer  Independent  Bathing  Assisted  Dressing  Assisted  Toileting  Assisted  Feeding  Independent  Med Admin  Independent  Med Delivery   whole    Wound Care Documentation and Therapy:        Elimination:  Continence: Bowel: Yes  Bladder: Yes  Urinary Catheter: None   Colostomy/Ileostomy/Ileal Conduit: No       Date of Last BM: 1/13/23    Intake/Output Summary (Last 24 hours) at 1/11/2023 1320  Last data filed at 1/11/2023 0335  Gross per 24 hour   Intake 1152.53 ml   Output 700 ml   Net 452.53 ml     I/O last 3 completed shifts: In: 3419.2 [P.O.:400; I.V.:2764.1; IV Piggyback:255.1]  Out: 1550 [Urine:1550]    Safety Concerns:      At Risk for Falls    Impairments/Disabilities:      None    Nutrition Therapy:  Current Nutrition Therapy:   - Oral Diet:  General    Routes of Feeding: Oral  Liquids: No Restrictions  Daily Fluid Restriction: no  Last Modified Barium Swallow with Video (Video Swallowing Test): not done    Treatments at the Time of Hospital Discharge:   Respiratory Treatments: ***  Oxygen Therapy:  is not on home oxygen therapy. Ventilator:    - No ventilator support    Rehab Therapies: Physical Therapy and Occupational Therapy  Weight Bearing Status/Restrictions: No weight bearing restrictions  Other Medical Equipment (for information only, NOT a DME order):  walker  Other Treatments: ***    Patient's personal belongings (please select all that are sent with patient): All personal belongings sent with patient. RN SIGNATURE:  Electronically signed by Ward Watts RN on 1/14/23 at 10:18 AM EST    CASE MANAGEMENT/SOCIAL WORK SECTION    Inpatient Status Date: 1/8/2023    Readmission Risk Assessment Score:  Readmission Risk              Risk of Unplanned Readmission:  18           Discharging to Facility/ Agency   Name: [facility]  Address: [address] [place] [address] Drive  FGT:[phone]    Dialysis Facility (if applicable)   Name:  Address:  Dialysis Schedule:  Phone:  Fax:    / signature: Electronically signed by ORI Beck on 1/11/23 at 1:21 PM EST    PHYSICIAN SECTION    Prognosis: Fair    Condition at Discharge: Stable    Rehab Potential (if transferring to Rehab): Fair    Recommended Labs or Other Treatments After Discharge:     Physician Certification: I certify the above information and transfer of Gertrudis Canela  is necessary for the continuing treatment of the diagnosis listed and that he requires Home Care for less 30 days.      Update Admission H&P: No change in H&P    PHYSICIAN SIGNATURE:  Electronically signed by Srinivasa Peñaloza MD on 1/14/23 at 9:25 AM EST

## 2023-01-11 NOTE — PROGRESS NOTES
Comprehensive Nutrition Assessment    Type and Reason for Visit:  Reassess    Nutrition Recommendations/Plan:   Continue current diet. Start 4 oz strawberry ensure enlive TID with meals. Malnutrition Assessment:  Malnutrition Status:  Mild malnutrition (01/09/23 0754)    Context:  Acute Illness     Findings of the 6 clinical characteristics of malnutrition:  Energy Intake:  Mild decrease in energy intake (Comment)  Weight Loss:  No significant weight loss     Body Fat Loss:  No significant body fat loss     Muscle Mass Loss:  Mild muscle mass loss Hand (interosseous)  Fluid Accumulation:  Mild Extremities   Strength:  Not Performed    Nutrition Assessment:    Continued mild malnutrition aeb PO 51-75%. Improving food/nutrition knowledge defecit aeb low sodium diet instruction provided. Renal indices improving, magnesium 1.9. Pt states he is \"doing about the same as yesterday. Denied any diet education questions from low sodium diet instruction. Pt would like to try strawberry ensure enlive due to decreased PO. Nutrition Related Findings:    + 1 pitting BLE Wound Type: None       Current Nutrition Intake & Therapies:    Average Meal Intake: 51-75%  Average Supplements Intake: None Ordered  ADULT DIET; Regular; No Added Salt (3-4 gm)    Anthropometric Measures:  Height: 5' 10\" (177.8 cm)  Ideal Body Weight (IBW): 166 lbs (75 kg)    Admission Body Weight: 180 lb (81.6 kg)  Current Body Weight: 178 lb 9.2 oz (81 kg), 106.2 % IBW. Weight Source: Bed Scale  Current BMI (kg/m2): 25.6  Usual Body Weight: 177 lb 1.6 oz (80.3 kg) (low weight in December)  % Weight Change (Calculated): -0.4  Weight Adjustment For: No Adjustment                 BMI Categories: Overweight (BMI 25.0-29. 9)    Estimated Daily Nutrient Needs:  Energy Requirements Based On: Kcal/kg  Weight Used for Energy Requirements: Current  Energy (kcal/day): 6246-9457 (18-22)  Weight Used for Protein Requirements: Ideal  Protein (g/day):  (1.2-1.4)  Method Used for Fluid Requirements: 1 ml/kcal  Fluid (ml/day): 1800    Nutrition Diagnosis:   Other (Comment) (mild malnutrition) related to inadequate protein-energy intake as evidenced by poor intake prior to admission, mild muscle loss, localized or generalized fluid accumulation  Food & Nutrition-related knowledge deficit related to cardiac dysfunction as evidenced by other (comment) (home diet)    Nutrition Interventions:   Food and/or Nutrient Delivery: Continue Current Diet, Start Oral Nutrition Supplement  Nutrition Education/Counseling: Education initiated  Coordination of Nutrition Care: Continue to monitor while inpatient  Plan of Care discussed with: patient    Goals:  Previous Goal Met: Progressing toward Goal(s)  Goals: Meet at least 75% of estimated needs     Recent Labs     01/09/23  0510 01/10/23  0550 01/11/23  0530    139 143   K 3.8 3.5* 3.8   CL 98 101 105   CO2 30 29 27   BUN 47* 40* 34*   CREATININE 1.65* 1.45* 1.43*   GLUCOSE 102* 101* 106*      Lab Results   Component Value Date/Time    LABALBU 4.0 01/04/2023 07:14 PM       Nutrition Monitoring and Evaluation:   Behavioral-Environmental Outcomes: None Identified  Food/Nutrient Intake Outcomes: Food and Nutrient Intake, Supplement Intake  Physical Signs/Symptoms Outcomes: Biochemical Data, Weight    Discharge Planning:    Continue Oral Nutrition Supplement, Continue current diet     Araceli Conde RD, LD  Contact: [phone]

## 2023-01-11 NOTE — PROGRESS NOTES
Hospitalist Progress Note  1/11/2023 11:13 AM  Subjective:   Admit Date: 1/8/2023  PCP: Ned Parrish, APRN - CNP    Interval History:     Carrie Forte working with PT this morning. He complains of feeling tired. His shortness of breath is improving. He reports no pain, no nausea or vomiting. Diet: ADULT DIET; Regular; No Added Salt (3-4 gm)  Medications:   Scheduled Meds:   enoxaparin  40 mg SubCUTAneous Daily    midodrine  10 mg Oral TID    piperacillin-tazobactam  3,375 mg IntraVENous Q8H    aspirin  81 mg Oral Daily    vitamin B-12  1,000 mcg Oral Daily    Vitamin D  2,000 Units Oral Daily    levothyroxine  100 mcg Oral Daily    clopidogrel  75 mg Oral Daily    tamsulosin  0.4 mg Oral Nightly    [Held by provider] furosemide  40 mg Oral Daily    rosuvastatin  10 mg Oral Daily    magnesium oxide  400 mg Oral Daily    propranolol  10 mg Oral BID    spironolactone  25 mg Oral Daily    [Held by provider] furosemide  20 mg IntraVENous BID    levothyroxine  25 mcg Oral Daily     Continuous Infusions:   sodium chloride 50 mL/hr at 01/11/23 0335     PRN Medications: acetaminophen    Objective:   Vitals: BP (!) 98/58   Pulse 76   Temp 98 °F (36.7 °C) (Temporal)   Resp 16   Ht 5' 10\" (1.778 m)   Wt 178 lb 9.2 oz (81 kg)   SpO2 95%   BMI 25.62 kg/m²   BMI: Body mass index is 25.62 kg/m².     CBC:   Recent Labs     01/08/23  1311   WBC 9.5   HGB 12.4*        BMP:    Recent Labs     01/09/23  0510 01/10/23  0550 01/11/23  0530    139 143   K 3.8 3.5* 3.8   CL 98 101 105   CO2 30 29 27   BUN 47* 40* 34*   CREATININE 1.65* 1.45* 1.43*   GLUCOSE 102* 101* 106*     Physical Exam:  General Appearance: alert and oriented to person, place and time, in no acute distress  Cardiovascular: normal rate, regular rhythm, normal S1 and S2, 3/6 systolic murmur  Pulmonary/Chest: Bibasilar crackles, no wheezing,,normal air movement, no respiratory distress  Abdomen: soft, non-tender, non-distended, normal bowel sounds  Extremities: Mild edema in lower extremities  Skin: warm and dry, no rash   Neurological: alert, oriented, normal speech, no focal findings or movement disorder noted         Medical Decision Making (MDM) Data:    External documents reviewed: My CXR interpretation:   My EKG interpretation:   Discussed with:    Tests considered but not ordered:    Heart score:    Social Determinants of Health that impact treatment or disposition:      Assessment and Plan:       Right lower lobe pneumonia -  started on IV Zosyn, blood cultures preliminary negative, clinically improving  Chronic systolic CHF -patient's echo reveals decline in EF to 25 -30% and severe aortic stenosis. Patient evaluated by Dr. Michelle Obrien. Patient is euvolemic. Holding Lasix due to hypotension. Dr. Michelle Obrien resumed his Aldactone due to patient developing edema in lower extremities. Hypotension -on gentle IV hydration, Imdur and Lopressor  discontinued per Dr. Michelle Obrien on 12/12/22. Increased midodrine from 10 mg twice daily to 3 times daily, monitor   Hypokalemia -replaced  CKD stage 3B -improved with IV fluids  H/O CAD, s/p CABGx4 - asymptomatic. Troponin levels elevated most likely secondary to reduced renal function. H/O SSS, s/p [employer] in 2012. Hypothyroidism - on Synthroid  Generalized weakness  -working with PT/OT, concerned about weakness and patient living alone. Discussed home health care with PT and OT on discharge and patient agreeable. H/O Afib in the past  - not on Coumadin by his choice        Differential Diagnosis: CHF exacerbation, pneumonia  Condition is improving / unchanged / worsening: Improving  Condition is a treatment goal: No  Chronic condition is / is not having mild / moderate, severe exacerbation, progression or side effects of treatment:       Shared decision making:       Code status and discussions:       Medical Necessity:   In patient is appropriate for this patient due to need for IV antibiotics, close monitoring of clinical condition, vitals             Garcia Russell MD, MD  Rounding Hospitalist

## 2023-01-11 NOTE — PROGRESS NOTES
[facility]  Occupational Therapy  Daily Note  Date: 2023  Patient Name: Johann Alfaro        MRN: [id number]    : 1932  (80 y.o.)    Subjective: Patient in bed upon arrival, agreeable to OT interventions w/ encouragement. Pt is PROGRESSING toward goals and independence of Self Care this treatment session  Continue to assess Pending Progress    Objective  ADL  Supine to Sit: Minimal assistance    Sit to stand: Stand by assistance  Stand to sit: Stand by assistance      Assessment  Assessment: Patient in bed upon arrival, agreeable to OT interventions w/ encouragement. See above information regarding assistance levels w/ transfers and bed mobility. Sits EOB x20 min to take morning medications and engage in UB exercise. Good tolerance of UB exercise using 2# dumbbell 1x20, reports he completes \"exercises every morning. I have a 5# dumbbell beside my recliner\". Requires x1 rest period during exercise routine. Politely declines education regarding AE at this time, wishes to sit in recliner before his visitors arrive. Ambulates to recliner w/o AD and no LOB. Once seated visitors arrive, call light and other personal items are within reach. Chair alarm in place. Will continue to address goals and progress patient as able. Activity Tolerance: Patient limited by fatigue      Exercises:  See Flowsheets    Goals  Short Term Goals  Time Frame for Short Term Goals: 4 days (23)  Short Term Goal 1: Patient will complete upper body dressing and/or bathing tasks while using adaptive equipment/techniques PRN with SET-UP. -MET  Short Term Goal 2: Patient will complete lower body dressing and/or bathing tasks while using adaptive equipment/techniques PRN with SUP. Short Term Goal 3: Patient will complete a commode transfer while using DME PRN with SUP. Short Term Goal 4: To increase independence with grooming tasks, patient will tolerate static/dynamic standing x2 minutes with no more than SUP.   Short Term Goal 5: To increase activity tolerance for ADLs/IADLs, patient will tolerate 5-10 minutes of ther ex/act with no more than 1 rest break.  -MET    Long Term Goals  Time Frame for Long Term Goals : STG=LTG       Inpatient safety: Call light in reach, Phone in reach, Use of Gait belt, Chair alarm, Nurse Notified, Family Present, and Left in chair    Time In: 0915  Time Out: 0940  Timed Coded Minutes: 25  Total Treatment Time: 25    Nick Pump, GRANDE/L    Date: 1/11/2023

## 2023-01-11 NOTE — PROGRESS NOTES
SW met with pt again today to discuss further about discharge planning. SW and pt discussed possibility of HH and pt is more than agreeable to this. Freedom of choice list reviewed and pt chooses [facility] since [facility] Dr [place] [employer]. Note left for Dr that pt would need HH ordered at discharge. Referral to [facility] completed for RN, PT and OT. Pt's physical house address [address] [place] [address] and phone number [phone] provided to home care. SW called and left another message for pt's friend to obtain her address for advance directive documents that were completed. Pt identifies no other discharge needs at this time. SW following.  Naomi BARTHOLOMEW LSW 1/11/2023

## 2023-01-11 NOTE — PROGRESS NOTES
Physical Therapy  Phone: [place]  Date: 2023  Fax: [phone]      Physical Therapy    Daily Note    Patient Name: Wilman Jluien      : 1932  (80 y.o.)  MRN: [id number]     Pt is PROGRESSING toward goals and increased independence of mobility this treatment session        Assessment                     Sit to Stand: Contact guard assistance, Stand by assistance  Stand to Sit: Contact guard assistance, Stand by assistance                   Ambulation  Surface: Level tile  Device: No Device  Assistance: Contact guard assistance, Stand by assistance  Distance: 100 ft x1  Comments: patient room to therapy room             Assessment: Patient in chair upon arrival.  He agrees to particiapte with PTA. Sit to stand from chair is SBA/CGA. Ambulates without device to therapy room. No LOB noted but does note fatigue. Completes standing exercises in // bars with good tolerance but continues to note fatigue. Is wheeled back to room following but ambulates from w/c to chair ~10 ftx1. Up in chair following with call light in reach and chair alarm in place.   Safety Devices  Type of Devices: Call light within reach, Chair alarm in place, Gait belt, Left in chair          Call light in reach, Phone in reach, Use of Gait belt, Chair alarm, and Left in chair  Time In: 1109  Time Out: 1133  Timed Coded Minutes: 24  Total Treatment Time: 24    Exercises:  See Flowsheets    Plan  Cont Per Plan Of Care    Goals  Short Term Goals  Time Frame for Short Term Goals: NA                   Long Term Goals  Time Frame for Long Term Goals : 5 days- epxires 1/15/23  Long Term Goal 1: Patient to ambulate 50ft x 2 without device independently  Long Term Goal 2: Patient to have good dynamic standing balance to complete ADLs safely  Long Term Goal 3: Patient to complete supine to sit transfer independently with head of bed flat  Long Term Goal 4: Fair+ endurance to complete functional mobility within home and self-care tasks       Andre Stapler Therapy License Number: [id number]    Date: 1/11/2023

## 2023-01-12 LAB
ANION GAP SERPL CALCULATED.3IONS-SCNC: 10 MMOL/L (ref 9–17)
BUN BLDV-MCNC: 28 MG/DL (ref 8–23)
BUN/CREAT BLD: 19 (ref 9–20)
CALCIUM SERPL-MCNC: 9.2 MG/DL (ref 8.6–10.4)
CHLORIDE BLD-SCNC: 103 MMOL/L (ref 98–107)
CO2: 27 MMOL/L (ref 20–31)
CREAT SERPL-MCNC: 1.45 MG/DL (ref 0.7–1.2)
GFR SERPL CREATININE-BSD FRML MDRD: 46 ML/MIN/1.73M2
GLUCOSE BLD-MCNC: 111 MG/DL (ref 70–99)
POTASSIUM SERPL-SCNC: 4.2 MMOL/L (ref 3.7–5.3)
SODIUM BLD-SCNC: 140 MMOL/L (ref 135–144)

## 2023-01-12 PROCEDURE — 6360000002 HC RX W HCPCS: Performed by: INTERNAL MEDICINE

## 2023-01-12 PROCEDURE — 36415 COLL VENOUS BLD VENIPUNCTURE: CPT

## 2023-01-12 PROCEDURE — 2580000003 HC RX 258: Performed by: INTERNAL MEDICINE

## 2023-01-12 PROCEDURE — 6370000000 HC RX 637 (ALT 250 FOR IP): Performed by: INTERNAL MEDICINE

## 2023-01-12 PROCEDURE — 80048 BASIC METABOLIC PNL TOTAL CA: CPT

## 2023-01-12 PROCEDURE — 1200000000 HC SEMI PRIVATE

## 2023-01-12 RX ORDER — MIDODRINE HYDROCHLORIDE 5 MG/1
20 TABLET ORAL 2 TIMES DAILY
Status: DISCONTINUED | OUTPATIENT
Start: 2023-01-12 | End: 2023-01-13

## 2023-01-12 RX ADMIN — PIPERACILLIN SODIUM AND TAZOBACTAM SODIUM 3375 MG: 3; 375 INJECTION, POWDER, FOR SOLUTION INTRAVENOUS at 11:17

## 2023-01-12 RX ADMIN — ENOXAPARIN SODIUM 40 MG: 100 INJECTION SUBCUTANEOUS at 10:44

## 2023-01-12 RX ADMIN — Medication 400 MG: at 10:45

## 2023-01-12 RX ADMIN — PIPERACILLIN SODIUM AND TAZOBACTAM SODIUM 3375 MG: 3; 375 INJECTION, POWDER, FOR SOLUTION INTRAVENOUS at 03:04

## 2023-01-12 RX ADMIN — PIPERACILLIN SODIUM AND TAZOBACTAM SODIUM 3375 MG: 3; 375 INJECTION, POWDER, FOR SOLUTION INTRAVENOUS at 17:13

## 2023-01-12 RX ADMIN — ROSUVASTATIN CALCIUM 10 MG: 20 TABLET, COATED ORAL at 10:45

## 2023-01-12 RX ADMIN — MIDODRINE HYDROCHLORIDE 20 MG: 5 TABLET ORAL at 20:29

## 2023-01-12 RX ADMIN — TAMSULOSIN HYDROCHLORIDE 0.4 MG: 0.4 CAPSULE ORAL at 20:29

## 2023-01-12 RX ADMIN — ASPIRIN 81 MG: 81 TABLET, COATED ORAL at 10:45

## 2023-01-12 RX ADMIN — LEVOTHYROXINE SODIUM 25 MCG: 25 TABLET ORAL at 05:40

## 2023-01-12 RX ADMIN — CHOLECALCIFEROL TAB 25 MCG (1000 UNIT) 2000 UNITS: 25 TAB at 10:45

## 2023-01-12 RX ADMIN — CLOPIDOGREL BISULFATE 75 MG: 75 TABLET ORAL at 10:45

## 2023-01-12 RX ADMIN — LEVOTHYROXINE SODIUM 100 MCG: 100 TABLET ORAL at 10:45

## 2023-01-12 RX ADMIN — CYANOCOBALAMIN TAB 1000 MCG 1000 MCG: 1000 TAB at 10:45

## 2023-01-12 ASSESSMENT — PAIN DESCRIPTION - DESCRIPTORS: DESCRIPTORS: ACHING;GNAWING

## 2023-01-12 ASSESSMENT — PAIN DESCRIPTION - LOCATION: LOCATION: SHOULDER

## 2023-01-12 ASSESSMENT — PAIN DESCRIPTION - ORIENTATION: ORIENTATION: RIGHT

## 2023-01-12 ASSESSMENT — PAIN SCALES - GENERAL
PAINLEVEL_OUTOF10: 5
PAINLEVEL_OUTOF10: 0

## 2023-01-12 NOTE — PLAN OF CARE
Problem: Metabolic/Fluid and Electrolytes - Adult  Goal: Hemodynamic stability and optimal renal function maintained  Outcome: Not Progressing     Problem: Nutrition Deficit:  Goal: Optimize nutritional status  Outcome: Not Progressing     Problem: Skin/Tissue Integrity  Goal: Absence of new skin breakdown  Description: 1. Monitor for areas of redness and/or skin breakdown  2. Assess vascular access sites hourly  3. Every 4-6 hours minimum:  Change oxygen saturation probe site  4. Every 4-6 hours:  If on nasal continuous positive airway pressure, respiratory therapy assess nares and determine need for appliance change or resting period.   1/12/2023 1502 by Sabina Hinojosa RN  Outcome: Progressing  1/12/2023 1324 by Sabina Hinojosa RN  Outcome: Progressing     Problem: Safety - Adult  Goal: Free from fall injury  1/12/2023 1502 by Sabina Hinojosa RN  Outcome: Progressing  1/12/2023 1324 by Sabina Hinojosa RN  Outcome: Progressing     Problem: Pain  Goal: Verbalizes/displays adequate comfort level or baseline comfort level  Outcome: Progressing     Problem: Respiratory - Adult  Goal: Achieves optimal ventilation and oxygenation  Outcome: Progressing     Problem: Metabolic/Fluid and Electrolytes - Adult  Goal: Hemodynamic stability and optimal renal function maintained  Outcome: Not Progressing     Problem: Nutrition Deficit:  Goal: Optimize nutritional status  Outcome: Not Progressing

## 2023-01-12 NOTE — PLAN OF CARE
Problem: Metabolic/Fluid and Electrolytes - Adult  Goal: Hemodynamic stability and optimal renal function maintained  1/12/2023 1324 by Trinity Choe RN  Outcome: Not Progressing  1/12/2023 0047 by Owen Mejia RN  Outcome: Progressing     Problem: Nutrition Deficit:  Goal: Optimize nutritional status  1/12/2023 1324 by Trinity Choe RN  Outcome: Not Progressing  1/12/2023 0047 by Owen Mejia RN  Outcome: Progressing     Problem: Discharge Planning  Goal: Discharge to home or other facility with appropriate resources  1/12/2023 0047 by Owen Mejia RN  Outcome: Progressing     Problem: Skin/Tissue Integrity  Goal: Absence of new skin breakdown  Description: 1. Monitor for areas of redness and/or skin breakdown  2. Assess vascular access sites hourly  3. Every 4-6 hours minimum:  Change oxygen saturation probe site  4. Every 4-6 hours:  If on nasal continuous positive airway pressure, respiratory therapy assess nares and determine need for appliance change or resting period.   1/12/2023 1324 by Trinity Choe RN  Outcome: Progressing  1/12/2023 0047 by Owen Mejia RN  Outcome: Progressing     Problem: Safety - Adult  Goal: Free from fall injury  1/12/2023 1324 by Trinity Choe RN  Outcome: Progressing  1/12/2023 0047 by Owen Mejia RN  Outcome: Carrie Watkins (Taken 1/11/2023 1632 by Rut Forrest RN)  Free From Fall Injury: Instruct family/caregiver on patient safety     Problem: ABCDS Injury Assessment  Goal: Absence of physical injury  1/12/2023 0047 by Owen Mejia RN  Outcome: Progressing     Problem: Metabolic/Fluid and Electrolytes - Adult  Goal: Electrolytes maintained within normal limits  1/12/2023 0047 by Owen Mejia RN  Outcome: Progressing  Goal: Glucose maintained within prescribed range  1/12/2023 0047 by Owen Mejia RN  Outcome: Progressing     Problem: Chronic Conditions and Co-morbidities  Goal: Patient's chronic conditions and co-morbidity symptoms are monitored and maintained or improved  1/12/2023 0047 by Breana Nettles RN  Outcome: Progressing     Problem: Pain  Goal: Verbalizes/displays adequate comfort level or baseline comfort level  1/12/2023 1324 by Krista Waletrs RN  Outcome: Progressing  1/12/2023 0047 by Breana Nettles RN  Outcome: Progressing     Problem: Respiratory - Adult  Goal: Achieves optimal ventilation and oxygenation  1/12/2023 1324 by Krista Walters RN  Outcome: Progressing  1/12/2023 0047 by Breana Nettles RN  Outcome: Progressing     Problem: Cardiovascular - Adult  Goal: Maintains optimal cardiac output and hemodynamic stability  1/12/2023 0047 by Breana Nettles RN  Outcome: Progressing     Problem: Musculoskeletal - Adult  Goal: Return mobility to safest level of function  1/12/2023 0047 by Breana Nettles RN  Outcome: Progressing     Problem: Metabolic/Fluid and Electrolytes - Adult  Goal: Hemodynamic stability and optimal renal function maintained  1/12/2023 1324 by Krista Walters RN  Outcome: Not Progressing  1/12/2023 0047 by Breana Nettles RN  Outcome: Progressing     Problem: Nutrition Deficit:  Goal: Optimize nutritional status  1/12/2023 1324 by Krista Walters RN  Outcome: Not Progressing  1/12/2023 0047 by Breana Nettles RN  Outcome: Progressing

## 2023-01-12 NOTE — CONSULTS
Palliative Care Inpatient Consult    NAME:  Noel Nathan RECORD NUMBER:  [id number]  AGE: 80 y.o. GENDER: male  : 1932  TODAY'S DATE:  2023    Reason for Consult:  code status    History of Present Illness     The patient is a 80 y.o. Non- / non  male who presents with Shortness of Breath (Shortness of breath for the past few weeks. Coughing up white phlegm. Worsened today.)      Referred to Palliative Care by  [] Physician   [x] Nursing  [] Family Request   [] Other:      He was admitted to the med/surg service for Acute on chronic systolic congestive heart failure (HCC) [I50.23]  Acute on chronic systolic (congestive) heart failure ([facility] [I50.23]  Lab test negative for COVID-19 virus [Z20.822].  The patient has a complicated medical history and has been hospitalized since 2023 12:59 PM.    Active Hospital Problems    Diagnosis Date Noted    Mild malnutrition ([facility] [address] [E44.1] 2023     Priority: Medium    Acute on chronic systolic (congestive) heart failure ([facility] [address] [I50.23] 2023     Priority: Medium    Acute on chronic systolic CHF (congestive heart failure) ([facility]) [I50.23] 10/14/2022     Priority: Medium       Data         BP (!) 78/50 Comment: MANUAL  Pulse 65   Temp 97.3 °F (36.3 °C) (Oral)   Resp 14   Ht 5' 10\" (1.778 m)   Wt 187 lb 6.3 oz (85 kg)   SpO2 97%   BMI 26.89 kg/m²     Wt Readings from Last 3 Encounters:   23 187 lb 6.3 oz (85 kg)   23 180 lb (81.6 kg)   22 178 lb 6.4 oz (80.9 kg)        Code Status: Full Code     ADVANCED CARE PLANNING:  Patient has capacity for medical decisions: yes  [address]:  currently waiting for address of PDM  Living Will:  currently waiting for address of PDM, but are otherwise completed      Personal, Social, and Family History  Marital Status:   Living situation:alone  Importance of damon/Scientology/spiritual beliefs: [] Very [] Somewhat [] Not   Psychological Distress: denies  Does patient understand diagnosis/treatment? yes  Does caregiver understand diagnosis/treatment? not asked    Assessment        Symptom management/ pain control   Pain Assessment:  The patient is not having any pain. Anxiety:  none  Dyspnea:  acute dyspnea and chronic dyspnea  Fatigue:  exercise intolerance  Other:    Palliative Performance Scale:     ___100% Full ambulation; normal activity and work; no evidence of disease; able to do own self care; normal intake; fully conscious  ___90% Full ambulation; normal activity and work; some evidence of disease; able to do own self care; normal intake; fully conscious  ___80% Full ambulation; normal activity with effort; some evidence of disease; able to do own self care; normal or reduced intake; fully conscious  ___70% Ambulation reduced; unable to perform normal job/work; significant disease; able to do own self care; normal or reduced intake; fully conscious  _x__60%  Ambulation reduced; cannot do hobbies/housework; significant disease; occasional assist; intake normal or reduced; fully conscious/some confusion  ___50%  Mainly sit/lie; can't do any work; extensive disease; considerable assist; intake normal or reduced; fully conscious/some confusion  ___40%  Mainly in bed; extensive disease; mainly assist; intake normal or reduced; fully conscious/ some confusion   ___30%  Bed bound; extensive disease; total care; intake reduced; fully conscious/some confusion  ___20%  Bed bound; extensive disease; total care; intake minimal; drowsy/coma  ___10%  Bed bound; extensive disease; total care; mouth care only; drowsy/coma  ___0       Death     Readmission Risk Score: 15%    Plan      Palliative Interaction: I was asked to see patient for code status conversation. Pt is sitting in the chair and is alert and oriented for our conversation. I introduce myself and explain  my role in his care.   We talk about his advance directives and that the  was working on them and is just waiting for his friend Jeremias Cassidy who is the primary decision maker address. He tells me his sons phone number and I add it to the form. I explain to him the 3 levels of code care and he does not want CPR or intubation. He verbalized understanding of all 3 levels and wants to be a DNR-CCA. Document signed per him. Pt denies any further questions or needs at this time. Education/support to patient  Clarification of medical condition to patient and family  Code status clarified: [facility]    Principle Problem/Diagnosis:  Acute on chronic systolic (congestive) heart failure (HCC)    Goals of care evaluation   The patient goals of care are live longer, improve or maintain function/quality of life, and preserve independence/autonomy/control   Goals of care discussed with:    [x] Patient independently    [] Patient and Family    [] Family or Healthcare DPOA independently    [] Unable to discuss with patient, family/DPOA not present    Code Status  Full Code     Palliative Care will continue to follow Mr. Tyra Felty care as needed. Thank you for allowing Palliative Care to participate in the care of Mr. Jason Dawson .      Electronically signed by   Azalea Mcdowell RN  Palliative Care Team  on 1/12/2023 at 4:15 PM    Palliative care office: [phone]

## 2023-01-12 NOTE — PROGRESS NOTES
[facility]    Inpatient Occupational Therapy  Plan of Care  OT Orders Received and Evaluation Complete  Date: 2023  Patient Name: Josefina George        MRN: [id number]    : 1932  (80 y.o.)  Referring Practitioner: Dr. Charis Calderon     Referral Date: 23  Diagnosis: Acute on chronic systolic heart failure  Treatment Diagnosis: Acute on chronic systolic heart failure    Identified Problem Areas  Performance deficits / Impairments: Decreased functional mobility , Decreased ADL status, Decreased safe awareness, Decreased balance, Decreased posture     Justification for Skilled Services:  [x]  Complete Daily Tasks Safely  [x] Improve Balance   [x]  Return to Prior Level of Function  [x]  Family/Caregiver Education    [x] Improve UE strength  [x] Patient Education: [x]Adaptive Equipment   [x]Home Exercise Program and Progression    Treatment Plan  Occupational Therapy Plan  Times Per Week: 4x  Times Per Day: Once a day (1x/day)  Current Treatment Recommendations: Balance training, Safety education & training, Patient/Caregiver education & training, Equipment evaluation, education, & procurement, Self-Care / ADL, Home management training  [] Modalities:  [x] Therapeutic Exercise   [x] Therapeutic Activity  [] Splinting:     [] Home Safety Evaluation         [x] ADL Retraining                       [] Muscle Re-education [] Cognitive Retraining            [] Sensory Integration  [x] Patient Education [x] Home Exercise Program [] Fine Motor Coordination       GOALS  Short Term Goals  Time Frame for Short Term Goals: 4 days (1/15/23)  Short Term Goal 1: N/A  Short Term Goal 2: Patient will complete lower body dressing and/or bathing tasks while using adaptive equipment/techniques PRN with SUP. Short Term Goal 3: Patient will complete a commode transfer while using DME PRN with SUP. Short Term Goal 4:  To increase independence with grooming tasks, patient will tolerate static/dynamic standing x2 minutes with no more than SUP. Short Term Goal 5: N/A  Long Term Goals  Time Frame for Long Term Goals : STG=LTG    Upon Swingbed Transfer this Plan of Care will be followed until revision is completed.     Constantino Guido OTR/L                    Date: 1/12/2023

## 2023-01-12 NOTE — PROGRESS NOTES
Hospitalist Progress Note  1/12/2023 6:57 AM  Subjective:   Admit Date: 1/8/2023  PCP: LORY Roberts - CNP    Interval History:     Patient states that he is feeling better. Has no complaints this morning except that the legs are getting swollen. Shortness of breath improved. Has no nausea or vomiting. Has no chest pain, no cough    Diet: ADULT DIET; Regular; No Added Salt (3-4 gm)  ADULT ORAL NUTRITION SUPPLEMENT; Breakfast, Lunch, Dinner; Standard High Calorie/High Protein Oral Supplement  Medications:   Scheduled Meds:   enoxaparin  40 mg SubCUTAneous Daily    midodrine  10 mg Oral TID    piperacillin-tazobactam  3,375 mg IntraVENous Q8H    aspirin  81 mg Oral Daily    vitamin B-12  1,000 mcg Oral Daily    Vitamin D  2,000 Units Oral Daily    levothyroxine  100 mcg Oral Daily    clopidogrel  75 mg Oral Daily    tamsulosin  0.4 mg Oral Nightly    [Held by provider] furosemide  40 mg Oral Daily    rosuvastatin  10 mg Oral Daily    magnesium oxide  400 mg Oral Daily    propranolol  10 mg Oral BID    spironolactone  25 mg Oral Daily    [Held by provider] furosemide  20 mg IntraVENous BID    levothyroxine  25 mcg Oral Daily     Continuous Infusions:  PRN Medications: acetaminophen    Objective:   Vitals: BP (!) 97/58   Pulse 64   Temp 97.7 °F (36.5 °C) (Oral)   Resp 18   Ht 5' 10\" (1.778 m)   Wt 187 lb 6.3 oz (85 kg)   SpO2 97%   BMI 26.89 kg/m²   BMI: Body mass index is 26.89 kg/m². CBC: No results for input(s): WBC, HGB, PLT in the last 72 hours.   BMP:    Recent Labs     01/10/23  0550 01/11/23  0530 01/12/23  0536    143 140   K 3.5* 3.8 4.2    105 103   CO2 29 27 27   BUN 40* 34* 28*   CREATININE 1.45* 1.43* 1.45*   GLUCOSE 101* 106* 111*     Physical Exam:    General Appearance: alert and oriented to person, place and time, in no acute distress  Cardiovascular: normal rate, regular rhythm, normal S1 and S2, 3/6 systolic murmur  Pulmonary/Chest: Bibasilar crackles, no wheezing,,normal air movement, no respiratory distress  Abdomen: soft, non-tender, non-distended, normal bowel sounds  Extremities: Mild edema in lower extremities  Skin: warm and dry, no rash   Neurological: alert, oriented, normal speech, no focal findings or movement disorder noted      Medical Decision Making (MDM) Data:    External documents reviewed: My CXR interpretation:   My EKG interpretation:   Discussed with:    Tests considered but not ordered:    Heart score:    Social Determinants of Health that impact treatment or disposition:      Assessment and Plan:     Right lower lobe pneumonia -  on IV Zosyn, blood cultures preliminary negative, clinically improving  Chronic systolic CHF -patient's echo reveals decline in EF to 25 -30% and severe aortic stenosis. Patient evaluated by Dr. Aleksandr Choe. Patient is euvolemic. Holding Lasix due to hypotension. Dr. Aleksandr Choe resumed his Aldactone due to patient developing edema in lower extremities. Hypotension -blood pressure improved, will discontinue IV fluids due to patient developing swelling in legs and retaining weight. Imdur and Lopressor  discontinued per Dr. Aleksandr Choe on 12/12/22. Increased midodrine from 10 mg twice daily to 3 times daily, monitor blood pressure  Hypokalemia -replaced  CKD stage 3B - improved with IV fluids  H/O CAD, s/p CABGx4 - asymptomatic. Troponin levels elevated most likely secondary to reduced renal function. H/O SSS, s/p [employer] in 2012. Hypothyroidism - on Synthroid  Generalized weakness  -improving with PT/OT. plan is to discharge home with home health care with PT and OT   H/O Afib in the past  - not on Coumadin by his choice     Mild malnutrition  - on oral supplement      Differential Diagnosis: CHF exacerbation, pneumonia  Condition is improving / unchanged / worsening: Improving  Condition is a treatment goal: No  Chronic condition is / is not having mild / moderate, severe exacerbation, progression or side effects of treatment:       Shared decision making:       Code status and discussions:       Medical Necessity:   In patient is appropriate for this patient due to need for IV antibiotics, close monitoring of clinical condition, vitals         Theo Rodriguez MD, MD  [facility]

## 2023-01-12 NOTE — PROGRESS NOTES
Ordere to hold two medications d/t BP per Dr West Person in addition to order to increase a medication dose.  See [facility]

## 2023-01-12 NOTE — PROGRESS NOTES
[facility]    Date: 2023  Patient Name: Ciarra Osorio        : 1932          [x] Pt Unavailable due to: low BP per RN and PTA. Will re-attempt OT session this afternoon as able.         Morales Hammonds, OTR/L Date: 2023

## 2023-01-12 NOTE — PROGRESS NOTES
Physical Therapy    [facility]  Physical Therapy    Date: 2023  Patient Name: Chelsy Galarza        : 1932       [x] Pt Refusal Patient seated up in chair. States that his BP is low and is unable to participate with PTA. Will check back later and progress as able. [] Pt Unavailable due to:           Monda Barthel, PTA Date: 2023

## 2023-01-12 NOTE — PROGRESS NOTES
[facility]  Occupational Therapy    Date: 2023  Patient Name: Leta Ferguson        : 1932       [x] Pt Refusal: Attempted to see patient for afternoon OT session (approved by Ehsan Small RN for seated activity only), however he politely declined both PT and OT services due to not feeling well. Reported increased swelling to bilateral feet and this OT applied ice packs over each foot as well as a pillow underneath lower legs for edema management. Also encouraged patient to move feet as able while in elevation. Will re-attempt to see patient tomorrow as able.               Luiz Bailey, OTR/L Date: 2023

## 2023-01-13 LAB
ANION GAP SERPL CALCULATED.3IONS-SCNC: 9 MMOL/L (ref 9–17)
BUN BLDV-MCNC: 27 MG/DL (ref 8–23)
BUN/CREAT BLD: 21 (ref 9–20)
CALCIUM SERPL-MCNC: 9.6 MG/DL (ref 8.6–10.4)
CHLORIDE BLD-SCNC: 104 MMOL/L (ref 98–107)
CO2: 30 MMOL/L (ref 20–31)
CREAT SERPL-MCNC: 1.3 MG/DL (ref 0.7–1.2)
GFR SERPL CREATININE-BSD FRML MDRD: 52 ML/MIN/1.73M2
GLUCOSE BLD-MCNC: 111 MG/DL (ref 70–99)
POTASSIUM SERPL-SCNC: 4.1 MMOL/L (ref 3.7–5.3)
SODIUM BLD-SCNC: 143 MMOL/L (ref 135–144)

## 2023-01-13 PROCEDURE — 97535 SELF CARE MNGMENT TRAINING: CPT

## 2023-01-13 PROCEDURE — 1200000000 HC SEMI PRIVATE

## 2023-01-13 PROCEDURE — 6370000000 HC RX 637 (ALT 250 FOR IP): Performed by: INTERNAL MEDICINE

## 2023-01-13 PROCEDURE — 36415 COLL VENOUS BLD VENIPUNCTURE: CPT

## 2023-01-13 PROCEDURE — 80048 BASIC METABOLIC PNL TOTAL CA: CPT

## 2023-01-13 PROCEDURE — 6360000002 HC RX W HCPCS: Performed by: INTERNAL MEDICINE

## 2023-01-13 PROCEDURE — 2580000003 HC RX 258: Performed by: INTERNAL MEDICINE

## 2023-01-13 RX ORDER — MIDODRINE HYDROCHLORIDE 5 MG/1
20 TABLET ORAL 3 TIMES DAILY
Status: DISCONTINUED | OUTPATIENT
Start: 2023-01-13 | End: 2023-01-14 | Stop reason: HOSPADM

## 2023-01-13 RX ADMIN — PIPERACILLIN SODIUM AND TAZOBACTAM SODIUM 3375 MG: 3; 375 INJECTION, POWDER, FOR SOLUTION INTRAVENOUS at 18:51

## 2023-01-13 RX ADMIN — CYANOCOBALAMIN TAB 1000 MCG 1000 MCG: 1000 TAB at 08:59

## 2023-01-13 RX ADMIN — LEVOTHYROXINE SODIUM 25 MCG: 25 TABLET ORAL at 06:32

## 2023-01-13 RX ADMIN — ROSUVASTATIN CALCIUM 10 MG: 20 TABLET, COATED ORAL at 08:58

## 2023-01-13 RX ADMIN — CLOPIDOGREL BISULFATE 75 MG: 75 TABLET ORAL at 08:59

## 2023-01-13 RX ADMIN — SPIRONOLACTONE 25 MG: 25 TABLET, FILM COATED ORAL at 08:58

## 2023-01-13 RX ADMIN — ASPIRIN 81 MG: 81 TABLET, COATED ORAL at 08:58

## 2023-01-13 RX ADMIN — Medication 400 MG: at 08:59

## 2023-01-13 RX ADMIN — ENOXAPARIN SODIUM 40 MG: 100 INJECTION SUBCUTANEOUS at 08:59

## 2023-01-13 RX ADMIN — ACETAMINOPHEN 650 MG: 325 TABLET, FILM COATED ORAL at 04:37

## 2023-01-13 RX ADMIN — MIDODRINE HYDROCHLORIDE 20 MG: 5 TABLET ORAL at 20:21

## 2023-01-13 RX ADMIN — LEVOTHYROXINE SODIUM 100 MCG: 100 TABLET ORAL at 08:58

## 2023-01-13 RX ADMIN — PIPERACILLIN SODIUM AND TAZOBACTAM SODIUM 3375 MG: 3; 375 INJECTION, POWDER, FOR SOLUTION INTRAVENOUS at 02:56

## 2023-01-13 RX ADMIN — PIPERACILLIN SODIUM AND TAZOBACTAM SODIUM 3375 MG: 3; 375 INJECTION, POWDER, FOR SOLUTION INTRAVENOUS at 11:20

## 2023-01-13 RX ADMIN — ACETAMINOPHEN 650 MG: 325 TABLET, FILM COATED ORAL at 23:25

## 2023-01-13 RX ADMIN — TAMSULOSIN HYDROCHLORIDE 0.4 MG: 0.4 CAPSULE ORAL at 20:21

## 2023-01-13 RX ADMIN — MIDODRINE HYDROCHLORIDE 20 MG: 5 TABLET ORAL at 08:58

## 2023-01-13 RX ADMIN — CHOLECALCIFEROL TAB 25 MCG (1000 UNIT) 2000 UNITS: 25 TAB at 08:58

## 2023-01-13 ASSESSMENT — PAIN - FUNCTIONAL ASSESSMENT
PAIN_FUNCTIONAL_ASSESSMENT: ACTIVITIES ARE NOT PREVENTED
PAIN_FUNCTIONAL_ASSESSMENT: PREVENTS OR INTERFERES SOME ACTIVE ACTIVITIES AND ADLS

## 2023-01-13 ASSESSMENT — PAIN SCALES - GENERAL
PAINLEVEL_OUTOF10: 0
PAINLEVEL_OUTOF10: 6
PAINLEVEL_OUTOF10: 7

## 2023-01-13 ASSESSMENT — PAIN DESCRIPTION - ORIENTATION
ORIENTATION: POSTERIOR
ORIENTATION: RIGHT;POSTERIOR

## 2023-01-13 ASSESSMENT — PAIN DESCRIPTION - DESCRIPTORS
DESCRIPTORS: ACHING
DESCRIPTORS: ACHING;DISCOMFORT

## 2023-01-13 ASSESSMENT — PAIN DESCRIPTION - LOCATION
LOCATION: SHOULDER
LOCATION: NECK

## 2023-01-13 NOTE — PROGRESS NOTES
[facility]  Occupational Therapy  Daily Note  Date: 2023  Patient Name: Claudia Mcfarland        MRN: [id number]    : 1932  (80 y.o.)    Subjective: Patient in bed upon arrival, agreeable to OT interventions w/ encouragement. Pt is PROGRESSING toward goals and independence of Self Care this treatment session  Continue to assess Pending Progress    Objective  ADL  Feeding: Setup  Grooming: Modified independent , Setup  UE Bathing: Modified independent , Setup  LE Bathing: Supervision, Setup  UE Dressing: Modified independent , Setup (don hospital gow)  LE Dressing: None  Toileting: Stand by assistance (use urinal)    Sit to stand: Stand by assistance  Stand to sit: Stand by assistance    Assessment  Assessment: Patient seated in recliner upon arrival, agreeable to OT interventions. RN advises to complete seated therapy interventions this date d/t continuous low BP. See above information regarding assistance level w/ ADLs. Remains in recliner at end of session w/ call light and other personal items within reach. Chair alarm in place. Will continue to address goals and progress patient as able. Activity Tolerance: Patient limited by fatigue      Exercises:  See Flowsheets    Goals  Short Term Goals  Time Frame for Short Term Goals: 4 days (1/15/23)  Short Term Goal 1: N/A  Short Term Goal 2: Patient will complete lower body dressing and/or bathing tasks while using adaptive equipment/techniques PRN with SUP. -MET BATHING PORTION  Short Term Goal 3: Patient will complete a commode transfer while using DME PRN with SUP. Short Term Goal 4: To increase independence with grooming tasks, patient will tolerate static/dynamic standing x2 minutes with no more than SUP.  -MET  Short Term Goal 5: N/A    Long Term Goals  Time Frame for Long Term Goals : STG=LTG    Inpatient safety: Call light in reach, Phone in reach, Use of Gait belt, Chair alarm, Nurse Notified, and Left in chair    Time In: 1115  Time Out: 1145  Timed Coded Minutes: 30  Total Treatment Time: 30    NELSON Kelsey    Date: 1/13/2023

## 2023-01-13 NOTE — PROGRESS NOTES
Pt complains of feeling \"like he can't take deep breaths\". He is complaining of feeling really hot as well. Explains that once in awhile he will put oxygen on at home when he feels like this. Pt is 97% on RA. Is educated that his sats are WDL and he does not need oxygen, but pt persistent on O2. This nurse applies 2 L of O2, but is educated that he cannot keep it on for long. Will continue to monitor.

## 2023-01-13 NOTE — ACP (ADVANCE CARE PLANNING)
Advance Care Planning     Advance Care Planning Activator (Inpatient)  Conversation Note      Date of ACP Conversation: 1/13/2023     Conversation Conducted with: Patient with Decision Making Capacity    ACP Activator: Mikaela Cornell, [address] [facility] Decision Maker:     Current Designated Health Care Decision Maker:     Primary Decision Maker (Active): June Quintana - [phone]    Secondary Decision Maker: Angie Mckeon - Child - [phone]  Click here to complete Healthcare Decision Makers including section of the Healthcare Decision Maker Relationship (ie \"Primary\")  Today we documented Decision Maker(s) consistent with ACP documents on file. Care Preferences    Ventilation: \"If you were in your present state of health and suddenly became very ill and were unable to breathe on your own, what would your preference be about the use of a ventilator (breathing machine) if it were available to you? \"      Would the patient desire the use of ventilator (breathing machine)?: yes    \"If your health worsens and it becomes clear that your chance of recovery is unlikely, what would your preference be about the use of a ventilator (breathing machine) if it were available to you? \"     Would the patient desire the use of ventilator (breathing machine)?: No      Resuscitation  \"CPR works best to restart the heart when there is a sudden event, like a heart attack, in someone who is otherwise healthy. Unfortunately, CPR does not typically restart the heart for people who have serious health conditions or who are very sick. \"    \"In the event your heart stopped as a result of an underlying serious health condition, would you want attempts to be made to restart your heart (answer \"yes\" for attempt to resuscitate) or would you prefer a natural death (answer \"no\" for do not attempt to resuscitate)? \" no       [x] Yes   [] No   Educated Patient / Decision Maker regarding differences between Advance Directives and portable DNR orders.     Length of ACP Conversation in minutes:  20    Conversation Outcomes:  [x] ACP discussion completed  [] Existing advance directive reviewed with patient; no changes to patient's previously recorded wishes  [x] New Advance Directive completed on 1/10/2023  [] Portable Do Not Rescitate prepared for Provider review and signature  [] POLST/POST/MOLST/MOST prepared for Provider review and signature      Follow-up plan:    [] Schedule follow-up conversation to continue planning  [] Referred individual to Provider for additional questions/concerns   [x] Advised patient/agent/surrogate to review completed ACP document and update if needed with changes in condition, patient preferences or care setting    [x] This note routed to one or more involved healthcare providers

## 2023-01-13 NOTE — FLOWSHEET NOTE
01/13/23 0807   Vital Signs   Temp 98 °F (36.7 °C)   Temp Source Axillary   Heart Rate 73   Heart Rate Source Brachial   Resp 18   BP (!) 93/58   MAP (Calculated) 70   BP Location Left upper arm   BP Method Automatic   Patient Position Up in chair   Level of Consciousness 0   MEWS Score 2   Pain Assessment   Pain Assessment None - Denies Pain   Oxygen Therapy   SpO2 98 %   O2 Device None (Room air)     Dr. Meaghan Vela notified of BP. Hold Propanolol per admin instructions. Continue to hold lasix and Ok to give aldactone.

## 2023-01-13 NOTE — PROGRESS NOTES
SW met with pt this morning to inform that SW has not been able to get in touch with his friend and he states that she just left. Pt calls her from his phone and friend answers and provides address for his advance directive documents. Correct phone number for son listed as well. Copies of advance directives made and one is placed on his paper chart. Four additional copies and his original are returned to him. One copy taken to primary care office as well. Pt still plans to discharge to home with [facility] services. NEELIMA following and notified [facility] that pt is not discharging today and it may be over the weekend or next week depending on pt progress.  Author Viraj REHMAN 1/13/2023

## 2023-01-13 NOTE — PROGRESS NOTES
Hospitalist Progress Note  1/13/2023 8:05 AM  Subjective:   Admit Date: 1/8/2023  PCP: LORY Chow CNP    Interval History:     Patient states that he continues to improve, has no complaints. He is up in chair eating breakfast this morning. His blood pressure is still low and his midodrine was increased by Dr. Sandy Barron to 20 mg twice daily. He reports no chest pain, no dizziness. He thinks he is getting stronger with physical therapy. Diet: ADULT DIET; Regular; No Added Salt (3-4 gm)  ADULT ORAL NUTRITION SUPPLEMENT; Breakfast, Lunch, Dinner; Standard High Calorie/High Protein Oral Supplement  Medications:   Scheduled Meds:   midodrine  20 mg Oral BID    enoxaparin  40 mg SubCUTAneous Daily    piperacillin-tazobactam  3,375 mg IntraVENous Q8H    aspirin  81 mg Oral Daily    vitamin B-12  1,000 mcg Oral Daily    Vitamin D  2,000 Units Oral Daily    levothyroxine  100 mcg Oral Daily    clopidogrel  75 mg Oral Daily    tamsulosin  0.4 mg Oral Nightly    [Held by provider] furosemide  40 mg Oral Daily    rosuvastatin  10 mg Oral Daily    magnesium oxide  400 mg Oral Daily    propranolol  10 mg Oral BID    spironolactone  25 mg Oral Daily    [Held by provider] furosemide  20 mg IntraVENous BID    levothyroxine  25 mcg Oral Daily     Continuous Infusions:  PRN Medications: acetaminophen    Objective:   Vitals: BP (!) 87/57   Pulse 71   Temp 97.3 °F (36.3 °C) (Axillary)   Resp 18   Ht 5' 10\" (1.778 m)   Wt 187 lb 6.3 oz (85 kg)   SpO2 97%   BMI 26.89 kg/m²   BMI: Body mass index is 26.89 kg/m². CBC: No results for input(s): WBC, HGB, PLT in the last 72 hours.   BMP:    Recent Labs     01/11/23  0530 01/12/23  0536 01/13/23  0520    140 143   K 3.8 4.2 4.1    103 104   CO2 27 27 30   BUN 34* 28* 27*   CREATININE 1.43* 1.45* 1.30*   GLUCOSE 106* 111* 111*         Physical Exam:       General Appearance: alert and oriented to person, place and time, in no acute distress  Cardiovascular: normal rate, regular rhythm, normal S1 and S2, 3/6 systolic murmur  Pulmonary/Chest: Bibasilar crackles, no wheezing,,normal air movement, no respiratory distress  Abdomen: soft, non-tender, non-distended, normal bowel sounds  Extremities: Mild edema in lower extremities  Skin: warm and dry, no rash   Neurological: alert, oriented, normal speech, no focal findings or movement disorder noted        Medical Decision Making (MDM) Data:    External documents reviewed: My CXR interpretation:   My EKG interpretation:   Discussed with:    Tests considered but not ordered:    Heart score:    Social Determinants of Health that impact treatment or disposition:      Assessment and Plan:       Right lower lobe pneumonia -  on IV Zosyn, blood cultures negative, clinically improving  Chronic systolic CHF -patient's echo reveals decline in EF to 25 -30% and severe aortic stenosis. Patient evaluated by Dr. Ed Santiago. Patient is euvolemic. Holding Lasix due to hypotension. Dr. Ed Santiago resumed his Aldactone due to patient developing edema in lower extremities. Hypotension -blood pressure improved,IV fluids stopped due to patient developing swelling in legs and retaining weight. Imdur and Lopressor  discontinued per Dr. Ed Santiago on 12/12/22. Increased midodrine from 10 mg tid to 20 mg bid,monitor blood pressure  Hypokalemia -replaced  CKD stage 3B - improved with IV fluids  H/O CAD, s/p CABGx4 - asymptomatic. Troponin levels elevated most likely secondary to reduced renal function. H/O SSS, s/p [employer] in 2012. Hypothyroidism - on Synthroid  Generalized weakness  -improving with PT/OT. plan is to discharge home with home health care with PT and OT   H/O Afib in the past  - not on Coumadin by his choice     Mild malnutrition  - on oral supplement      Differential Diagnosis: CHF exacerbation, pneumonia  Condition is improving / unchanged / worsening: Improving  Condition is a treatment goal: No  Chronic condition is / is not having mild / moderate, severe exacerbation, progression or side effects of treatment:       Shared decision making:       Code status and discussions:       Medical Necessity:   In patient is appropriate for this patient due to need for IV antibiotics, close monitoring of clinical condition, vitals         DVT prophylaxis:   [x] Lovenox   [] SCDs   [] SQ Heparin   [] Encourage ambulation, low risk for DVT, no chemical or mechanical    prophylaxis necessary      [] Already on Anticoagulation            Tonie Zimmerman MD, MD  [facility]

## 2023-01-13 NOTE — PLAN OF CARE
Problem: Discharge Planning  Goal: Discharge to home or other facility with appropriate resources  Outcome: Progressing     Problem: Skin/Tissue Integrity  Goal: Absence of new skin breakdown  Description: 1. Monitor for areas of redness and/or skin breakdown  2. Assess vascular access sites hourly  3. Every 4-6 hours minimum:  Change oxygen saturation probe site  4. Every 4-6 hours:  If on nasal continuous positive airway pressure, respiratory therapy assess nares and determine need for appliance change or resting period.   1/13/2023 0115 by Alvino Nolasco RN  Outcome: Progressing  1/12/2023 1502 by Danae Elise RN  Outcome: Progressing  1/12/2023 1324 by Danae Elise RN  Outcome: Progressing     Problem: Safety - Adult  Goal: Free from fall injury  1/13/2023 0115 by Alvino Nolasco RN  Outcome: Progressing  1/12/2023 1502 by Danae Elise RN  Outcome: Progressing  1/12/2023 1324 by Danae Elise RN  Outcome: Progressing     Problem: ABCDS Injury Assessment  Goal: Absence of physical injury  Outcome: Progressing     Problem: Metabolic/Fluid and Electrolytes - Adult  Goal: Electrolytes maintained within normal limits  Outcome: Progressing  Goal: Hemodynamic stability and optimal renal function maintained  1/13/2023 0115 by Alvino Nolasco RN  Outcome: Progressing  1/12/2023 1324 by Danae Elise RN  Outcome: Not Progressing  Goal: Glucose maintained within prescribed range  Outcome: Progressing     Problem: Chronic Conditions and Co-morbidities  Goal: Patient's chronic conditions and co-morbidity symptoms are monitored and maintained or improved  Outcome: Progressing     Problem: Nutrition Deficit:  Goal: Optimize nutritional status  1/13/2023 0115 by Alvino Nolasco RN  Outcome: Progressing  1/12/2023 1324 by Danae Elise RN  Outcome: Not Progressing     Problem: Pain  Goal: Verbalizes/displays adequate comfort level or baseline comfort level  1/13/2023 0115 by Alvino Nolasco RN  Outcome: Progressing  1/12/2023 1324 by Dario Beatty RN  Outcome: Progressing     Problem: Respiratory - Adult  Goal: Achieves optimal ventilation and oxygenation  1/13/2023 0115 by Danial Hernández RN  Outcome: Progressing  1/12/2023 1324 by Dario Beatty RN  Outcome: Progressing     Problem: Cardiovascular - Adult  Goal: Maintains optimal cardiac output and hemodynamic stability  Outcome: Progressing     Problem: Musculoskeletal - Adult  Goal: Return mobility to safest level of function  Outcome: Progressing     Problem: Metabolic/Fluid and Electrolytes - Adult  Goal: Hemodynamic stability and optimal renal function maintained  1/13/2023 0115 by Danial Hernández RN  Outcome: Progressing  1/12/2023 1324 by Dario Beatty RN  Outcome: Not Progressing     Problem: Nutrition Deficit:  Goal: Optimize nutritional status  1/13/2023 0115 by Danial Hernández RN  Outcome: Progressing  1/12/2023 1324 by Dario Beatty RN  Outcome: Not Progressing

## 2023-01-13 NOTE — PROGRESS NOTES
Physical Therapy    [facility]  Physical Therapy    Date: 2023  Patient Name: Reginald Bernardo        : 1932       [x] Pt Refusal Patient in chair upon arrival to room. Nurse also in room getting ready to give morning meds. Patient requests for PTA to return later as he just got warm. States he is feeling better than yesterday. Will check back and progress as able. [] Pt Unavailable due to:           Jerel Aguirre, INDRA Date: 2023

## 2023-01-13 NOTE — PROGRESS NOTES
Comprehensive Nutrition Assessment    Type and Reason for Visit:  Reassess    Nutrition Recommendations/Plan:   Encourage oral intakes     Malnutrition Assessment:  Malnutrition Status:  Mild malnutrition (01/09/23 0754)      Nutrition Assessment:    Stable malnutrition with reported improved PO per I/O (although patient reports lower intakes). Continued improvement in renal indices. Weight is up from admission and usual without increases in edema. Lasix currently being held. Denies questions or concerns upon visit. Nutrition Related Findings:    + 1 pitting BLE Wound Type: None       Current Nutrition Intake & Therapies:    Average Meal Intake: %  Average Supplements Intake: Unable to assess (not recorded but states consuming \"most\")  ADULT DIET; Regular; No Added Salt (3-4 gm)  ADULT ORAL NUTRITION SUPPLEMENT; Breakfast, Lunch, Dinner; Standard High Calorie/High Protein Oral Supplement    Anthropometric Measures:  Height: 5' 10\" (177.8 cm)  Ideal Body Weight (IBW): 166 lbs (75 kg)    Admission Body Weight: 180 lb (81.6 kg)  Current Body Weight: 187 lb 6.3 oz (85 kg), 106.2 % IBW. Weight Source: Bed Scale  Current BMI (kg/m2): 26.9  Usual Body Weight: 177 lb 1.6 oz (80.3 kg)  % Weight Change (Calculated): 5.8  Weight Adjustment For: No Adjustment                 BMI Categories: Overweight (BMI 25.0-29. 9)    Estimated Daily Nutrient Needs:  Energy Requirements Based On: Kcal/kg  Weight Used for Energy Requirements: Current  Energy (kcal/day): 4773-8660 (18-22)  Weight Used for Protein Requirements: Ideal  Protein (g/day):  (1.2-1.4)  Method Used for Fluid Requirements: 1 ml/kcal  Fluid (ml/day): 1800    Nutrition Diagnosis:   Other (Comment) (mild malnutrition) related to inadequate protein-energy intake as evidenced by poor intake prior to admission, mild muscle loss, localized or generalized fluid accumulation    Lab Results   Component Value Date     01/13/2023    K 4.1 01/13/2023     01/13/2023    CO2 30 01/13/2023    BUN 27 (H) 01/13/2023    CREATININE 1.30 (H) 01/13/2023    GLUCOSE 111 (H) 01/13/2023    CALCIUM 9.6 01/13/2023    PROT 7.0 01/04/2023    LABALBU 4.0 01/04/2023    BILITOT 0.7 01/04/2023    ALKPHOS 74 01/04/2023    AST 16 01/04/2023    ALT 6 01/04/2023    LABGLOM 52 (L) 01/13/2023    GFRAA >60 06/06/2022    GLOB NOT REPORTED 04/21/2014     Nutrition Interventions:   Food and/or Nutrient Delivery: Continue Oral Nutrition Supplement, Continue Current Diet  Nutrition Education/Counseling: No recommendation at this time  Coordination of Nutrition Care: Continue to monitor while inpatient  Plan of Care discussed with: patient    Goals:  Previous Goal Met: Progressing toward Goal(s)  Goals: Meet at least 75% of estimated needs       Nutrition Monitoring and Evaluation:   Behavioral-Environmental Outcomes:  Other (Comment) (advanced age)  Food/Nutrient Intake Outcomes: Food and Nutrient Intake, Supplement Intake  Physical Signs/Symptoms Outcomes: Biochemical Data, Weight    Discharge Planning:    Continue Oral Nutrition Supplement, Continue current diet     Vince Kwong, [address],   Contact: [phone]

## 2023-01-13 NOTE — PROGRESS NOTES
Dr. Marcie Degroot called in regards to pts Inderal ordered for this PM and BP of 98/55 manual. Orders received to hold medication and to modify Inderal order to hold for SBP < 110. Orders entered.

## 2023-01-13 NOTE — PROGRESS NOTES
Physical Therapy  [facility]  Physical Therapy    Date: 2023  Patient Name: Hugo Tee        : 1932       [x] Pt Refusal Patient seated up in chair sleeping upon arrival to room. States he is very tired and would like to skip therapy today. When asked if he was comfortable in the chair or would he rather lay down he said he would stay in the [address] Will resume with PT services in the morning as able. [] Pt Unavailable due to:           Xochilt Garcia, PTA Date: 2023

## 2023-01-14 VITALS
BODY MASS INDEX: 26.83 KG/M2 | HEIGHT: 70 IN | RESPIRATION RATE: 18 BRPM | DIASTOLIC BLOOD PRESSURE: 56 MMHG | SYSTOLIC BLOOD PRESSURE: 91 MMHG | OXYGEN SATURATION: 98 % | HEART RATE: 72 BPM | WEIGHT: 187.39 LBS | TEMPERATURE: 98 F

## 2023-01-14 PROBLEM — R53.1 GENERALIZED WEAKNESS: Status: ACTIVE | Noted: 2023-01-01

## 2023-01-14 LAB
CULTURE: NORMAL
Lab: NORMAL
SPECIMEN DESCRIPTION: NORMAL

## 2023-01-14 PROCEDURE — 2580000003 HC RX 258: Performed by: INTERNAL MEDICINE

## 2023-01-14 PROCEDURE — 6370000000 HC RX 637 (ALT 250 FOR IP): Performed by: INTERNAL MEDICINE

## 2023-01-14 PROCEDURE — 6360000002 HC RX W HCPCS: Performed by: INTERNAL MEDICINE

## 2023-01-14 PROCEDURE — 97535 SELF CARE MNGMENT TRAINING: CPT

## 2023-01-14 RX ORDER — PROPRANOLOL HYDROCHLORIDE 10 MG/1
10 TABLET ORAL 2 TIMES DAILY
Qty: 90 TABLET | Refills: 3 | Status: SHIPPED | OUTPATIENT
Start: 2023-01-14

## 2023-01-14 RX ORDER — MIDODRINE HYDROCHLORIDE 10 MG/1
20 TABLET ORAL 3 TIMES DAILY
Qty: 180 TABLET | Refills: 1 | Status: SHIPPED | OUTPATIENT
Start: 2023-01-14

## 2023-01-14 RX ORDER — AMOXICILLIN AND CLAVULANATE POTASSIUM 875; 125 MG/1; MG/1
1 TABLET, FILM COATED ORAL 2 TIMES DAILY
Qty: 10 TABLET | Refills: 0 | Status: SHIPPED | OUTPATIENT
Start: 2023-01-14 | End: 2023-01-19

## 2023-01-14 RX ORDER — ROSUVASTATIN CALCIUM 10 MG/1
10 TABLET, COATED ORAL DAILY
Qty: 30 TABLET | Refills: 3 | Status: SHIPPED | OUTPATIENT
Start: 2023-01-14

## 2023-01-14 RX ADMIN — LEVOTHYROXINE SODIUM 25 MCG: 25 TABLET ORAL at 06:57

## 2023-01-14 RX ADMIN — ASPIRIN 81 MG: 81 TABLET, COATED ORAL at 09:59

## 2023-01-14 RX ADMIN — ENOXAPARIN SODIUM 40 MG: 100 INJECTION SUBCUTANEOUS at 09:59

## 2023-01-14 RX ADMIN — Medication 400 MG: at 10:03

## 2023-01-14 RX ADMIN — LEVOTHYROXINE SODIUM 100 MCG: 100 TABLET ORAL at 09:59

## 2023-01-14 RX ADMIN — CYANOCOBALAMIN TAB 1000 MCG 1000 MCG: 1000 TAB at 10:00

## 2023-01-14 RX ADMIN — SPIRONOLACTONE 25 MG: 25 TABLET, FILM COATED ORAL at 09:59

## 2023-01-14 RX ADMIN — MIDODRINE HYDROCHLORIDE 20 MG: 5 TABLET ORAL at 10:02

## 2023-01-14 RX ADMIN — CHOLECALCIFEROL TAB 25 MCG (1000 UNIT) 2000 UNITS: 25 TAB at 09:59

## 2023-01-14 RX ADMIN — PIPERACILLIN SODIUM AND TAZOBACTAM SODIUM 3375 MG: 3; 375 INJECTION, POWDER, FOR SOLUTION INTRAVENOUS at 03:02

## 2023-01-14 RX ADMIN — ROSUVASTATIN CALCIUM 10 MG: 20 TABLET, COATED ORAL at 09:59

## 2023-01-14 RX ADMIN — CLOPIDOGREL BISULFATE 75 MG: 75 TABLET ORAL at 09:59

## 2023-01-14 NOTE — PROGRESS NOTES
Ambulated to BR without assistive device. He is steady. Has BM and voids. Takes 650 MG Tylenol for neck pain he rates 7/10 scale. Pt tells writer that the Tylenol works for him.

## 2023-01-14 NOTE — PROGRESS NOTES
IV removed without incident. Discharge instructions reviewed with and provided to patient and Collette Harrier, at bedside. Verbalized understanding of follow up appointment, diet, activity, medications and reasons to return to ED/call physician. Patient does voice that his medications get confusing for him, states he has a med planner and Jabier Pennington at bedside reports she will assist with medications. Home Care will also be there to assist patient with medications. Advised to call back directly if there are further questions, or if these symptoms fail to improve as anticipated or worsen. All questions answered. Personal belongings sent with patient. Assisted to wheelchair and taken to personal car. Denies further needs.

## 2023-01-14 NOTE — PROGRESS NOTES
Pt relates that he is hot. Pt afebrile. Michael Power provided and pt requesting that his ace wraps be taken off for awhile.

## 2023-01-14 NOTE — DISCHARGE SUMMARY
Hospitalist Discharge Summary    Patient:  Hanane Nelson  YOB: 1932    MRN: [id number]   Acct: [de-identified]    Primary Care Physician: LORY Nagy CNP    Admit date:  1/8/2023    Discharge date:  1/14/2023       Discharge Diagnoses:     1. Right lower lobe pneumonia  2. Chronic systolic CHF with EF 25 to 30%  3. Severe aortic stenosis  4. Hyportension  5. Hypokalemia  6. CKD stage IIIb  7. History of CAD, s/p CABG x4  8. H/O SSS s/p [employer] in 2012  9. Mild malnutrition  10. Generalized weakness  11. Hypothyroidism  12. History of A. fib in the past    Discharge Medications:         Medication List        START taking these medications      amoxicillin-clavulanate 875-125 MG per tablet  Commonly known as:  Augmentin  Take 1 tablet by mouth 2 times daily for 5 days            CHANGE how you take these medications      midodrine 10 MG tablet  Commonly known as: PROAMATINE  Take 2 tablets by mouth 3 times daily  What changed:   how much to take  when to take this     propranolol 10 MG tablet  Commonly known as: INDERAL  Take 1 tablet by mouth 2 times daily Hold for SBP less than 110  What changed: additional instructions     rosuvastatin 10 MG tablet  Commonly known as: CRESTOR  Take 1 tablet by mouth daily  What changed:   medication strength  how much to take            CONTINUE taking these medications      aspirin 81 MG tablet     clopidogrel 75 MG tablet  Commonly known as: PLAVIX  TAKE 1 TABLET BY MOUTH DAILY     levothyroxine 125 MCG tablet  Commonly known as: SYNTHROID  TAKE 1 TABLET BY MOUTH DAILY     Magnesium 400 MG Tabs  Take 400 mg by mouth daily     spironolactone 25 MG tablet  Commonly known as: ALDACTONE  Take 1 tablet by mouth daily For congestive heart failure     tamsulosin 0.4 MG capsule  Commonly known as: FLOMAX  Take 1 capsule by mouth at bedtime Urinary frequency/enlarged prostate, take at bedtime     vitamin B-12 1000 MCG tablet  Commonly known as: CYANOCOBALAMIN     Vitamin D3 50 MCG (2000 UT) Caps            STOP taking these medications      doxycycline hyclate 100 MG tablet  Commonly known as: VIBRA-TABS     furosemide 40 MG tablet  Commonly known as: LASIX     metoprolol 100 MG tablet  Commonly known as: LOPRESSOR               Where to Get Your Medications        These medications were sent to [address] [place] [address] [address] [place] [address]      Phone: [phone]   amoxicillin-clavulanate 875-125 MG per tablet  midodrine 10 MG tablet  propranolol 10 MG tablet  rosuvastatin 10 MG tablet         Diet:  ADULT DIET; Regular;  No Added Salt (3-4 gm)  ADULT ORAL NUTRITION SUPPLEMENT; Breakfast, Lunch, Dinner; Standard High Calorie/High Protein Oral Supplement    Activity:  Activity as tolerated (Patient may move about with assist as indicated or with supervision.)    Follow-up:  in 1 weeks with LORY Franklin CNP,       Consultants: Cardiology Dr. Stephanie Keys            Physical Exam:    Emir Live for the past 24 hrs:   BP Temp Temp src Pulse Resp SpO2   01/13/23 1945 (!) 91/55 97.7 °F (36.5 °C) Oral -- 18 98 %   01/13/23 1400 (!) 81/52 98 °F (36.7 °C) Oral 72 18 98 %     Weight: Weight: 187 lb 6.3 oz (85 kg)     24 hour intake/output:  Intake/Output Summary (Last 24 hours) at 1/14/2023 0925  Last data filed at 1/14/2023 0033  Gross per 24 hour   Intake --   Output 400 ml   Net -400 ml     General Appearance: alert and oriented to person, place and time, in no acute distress  Cardiovascular: normal rate, regular rhythm, normal S1 and S2, 3/6 systolic murmur  Pulmonary/Chest: Bibasilar crackles, no wheezing,,normal air movement, no respiratory distress  Abdomen: soft, non-tender, non-distended, normal bowel sounds  Extremities: Mild edema in lower extremities  Skin: warm and dry, no rash   Neurological: alert, oriented, normal speech, no focal findings or movement disorder noted        Hospital Course: The patient is a 80 y.o. male who presented to ER by a private car for evaluation of SOB started a couple of weeks ago. Patient states that he feels fine when wakes up but as soon as he starts moving around he feels like \"my power goes out\". He becomes very short of breath and generally weak. He reports associated cough with small amount of whitish sputum. He reports no chest pain, no dizziness, no palpitations, no nausea or vomiting. He reports no wheezing. He had no fevers or chills. Was seen by his PCP Jory Sheriff on 1/4/2023 for symptoms of fatigue, cough and shortness of breath. Was prescribed prednisone and doxycycline for acute sinusitis. Was recommended to restart his spironolactone and continue on Lasix. .  Patient states that his symptoms did not improve and he had to come to the emergency room for evaluation. Patient has a h/o severe CAD, s/p CABG x 4 in 2000, SSS s/p pacemaker placed 8/16/12, CHF with EF around 35%, mod AS with valve area 1.1 cm2, CKD, hypotension. He follows up with Dr. Ankit Disla. recently his meds have been adjusted due to Hypotension and Imdur, Lopressor were stopped. Work up in [place] [id number] revealed BP 98/65, HR 78, RR 20, sats 95 % on RA. BMP showed sodium 141, potassium 4.2, Cr 1.77, BUN 49. Initial troponin was 71, repeat 61, 62. Pro-BNP was 18.837. WBC 9.5, H/H 12.4/37.8, platelets 919. EKG paced rhythm, rate 78, pCXR cardiomegaly, prominence of the bronchovascular markings, small BL pleural effusions. It was felt the patient's SOB was related to CHF exacerbation. He was treated in ER with IV Lasix 20 mg x 1. Later his blood pressure dropped 87/53 and no additional Lasix was given. Cardiology Dr. Ankit Disla was consulted and he felt the patient's symptoms were possibly related to pneumonia versus CHF.   CT scan of chest was obtained and it showed trace pleural effusions and right lower lobe infiltrate consistent with pneumonia. 2D echo was obtained revealing severe LV dysfunction with EF in the 25 to 30% range, severe aortic stenosis with valve area 0.5 to 0.6 cm², severe pulmonary hypertension. Patient was started on IV Zosyn for his pneumonia. We were not able to treat him with Lasix due to persistently low blood pressure. He was treated with IV fluids for BP support. Gained about 10 pounds and IV fluids were stopped. Was resumed. We increased his midodrine to 20 mg 3 times daily. After stopping IV fluids his blood pressure has been fairly stable with systolic in 55G which appears to be his baseline. Patient is asymptomatic with his current blood pressure. Joy Velasco was also evaluated by PT and OT for generalized weakness and did fairly well showing progress. He would like to continue working with PT OT at home and  arranged home health care. Patient is not a surgical candidate for his severe aortic stenosis due to his severe comorbidities. He overall improved with IV antibiotics and IV fluids and will be discharged home today. Prescribed Augmentin for 5 more days for his pneumonia. We will not resume Lasix due to relatively low blood pressure. We will continue on spironolactone. Follow-up with family physician next week. Follow-up with Dr. Margarita Mckeon is advised.   CODE STATUS DNR CCA            [place] [address] with PT/OT, RN    Condition: Stable    Time Spent: 34 minutes      Electronically signed by Dakota Rodriguez MD on 1/14/2023 at 9:25 AM  Discharging Hospitalist

## 2023-01-14 NOTE — PROGRESS NOTES
[facility]  [facility]upational Therapy  Daily Note  Date: 2023  Patient Name: Claudia Mcfarland        MRN: [id number]    : 1932  (80 y.o.)    Subjective: Patient was sitting EOB eating breakfast upon OT arrival. Was agreeable to OT interventions. Per patient, Vidya Humphrey talked about releasing me today. \"  Pt is PROGRESSING toward goals and independence of Self Care this treatment session  Home with HH    Objective  ADLs:  Feeding: Independent  Grooming: Supervision  UE Bathing: Setup  LE Bathing: Supervision  UE Dressing: Setup  LE Dressing: Minimal assistance    Transfers:  Sit to sit: Supervision  Stand to sit: Supervision    Assessment  Assessment: Patient was sitting EOB eating breakfast upon OT arrival. Was agreeable to OT interventions. Requested to take a shower, however the RN recommended sponge bathing this date due to inconsistent blood pressure readings. Instructed patient to wash around RUE IV site to reduce the risk of an infection. Completed upper body sponge bathing tasks while sitting EOB with SET-UP A. Completed lower body sponge bathing tasks while sitting EOB/standing PRN with SUP. Discussed washing bilateral feet with a long-handled sponge for energy conservation purposes. Completed an UB dressing task (donning a t-shirt) while sitting EOB with SET-UP A. Completed a LB dressing task (donning a clean brief and elastic-waisted pajama pants) while sitting EOB/standing PRN with MIN A (due to difficulty donning brief to ankle level). Recommended using a reacher to increase independence and ease with LB dressing tasks, however patient declined. Donned bilateral slipper socks while sitting EOB and lifting each foot to waist level with SUP. Required a brief rest break after donning brief/pants and prior to donning socks due to SOB. Completed a sit to stand transfer from EOB while demonstrating proper hand placement with SUP. Performed functional mobility to/from bathroom without an AD with SBA. Did not experience any LOB nor require any rest breaks during this ambulation bout. In the bathroom, patient completed a grooming task (brushing teeth) while standing in front of the bathroom sink with SUP. Did not experience any LOB nor require any rest breaks during this standing bout. Declined bathroom need at this time. Upon exiting the bathroom, patient completed a stand to sit transfer onto recliner while demonstrating proper hand placement with SUP. Educated patient on energy conservation techniques to facilitate increased safety at home. Patient remains in recliner with call light/personal items within reach and chair alarm activated upon OT departure. Will continue to progress patient as tolerated (if still in inpatient status tomorrow).     Goals  Short Term Goals  Time Frame for Short Term Goals: 4 days (1/15/23)  Short Term Goal 1: Patient will complete lower body dressing and/or bathing tasks while using adaptive equipment/techniques PRN with SUP - MET  Short Term Goal 2: Patient will complete a commode transfer while using DME PRN with SUP - NOT MET  Short Term Goal 3: To increase independence with grooming tasks, patient will tolerate static/dynamic standing x2 minutes with no more than SUP - MET    Long Term Goals  Time Frame for Long Term Goals : STG=LTG    Call light in reach, Phone in reach, Use of Gait belt, Chair alarm, and Left in chair  Time In: 2058  Time Out: 0947  Timed Coded Minutes: 43  Total Treatment Time: 120 [facility], OTR/L      Date: 1/14/2023

## 2023-01-16 ENCOUNTER — CARE COORDINATION (OUTPATIENT)
Dept: CASE MANAGEMENT | Age: 88
End: 2023-01-16

## 2023-01-16 DIAGNOSIS — I50.22 CHRONIC SYSTOLIC (CONGESTIVE) HEART FAILURE (HCC): Primary | ICD-10-CM

## 2023-01-16 LAB
CULTURE: NORMAL
Lab: NORMAL
SPECIMEN DESCRIPTION: NORMAL

## 2023-01-16 PROCEDURE — 1111F DSCHRG MED/CURRENT MED MERGE: CPT | Performed by: NURSE PRACTITIONER

## 2023-01-16 NOTE — CARE COORDINATION
[facility] Care Transitions Initial Follow Up Call    Call within 2 business days of discharge: Yes    Care Transition Nurse contacted the patient by telephone to perform post hospital discharge assessment. Verified name and  with patient as identifiers. Provided introduction to self, and explanation of the Care Transition Nurse role. Patient very hard of hearing    Patient: Karly Mccarthy   Patient : 1932   MRN: [id number]    Reason for Admission: CHF, RLL PNE  Discharge Date: 23   RARS: Readmission Risk Score: 13.6      Last Discharge [address]       Date Complaint Diagnosis Description Type Department Provider    23 Shortness of Breath Acute on chronic systolic congestive heart failure ([address] . .. ED to Hosp-Admission (Discharged) (ADMITTED) [facility] Devin Christine MD; Arturo Pascal . .. Was this an external facility discharge? No     Challenges to be reviewed by the provider   Additional needs identified to be addressed with provider: No  none               Method of communication with provider: none. Patient discharged on  after 6 day hospitalization for RLL PNE, CHF. Patient was watching TV at time of our phone call, but could not really converse for any period of time - very [address]. He did say he was felling better & that I should call his personal friend, Geovanni Flores. Sharmila's mailbox was full, so unable to reach. I did contact Dilan Ull[address]. - confirmed that initial vs was yesterday, 1/15 & that two more home visits are scheduled for this week -  + . Documentation notes from initial visit indicated medications that patient is taking, so 1111F was able to be completed. Taking 5 day course of augmentin. Noted that lasix was stopped while hospitalized b/o hypotension. Discharged home on spironolactone. Noted patient is scheduled for an extended visit with Dr Jackelyn Ramon, cardio on .   [facility]. is planning to schedule PCP appt & include Geovanni Flores in the scheduling. Will make another attempt to reach [facility]. Communicated plan to Murray, patient's current ACM. Plan for next call: attempt to speak with patient's personal friend, Lillie Barrow per his request - patient is so hard of hearing & has difficulty on phone    Advance Care Planning:   Does patient have an Advance Directive: reviewed and current. Medication reconciliation was performed by Dilan Oakes. nurse - [id number] [facility]. documentation from 1/15 home visit: yes    Was patient discharged with a pulse oximeter? no    Non-face-to-face services provided:  Scheduled appointment with PCP-spoke with home care asking assistance to schedule when doing a home vs  Scheduled appointment with Specialist-1/25 cardio  Obtained and reviewed discharge summary and/or continuity of care documents  Communication with home health agencies or other community services the patient is currently using-communicated with home care    Offered patient enrollment in the Remote Patient Monitoring (RPM) program for in-home monitoring: Yes, patient was enrolled per [facility].     Care Transitions 24 Hour Call    Do you have a copy of your discharge instructions?: Yes  Do you have all of your prescriptions and are they filled?: Yes (Comment: yes as per home care)  Have you been contacted by a [address]?: No  Have you scheduled your follow up appointment?:  (Comment: [facility] will assist with PCP appt - already has cardio appt scheduled 1/25)  Patient DME: Shower chair, Straight cane, Walker  Patient Home Equipment: Nebulizer  Do you have support at home?: Alone  Do you feel like you have everything you need to keep you well at home?: Yes (Comment: [facility] + personal friend)  Are you an active caregiver in your home?: No  Care Transitions Interventions         Follow Up  Future Appointments   Date Time Provider Andres Sharma   1/25/2023 11:30 AM Hollis Rao MD [facility] [employer]   2/16/2023 11:00 AM Marques Quiet, APRN - CNP [facility]   3/13/2023 10:40 AM LORY Landin CNP [place] [facility]   12/18/2023 10:00 AM LORY Landin CNP [place] [facility]         Plan for next call:  attempt to speak with patient's personal friend, Nawaf Garcia per his request - patient is so hard of hearing & has difficulty on phone    Jermain Bishop RN

## 2023-01-17 ENCOUNTER — CARE COORDINATION (OUTPATIENT)
Dept: CASE MANAGEMENT | Age: 88
End: 2023-01-17

## 2023-01-17 NOTE — CARE COORDINATION
[facility] Care Transitions Follow up call - no answer by patient, unable to reach patient's friend, Dave Granados - her mailbox is full  CT episode resolved - informed current [facility], Alfonso Stephens    Patient: Romero Kirby    Patient : 1932   MRN: [id number]    Reason for Admission: CHF, RLL PNE  Discharge Date: 23 RARS: Readmission Risk Score: 13.6    Last Discharge  Street         Date Complaint Diagnosis Description Type Department Provider     23 Shortness of Breath Acute on chronic systolic congestive heart failure ([address] . .. ED to Hosp-Admission (Discharged) (ADMITTED) [facility] Ras Singh MD; Ana Cristina Vallejo . .. Was this an external facility discharge?  No     Follow Up  Future Appointments   Date Time Provider Andres Sharma   2023 11:30 AM MD Ismael Acosta W   2023 11:00 AM [facility]   3/13/2023 10:40 AM LORY Dumont CNP MED W   2023 10:00 AM LORY Dumont CNP [place] [facility]       Bekah Hernandez RN

## 2023-01-19 ENCOUNTER — TELEPHONE (OUTPATIENT)
Dept: PRIMARY CARE CLINIC | Age: 88
End: 2023-01-19

## 2023-01-19 NOTE — TELEPHONE ENCOUNTER
[facility] calls [facility] with weeping blisters on feet. New and does not look like burn or infection just fluid releasing. Pt without shoes on today. Will keep clean and dry and apply telpha with ace wrap to lower legs. Scheduled follow up in office tomorrow at 10 am [place]. Clarified Midodrine is 20 mg every 4-5 hours during the day, was giving every 8 hours. , pt bp and adrenal insufficiency depends on this med proper dosing very important.      Thanks  Titus HENLEY CNP

## 2023-01-20 ENCOUNTER — OFFICE VISIT (OUTPATIENT)
Dept: PRIMARY CARE CLINIC | Age: 88
End: 2023-01-20

## 2023-01-20 VITALS
WEIGHT: 185.8 LBS | DIASTOLIC BLOOD PRESSURE: 60 MMHG | OXYGEN SATURATION: 94 % | BODY MASS INDEX: 26.66 KG/M2 | SYSTOLIC BLOOD PRESSURE: 90 MMHG | HEART RATE: 60 BPM

## 2023-01-20 DIAGNOSIS — R22.43 LOCALIZED SWELLING OF BOTH LOWER LEGS: ICD-10-CM

## 2023-01-20 DIAGNOSIS — I50.33 ACUTE ON CHRONIC DIASTOLIC CONGESTIVE HEART FAILURE (HCC): Primary | ICD-10-CM

## 2023-01-20 DIAGNOSIS — I48.0 PAROXYSMAL ATRIAL FIBRILLATION (HCC): ICD-10-CM

## 2023-01-20 DIAGNOSIS — Z09 HOSPITAL DISCHARGE FOLLOW-UP: ICD-10-CM

## 2023-01-20 ASSESSMENT — PATIENT HEALTH QUESTIONNAIRE - PHQ9
SUM OF ALL RESPONSES TO PHQ QUESTIONS 1-9: 0
1. LITTLE INTEREST OR PLEASURE IN DOING THINGS: 0
SUM OF ALL RESPONSES TO PHQ QUESTIONS 1-9: 0
SUM OF ALL RESPONSES TO PHQ9 QUESTIONS 1 & 2: 0
2. FEELING DOWN, DEPRESSED OR HOPELESS: 0

## 2023-01-21 ASSESSMENT — ENCOUNTER SYMPTOMS
ABDOMINAL DISTENTION: 0
CHEST TIGHTNESS: 0
EYES NEGATIVE: 1
RHINORRHEA: 0
SORE THROAT: 0
COUGH: 0

## 2023-01-21 NOTE — PROGRESS NOTES
HPI Notes    Name: Mason Martinez  : 1932         Chief Complaint:     Chief Complaint   Patient presents with    Follow-Up from Hospital     Patient was seen in the ED for SOB and treated for CHF on . Then given IV Zosyn for his pneumonia. Physician decreased Rosuvastatin from 20mg to 10mg. And increased Midodrine from Bid to 2 TID. Patient is to follow up with Dr Harshad Mathur. History of Present Illness:        HPI  Pt comes to office today with friend that drove him and is unable to wear any shoes (even though it is snowing outside) because his feet are too swollen and weeping fluids. He has socks on and plastic grocery bags over both feet coming in. Pt states he feels much better even with medication given prior to hospitalization for pneumonia and CHF. Pt 's bp remains low and he is on midodrine which doses where adjusted recently to appropriate times of Q4 hours while awake and not Q 8 hours (or prior to bedtime) . Pt is trying to be active in his home but it is difficult with both legs swollen up to knee level. His bp is stable but also remains low which limits diuresis . Pt remains on spironolactone medication. Lab Results   Component Value Date    WBC 9.5 2023    HGB 12.4 (L) 2023    HCT 37.8 (L) 2023    MCV 94.2 2023     2023     Lab Results   Component Value Date/Time     2023 05:20 AM    K 4.1 2023 05:20 AM     2023 05:20 AM    CO2 30 2023 05:20 AM    BUN 27 2023 05:20 AM    CREATININE 1.30 2023 05:20 AM    GLUCOSE 111 2023 05:20 AM    CALCIUM 9.6 2023 05:20 AM      Pt's legs are warmed today with warm water washed and dried, applied telpha to tops of feet with small amount kerlix and 4\" double ace from toes to knees pt wrapped in towel and warmed feet.  Then applied clean fresh socks        Admit date:  2023                          Discharge date:  2023         Discharge Diagnoses:      1. Right lower lobe pneumonia  2. Chronic systolic CHF with EF 25 to 30%  3. Severe aortic stenosis  4. Hyportension  5. Hypokalemia  6. CKD stage IIIb  7. History of CAD, s/p CABG x4  8. H/O SSS s/p [employer] in 2012  9. Mild malnutrition  10. Generalized weakness  11. Hypothyroidism  12. History of A. fib in the past      Past Medical History:     Past Medical History:   Diagnosis Date    Atrial fibrillation ([address]     CAD (coronary artery disease)     Hard of hearing     Hiatal hernia     History of PTCA     9/2012    Hyperlipidemia     Hypothyroidism     Pacemaker     Qnovo      Reviewed all health maintenance requirements and ordered appropriate tests  Health Maintenance Due   Topic Date Due    COVID-19 Vaccine (1) Never done    DTaP/Tdap/Td vaccine (1 - Tdap) Never done    Shingles vaccine (1 of 2) Never done    Flu vaccine (1) 08/01/2022       Past Surgical History:     Past Surgical History:   Procedure Laterality Date    CARDIAC SURGERY      CORONARY ARTERY BYPASS GRAFT  2000    quad bypass [address]  36/98/0458    UMBILICAL HERNIA REPAIR    PACEMAKER INSERTION  8/6/12    Qnovo    PTCA  81/52/67    UMBILICAL HERNIA REPAIR  11/21/2016    Merlin Erm MD        Medications:       Prior to Admission medications    Medication Sig Start Date End Date Taking?  Authorizing Provider   rosuvastatin (CRESTOR) 10 MG tablet Take 1 tablet by mouth daily 1/14/23  Yes Theodore Barnard MD   propranolol (INDERAL) 10 MG tablet Take 1 tablet by mouth 2 times daily Hold for SBP less than 110 1/14/23  Yes Theodore Barnard MD   midodrine (PROAMATINE) 10 MG tablet Take 2 tablets by mouth 3 times daily 1/14/23  Yes Theodore Barnard MD   spironolactone (ALDACTONE) 25 MG tablet Take 1 tablet by mouth daily For congestive heart failure 1/5/23  Yes Quinn Bertrand, APRN - CNP   Magnesium 400 MG TABS Take 400 mg by mouth daily 12/12/22  Yes Adelso Farrell MD clopidogrel (PLAVIX) 75 MG tablet TAKE 1 TABLET BY MOUTH DAILY 11/11/22  Yes LORY Barba CNP   tamsulosin (FLOMAX) 0.4 MG capsule Take 1 capsule by mouth at bedtime Urinary frequency/enlarged prostate, take at bedtime 11/10/22  Yes LORY Barba CNP   levothyroxine (SYNTHROID) 125 MCG tablet TAKE 1 TABLET BY MOUTH DAILY 10/19/22  Yes LORY Barba CNP   vitamin B-12 (CYANOCOBALAMIN) 1000 MCG tablet Take 1,000 mcg by mouth daily   Yes Historical Provider, MD   Cholecalciferol (VITAMIN D3) 50 MCG (2000 UT) CAPS Take 2,000 Units by mouth daily   Yes Historical Provider, MD   aspirin 81 MG tablet Take 81 mg by mouth daily. Yes Historical Provider, MD   metoprolol (LOPRESSOR) 100 MG tablet Take 1 tablet by mouth 2 times daily  Patient taking differently: No sig reported 3/27/20 4/26/21  Lalita Mansfield MD        Allergies:       Menthol (topical analgesic)    Social History:     Tobacco:    reports that he has never smoked. He has never used smokeless tobacco.  Alcohol:      reports that he does not currently use alcohol. Drug Use:  reports no history of drug use. Family History:     Family History   Problem Relation Age of Onset    Heart Disease Father        Review of Systems:         Review of Systems   Constitutional:  Positive for activity change. Negative for appetite change, chills and fever. HENT:  Negative for congestion, rhinorrhea and sore throat. Eyes: Negative. Respiratory:  Negative for cough and chest tightness. Cardiovascular:  Positive for leg swelling. Negative for chest pain and palpitations. Gastrointestinal:  Negative for abdominal distention. Endocrine: Negative for cold intolerance and heat intolerance. Genitourinary:  Negative for difficulty urinating. Musculoskeletal:  Negative for arthralgias and gait problem. Neurological:  Negative for dizziness and headaches. Psychiatric/Behavioral:  Negative for decreased concentration.  The patient is not nervous/anxious. Physical Exam:     Vitals:  BP 90/60   Pulse 60   Wt 185 lb 12.8 oz (84.3 kg)   SpO2 94%   BMI 26.66 kg/m²       Physical Exam  Vitals and nursing note reviewed. Constitutional:       General: He is not in acute distress. Appearance: Normal appearance. He is well-developed. HENT:      Head: Normocephalic and atraumatic. Right Ear: Tympanic membrane normal.      Left Ear: Tympanic membrane and external ear normal.      Mouth/Throat:      Pharynx: No oropharyngeal exudate. Eyes:      Pupils: Pupils are equal, round, and reactive to light. Neck:      Vascular: No carotid bruit (neg thalia). Cardiovascular:      Rate and Rhythm: Normal rate and regular rhythm. Pulses: Normal pulses. Heart sounds: Normal heart sounds. No murmur heard. Pulmonary:      Effort: Pulmonary effort is normal. No respiratory distress. Breath sounds: Normal breath sounds. Abdominal:      Palpations: Abdomen is soft. There is no mass. Tenderness: There is no abdominal tenderness. Musculoskeletal:         General: Swelling (bilateral lower legs knee down with pitting/taunt edema and draining seeping fluid) present. Normal range of motion. Cervical back: Normal range of motion and neck supple. Lymphadenopathy:      Cervical: No cervical adenopathy. Skin:     General: Skin is warm. Findings: No rash. Neurological:      Mental Status: He is alert and oriented to person, place, and time. Psychiatric:         Behavior: Behavior normal.         Thought Content:  Thought content normal.         Judgment: Judgment normal.           Data:     Lab Results   Component Value Date/Time     01/13/2023 05:20 AM    K 4.1 01/13/2023 05:20 AM     01/13/2023 05:20 AM    CO2 30 01/13/2023 05:20 AM    BUN 27 01/13/2023 05:20 AM    CREATININE 1.30 01/13/2023 05:20 AM    GLUCOSE 111 01/13/2023 05:20 AM    PROT 7.0 01/04/2023 07:14 PM    LABALBU 4.0 01/04/2023 07:14 PM BILITOT 0.7 01/04/2023 07:14 PM    ALKPHOS 74 01/04/2023 07:14 PM    AST 16 01/04/2023 07:14 PM    ALT 6 01/04/2023 07:14 PM     Lab Results   Component Value Date/Time    WBC 9.5 01/08/2023 01:11 PM    RBC 4.01 01/08/2023 01:11 PM    HGB 12.4 01/08/2023 01:11 PM    HCT 37.8 01/08/2023 01:11 PM    MCV 94.2 01/08/2023 01:11 PM    MCH 31.0 01/08/2023 01:11 PM    MCHC 32.9 01/08/2023 01:11 PM    RDW 13.9 01/08/2023 01:11 PM     01/08/2023 01:11 PM    MPV NOT REPORTED 10/11/2021 10:31 AM     Lab Results   Component Value Date/Time    TSH 23.63 10/24/2022 12:19 PM     Lab Results   Component Value Date/Time    CHOL 137 10/24/2022 12:19 PM    HDL 34 10/24/2022 12:19 PM    PSA 1.85 10/24/2022 12:19 PM          Assessment & Plan        Diagnosis Orders   1. Acute on chronic diastolic congestive heart failure (HCC)  CBC with Auto Differential    Basic Metabolic Panel    US DUP LOWER EXTREMITY RIGHT MELISSA    US DUP LOWER EXTREMITY LEFT MELISSA      2. Localized swelling of both lower legs  CBC with Auto Differential    Basic Metabolic Panel    US DUP LOWER EXTREMITY RIGHT MELISSA    US DUP LOWER EXTREMITY LEFT MELISSA      3. Paroxysmal atrial fibrillation (HCC)                          Completed Refills   Requested Prescriptions      No prescriptions requested or ordered in this encounter     Return in about 4 weeks (around 2/17/2023) for follow up results. No orders of the defined types were placed in this encounter. Orders Placed This Encounter   Procedures    US DUP LOWER EXTREMITY RIGHT MELISSA     Standing Status:   Future     Standing Expiration Date:   1/20/2024     Order Specific Question:   Reason for exam:     Answer:   thalia leg swelling post hospitalization , weeping fluid from feet.  rule out DVT    US DUP LOWER EXTREMITY LEFT MELISSA     Standing Status:   Future     Standing Expiration Date:   1/20/2024     Order Specific Question:   Reason for exam:     Answer:   bilateral leg swelling post hospitalization (CHF, pneumonia) , weeping fluid from both feet rule out DVT    CBC with Auto Differential     Standing Status:   Future     Standing Expiration Date:   5/62/0604    Basic Metabolic Panel     Standing Status:   Future     Standing Expiration Date:   1/20/2024         There are no Patient Instructions on file for this visit.     Electronically signed by LORY Landin CNP on 1/21/2023 at 4:52 PM           Completed Refills   Requested Prescriptions      No prescriptions requested or ordered in this encounter

## 2023-01-23 ENCOUNTER — HOSPITAL ENCOUNTER (OUTPATIENT)
Age: 88
Setting detail: SPECIMEN
Discharge: HOME | End: 2023-01-23
Payer: MEDICARE

## 2023-01-23 ENCOUNTER — CARE COORDINATION (OUTPATIENT)
Dept: CARE COORDINATION | Age: 88
End: 2023-01-23

## 2023-01-23 LAB
ANION GAP SERPL CALCULATED.3IONS-SCNC: 10 MMOL/L (ref 9–17)
BUN BLDV-MCNC: 21 MG/DL (ref 8–23)
BUN/CREAT BLD: 15 (ref 9–20)
CALCIUM SERPL-MCNC: 9.6 MG/DL (ref 8.6–10.4)
CHLORIDE BLD-SCNC: 101 MMOL/L (ref 98–107)
CO2: 30 MMOL/L (ref 20–31)
CREAT SERPL-MCNC: 1.36 MG/DL (ref 0.7–1.2)
GFR SERPL CREATININE-BSD FRML MDRD: 49 ML/MIN/1.73M2
GLUCOSE BLD-MCNC: 124 MG/DL (ref 70–99)
HCT VFR BLD CALC: 34.8 % (ref 41–53)
HEMOGLOBIN: 11.1 G/DL (ref 13.5–17.5)
MCH RBC QN AUTO: 30.3 PG (ref 26–34)
MCHC RBC AUTO-ENTMCNC: 31.8 G/DL (ref 31–37)
MCV RBC AUTO: 95.2 FL (ref 80–100)
PDW BLD-RTO: 15.3 % (ref 12.1–15.2)
PLATELET # BLD: 135 K/UL (ref 140–450)
POTASSIUM SERPL-SCNC: 3.4 MMOL/L (ref 3.7–5.3)
RBC # BLD: 3.66 M/UL (ref 4.5–5.9)
SODIUM BLD-SCNC: 141 MMOL/L (ref 135–144)
WBC # BLD: 7 K/UL (ref 3.5–11)

## 2023-01-23 PROCEDURE — 80048 BASIC METABOLIC PNL TOTAL CA: CPT

## 2023-01-23 PROCEDURE — 85027 COMPLETE CBC AUTOMATED: CPT

## 2023-01-23 NOTE — CARE COORDINATION
Ambulatory Care Coordination Note  1/23/2023    ACC: Yumi Uribe RN    CC outreach call placed to patient. Spoke briefly with patient who had hard time hearing writer and dog barking in the background. Phone then handed off to VIR[place]IA Jenkins with [facility] states that the caregiver will not drive in the snow and patient can not get into/out of the [employer] van by himself. She states to call back in a few weeks to see about getting ultrasound of legs done then after this snow. Per [facility] \"we are out here everyday and are monitoring him. Let Cheryle know that home care will be doing wound care/dressing changes Monday through Friday this week and the caregiver Astrid is doing it on the weekend. Also getting blood drawn today.\" Reports blood pressure today was 104/70.     Future Appointments   Date Time Provider Department Center   1/25/2023 11:30 AM MD Emre Lara Car [facility]   2/16/2023 11:00 AM L[facility]NP [facility]   3/13/2023 10:40 AM Cheryle M [place], APRN - CNP [facility]   12/18/2023 10:00 AM Cheryle M [place], APRN - CNP [facility]     Care Coordination Plan of Care:   This nurse Care Coordinator will  - update PCP         Prior to Admission medications    Medication Sig Start Date End Date Taking? Authorizing Provider   rosuvastatin (CRESTOR) 10 MG tablet Take 1 tablet by mouth daily 1/14/23   Tien Payne MD   propranolol (INDERAL) 10 MG tablet Take 1 tablet by mouth 2 times daily Hold for SBP less than 110 1/14/23   Tien Payne MD   midodrine (PROAMATINE) 10 MG tablet Take 2 tablets by mouth 3 times daily 1/14/23   Tien Payne MD   spironolactone (ALDACTONE) 25 MG tablet Take 1 tablet by mouth daily For congestive heart failure 1/5/23   LORY Hui CNP   Magnesium 400 MG TABS Take 400 mg by mouth daily 12/12/22   Elie Baum MD   clopidogrel (PLAVIX) 75 MG tablet TAKE 1 TABLET BY MOUTH DAILY 11/11/22   LORY Hui CNP  tamsulosin (FLOMAX) 0.4 MG capsule Take 1 capsule by mouth at bedtime Urinary frequency/enlarged prostate, take at bedtime 11/10/22   LORY Ugalde CNP   levothyroxine (SYNTHROID) 125 MCG tablet TAKE 1 TABLET BY MOUTH DAILY 10/19/22   LORY Ugalde CNP   vitamin B-12 (CYANOCOBALAMIN) 1000 MCG tablet Take 1,000 mcg by mouth daily    Historical Provider, MD   Cholecalciferol (VITAMIN D3) 50 MCG (2000 UT) CAPS Take 2,000 Units by mouth daily    Historical Provider, MD   metoprolol (LOPRESSOR) 100 MG tablet Take 1 tablet by mouth 2 times daily  Patient taking differently: No sig reported 3/27/20 4/26/21  Mejia Telles MD   aspirin 81 MG tablet Take 81 mg by mouth daily.     Historical Provider, MD

## 2023-01-23 NOTE — TELEPHONE ENCOUNTER
Noted,ultrasound is next week I believe anyhow. Unlikely but need to check will advise further when labs seen.

## 2023-01-27 ENCOUNTER — HOSPITAL ENCOUNTER (OUTPATIENT)
Age: 88
Setting detail: SPECIMEN
Discharge: HOME | End: 2023-01-27
Payer: MEDICARE

## 2023-01-27 ENCOUNTER — TELEPHONE (OUTPATIENT)
Dept: PRIMARY CARE CLINIC | Age: 88
End: 2023-01-27

## 2023-01-27 LAB
ANION GAP SERPL CALCULATED.3IONS-SCNC: 12 MMOL/L (ref 9–17)
BUN BLDV-MCNC: 25 MG/DL (ref 8–23)
BUN/CREAT BLD: 18 (ref 9–20)
CALCIUM SERPL-MCNC: 9.3 MG/DL (ref 8.6–10.4)
CHLORIDE BLD-SCNC: 99 MMOL/L (ref 98–107)
CO2: 29 MMOL/L (ref 20–31)
CREAT SERPL-MCNC: 1.36 MG/DL (ref 0.7–1.2)
GFR SERPL CREATININE-BSD FRML MDRD: 49 ML/MIN/1.73M2
GLUCOSE BLD-MCNC: 116 MG/DL (ref 70–99)
HCT VFR BLD CALC: 32.5 % (ref 41–53)
HEMOGLOBIN: 10.6 G/DL (ref 13.5–17.5)
POTASSIUM SERPL-SCNC: 3.8 MMOL/L (ref 3.7–5.3)
SODIUM BLD-SCNC: 140 MMOL/L (ref 135–144)
TROPONIN, HIGH SENSITIVITY: 51 NG/L (ref 0–22)

## 2023-01-27 PROCEDURE — 85018 HEMOGLOBIN: CPT

## 2023-01-27 PROCEDURE — 84484 ASSAY OF TROPONIN QUANT: CPT

## 2023-01-27 PROCEDURE — 80048 BASIC METABOLIC PNL TOTAL CA: CPT

## 2023-01-27 PROCEDURE — 85014 HEMATOCRIT: CPT

## 2023-01-27 NOTE — TELEPHONE ENCOUNTER
Dmitri Rodriguez with home care called to advise provider that patient states he does not feel well. Weight is 183.8 pounds and legs are still swollen, not as wet. Dmitri Rodriguez unsure if Spironolactone was called in or if there was a med change.      Dmitri Rodriguez would like call back at [phone]

## 2023-01-28 NOTE — TELEPHONE ENCOUNTER
Pt was offered ER and he declined going. Labs drawn by home health are stable. Legs remain unchanged with care for this week through home health. Pt did not take extra spironolactone as advised , needs outpt doppler scheduled please arrange through scheduling. I conveyed information to DR. Joanna Fletcher with [facility] at office. Will consider SNF to help with monitoring and more advanced diuresis.      Thanks

## 2023-01-30 ENCOUNTER — CARE COORDINATION (OUTPATIENT)
Dept: CARE COORDINATION | Age: 88
End: 2023-01-30

## 2023-01-30 ENCOUNTER — HOSPITAL ENCOUNTER (INPATIENT)
Age: 88
LOS: 7 days | Discharge: HOME | DRG: 291 | End: 2023-02-06
Attending: FAMILY MEDICINE | Admitting: INTERNAL MEDICINE
Payer: MEDICARE

## 2023-01-30 ENCOUNTER — APPOINTMENT (OUTPATIENT)
Dept: GENERAL RADIOLOGY | Age: 88
DRG: 291 | End: 2023-01-30
Payer: MEDICARE

## 2023-01-30 ENCOUNTER — TELEPHONE (OUTPATIENT)
Dept: FAMILY MEDICINE CLINIC | Age: 88
End: 2023-01-30

## 2023-01-30 DIAGNOSIS — I50.23 ACUTE ON CHRONIC SYSTOLIC CONGESTIVE HEART FAILURE (HCC): Primary | ICD-10-CM

## 2023-01-30 DIAGNOSIS — Z20.822 LAB TEST NEGATIVE FOR COVID-19 VIRUS: ICD-10-CM

## 2023-01-30 DIAGNOSIS — R60.0 BILATERAL LOWER EXTREMITY EDEMA: ICD-10-CM

## 2023-01-30 LAB
ABSOLUTE EOS #: 0.1 K/UL (ref 0–0.4)
ABSOLUTE LYMPH #: 0.8 K/UL (ref 1–4.8)
ABSOLUTE MONO #: 0.3 K/UL (ref 0–1)
ALBUMIN SERPL-MCNC: 3.9 G/DL (ref 3.5–5.2)
ALP BLD-CCNC: 66 U/L (ref 40–129)
ALT SERPL-CCNC: 10 U/L (ref 5–41)
ANION GAP SERPL CALCULATED.3IONS-SCNC: 14 MMOL/L (ref 9–17)
AST SERPL-CCNC: 15 U/L
BASOPHILS # BLD: 1 % (ref 0–2)
BASOPHILS ABSOLUTE: 0.1 K/UL (ref 0–0.2)
BILIRUB SERPL-MCNC: 1 MG/DL (ref 0.3–1.2)
BUN BLDV-MCNC: 34 MG/DL (ref 8–23)
BUN/CREAT BLD: 21 (ref 9–20)
CALCIUM SERPL-MCNC: 9.7 MG/DL (ref 8.6–10.4)
CHLORIDE BLD-SCNC: 101 MMOL/L (ref 98–107)
CO2: 28 MMOL/L (ref 20–31)
CREAT SERPL-MCNC: 1.65 MG/DL (ref 0.7–1.2)
DIFFERENTIAL TYPE: YES
EOSINOPHILS RELATIVE PERCENT: 1 % (ref 0–5)
GFR SERPL CREATININE-BSD FRML MDRD: 39 ML/MIN/1.73M2
GLUCOSE BLD-MCNC: 111 MG/DL (ref 70–99)
HCT VFR BLD CALC: 35 % (ref 41–53)
HEMOGLOBIN: 11.2 G/DL (ref 13.5–17.5)
LYMPHOCYTES # BLD: 13 % (ref 13–44)
MCH RBC QN AUTO: 30.3 PG (ref 26–34)
MCHC RBC AUTO-ENTMCNC: 32 G/DL (ref 31–37)
MCV RBC AUTO: 94.6 FL (ref 80–100)
MONOCYTES # BLD: 5 % (ref 5–9)
PDW BLD-RTO: 15.4 % (ref 12.1–15.2)
PLATELET # BLD: 164 K/UL (ref 140–450)
POTASSIUM SERPL-SCNC: 4 MMOL/L (ref 3.7–5.3)
PRO-BNP: ABNORMAL PG/ML
RBC # BLD: 3.7 M/UL (ref 4.5–5.9)
SARS-COV-2, RAPID: NOT DETECTED
SEG NEUTROPHILS: 80 % (ref 39–75)
SEGMENTED NEUTROPHILS ABSOLUTE COUNT: 4.9 K/UL (ref 2.1–6.5)
SODIUM BLD-SCNC: 143 MMOL/L (ref 135–144)
SPECIMEN DESCRIPTION: NORMAL
TOTAL PROTEIN: 6.9 G/DL (ref 6.4–8.3)
TROPONIN, HIGH SENSITIVITY: 52 NG/L (ref 0–22)
TROPONIN, HIGH SENSITIVITY: 55 NG/L (ref 0–22)
WBC # BLD: 6.1 K/UL (ref 3.5–11)

## 2023-01-30 PROCEDURE — 87635 SARS-COV-2 COVID-19 AMP PRB: CPT

## 2023-01-30 PROCEDURE — 36415 COLL VENOUS BLD VENIPUNCTURE: CPT

## 2023-01-30 PROCEDURE — 94761 N-INVAS EAR/PLS OXIMETRY MLT: CPT

## 2023-01-30 PROCEDURE — 2580000003 HC RX 258: Performed by: INTERNAL MEDICINE

## 2023-01-30 PROCEDURE — 6370000000 HC RX 637 (ALT 250 FOR IP): Performed by: INTERNAL MEDICINE

## 2023-01-30 PROCEDURE — 99285 EMERGENCY DEPT VISIT HI MDM: CPT

## 2023-01-30 PROCEDURE — 83880 ASSAY OF NATRIURETIC PEPTIDE: CPT

## 2023-01-30 PROCEDURE — 80053 COMPREHEN METABOLIC PANEL: CPT

## 2023-01-30 PROCEDURE — 6360000002 HC RX W HCPCS: Performed by: INTERNAL MEDICINE

## 2023-01-30 PROCEDURE — 1200000000 HC SEMI PRIVATE

## 2023-01-30 PROCEDURE — 71045 X-RAY EXAM CHEST 1 VIEW: CPT

## 2023-01-30 PROCEDURE — 85025 COMPLETE CBC W/AUTO DIFF WBC: CPT

## 2023-01-30 PROCEDURE — C9803 HOPD COVID-19 SPEC COLLECT: HCPCS

## 2023-01-30 PROCEDURE — 93005 ELECTROCARDIOGRAM TRACING: CPT | Performed by: FAMILY MEDICINE

## 2023-01-30 PROCEDURE — 84484 ASSAY OF TROPONIN QUANT: CPT

## 2023-01-30 RX ORDER — MIDODRINE HYDROCHLORIDE 5 MG/1
20 TABLET ORAL 3 TIMES DAILY
Status: DISCONTINUED | OUTPATIENT
Start: 2023-01-30 | End: 2023-02-04

## 2023-01-30 RX ORDER — SODIUM CHLORIDE 9 MG/ML
INJECTION, SOLUTION INTRAVENOUS PRN
Status: DISCONTINUED | OUTPATIENT
Start: 2023-01-30 | End: 2023-02-06 | Stop reason: HOSPADM

## 2023-01-30 RX ORDER — SODIUM CHLORIDE 0.9 % (FLUSH) 0.9 %
5-40 SYRINGE (ML) INJECTION EVERY 12 HOURS SCHEDULED
Status: DISCONTINUED | OUTPATIENT
Start: 2023-01-30 | End: 2023-02-06 | Stop reason: HOSPADM

## 2023-01-30 RX ORDER — LANOLIN ALCOHOL/MO/W.PET/CERES
400 CREAM (GRAM) TOPICAL DAILY
Status: DISCONTINUED | OUTPATIENT
Start: 2023-01-30 | End: 2023-02-06 | Stop reason: HOSPADM

## 2023-01-30 RX ORDER — ROSUVASTATIN CALCIUM 10 MG/1
10 TABLET, COATED ORAL DAILY
Status: DISCONTINUED | OUTPATIENT
Start: 2023-01-31 | End: 2023-02-06 | Stop reason: HOSPADM

## 2023-01-30 RX ORDER — LEVOTHYROXINE SODIUM 0.12 MG/1
1 TABLET ORAL DAILY
Status: DISCONTINUED | OUTPATIENT
Start: 2023-01-30 | End: 2023-01-30 | Stop reason: RX

## 2023-01-30 RX ORDER — HEPARIN SODIUM 5000 [USP'U]/ML
5000 INJECTION, SOLUTION INTRAVENOUS; SUBCUTANEOUS 2 TIMES DAILY
Status: DISCONTINUED | OUTPATIENT
Start: 2023-01-30 | End: 2023-02-06 | Stop reason: HOSPADM

## 2023-01-30 RX ORDER — PROPRANOLOL HYDROCHLORIDE 10 MG/1
10 TABLET ORAL 2 TIMES DAILY
Status: DISCONTINUED | OUTPATIENT
Start: 2023-01-30 | End: 2023-02-06 | Stop reason: HOSPADM

## 2023-01-30 RX ORDER — LANOLIN ALCOHOL/MO/W.PET/CERES
1000 CREAM (GRAM) TOPICAL DAILY
Status: DISCONTINUED | OUTPATIENT
Start: 2023-01-30 | End: 2023-02-06 | Stop reason: HOSPADM

## 2023-01-30 RX ORDER — METOPROLOL TARTRATE 50 MG/1
100 TABLET, FILM COATED ORAL DAILY
Status: DISCONTINUED | OUTPATIENT
Start: 2023-01-30 | End: 2023-01-30 | Stop reason: ALTCHOICE

## 2023-01-30 RX ORDER — DOBUTAMINE HYDROCHLORIDE 400 MG/100ML
5 INJECTION INTRAVENOUS CONTINUOUS
Status: DISCONTINUED | OUTPATIENT
Start: 2023-01-30 | End: 2023-01-31

## 2023-01-30 RX ORDER — ASPIRIN 81 MG/1
81 TABLET, CHEWABLE ORAL DAILY
Status: DISCONTINUED | OUTPATIENT
Start: 2023-01-31 | End: 2023-02-06 | Stop reason: HOSPADM

## 2023-01-30 RX ORDER — SODIUM CHLORIDE 0.9 % (FLUSH) 0.9 %
5-40 SYRINGE (ML) INJECTION PRN
Status: DISCONTINUED | OUTPATIENT
Start: 2023-01-30 | End: 2023-02-06 | Stop reason: HOSPADM

## 2023-01-30 RX ORDER — VITAMIN B COMPLEX
2000 TABLET ORAL DAILY
Status: DISCONTINUED | OUTPATIENT
Start: 2023-01-31 | End: 2023-02-06 | Stop reason: HOSPADM

## 2023-01-30 RX ORDER — ACETAMINOPHEN 325 MG/1
650 TABLET ORAL EVERY 6 HOURS PRN
Status: DISCONTINUED | OUTPATIENT
Start: 2023-01-30 | End: 2023-02-06 | Stop reason: HOSPADM

## 2023-01-30 RX ORDER — ONDANSETRON 4 MG/1
4 TABLET, ORALLY DISINTEGRATING ORAL EVERY 8 HOURS PRN
Status: DISCONTINUED | OUTPATIENT
Start: 2023-01-30 | End: 2023-02-03

## 2023-01-30 RX ORDER — SPIRONOLACTONE 25 MG/1
25 TABLET ORAL DAILY
Status: DISCONTINUED | OUTPATIENT
Start: 2023-01-31 | End: 2023-01-30

## 2023-01-30 RX ORDER — SPIRONOLACTONE 25 MG/1
25 TABLET ORAL 2 TIMES DAILY
Status: DISCONTINUED | OUTPATIENT
Start: 2023-01-30 | End: 2023-02-05

## 2023-01-30 RX ORDER — ACETAMINOPHEN 650 MG/1
650 SUPPOSITORY RECTAL EVERY 6 HOURS PRN
Status: DISCONTINUED | OUTPATIENT
Start: 2023-01-30 | End: 2023-02-06 | Stop reason: HOSPADM

## 2023-01-30 RX ORDER — CLOPIDOGREL BISULFATE 75 MG/1
75 TABLET ORAL DAILY
Status: DISCONTINUED | OUTPATIENT
Start: 2023-01-31 | End: 2023-02-06 | Stop reason: HOSPADM

## 2023-01-30 RX ORDER — FUROSEMIDE 10 MG/ML
40 INJECTION INTRAMUSCULAR; INTRAVENOUS ONCE
Status: DISCONTINUED | OUTPATIENT
Start: 2023-01-30 | End: 2023-01-30

## 2023-01-30 RX ORDER — POLYETHYLENE GLYCOL 3350 17 G/17G
17 POWDER, FOR SOLUTION ORAL DAILY PRN
Status: DISCONTINUED | OUTPATIENT
Start: 2023-01-30 | End: 2023-02-06 | Stop reason: HOSPADM

## 2023-01-30 RX ORDER — ONDANSETRON 2 MG/ML
4 INJECTION INTRAMUSCULAR; INTRAVENOUS EVERY 6 HOURS PRN
Status: DISCONTINUED | OUTPATIENT
Start: 2023-01-30 | End: 2023-02-03

## 2023-01-30 RX ORDER — TAMSULOSIN HYDROCHLORIDE 0.4 MG/1
0.4 CAPSULE ORAL NIGHTLY
Status: DISCONTINUED | OUTPATIENT
Start: 2023-01-30 | End: 2023-02-06 | Stop reason: HOSPADM

## 2023-01-30 RX ADMIN — PROPRANOLOL HYDROCHLORIDE 10 MG: 10 TABLET ORAL at 22:02

## 2023-01-30 RX ADMIN — SPIRONOLACTONE 25 MG: 25 TABLET, FILM COATED ORAL at 22:01

## 2023-01-30 RX ADMIN — HEPARIN SODIUM 5000 UNITS: 5000 INJECTION INTRAVENOUS; SUBCUTANEOUS at 22:01

## 2023-01-30 RX ADMIN — SODIUM CHLORIDE: 9 INJECTION, SOLUTION INTRAVENOUS at 19:35

## 2023-01-30 RX ADMIN — SODIUM CHLORIDE, PRESERVATIVE FREE 10 ML: 5 INJECTION INTRAVENOUS at 22:04

## 2023-01-30 RX ADMIN — DOBUTAMINE HYDROCHLORIDE 5 MCG/KG/MIN: 400 INJECTION INTRAVENOUS at 19:36

## 2023-01-30 RX ADMIN — TAMSULOSIN HYDROCHLORIDE 0.4 MG: 0.4 CAPSULE ORAL at 22:01

## 2023-01-30 RX ADMIN — MIDODRINE HYDROCHLORIDE 20 MG: 5 TABLET ORAL at 22:01

## 2023-01-30 RX ADMIN — Medication 400 MG: at 17:37

## 2023-01-30 ASSESSMENT — PAIN - FUNCTIONAL ASSESSMENT: PAIN_FUNCTIONAL_ASSESSMENT: NONE - DENIES PAIN

## 2023-01-30 NOTE — PROGRESS NOTES
Arrives on floor per w/c. Able to ambulate to bed for weight and then to chair per his request.  Legs elevated when up in chair. Admission assessment completed. Oriented to room and orders reviewed. Denies pain or needs at this time.

## 2023-01-30 NOTE — PROGRESS NOTES
Dr. Newell Bamberger called x2 to notify of consult-messages left. Dr. Jessica Navarro calls in at this time and updated with inability to reach Dr. Newell Bamberger. Order for Dobutamine 5 mcg/kg/min received and entered. Nursing to continue to try to get ahold of Dr. Newell Bamberger to clarify admission cardiac medications.

## 2023-01-30 NOTE — PROGRESS NOTES
Ace wraps removed from BLE. Open wounds noted on tops of both feet. Serous drainage on both dressings. Photos taken, rewrapped with nonadherent dressing, kerlix, and then ace wrap. Pt states \"they look a lot better than they did\".

## 2023-01-30 NOTE — CARE COORDINATION
Ambulatory Care Coordination Note  1/30/2023    ACC: Jessi Philippe RN    Planned to make CC outreach call to patient today. Noted patient currently in the ED at [facility]. Will defer planned call today. Future Appointments   Date Time Provider Andres Sharma   2/16/2023 11:00 AM LORY Calderon CNP nw pc [facility]   3/13/2023 10:40 AM LORY Calderon CNP [facility]   12/18/2023 10:00 AM LORY Calderon CNP [facility]     Care Coordination Plan of Care:    This nurse Care Coordinator will  - defer planned call today   -follow up with patient tomorrow if discharged home

## 2023-01-30 NOTE — TELEPHONE ENCOUNTER
Received call from [facility] pt with little change from last week, feet and lower legs up to knees remain shiny and taunt with swelling, did not have doppler done as planned.     Pt on spironolactone diuretic, bp remains low SBP  average. With being on midodrine 20 mg 3 x daily   Pt with heaviness in legs difficult to move around elevating when able   Lives alone.   Weight increase today by 1# , no loss in the last week.     Discussed  case with Dr. Baum Cardiologist and advised pt come in through ER with likely admission for CHF, leg swelling. Diuresis needed and wound care to feet which are oozing the fluid and now have some erythema present.       Hx recent hospitalization        Admit date:  1/8/2023                          Discharge date:  1/14/2023         Discharge Diagnoses:      1.  Right lower lobe pneumonia  2.  Chronic systolic CHF with EF 25 to 30%  3.  Severe aortic stenosis  4.  Hyportension  5.  Hypokalemia  6.  CKD stage IIIb  7.  History of CAD, s/p CABG x4  8.  H/O SSS s/p [employer] in 2012  9.  Mild malnutrition  10.  Generalized weakness  11.  Hypothyroidism  12.  History of A. fib in the past    I spoke with pt on phone and he is agreeable to come to ER for evaluation.   Report called to ER nurse.     Cheryle HENLEY CNP

## 2023-01-31 ENCOUNTER — APPOINTMENT (OUTPATIENT)
Dept: CT IMAGING | Age: 88
DRG: 291 | End: 2023-01-31
Payer: MEDICARE

## 2023-01-31 ENCOUNTER — CARE COORDINATION (OUTPATIENT)
Dept: CARE COORDINATION | Age: 88
End: 2023-01-31

## 2023-01-31 PROBLEM — I50.813 ACUTE ON CHRONIC RIGHT-SIDED HEART FAILURE (HCC): Status: ACTIVE | Noted: 2023-01-01

## 2023-01-31 LAB
ABSOLUTE EOS #: 0.1 K/UL (ref 0–0.4)
ABSOLUTE LYMPH #: 0.8 K/UL (ref 1–4.8)
ABSOLUTE MONO #: 0.4 K/UL (ref 0–1)
ANION GAP SERPL CALCULATED.3IONS-SCNC: 9 MMOL/L (ref 9–17)
BASOPHILS # BLD: 1 % (ref 0–2)
BASOPHILS ABSOLUTE: 0.1 K/UL (ref 0–0.2)
BUN BLDV-MCNC: 35 MG/DL (ref 8–23)
BUN/CREAT BLD: 22 (ref 9–20)
CALCIUM SERPL-MCNC: 9.2 MG/DL (ref 8.6–10.4)
CHLORIDE BLD-SCNC: 101 MMOL/L (ref 98–107)
CO2: 30 MMOL/L (ref 20–31)
CREAT SERPL-MCNC: 1.61 MG/DL (ref 0.7–1.2)
DIFFERENTIAL TYPE: YES
EKG ATRIAL RATE: 79 BPM
EKG P-R INTERVAL: 164 MS
EKG Q-T INTERVAL: 502 MS
EKG QRS DURATION: 170 MS
EKG QTC CALCULATION (BAZETT): 575 MS
EKG R AXIS: -65 DEGREES
EKG T AXIS: 105 DEGREES
EKG VENTRICULAR RATE: 79 BPM
EOSINOPHILS RELATIVE PERCENT: 1 % (ref 0–5)
GFR SERPL CREATININE-BSD FRML MDRD: 40 ML/MIN/1.73M2
GLUCOSE BLD-MCNC: 104 MG/DL (ref 70–99)
HCT VFR BLD CALC: 31.3 % (ref 41–53)
HEMOGLOBIN: 9.9 G/DL (ref 13.5–17.5)
LYMPHOCYTES # BLD: 15 % (ref 13–44)
MCH RBC QN AUTO: 30.2 PG (ref 26–34)
MCHC RBC AUTO-ENTMCNC: 31.7 G/DL (ref 31–37)
MCV RBC AUTO: 95.4 FL (ref 80–100)
MONOCYTES # BLD: 7 % (ref 5–9)
PDW BLD-RTO: 15.4 % (ref 12.1–15.2)
PLATELET # BLD: 145 K/UL (ref 140–450)
POTASSIUM SERPL-SCNC: 4 MMOL/L (ref 3.7–5.3)
RBC # BLD: 3.28 M/UL (ref 4.5–5.9)
SEG NEUTROPHILS: 76 % (ref 39–75)
SEGMENTED NEUTROPHILS ABSOLUTE COUNT: 4.1 K/UL (ref 2.1–6.5)
SODIUM BLD-SCNC: 140 MMOL/L (ref 135–144)
WBC # BLD: 5.4 K/UL (ref 3.5–11)

## 2023-01-31 PROCEDURE — 6360000002 HC RX W HCPCS: Performed by: INTERNAL MEDICINE

## 2023-01-31 PROCEDURE — 2700000000 HC OXYGEN THERAPY PER DAY

## 2023-01-31 PROCEDURE — 2580000003 HC RX 258: Performed by: INTERNAL MEDICINE

## 2023-01-31 PROCEDURE — 1200000000 HC SEMI PRIVATE

## 2023-01-31 PROCEDURE — 93010 ELECTROCARDIOGRAM REPORT: CPT | Performed by: INTERNAL MEDICINE

## 2023-01-31 PROCEDURE — 6370000000 HC RX 637 (ALT 250 FOR IP): Performed by: INTERNAL MEDICINE

## 2023-01-31 PROCEDURE — 85025 COMPLETE CBC W/AUTO DIFF WBC: CPT

## 2023-01-31 PROCEDURE — 71250 CT THORAX DX C-: CPT

## 2023-01-31 PROCEDURE — 36415 COLL VENOUS BLD VENIPUNCTURE: CPT

## 2023-01-31 PROCEDURE — 94761 N-INVAS EAR/PLS OXIMETRY MLT: CPT

## 2023-01-31 PROCEDURE — 80048 BASIC METABOLIC PNL TOTAL CA: CPT

## 2023-01-31 PROCEDURE — 99223 1ST HOSP IP/OBS HIGH 75: CPT | Performed by: INTERNAL MEDICINE

## 2023-01-31 RX ORDER — SODIUM CHLORIDE 9 MG/ML
INJECTION, SOLUTION INTRAVENOUS ONCE
Status: COMPLETED | OUTPATIENT
Start: 2023-01-31 | End: 2023-01-31

## 2023-01-31 RX ORDER — DOPAMINE HYDROCHLORIDE 160 MG/100ML
10 INJECTION, SOLUTION INTRAVENOUS CONTINUOUS
Status: DISCONTINUED | OUTPATIENT
Start: 2023-01-31 | End: 2023-02-01

## 2023-01-31 RX ORDER — DOPAMINE HYDROCHLORIDE 160 MG/100ML
7.5 INJECTION, SOLUTION INTRAVENOUS CONTINUOUS
Status: DISCONTINUED | OUTPATIENT
Start: 2023-01-31 | End: 2023-01-31

## 2023-01-31 RX ORDER — DOPAMINE HYDROCHLORIDE 160 MG/100ML
5 INJECTION, SOLUTION INTRAVENOUS CONTINUOUS
Status: DISCONTINUED | OUTPATIENT
Start: 2023-01-31 | End: 2023-01-31

## 2023-01-31 RX ORDER — FUROSEMIDE 10 MG/ML
40 INJECTION INTRAMUSCULAR; INTRAVENOUS 2 TIMES DAILY
Status: DISCONTINUED | OUTPATIENT
Start: 2023-01-31 | End: 2023-02-03

## 2023-01-31 RX ADMIN — LEVOTHYROXINE SODIUM 125 MCG: 25 TABLET ORAL at 06:57

## 2023-01-31 RX ADMIN — CHOLECALCIFEROL TAB 25 MCG (1000 UNIT) 2000 UNITS: 25 TAB at 09:07

## 2023-01-31 RX ADMIN — HEPARIN SODIUM 5000 UNITS: 5000 INJECTION INTRAVENOUS; SUBCUTANEOUS at 20:26

## 2023-01-31 RX ADMIN — ROSUVASTATIN 10 MG: 10 TABLET, FILM COATED ORAL at 09:06

## 2023-01-31 RX ADMIN — DOPAMINE HYDROCHLORIDE IN DEXTROSE 5 MCG/KG/MIN: 1.6 INJECTION, SOLUTION INTRAVENOUS at 04:08

## 2023-01-31 RX ADMIN — FUROSEMIDE 40 MG: 10 INJECTION, SOLUTION INTRAMUSCULAR; INTRAVENOUS at 18:20

## 2023-01-31 RX ADMIN — MIDODRINE HYDROCHLORIDE 20 MG: 5 TABLET ORAL at 20:26

## 2023-01-31 RX ADMIN — SODIUM CHLORIDE, PRESERVATIVE FREE 10 ML: 5 INJECTION INTRAVENOUS at 20:26

## 2023-01-31 RX ADMIN — CLOPIDOGREL BISULFATE 75 MG: 75 TABLET ORAL at 09:07

## 2023-01-31 RX ADMIN — SPIRONOLACTONE 25 MG: 25 TABLET, FILM COATED ORAL at 09:06

## 2023-01-31 RX ADMIN — Medication 400 MG: at 09:07

## 2023-01-31 RX ADMIN — SODIUM CHLORIDE: 9 INJECTION, SOLUTION INTRAVENOUS at 00:44

## 2023-01-31 RX ADMIN — DOPAMINE HYDROCHLORIDE IN DEXTROSE 7.5 MCG/KG/MIN: 1.6 INJECTION, SOLUTION INTRAVENOUS at 16:12

## 2023-01-31 RX ADMIN — MIDODRINE HYDROCHLORIDE 20 MG: 5 TABLET ORAL at 09:06

## 2023-01-31 RX ADMIN — SODIUM CHLORIDE 250 ML: 9 INJECTION, SOLUTION INTRAVENOUS at 00:57

## 2023-01-31 RX ADMIN — CYANOCOBALAMIN TAB 1000 MCG 1000 MCG: 1000 TAB at 09:06

## 2023-01-31 RX ADMIN — HEPARIN SODIUM 5000 UNITS: 5000 INJECTION INTRAVENOUS; SUBCUTANEOUS at 09:07

## 2023-01-31 RX ADMIN — TAMSULOSIN HYDROCHLORIDE 0.4 MG: 0.4 CAPSULE ORAL at 20:26

## 2023-01-31 RX ADMIN — SODIUM CHLORIDE, PRESERVATIVE FREE 10 ML: 5 INJECTION INTRAVENOUS at 18:20

## 2023-01-31 RX ADMIN — PIPERACILLIN SODIUM AND TAZOBACTAM SODIUM 4500 MG: 4; .5 INJECTION, POWDER, LYOPHILIZED, FOR SOLUTION INTRAVENOUS at 18:31

## 2023-01-31 RX ADMIN — SPIRONOLACTONE 25 MG: 25 TABLET, FILM COATED ORAL at 18:20

## 2023-01-31 RX ADMIN — MIDODRINE HYDROCHLORIDE 20 MG: 5 TABLET ORAL at 14:02

## 2023-01-31 RX ADMIN — ASPIRIN 81 MG: 81 TABLET, CHEWABLE ORAL at 09:06

## 2023-01-31 NOTE — PROGRESS NOTES
Cardiology    Mainly Right sided CHF  Severe aortic stenosis  Severe LV discomfort  Renal insufficiency  Marked pedal edema  Hypotension  Failure to thrive    Mr. Jeremy Fong has developed increasing pedal edema. He does have ace wraps on his lower legs and feet, but still has blistering on his feet. He is admitted to optimize fluids while watching his renal function. When I see him in ER, he does not appear to be in any pulmonary edema. No ascites is present. Does have 3 + edema on both lower extremities. His BP, which is always low, is in the 90's at this point. Discussed with Dr. Jessica Navarro. Will start Dobutamine 5 mcg/kg/min to help with cardiac output and BP. Will increase Aldactone to 25 mg bid, though will need to watch renal function carefully. Will hold Inderal at this time, since on Dobutamine. Later, will attempt to place on low dose Coreg at 3.125 bid. Very tenuous situation with his low EF and aortic stenosis. Prognosis poor, but robert try to keep as comfortable as possible. Hopefully, can be in swing bed when his is improved.     Thanks, Alex Martin MD

## 2023-01-31 NOTE — PROGRESS NOTES
SW  and RN case manager met with pt to complete assessment this morning during quality rounds. Pt is alert and oriented and pleasant with assessment. Pt is a 80year old male admitted for acute on chronic right sided heart failure. [place] home in [place]. Pt has a cane and walker at home to use if needed but he has not been using them. Pt is current with [facility] and they are aware of his admission here. Pt drives and his friend Paty Mccall helps him to get around. Pt is a DNR CCA and follows with David Lindsey CNP as PCP. Pt has advance directives on file and these remain accurate. ACP note completed with pt. Pt reports that his medications are affordable with insurance. Pt would like to return home at discharge and states that he has some people that can come and stay with him. SW and pt discuss staying somewhere for strengthening but pt is not certain he would want to do this. Therapy evaluations on hold today due to pt's medical condition. Palliative care also spoke with pt about hospice. SW will continue to follow for discharge planning and remain available.  Elisabet YOONW 1/31/2023

## 2023-01-31 NOTE — ACP (ADVANCE CARE PLANNING)
Advance Care Planning     Advance Care Planning Activator (Inpatient)  Conversation Note      Date of ACP Conversation: 1/31/2023     Conversation Conducted with: Patient with Decision Making Capacity    ACP Activator: Octavia Cole, [address] [facility] Decision Maker:     Current Designated Health Care Decision Maker:     Primary Decision Maker (Active): Miranda Christiansen - [phone]    Secondary Decision Maker: Edin Hutton - Jacqueline - [phone]  Click here to complete Healthcare Decision Makers including section of the Healthcare Decision Maker Relationship (ie \"Primary\")  Today we documented Decision Maker(s) consistent with ACP documents on file. Care Preferences    Ventilation: \"If you were in your present state of health and suddenly became very ill and were unable to breathe on your own, what would your preference be about the use of a ventilator (breathing machine) if it were available to you? \"      Would the patient desire the use of ventilator (breathing machine)?: no    \"If your health worsens and it becomes clear that your chance of recovery is unlikely, what would your preference be about the use of a ventilator (breathing machine) if it were available to you? \"     Would the patient desire the use of ventilator (breathing machine)?: No      Resuscitation  \"CPR works best to restart the heart when there is a sudden event, like a heart attack, in someone who is otherwise healthy. Unfortunately, CPR does not typically restart the heart for people who have serious health conditions or who are very sick. \"    \"In the event your heart stopped as a result of an underlying serious health condition, would you want attempts to be made to restart your heart (answer \"yes\" for attempt to resuscitate) or would you prefer a natural death (answer \"no\" for do not attempt to resuscitate)? \" no       [] Yes   [x] No   Educated Patient / Decision Maker regarding differences between Advance Directives and portable DNR orders.     Length of ACP Conversation in minutes:  5    Conversation Outcomes:  [x] ACP discussion completed  [x] Existing advance directive reviewed with patient; no changes to patient's previously recorded wishes  [] New Advance Directive completed  [] Portable Do Not Rescitate prepared for Provider review and signature  [] POLST/POST/MOLST/MOST prepared for Provider review and signature      Follow-up plan:    [] Schedule follow-up conversation to continue planning  [] Referred individual to Provider for additional questions/concerns   [x] Advised patient/agent/surrogate to review completed ACP document and update if needed with changes in condition, patient preferences or care setting    [x] This note routed to one or more involved healthcare providers

## 2023-01-31 NOTE — PROGRESS NOTES
Dr. Rossi Dixon called and updated with Dr. Britton Hunting orders today and patient general condition. Updated with CT results. New order received and entered.

## 2023-01-31 NOTE — PROGRESS NOTES
Cardiology    During the night had hypotension with bp's in the 70's. Initially I felt his hypotension may be due to tachycardia with his AS, decreasing output. However, there was no change with decreasing Dobutamine. We gave him a 250cc bolus and BP responded to 99 mmHg. Will change from dobutamine to dopamine and determine if bp is more stable. He is more comfortable now, according to Willy, who, along with Nasir Morales, has done an excellent job on a very difficult situation. Awaiting morning labs. If renal function worse, will hold Aldactone or decrease back to once daily.     Deshaun Guido MD

## 2023-01-31 NOTE — CARE COORDINATION
Ambulatory Care Coordination Note  1/31/2023    ACC: Ramiro Majano RN    Received notification that patient was admitted to [facility] on 1/30/2023 for acute on chronic right sided heart failure. Care Coordination Plan of Care:    This nurse Care Coordinator will  - defer all planned calls at this time  -await notification of patient discharge and follow up with care transitions nurse at that time     Future Appointments   Date Time Provider Andres Sharma   2/16/2023 11:00 AM LORY Crane CNP  [facility]   3/13/2023 10:40 AM Kirby Fluke, APRN - CNP [facility]   12/18/2023 10:00 AM LORY Crane CNP [facility]

## 2023-01-31 NOTE — CARE COORDINATION
Case Management Assessment  Initial Evaluation    Date/Time of Evaluation: 1/31/2023 12:31 PM  Assessment Completed by: Kristine Graham RN    If patient is discharged prior to next notation, then this note serves as note for discharge by case management. Patient Name: Renetta Andre                   YOB: 1932  Diagnosis: Acute on chronic systolic CHF (congestive heart failure) ([facility] [address] [I50.23]                   Date / Time: 1/30/2023  1:51 PM    Patient Admission Status: Inpatient   Readmission Risk (Low < 19, Mod (19-27), High > 27): Readmission Risk Score: 21.9    Current PCP: LORY Alicia CNP  PCP verified by CM? (P) Yes Ruby Talavera CNP)    Chart Reviewed: Yes      History Provided by: (P) Patient  Patient Orientation: (P) Alert and Oriented, Person, Place, Situation, Self    Patient Cognition: (P) Alert    Hospitalization in the last 30 days (Readmission):  Yes, readmission assessment completed. If yes, Readmission Assessment in CM Navigator will be completed.     Advance Directives:      Code Status: DNR-CCA   Patient's Primary Decision Maker is: (P) Named in [address] [place] [address]    Primary Decision Maker (Active): Sharmila Montgomery  Other - [phone]    Secondary Decision Maker: Kesha Wood - Child - [phone]    Discharge Planning:    Patient lives with: Alone Type of Home: (P) House  Primary Care Giver: (P) Self  Patient Support Systems include: (P) Friends/Neighbors   Current Financial resources: (P) Medicaid  Current community resources: (P) None  Current services prior to admission: (P) 1 [address] Yoli (currently has [facility])            Current DME:              Type of Home Care services:  (P) Aide Services, OT, PT, Nursing Services    ADLS  Prior functional level: (P) Independent in ADLs/IADLs  Current functional level: (P) Assistance with the following:, Mobility, Shopping, Housework, Toileting, Dressing, Bathing    PT AM-PAC:   /24  OT AM-PAC:   /24    Family can provide assistance at DC: (P) No  Would you like Case Management to discuss the discharge plan with any other family members/significant others, and if so, who? (P) No  Plans to Return to Present Housing: (P) Unknown at present  Other Identified Issues/Barriers to RETURNING to current housing: no needs identified at this time. Potential Assistance needed at discharge: (P) N/A            Potential DME:    Patient expects to discharge to: (P) [facility] for transportation at discharge:      Financial    Payor: Margarita Moyer / Plan: [place] / Product Type: *No Product type* /     Does insurance require precert for SNF: Yes    Potential assistance Purchasing Medications: (P) No  Meds-to-Beds request:        [address] [address]i Wilson [address]  [place] [address]  Phone: [phone] Fax: [phone]    [facility] ([address] [place] [address]) - [address] [facility] [phone] - F [phone]  [address] [place] [address]  Phone: [phone] Fax: [phone]      Notes:    Factors facilitating achievement of predicted outcomes: Friend support, Cooperative, Pleasant, and Has needed Durable Medical Equipment at home    Barriers to discharge: Limited family support, Decreased endurance, Upper extremity weakness, Lower extremity weakness, and Medical complications    Additional Case Management Notes: patient wishing to return home with resumption of [facility] services. The Plan for Transition of Care is related to the following treatment goals of Acute on chronic systolic CHF (congestive heart failure) (HCC) [Z61.01]    IF APPLICABLE: The Patient and/or patient representative Joanna Fernandez and his family were provided with a choice of provider and agrees with the discharge plan.  Freedom of choice list with basic dialogue that supports the patient's individualized plan of care/goals and shares the quality data associated with the providers was provided to: (P) Patient   Patient Representative Name:       The Patient and/or Patient Representative Agree with the Discharge Plan?  (P) Yes    Deion Sen RN  Case Management Department  Ph: [phone] Fax: [phone]

## 2023-01-31 NOTE — PROGRESS NOTES
Up in chair this am.  Appears tired and worn out. Dr. Cece Clark in to see patient. Lung fields with coarse crackles right mid and lower lobe as well as left base. Edema to BLE improved from last pm.  Ace wraps remain in place and are CD&I. Legs elevated when up in chair. O2 on 2L for comfort. Dopamine remains on at 5 mcg/kg/minute. Radiology here for CT scan.

## 2023-01-31 NOTE — PROGRESS NOTES
Has requested several times this morning to be bathed. RN completes most of bath, pt does wash face. Very appreciative and \"feels better\" after bath. Remains up in chair with legs elevated. Stands to void but does not feel he can walk to bed for weight at this time. Urine dark anne-marie. Feels his breathing is \"better\" than it was this morning.

## 2023-01-31 NOTE — CONSULTS
[place] [address]                                  CONSULTATION    PATIENT NAME: Nidia Moreno                     :        1932  MED REC NO:   [id number]                              ROOM:       2215  ACCOUNT NO:   [de-identified]                           ADMIT DATE: 2023  PROVIDER:     Karie Duvall MD    CONSULT DATE:  2023    REASON FOR CONSULT:  1. Shortness of breath with right-sided CHF and marked pedal edema. 2.  Severe aortic stenosis. 3.  Severe LV dysfunction. HISTORY OF PRESENT ILLNESS:  The patient is a 45-year-old gentleman who  I am seeing for many years. To recount his history, he had atrial  fibrillation in , associated with shortness of breath and had a  catheterization at [facility] with four-vessel coronary artery bypass  surgery in . On 2012, he had a heart rate of 32 and we stopped his Lopressor  and digoxin, but he remained bradycardic, and therefore, I placed a  [employer] DDD pacemaker on 2012. He is  pacer-dependent and at beginning of life. On 2012, we did catheterization because of shortness of breath,  that showed 99% LAD, occluded intermediate branch of the circumflex,  occluded OM and 99% disease in the right coronary artery. The right  coronary artery was occluded, vein graft to the OM was occluded, vein  graft to the high lateral branch of the circumflex was patent, the LIMA  was patent to the LAD, EF of 40%. We did angioplasty of the right  coronary artery, placing 3.0 x 38, 3.0 x 38, 3.5 x 24 and 3.5 x 16 mm  drug-eluting ION stents with a good end result. He has a history of moderate aortic stenosis.   However, his last  echocardiogram done on 2023, I felt showed severe LV dysfunction,  EF of 25% to 30%, with severe AS with a mean gradient of 40 and an  aortic valve area of 0.5 cm2. He also had moderate-to-severe pulmonary  hypertension with a PA pressure of 55 mmHg. This was different than an  echocardiogram read on 10/07/2021 at [facility], where they felt his EF  was in the 35% to 40% and moderate AS. He was hospitalized from 01/08 to 01/14. He was treated for pneumonia,  although he was also hypotensive at that time and we placed him on  midodrine. We also added Inderal 10 mg b.i.d. His blood pressure was  too low to place him on Entresto. He was discharged and saw Honorio Kong on 01/20. He at that time had marked  increase in pedal edema, was unable to wear any shoes. He wore socks  and plastic grocery bags over both feet to come into our office. He was  continued on same medications with instruction to elevate his feet as  much as possible. Yesterday, I received a call from Honorio Kong that home health had noticed a  little change in his feet with lower legs very edematous and shiny, taut  with swelling. His blood pressure was in the 90 to 100 range. He was  having difficulty with moving. He was sent to the emergency room and I did see him in the emergency  room. He was fairly short of breath in the emergency room, although was  not in any acute distress. He did have edema of both lower extremities,  which were Ace wrapped. He stated that his abdomen was not swollen. Nights are always worse for him and he has difficulty with marked  shortness of breath at night, although he does sleep in a chair. He  denies any chest pain or chest discomfort. Denies any fevers, sweats or  chills. He is coughing clear sputum. He does have chronic renal insufficiency and his creatinine in the  emergency room was unchanged at 1.65. His proBNP was 26,000. His chest x-ray showed, what appeared to be, mild vascular congestion  with small effusions. He has had no syncope or near syncope. His appetite is poor but he is  still trying to eat.     CARDIAC RISK FACTORS:  Known CAD: Positive. Bypass Surgery:  Positive. PTCA:  Positive. Hypertension:  Positive. Hyperlipidemia:  Positive. Smoking:  Negative. Diabetes:  Negative. MEDICATIONS PRIOR TO ADMISSION:  He was on Crestor 10 mg daily, Inderal  10 mg b.i.d., midodrine 20 mg t.i.d., Aldactone 25 mg daily, magnesium  400 mg daily, Plavix 75 mg daily, Flomax 0.4 mg nightly, Synthroid 125  mcg daily, vitamin B12 1000 mcg daily, vitamin D 2000 units daily and  aspirin 81 mg daily. PAST MEDICAL AND SURGICAL HISTORY:  1. Cardiac as above. 2.  Hypertension, well controlled, and has been hypotensive recently. 3.  Hyperlipidemia, well controlled. 4.  Atrial fibrillation, initially on Coumadin, which he stopped after  nose surgery because of epistaxis and hypotension. 5.  He has hyperparathyroidism. 6.  Sick sinus syndrome, status post [employer] pacemaker  in 2012. FAMILY HISTORY:  Significant for CAD. SOCIAL HISTORY:  He is 80years old. Two children, one in [place]. He  lived with [facility] until she passed away. He lives alone now. He is very inactive. He does not smoke or drink alcohol. He has had  marked difficulty with ambulation with his edematous lower extremities. REVIEW OF SYSTEMS:  Cardiac as above. Other systems reviewed including  constitutional, eyes, ears, nose and throat, cardiovascular,  respiratory, GI, , musculoskeletal, integumentary, neurologic,  endocrine, hematologic and allergic/immunologic are negative except for  what is described above. No weight loss or weight gain. No change in  bowel habits. No blood in stools. No fevers, sweats or chills. PHYSICAL EXAMINATION:  VITAL SIGNS:  His blood pressure was 94/64 with a heart rate of 81 and  regular. Respiratory rate 18. He weighed 190 pounds when he got to the  floor. His O2 sat was 97% on room air. GENERAL:  He is a weak 25-year-old gentleman. Denied pain. He was  oriented to person, place and time.   Answered questions appropriately. SKIN:  No unusual skin changes although his skin was dry. HEENT:  The pupils are equally round and intact. Mucous membranes were  dry. NECK:  He had some JVD. Good carotid pulses. No carotid bruits. No  lymphadenopathy or thyromegaly. CARDIOVASCULAR EXAM:  S1 and S2 were normal.  No S3 or S4. Grade 3/6  systolic ejection murmur. No diastolic murmur. LUNGS:  Coarse crackles at the bases. ABDOMEN:  Protuberant, but soft and nontender. Could not feel liver,  spleen or aorta. EXTREMITIES:  He had 3+ edema up to his knees, they were Ace wrapped. NEUROLOGIC EXAM:  He was somewhat groggy but answered questions  appropriately. LABORATORY DATA:  His sodium was 143, potassium 4.0, BUN 34, creatinine  1.65, GFR was 39, calcium 9.7. Troponin was 52. ALT was 10, AST was  15. White count 6.1, hemoglobin 11.2 with a platelet count of 670,969. Chest x-ray showed slight increase in pleural effusions with mild  interstitial venous prominence, suspicious for mild CHF. IMPRESSION:  1. CHF mainly I feel right greater than left. 2.  Chronic renal insufficiency, with creatinine about at baseline at  1.65.  3.  Severe LV dysfunction, EF of 25% to 30%, with severe aortic  stenosis, with a mean gradient of 40 mmHg and an aortic valve area of  0.5 cm2 and a PA pressure of 55 on an echocardiogram that I saw on  01/09/2023, which was significantly worse than the echocardiogram read  at [facility] in 10/2021.  4.  Failure to thrive. 5.  Marked edema of both lower extremities. 6.  Sick sinus syndrome status post [employer] DDD pacemaker  placed on 08/06/2012, pacer-dependent, still at beginning of life.   7.  Severe CAD with a catheterization on 09/09/2012 showing 99% LAD,  subtotally occluded intermediate branch of the circumflex, 99% RCA, with  bypass surgery in 2000 at [facility] with a LIMA to the LAD, a vein graft  to the high lateral circumflex, a vein graft to the OM and a vein graft  to the right coronary artery. 8.  Catheterization on 09/09/2012 showing patent vein graft to the high  lateral circumflex, with the vein graft to the right coronary artery  occluded, vein graft to the OM occluded, OM occluded, and patent LIMA to  the LAD with two 3.0 x 38 mm drug-eluting ION stents along with 3.5 x 24  and 3.5 x 16 mm ION stents placed in the right coronary artery. 9.  Hypotension. PLAN:  1. We will place on dobutamine as he does not seem to me in marked  fluid overload but appears to me to be even more right-sided CHF. 2.  We will continue Aldactone at 25 mg b.i.d.. 3.  Continue to watch renal function carefully. DISCUSSION:  The patient is worsening in general.  He is more frail than  he has been previously and he has marked difficulty with functioning at  home in spite of having home health come. He does have marked edema up to both knees. However, I did not feel  that he has had marked total body fluid overload. His mucous membranes  were dry and I did not feel he had severe JVD. However, he has Ace wraps on his legs and he tries to keep them elevated  at home. His blood pressure is low in the 90 to 95 range. I will place him on  dobutamine in view of his severe LV dysfunction and his aortic stenosis  and try to get his cardiac output increased somewhat so that we can  slowly diurese. If his pressure continues to be low, we will need to switch from  dobutamine to dopamine. His prognosis of course is very poor, but he is still alert and  certainly at goal; the main goal is to keep him comfortable. Hopefully, he will agree to swing bed to better manage his low cardiac  output, severe LV dysfunction and his severe aortic stenosis. Thank you very much for allowing me the privilege of seeing the patient. If you have any questions on my thoughts, please do not hesitate to  contact me.         Roby Stahl MD    D: 01/31/2023 7:29:56       T: 01/31/2023 9:38:52     [email or web address]  Job#: [id number]     Doc#: [id number]    CC:  Jadyn Cedillo

## 2023-01-31 NOTE — PROGRESS NOTES
Cardiology    Difficult night last night with more sob. Groggy today and states breathing is worse than yesterday. BP better on Dopamine 5 mcg/kg/min. Renal function unchanged with Cr 1.61. Hgb 9.9    Course crackles in lungs, although coughing clear sputum. Wt is up 9 pounds, although edema looks better in lower extremities. Will repeat ct of chest to try to assess pulmonary edema vs chf.    If this is chf, would diurese with lasix and aldactone, and support pressure with dopamine. His trop detection is secondary to demand ischemia and pulmonary hypertension. So far, I'm flunking this hospitalization. ..     Anabella Avila MD

## 2023-01-31 NOTE — PROGRESS NOTES
Patient VS being closely monitored. Dobutamine running at 6.5ml/hr. BP 95/54 @ 2030. VS documented with BP trending down and HR trending up. Most recent BP 79/52 @ 2230 and HR 102bpm. Patient complains of SOB. He states \"this always happens at night when I get hot\". Fan placed in the room. Dr. Rajendra Coppola called with these updates. Dr. Cabrera Cuba orders Dobutamine to be turned down to 2.5ml/hr and will call back in BP continues to trend down.

## 2023-01-31 NOTE — PROGRESS NOTES
Dr. Isabel Pozo called. Informed this nurse he would discontinue Dobutamine and start Dopamine. This Dopamine will not be titrated per orders. Will continue to closely monitor patient vitals and assess for any changes.

## 2023-01-31 NOTE — H&P
Hospital Medicine  History and Physical    Patient:  Jesus Schwartz  MRN: [id number]    CHIEF COMPLAINT:  Shortness of breath (SOB) with legs swelling    History Obtained From:  patient  PCP: LORY Ravi CNP    HISTORY OF PRESENT ILLNESS:   The patient is a 80 y.o. male who presents to the ER with increasing SOB and leg edema. According to Milton Vance, he noticed his legs starting to swell more 2 weeks ago. He states the right is always more swollen than the left. During this time he noticed he was more SOB with exertion. Recently he has been waking in the middle of the night SOB. He states he does have an occasional productive cough. No fever or chills. Appetite has been okay. He denies having any salt in his diet. He does try to keep his legs elevated. No chest pain. Milton Vance denies any change in medication. Over this time he has progressively become weaker. With his worsening symptoms he came to the ER for further evaluation. In the ER his CMP was normal other than a BUN of 34, Creatinine of 1.65, and Glucose at 111. BNP was 26,759. Troponin was 52. CBC showed a WBC at 6.1, Hgb 11.2, and Plt ct at 164. COVID was negative. ECG showed atrial sensed, ventricular paced rhythm. CXR showed:  1. Cardiomegaly with prior sternotomy and pacemaker similar. 2. Slight increase in pleural effusions, left greater than right. 3. Mild interstitial venous prominence again seen. Dr. Muna Dee, his Cardiologist, evaluated Milton Vance in the ER and felt he had mainly right sided heart failure. He request admission to place on a Dobutamine drip to see if he could induce diuresis. Overnight his SBP dropped into the 80's requiring a switch from Dobutamine to Dopamine with a small fluid bolus. This am Milton Vance has not complaints.       Past Medical History:        Diagnosis Date    Atrial fibrillation [facility])     CAD (coronary artery disease)     Hard of hearing     Hiatal hernia     History of PTCA     9/2012 Hyperlipidemia     Hypothyroidism     Pacemaker     [employer]       Past Surgical History:        Procedure Laterality Date    CARDIAC SURGERY      CORONARY ARTERY BYPASS GRAFT  2000    quad bypass [place]    HERNIA REPAIR  00/19/5428    UMBILICAL HERNIA REPAIR    PACEMAKER INSERTION  8/6/12    [employer]    PTCA  25/22/55    UMBILICAL HERNIA REPAIR  11/21/2016    Celina Bumpers MD       Medications Prior to Admission:  I obtained, documented, reviewed, and updated the patient's current medications. Prior to Admission medications    Medication Sig Start Date End Date Taking?  Authorizing Provider   rosuvastatin (CRESTOR) 10 MG tablet Take 1 tablet by mouth daily 1/14/23   Ko Summers MD   propranolol (INDERAL) 10 MG tablet Take 1 tablet by mouth 2 times daily Hold for SBP less than 110 1/14/23   Ko Summers MD   midodrine (PROAMATINE) 10 MG tablet Take 2 tablets by mouth 3 times daily 1/14/23   Ko Summers MD   spironolactone (ALDACTONE) 25 MG tablet Take 1 tablet by mouth daily For congestive heart failure 1/5/23   LORY Hyman CNP   Magnesium 400 MG TABS Take 400 mg by mouth daily 12/12/22   Anh Domingo MD   clopidogrel (PLAVIX) 75 MG tablet TAKE 1 TABLET BY MOUTH DAILY 11/11/22   LORY Hyman CNP   tamsulosin (FLOMAX) 0.4 MG capsule Take 1 capsule by mouth at bedtime Urinary frequency/enlarged prostate, take at bedtime 11/10/22   LORY Hyman CNP   levothyroxine (SYNTHROID) 125 MCG tablet TAKE 1 TABLET BY MOUTH DAILY 10/19/22   LORY Hyman CNP   vitamin B-12 (CYANOCOBALAMIN) 1000 MCG tablet Take 1,000 mcg by mouth daily    Historical Provider, MD   Cholecalciferol (VITAMIN D3) 50 MCG (2000 UT) CAPS Take 2,000 Units by mouth daily    Historical Provider, MD   metoprolol (LOPRESSOR) 100 MG tablet Take 1 tablet by mouth 2 times daily  Patient taking differently: No sig reported 3/27/20 4/26/21  Anh Domingo MD   aspirin 81 MG tablet Take 81 mg by mouth daily. Historical Provider, MD       Allergies:  Menthol (topical analgesic)    Social History:   TOBACCO:   reports that he has never smoked. He has never used smokeless tobacco.  ETOH:   reports that he does not currently use alcohol. OCCUPATION:      Family History:       Problem Relation Age of Onset    Heart Disease Father        REVIEW OF SYSTEMS:  Constitutional:  negative for  fevers and chills  HEENT:  positive for  nasal congestion and hearing loss  negative for  ear drainage, earaches, and sore throat  Neck:  No lumps or masses  Cardiac:  positive for  dyspnea, edema  negative for  chest pain, palpitations  Respiratory:  positive for  cough with sputum  negative for  wheezing and hemoptysis  Gastrointestinal:  negative for nausea, vomiting, change in bowel habits, and abdominal pain  :  negative for frequency, dysuria, and hesitancy  Musculoskeletal:  positive for  arthralgias and muscle weakness  Neuro:  negative      Physical Exam:    Vitals: BP (!) 82/48   Pulse 75   Temp 97.8 °F (36.6 °C) (Oral)   Resp 18   Ht 5' 10\" (1.778 m)   Wt 190 lb (86.2 kg)   SpO2 95%   BMI 27.26 kg/m²   General appearance: alert, appears stated age and cooperative  Skin: Skin color, texture, turgor normal. No rashes or lesions  HEENT: Head: Normocephalic, no lesions, without obvious abnormality. Eye: Normal external eye, conjunctiva, lids cornea, SARABJIT. Ears: Normal TM's bilaterally. Normal auditory canals and external ears. Non-tender. Nose: Normal external nose, mucus membranes and septum. Nasal oxygen on. Pharynx: Dental Hygiene adequate. Normal buccal mucosa. Normal pharynx. Neck: no adenopathy, supple, symmetrical, trachea midline, and Cardiac murmur that radiates to the carotids  Lungs:  Clear anteriorly with diminished breath sounds in the bases posteriorly.     Heart: regular rate and rhythm, S1, S2 normal, and II/VI systolic murmur  Abdomen: soft, non-tender; bowel sounds normal; no masses,  no organomegaly  Extremities:  ACE wrap on both lower legs. Right leg with edema with less edema in the left. Neurologic: Mental status: Alert, oriented, thought content appropriate    CBC:   Recent Labs     01/30/23  1422 01/31/23  0355   WBC 6.1 5.4   HGB 11.2* 9.9*    145     BMP:    Recent Labs     01/30/23  1422 01/31/23  0355    140   K 4.0 4.0    101   CO2 28 30   BUN 34* 35*   CREATININE 1.65* 1.61*   GLUCOSE 111* 104*     Hepatic:   Recent Labs     01/30/23 1422   AST 15   ALT 10   BILITOT 1.0   ALKPHOS 66     Troponin: No results for input(s): TROPONINI in the last 72 hours. BNP: No results for input(s): BNP in the last 72 hours. Lipids: No results for input(s): CHOL, HDL in the last 72 hours. Invalid input(s): LDLCALCU  ABGs: No results found for: PHART, PO2ART, OJV6QBZ  INR: No results for input(s): INR in the last 72 hours. -----------------------------------------------------------------  Medical Decision Making (MDM) Data:    External documents reviewed: -  My CXR interpretation: -  My EKG interpretation: Atrial sensed, ventricular paced rhythm. Discussed with:  Dr. Araseli Laguna and Dr. Newell Bamberger  Tests considered but not ordered:  US of leg to rule out DVT  Heart score:  -  Social Determinants of Health that impact treatment or disposition:  Ability to care for himself at home. Assessment and Plan   Right sided CHF - placed initially on Dobutamine drip but SBP dropped requiring a small fluid bolus and change to Dopamine. Can't tolerate diuresis with SBP in the 80's - 90's. Lower legs ACE wrapped to decrease edema. Severe Aortic stenosis. Severe LV dysfunction - EF 25 to 30 % on 1/8/23 with moderate to severe pulmonary HTN with a PAP at 55 mmHg. Hypotension - Inderal on hold. On Midodrine and currently on Dopamine drip. CKD stage III B  H/O CAD - S/P CABG x 4. On Aspirin and Plavix. Inderal on hold. H/O SSS - Pacemaker placed.   Hypothyroidism - on Levothyroxine. Generalized weakness - PT / OT ordered. H/O Atrial fib - refused anticoagulation in the past.     Hyperlipidemia - on Crestor. Vitamin D deficiency - on replacement. BPH - on Flomax. Chronic anemia - a drop seen since admission which was likely secondary to the fluid bolus he received. Differential Diagnosis:  Worsening cardiac function, worsening aortic stenosis, bronchitis, PE. Condition is improving / unchanged / worsening:  Improving  Condition is at treatment goal:  NO  Chronic condition is / is not having mild / moderate, severe exacerbation, progression or side effects of treatment:  -    Shared decision making:  -    Code status and discussions:  DNR CC - A    Medical Necessity: In patient is appropriate for this patient secondary to the need for IV Dopamine. Estimated length of stay: 3 days. The beneficiary may reasonably be expected to be discharged or transferred to a hospital within 96 hours after admission.       Patient Active Problem List   Diagnosis Code    Hard of hearing H91.90    CAD (coronary artery disease) I25.10    Atrial fibrillation (HCC) I48.91    Hiatal hernia K44.9    Hyperlipidemia E78.5    Hypothyroidism E03.9    Pacemaker Z95.0    History of PTCA Z98.61    Calculus of gallbladder without cholecystitis without obstruction J56.55    Umbilical hernia L27.8    Vitamin D deficiency E55.9    Chronic systolic (congestive) heart failure I50.22    Acute on chronic systolic CHF (congestive heart failure) (HCC) I50.23    Acute on chronic systolic (congestive) heart failure (HCC) I50.23    Mild malnutrition (HCC) E44.1    Generalized weakness R53.1       DVT prophylaxis:   [] Lovenox   [] SCDs   [x] SQ Heparin   [] Encourage ambulation, low risk for DVT, no chemical or mechanical    prophylaxis necessary      [] Already on Anticoagulation      Documentation of the Current Medications in the Medical Record    (x)  I have utilized all available immediate resources to obtain, update, or review the patient's current medications (including all prescriptions, over-the-counter products, herbals, cannabis / cannabidiol products, vitamin / mineral / dietary (nutritional) supplements). (Satisfies MIPS Performance)  If Yes, Stop Here  ( )  The patient is not eligible for medication reconciliation; the patient is in an emergent medical situation where delaying treatment would jeopardize the patient's health. (MIPS Performance exception / exclusion)  ( )  I did not confirm, update or review the patient's current list of medications today. (Does not satisfy MIPS Performance)        Heart Failure     (x)  The patient has current or prior documentation of left ventricular ejection fraction (LVEF) less than or equal to 40%, or moderate or severely depressed left ventricular systolic function. Answer both:   ( ) The patient was prescribed or already taking an Angiotensin -Converting Enzyme (ACE) Inhibitor, or Angiotensin Receptor Blocker (ARB). (x) The patient was prescribed or already taking a beta - blocker. If Yes to both, stop here. ( )  Patient not prescribed / taking:   (x) ACE or ARB for medical/patient reason(s) including ( ) (ex. Allergy, intolerance, contraindication)   ( ) Beta - blocker for medical/patient reason(s) include ( ) (ex. Allergy, intolerance, contraindication)  ( )  Patient not prescribed / taking:   ( ) ACE or ARB, no reason given (does not satisfy MIPS performance)   ( ) Beta - blocker, no reason given (does not satisfy MIPS performance)    Advanced Care Plan    (x)  I confirmed that the patient's [address] is present, code status documented, or surrogate decision maker is listed in the patient's medical record. ( )  The patient's advanced care plan is not present because:  (select)   ( ) I confirmed today that the patient does not wish or was not able to name a surrogate decision maker or provide an [address].    ( ) Hospice care is currently being provided or has been provided this calender year. ( )  I did not confirm today the presence of an [address] or surrogate decision maker documented within the patient's medical record. (Does not satisfy MIPS performance).             Dina Reddy MD, MD  Admitting Hospitalist

## 2023-01-31 NOTE — PROGRESS NOTES
Dr. Faith Snell called and updated with CT results and pt condition. New orders noted. LEOPOLDO Doll at bedside. Pt and Charles Perez updated with pt condition and orders. Attempted to call son Randal Lover but no answer. Pt and Charles Perez updated.

## 2023-01-31 NOTE — PROGRESS NOTES
Dressings changed and ace wraps to BLE. Mod amount serous drainage from dorsum of both feet. Non-adherent dressing, kerlix, then ace wraps applied. Patient tolerates well. Legs elevated while up in chair.

## 2023-01-31 NOTE — PROGRESS NOTES
Occupational Therapy      Pt on medical hold this date. Will attempt OT evaluation tomorrow if pt appropriate.      Kamran Negro, PAT, OTR/L  1/31/2023

## 2023-01-31 NOTE — CONSULTS
Palliative Care Inpatient Consult    NAME:  Noel Nathan RECORD NUMBER:  [id number]  AGE: 80 y.o. GENDER: male  : 1932  TODAY'S DATE:  2023    Reason for Consult:  goals of care    History of Present Illness     The patient is a 80 y.o. Non- / non  male who presents with Leg Swelling (C/o BLE swelling for 2 weeks or more, ROHINI Pires sent over, patient states Lexis told him he is going to get admitted.)      Referred to Palliative Care by  [] Physician   [x] Nursing  [] Family Request   [] Other:      He was admitted to the med/surg service for Acute on chronic systolic CHF (congestive heart failure) ([facility] [I50.23]. The patient has a complicated medical history and has been hospitalized since 2023  1:51 PM.    Active Hospital Problems    Diagnosis Date Noted    Acute on chronic right-sided heart failure ([facility] [I50.813] 2023     Priority: Medium    Acute on chronic systolic CHF (congestive heart failure) ([facility]) [I50.23] 10/14/2022     Priority: Medium       Data         BP (!) 84/55   Pulse 83   Temp (!) 96.6 °F (35.9 °C) (Temporal)   Resp 20   Ht 5' 10\" (1.778 m)   Wt 199 lb (90.3 kg)   SpO2 99%   BMI 28.55 kg/m²     Wt Readings from Last 3 Encounters:   23 199 lb (90.3 kg)   23 185 lb 12.8 oz (84.3 kg)   23 187 lb 6.3 oz (85 kg)        Code Status: DNR-CCA     ADVANCED CARE PLANNING:  Patient has capacity for medical decisions: yes  Health Care Power of : yes  Living Will: yes     Personal, Social, and Family History  Marital Status:   Living situation:alone  Importance of damon/Quaker/spiritual beliefs: [] Very [] Somewhat [] Not   Psychological Distress: mild  Does patient understand diagnosis/treatment? yes  Does caregiver understand diagnosis/treatment? not asked    Assessment        Symptom management/ pain control   Pain Assessment:  The patient is not having any pain.   Anxiety:  none  Dyspnea:  acute dyspnea and chronic dyspnea  Fatigue:  exercise intolerance  Other:    Palliative Performance Scale:     ___100% Full ambulation; normal activity and work; no evidence of disease; able to do own self care; normal intake; fully conscious  ___90% Full ambulation; normal activity and work; some evidence of disease; able to do own self care; normal intake; fully conscious  ___80% Full ambulation; normal activity with effort; some evidence of disease; able to do own self care; normal or reduced intake; fully conscious  ___70% Ambulation reduced; unable to perform normal job/work; significant disease; able to do own self care; normal or reduced intake; fully conscious  _x__60%  Ambulation reduced; cannot do hobbies/housework; significant disease; occasional assist; intake normal or reduced; fully conscious/some confusion  ___50%  Mainly sit/lie; can't do any work; extensive disease; considerable assist; intake normal or reduced; fully conscious/some confusion  ___40%  Mainly in bed; extensive disease; mainly assist; intake normal or reduced; fully conscious/ some confusion   ___30%  Bed bound; extensive disease; total care; intake reduced; fully conscious/some confusion  ___20%  Bed bound; extensive disease; total care; intake minimal; drowsy/coma  ___10%  Bed bound; extensive disease; total care; mouth care only; drowsy/coma  ___0       Death     Readmission Risk Score: 22%    Plan      Palliative Interaction: I was asked to see patient for goals of care. Pt was recently admitted and discharged from the hospital.  During his last admission he completed AD and changed his code status to [facility]. The patient is currently in with CHF and has been declining according to him. I introduce myself and explain my role in his care. We discuss how he is feeling and he tells me that he \"is ready to die\". He tells me that he doesn't want to live like this.   We have a discussion about what quality of life means to him, he tells me that he wants to go home and be with his dog, he states \"I know I am at the end, it is too hard\". We discuss different options including hospice. I ask if anyone would be able to help him at home and he tells me his friend Joy Yang and his sister would be able to help him at home. I talk about his options for hospice and I mention going to an ECF, he asks immediately if his dog could go and I tell him probably not and he says \"well I want to be with my dog so that isn't an option, I want to go home. \"  We talk about his current treatment and I explain the dopamine to him. He wants to give it another day and then probably investigate getting hospice involved. I update his nurse about his plan of care. Education/support to patient  Continue with current plan of care  Code status clarified: [facility]    Principle Problem/Diagnosis:  Acute on chronic right-sided heart failure ([facility]    Goals of care evaluation   The patient goals of care are improve or maintain function/quality of life and provide comfort care/support/palliation/relieve suffering   Goals of care discussed with:    [x] Patient independently    [] Patient and Family    [] Family or Healthcare DPOA independently    [] Unable to discuss with patient, family/DPOA not present    Code Status  DNR-CCA     Palliative Care will continue to follow Mr. Ivonne Phan care as needed. Thank you for allowing Palliative Care to participate in the care of Mr. Jose Angel Steel .      Electronically signed by   Nataly Shook RN  Palliative Care Team  on 1/31/2023 at 11:13 AM    Palliative care office: [phone]

## 2023-01-31 NOTE — PROGRESS NOTES
Ticket to ride completed.  The following information was reported off to [facility]:  Name  Allergies  Orientation Level  Destination  Safety Issues  Code Status  Oxygen Requirements  Special needs including mobility, language, communication

## 2023-01-31 NOTE — PROGRESS NOTES
[facility]  Physical Therapy    Date: 2023  Patient Name: Caitlin Guthrie        : 1932       [] Pt Refusal           [x] Pt Unavailable due to:  pt is a hold for the day per RN(Lulú) for both PT and OT.   Will follow up 23 if appropriate        Autumn Oshea, PT   Date: 2023

## 2023-01-31 NOTE — PROGRESS NOTES
Dr. Marek Luna returns phone call. Cardiac medication orders entered per doctor. Informed him this nurse started the Dobutamine drip with Catrina Sauer RN. Dr. Marek Luna states to call him with HR above 120 and changes in BP or patient status.

## 2023-01-31 NOTE — PROGRESS NOTES
RN in for morning medications. Able to take with applesauce. Ate only few bites of breakfast.  \"I just can't eat-I feel like I'm breathing through a brick wall\". Palliative nurse in to speak with patient-see palliative care note. \"I may not be here tomorrow morning\". Agrees to have son called and updated. Speaks on phone to [facility] and updated per patient. Pt requests to be washed up this morning \"I stink\". Pleasant and converses with nurse but dyspnea at rest continues to be noted.

## 2023-01-31 NOTE — PROGRESS NOTES
Cardiology    He states he feels better, however, had difficulty laying flat in bed for weight. Currently resting comfortably at 30 degree upright. BP still low in 80's to 90's although he is tolerating it well. His weight continues to climb. Initially was:  361  743   390  938 (now)    He ct scan appeared to have a pneumonia pattern, however, also had effusions bilaterally. I would consider antibiotics empirically based on ct scan. With increasing wt and now some PND and orthopnea,  will add IV lasix. His pressure is already low, so will increase Dopamine to 10 mcg/kg/min. He is more alert and answers questions easier, and appropriately.     Thanks, Alex Martin MD

## 2023-01-31 NOTE — ED PROVIDER NOTES
[address] [facility] [address]      Pt Name: Tee Martinez  MRN: [id number]  [place] 11/27/1932  Date of evaluation: 1/30/2023  Provider: Milton Steel MD    CHIEF COMPLAINT       Chief Complaint   Patient presents with    Leg Swelling     C/o BLE swelling for 2 weeks or more, ROHINI Pires sent over, patient states Lexis told him he is going to get admitted. HISTORY OF PRESENT ILLNESS      Tee Martinez is a 80 y.o. male who presents to the emergency department to the emergency room via private vehicle, patient was sent to the ER through his PCP, concern for bilateral extremity edema 2 weeks, not responding to diuretics. Patient does note some shortness of breath with ambulation as well as when he lays down flat, denies any chest pain, denies any recent illness, states compliant with all medications. PCP NP Lexis did contact to the emergency room, advised that she spoke with patient's cardiologist Dr. Fransisco Rivas who wanted patient admitted through the emergency room. REVIEW OF SYSTEMS       Review of Systems   Constitutional:  Negative for fever. Cardiovascular:  Positive for leg swelling. Negative for chest pain. All other systems reviewed and are negative.       PAST MEDICAL HISTORY     Past Medical History:   Diagnosis Date    Atrial fibrillation [facility])     CAD (coronary artery disease)     Hard of hearing     Hiatal hernia     History of PTCA     9/2012    Hyperlipidemia     Hypothyroidism     Pacemaker     [employer]         SURGICAL HISTORY       Past Surgical History:   Procedure Laterality Date    CARDIAC SURGERY      CORONARY ARTERY BYPASS GRAFT  2000    quad bypass [place]    HERNIA REPAIR  62/03/9062    UMBILICAL HERNIA REPAIR    PACEMAKER INSERTION  8/6/12    [employer]    PTCA  32/91/37    UMBILICAL HERNIA REPAIR  11/21/2016    Antoinette Phelps MD         CURRENT MEDICATIONS       Current Discharge Medication List        CONTINUE these medications which have NOT CHANGED    Details   rosuvastatin (CRESTOR) 10 MG tablet Take 1 tablet by mouth daily  Qty: 30 tablet, Refills: 3      propranolol (INDERAL) 10 MG tablet Take 1 tablet by mouth 2 times daily Hold for SBP less than 110  Qty: 90 tablet, Refills: 3      midodrine (PROAMATINE) 10 MG tablet Take 2 tablets by mouth 3 times daily  Qty: 180 tablet, Refills: 1      spironolactone (ALDACTONE) 25 MG tablet Take 1 tablet by mouth daily For congestive heart failure  Qty: 30 tablet, Refills: 0      Magnesium 400 MG TABS Take 400 mg by mouth daily  Qty: 90 tablet, Refills: 3      clopidogrel (PLAVIX) 75 MG tablet TAKE 1 TABLET BY MOUTH DAILY  Qty: 90 tablet, Refills: 1    Comments: This prescription was filled on 11/10/2022. Any refills authorized will be placed on file. tamsulosin (FLOMAX) 0.4 MG capsule Take 1 capsule by mouth at bedtime Urinary frequency/enlarged prostate, take at bedtime  Qty: 90 capsule, Refills: 1      levothyroxine (SYNTHROID) 125 MCG tablet TAKE 1 TABLET BY MOUTH DAILY  Qty: 90 tablet, Refills: 1    Associated Diagnoses: Hypothyroidism, unspecified      vitamin B-12 (CYANOCOBALAMIN) 1000 MCG tablet Take 1,000 mcg by mouth daily      Cholecalciferol (VITAMIN D3) 50 MCG (2000 UT) CAPS Take 2,000 Units by mouth daily      aspirin 81 MG tablet Take 81 mg by mouth daily.              ALLERGIES       Menthol (topical analgesic)    FAMILY HISTORY       Family History   Problem Relation Age of Onset    Heart Disease Father           SOCIAL HISTORY       Social History     Tobacco Use    Smoking status: Never    Smokeless tobacco: Never   Vaping Use    Vaping Use: Never used   Substance Use Topics    Alcohol use: Not Currently     Comment: 6 monthly    Drug use: No         PHYSICAL EXAM       ED Triage Vitals   BP Temp Temp Source Heart Rate Resp SpO2 Height Weight   01/30/23 1357 01/30/23 1357 01/30/23 1630 01/30/23 1357 01/30/23 1357 01/30/23 1357 01/30/23 1357 01/30/23 1357   89/61 98.3 °F (36.8 °C) Oral 82 18 95 % 5' 10\" (1.778 m) 185 lb (83.9 kg)       Physical Exam  Physical Exam   Constitutional: Patient is oriented to person, place, and time. Patient appears well-developed and well-nourished. Patient is active and cooperative. HENT:   Head: Normocephalic and atraumatic. Head is without contusion. Right Ear: Hearing and external ear normal. No drainage. Left Ear: Hearing and external ear normal. No drainage. Nose: Nose normal. No nasal deformity. No epistaxis. Mouth/Throat: Mucous membranes are not dry. Eyes: EOMI. Conjunctivae, sclera, and lids are normal. Right eye exhibits no discharge. Left eye exhibits no discharge. Neck: Full passive range of motion without pain and phonation normal.  Negative JVD, negative tracheal deviation  Cardiovascular:  Normal rate, regular rhythm and intact distal pulses. Bilateral lower extremity edema with pitting  Pulses: Right radial pulse  2+   Pulmonary/Chest: Effort normal. No tachypnea and no bradypnea. Fine crackles greatest appreciated at bilateral bases, no gross wheezing rhonchi or stridor. Pacer noted left upper chest wall. Abdominal: Soft. Patient without distension or tenderness  Musculoskeletal:   Negative acute trauma or deformity,  apparent full range of motion and normal strength all extremities appropriate to age. Neurological: Patient is alert and oriented to person, place, and time. patient displays no tremor. Patient displays no seizure activity. .    Skin: Skin is warm and dry. Patient is not diaphoretic. Psychiatric: Patient has a normal mood and affect.  Patient speech is normal and behavior is normal. Cognition and memory are normal.     DIAGNOSTIC RESULTS     EKG:   EKG @ 1422 hrs -your sensed ventricular paced rhythm, rate 79, left axis, VT QRS normal, , as compared to prior EKG dated 1/8/2023, no significant changes morphology    RADIOLOGY:     Chest x-ray shows mild bilateral pleural effusions left slightly greater than right, some interstitial edema, noted pacemaker left upper chest    Interpretation per the Radiologist below, if available at the time of this note:    XR CHEST PORTABLE   Final Result   FINDINGS/IMPRESSION:       1. Cardiomegaly with prior sternotomy and pacemaker similar. 2. Slight increase in pleural effusions, left greater than right. 3. Mild interstitial venous prominence again seen. Suspicious for CHF, mild.                 LABS:  Labs Reviewed   CBC WITH AUTO DIFFERENTIAL - Abnormal; Notable for the following components:       Result Value    RBC 3.70 (*)     Hemoglobin 11.2 (*)     Hematocrit 35.0 (*)     RDW 15.4 (*)     Seg Neutrophils 80 (*)     Absolute Lymph # 0.80 (*)     All other components within normal limits   COMPREHENSIVE METABOLIC PANEL - Abnormal; Notable for the following components:    Glucose 111 (*)     BUN 34 (*)     Creatinine 1.65 (*)     Est, Glom Filt Rate 39 (*)     Bun/Cre Ratio 21 (*)     All other components within normal limits   BRAIN NATRIURETIC PEPTIDE - Abnormal; Notable for the following components:    Pro-BNP 26,759 (*)     All other components within normal limits   TROPONIN - Abnormal; Notable for the following components:    Troponin, High Sensitivity 52 (*)     All other components within normal limits   TROPONIN - Abnormal; Notable for the following components:    Troponin, High Sensitivity 55 (*)     All other components within normal limits   BASIC METABOLIC PANEL W/ REFLEX TO MG FOR LOW K - Abnormal; Notable for the following components:    Glucose 104 (*)     BUN 35 (*)     Creatinine 1.61 (*)     Est, Glom Filt Rate 40 (*)     Bun/Cre Ratio 22 (*)     All other components within normal limits   CBC WITH AUTO DIFFERENTIAL - Abnormal; Notable for the following components:    RBC 3.28 (*)     Hemoglobin 9.9 (*)     Hematocrit 31.3 (*)     RDW 15.4 (*)     Seg Neutrophils 76 (*)     Absolute Lymph # 0.80 (*)     All other components within normal limits   COVID-19, RAPID       All other labs were within normal range or not returned as of this dictation. MIPS    Not applicable      EMERGENCY DEPARTMENT COURSE and DIFFERENTIAL DIAGNOSIS/MDM:     Patient history and physical exam taken at bedside, discussed symptoms and exam findings, discussed EKG chest x-ray blood work, IV access, place patient on cardiac monitor, pulse ox, O2 via nasal cannula, patient resting high semi-Sky's in bed. EKG and chest x-ray as above    Lab work-up reviewed, noting patient troponin elevated however this is baseline for patient reviewing priors, will get a 1 hour troponin to verify    Patient updated on initial work-up, we do note his blood pressure has been 86-90/50, though noted patient blood pressure often in this range. Initially did order Lasix so we will hold on this at this time until discussed with cardiology. Case was discussed with Dr. More Britt who did come over and see patient, there was some discussion regarding patient's low blood pressure though this is baseline for patient, will discuss case with Dr. Rossi Jimenez, hospitalist.    Case was discussed with Dr. Rossi Jimenez, hospitalist, regarding patient's presentation and current work-up, he does have concerns regarding blood pressure, Dr. More Britt will contact him directly to discuss case. Advised the patient to be admitted, Dr. More Britt is can start patient on a drip upstairs with close follow-up.         1)  Number and Complexity of Problems  Problem List This Visit: Lower extremity edema    Differential Diagnosis: CHF exacerbation    Diagnoses Considered but Do Not Suspect: N/A    Pertinent Comorbid Conditions: CHF    2)  Data Reviewed  My EKG interpretation:  As above    Decision Rules/Scores utilized: N/A    Tests considered but not ordered and why: N/A    External Documents Reviewed: N/A    Imaging that is independently reviewed and interpreted by me as: Chest x-ray    See more data below for the lab and radiology tests and orders. 3)  Treatment and Disposition    Patient repeat assessment:  As above    Disposition discussion with patient/family: Admission    Case discussed with consulting clinician:  As above    Social determinants of health impacting treatment or disposition: N/A    Shared Decision Making: N/A    Code Status Discussion: N/A      REASSESSMENT     As above      CRITICAL CARE TIME     Total Critical Care time was 0 minutes, excluding separately reportable procedures. There was a high probability of clinically significant/life threatening deterioration in the patient's condition which required my urgent intervention. PROCEDURES:  Unless otherwise noted below, none     Procedures    FINAL IMPRESSION      1. Acute on chronic systolic congestive heart failure ([facility] [address]    2. Bilateral lower extremity edema    3. Lab test negative for COVID-19 virus          DISPOSITION/PLAN     DISPOSITION Admitted 01/30/2023 03:59:38 PM      PATIENT REFERRED TO:  No follow-up provider specified.     DISCHARGE MEDICATIONS:  Current Discharge Medication List          (Please note that portions of this note were completed with a voice recognition program.  Efforts were made to edit the dictations but occasionally words are mis-transcribed.)    Obdulia Mobley MD (electronically signed)  Attending Emergency Physician           Obdulia Mobley MD  01/31/23 3589

## 2023-01-31 NOTE — CARE COORDINATION
Readmission Assessment  Number of Days since last admission?: (P) 8-30 days  Previous Disposition: (P) Home with [facility])  Who is being Interviewed: (P) Patient  What was the patient's/caregiver's perception as to why they think they needed to return back to the hospital?: (P) Other (Comment) (decline in health)  Did you visit your Primary Care Physician after you left the hospital, before you returned this time?: (P) Yes  Did you see a specialist, such as Cardiac, Pulmonary, Orthopedic Physician, etc. after you left the hospital?: (P) No  Who advised the patient to return to the hospital?: (P) Physician's Nurse/Office staff  Does the patient report anything that got in the way of taking their medications?: (P) No  In our efforts to provide the best possible care to you and others like you, can you think of anything that we could have done to help you after you left the hospital the first time, so that you might not have needed to return so soon?: (P) Other (Comment) (patient denies at this time.  will resume [facility] at discharge.)

## 2023-01-31 NOTE — PROGRESS NOTES
Patient BP 68/47. Dr. Ovidio Louis called and new orders entered. Discussed crackles in the bases and BLE pitting +2. Dr. Rodolph Hashimoto says due to the BP it is necessary to give a bolus. Dr. Rodolph Hashimoto orders 250ml Bolus of Normal Saline to give over 15 minutes and to increase Dobutamine to 5ml/hr. Will continue to monitor patient closely for any changes.

## 2023-01-31 NOTE — PROGRESS NOTES
Son Pavan Varner speaks with patient on phone and then speaks with nurse. Updated on patient condition. States he will be in later today to see patient.

## 2023-01-31 NOTE — PROGRESS NOTES
Returned to bed per pt request.  Some dyspnea with activity but better than this am.  Weight obtained and verified at 204.8 lb. Message left for Dr. Tripp Akhtar regarding new weight.

## 2023-02-01 LAB
ABSOLUTE EOS #: 0 K/UL (ref 0–0.4)
ABSOLUTE LYMPH #: 0.43 K/UL (ref 1–4.8)
ABSOLUTE MONO #: 0.14 K/UL (ref 0–1)
ANION GAP SERPL CALCULATED.3IONS-SCNC: 12 MMOL/L (ref 9–17)
BASOPHILS # BLD: 1 % (ref 0–2)
BASOPHILS ABSOLUTE: 0.14 K/UL (ref 0–0.2)
BUN SERPL-MCNC: 32 MG/DL (ref 8–23)
BUN/CREAT BLD: 21 (ref 9–20)
CALCIUM SERPL-MCNC: 9.1 MG/DL (ref 8.6–10.4)
CHLORIDE SERPL-SCNC: 98 MMOL/L (ref 98–107)
CO2 SERPL-SCNC: 29 MMOL/L (ref 20–31)
CREAT SERPL-MCNC: 1.54 MG/DL (ref 0.7–1.2)
EOSINOPHILS RELATIVE PERCENT: 0 % (ref 0–5)
GFR SERPL CREATININE-BSD FRML MDRD: 43 ML/MIN/1.73M2
GLUCOSE SERPL-MCNC: 138 MG/DL (ref 70–99)
HCT VFR BLD AUTO: 36.5 % (ref 41–53)
HGB BLD-MCNC: 11.8 G/DL (ref 13.5–17.5)
LYMPHOCYTES # BLD: 3 % (ref 13–44)
MCH RBC QN AUTO: 30.5 PG (ref 26–34)
MCHC RBC AUTO-ENTMCNC: 32.4 G/DL (ref 31–37)
MCV RBC AUTO: 94.1 FL (ref 80–100)
MONOCYTES # BLD: 1 % (ref 5–9)
MORPHOLOGY: ABNORMAL
MORPHOLOGY: ABNORMAL
PDW BLD-RTO: 15 % (ref 12.1–15.2)
PLATELET # BLD AUTO: 178 K/UL (ref 140–450)
POTASSIUM SERPL-SCNC: 3.9 MMOL/L (ref 3.7–5.3)
RBC # BLD: 3.88 M/UL (ref 4.5–5.9)
SEG NEUTROPHILS: 95 % (ref 39–75)
SEGMENTED NEUTROPHILS ABSOLUTE COUNT: 13.49 K/UL (ref 2.1–6.5)
SODIUM SERPL-SCNC: 139 MMOL/L (ref 135–144)
WBC # BLD AUTO: 14.2 K/UL (ref 3.5–11)

## 2023-02-01 PROCEDURE — 85025 COMPLETE CBC W/AUTO DIFF WBC: CPT

## 2023-02-01 PROCEDURE — 94761 N-INVAS EAR/PLS OXIMETRY MLT: CPT

## 2023-02-01 PROCEDURE — 2580000003 HC RX 258: Performed by: INTERNAL MEDICINE

## 2023-02-01 PROCEDURE — 6360000002 HC RX W HCPCS: Performed by: INTERNAL MEDICINE

## 2023-02-01 PROCEDURE — 2700000000 HC OXYGEN THERAPY PER DAY

## 2023-02-01 PROCEDURE — 36415 COLL VENOUS BLD VENIPUNCTURE: CPT

## 2023-02-01 PROCEDURE — 6370000000 HC RX 637 (ALT 250 FOR IP): Performed by: INTERNAL MEDICINE

## 2023-02-01 PROCEDURE — 1200000000 HC SEMI PRIVATE

## 2023-02-01 PROCEDURE — 80048 BASIC METABOLIC PNL TOTAL CA: CPT

## 2023-02-01 RX ORDER — 0.9 % SODIUM CHLORIDE 0.9 %
250 INTRAVENOUS SOLUTION INTRAVENOUS ONCE
Status: COMPLETED | OUTPATIENT
Start: 2023-02-01 | End: 2023-02-01

## 2023-02-01 RX ORDER — DOPAMINE HYDROCHLORIDE 160 MG/100ML
5 INJECTION, SOLUTION INTRAVENOUS CONTINUOUS
Status: DISCONTINUED | OUTPATIENT
Start: 2023-02-01 | End: 2023-02-01

## 2023-02-01 RX ORDER — SODIUM CHLORIDE 9 MG/ML
INJECTION, SOLUTION INTRAVENOUS PRN
Status: ACTIVE | OUTPATIENT
Start: 2023-02-01 | End: 2023-02-01

## 2023-02-01 RX ORDER — DOPAMINE HYDROCHLORIDE 160 MG/100ML
20 INJECTION, SOLUTION INTRAVENOUS CONTINUOUS
Status: DISCONTINUED | OUTPATIENT
Start: 2023-02-01 | End: 2023-02-03

## 2023-02-01 RX ADMIN — MIDODRINE HYDROCHLORIDE 20 MG: 5 TABLET ORAL at 14:20

## 2023-02-01 RX ADMIN — TAMSULOSIN HYDROCHLORIDE 0.4 MG: 0.4 CAPSULE ORAL at 20:02

## 2023-02-01 RX ADMIN — MIDODRINE HYDROCHLORIDE 20 MG: 5 TABLET ORAL at 09:46

## 2023-02-01 RX ADMIN — CYANOCOBALAMIN TAB 1000 MCG 1000 MCG: 1000 TAB at 09:46

## 2023-02-01 RX ADMIN — MIDODRINE HYDROCHLORIDE 20 MG: 5 TABLET ORAL at 20:02

## 2023-02-01 RX ADMIN — HEPARIN SODIUM 5000 UNITS: 5000 INJECTION INTRAVENOUS; SUBCUTANEOUS at 20:01

## 2023-02-01 RX ADMIN — DOPAMINE HYDROCHLORIDE IN DEXTROSE 20 MCG/KG/MIN: 1.6 INJECTION, SOLUTION INTRAVENOUS at 20:05

## 2023-02-01 RX ADMIN — CHOLECALCIFEROL TAB 25 MCG (1000 UNIT) 2000 UNITS: 25 TAB at 09:46

## 2023-02-01 RX ADMIN — SODIUM CHLORIDE 250 ML: 9 INJECTION, SOLUTION INTRAVENOUS at 09:48

## 2023-02-01 RX ADMIN — DOPAMINE HYDROCHLORIDE IN DEXTROSE 15 MCG/KG/MIN: 1.6 INJECTION, SOLUTION INTRAVENOUS at 05:12

## 2023-02-01 RX ADMIN — LEVOTHYROXINE SODIUM 125 MCG: 25 TABLET ORAL at 05:20

## 2023-02-01 RX ADMIN — HEPARIN SODIUM 5000 UNITS: 5000 INJECTION INTRAVENOUS; SUBCUTANEOUS at 09:45

## 2023-02-01 RX ADMIN — DOPAMINE HYDROCHLORIDE IN DEXTROSE 15 MCG/KG/MIN: 1.6 INJECTION, SOLUTION INTRAVENOUS at 08:02

## 2023-02-01 RX ADMIN — DOPAMINE HYDROCHLORIDE IN DEXTROSE 20 MCG/KG/MIN: 1.6 INJECTION, SOLUTION INTRAVENOUS at 16:07

## 2023-02-01 RX ADMIN — SODIUM CHLORIDE: 9 INJECTION, SOLUTION INTRAVENOUS at 04:30

## 2023-02-01 RX ADMIN — DOPAMINE HYDROCHLORIDE IN DEXTROSE 10 MCG/KG/MIN: 1.6 INJECTION, SOLUTION INTRAVENOUS at 00:36

## 2023-02-01 RX ADMIN — ROSUVASTATIN 10 MG: 10 TABLET, FILM COATED ORAL at 09:46

## 2023-02-01 RX ADMIN — SODIUM CHLORIDE: 9 INJECTION, SOLUTION INTRAVENOUS at 00:44

## 2023-02-01 RX ADMIN — PIPERACILLIN AND TAZOBACTAM 3375 MG: 3; .375 INJECTION, POWDER, FOR SOLUTION INTRAVENOUS at 16:17

## 2023-02-01 RX ADMIN — CLOPIDOGREL BISULFATE 75 MG: 75 TABLET ORAL at 09:54

## 2023-02-01 RX ADMIN — PIPERACILLIN AND TAZOBACTAM 3375 MG: 3; .375 INJECTION, POWDER, FOR SOLUTION INTRAVENOUS at 09:48

## 2023-02-01 RX ADMIN — ASPIRIN 81 MG: 81 TABLET, CHEWABLE ORAL at 09:46

## 2023-02-01 RX ADMIN — Medication 400 MG: at 09:46

## 2023-02-01 RX ADMIN — PIPERACILLIN AND TAZOBACTAM 3375 MG: 3; .375 INJECTION, POWDER, FOR SOLUTION INTRAVENOUS at 00:43

## 2023-02-01 RX ADMIN — DOPAMINE HYDROCHLORIDE IN DEXTROSE 20 MCG/KG/MIN: 1.6 INJECTION, SOLUTION INTRAVENOUS at 12:00

## 2023-02-01 RX ADMIN — SPIRONOLACTONE 25 MG: 25 TABLET, FILM COATED ORAL at 09:46

## 2023-02-01 NOTE — PROGRESS NOTES
Cardiology    During the night his bp was lower in the high 70's. Was still responsive and had mentioned earlier that \"he might want to go home and die\". As a test, I lowered Dopamine from 10 to 5 mcg, and his bp immediately dropped in 60's. Increased to 15 mcg/kg/min, and bp is in 90's. This morning he seems to be breathing better. Awake and alert and denies pain. His renal function has improved with diuresis and is at 1.54. White count elevated to 14,000 which means he's making a response to his pneumonia with the antibiotics. I had a talk with his son Tim Koenig and him, with Che Arnold and Clarke present. My feeling is that we are not doing heroics at this time. If we stop dopamine it appears his bp would quickly drop, and I doubt he would survive for any length of time, and would certainly not make it out of hospital.    Therefore, I believe we should stay the course with the dopamine and ATB's and give him some time. \"Will give him a chance to survive, if he is supposed to, and if God wants him, he will take him. \"    Mr. Rhonda Mcdaniels seems to understand and at this point is willing to give it some time. Tim Koenig was also in agreement not to stop meds at this point. Che Arnold and Clarke were present, and I went over their important roles, and that they understood and would continue to work with Arnulfo Prieto and family, as time went on. We'll see what develops. ..     Thanks, Scot Reyes MD

## 2023-02-01 NOTE — PROGRESS NOTES
Patient resting in bed. BP currently 78/46. Will check BP every 15 minutes. Dopamine decreased to 5mcg/kg/min @ 0429.

## 2023-02-01 NOTE — PROGRESS NOTES
Patient's son Dianna Mansfield called around 2030. Dianna Mansfield states concerns about his father's health. He is worried about what the plan will be if none of the treatments work. Dianna Mansfield and I discussed his fathers current treatment plan and status. Dianna Mansfield lives out of town but is currently staying at his fathers house so he can be close while he is sick. Dianna Mansfield would like to speak with Dr. Faith Snell and Dr. Ovidio Ramos tomorrow and be more involved in his fathers treatment. Informed richard that I would tell Dr. Faith Snell and Dr. Ovidio Ramos about this request and get back to him asap about times he should come in. Informed patient that I maybe calling him early in the morning.

## 2023-02-01 NOTE — PROGRESS NOTES
Hospitalist Progress Note  2/1/2023 6:11 AM  Subjective:   Admit Date: 1/30/2023  PCP: LORY Cervantes - CNP    Interval History: Mahesh Cárdenaspool states he slept well overnight. He feels his breathing is doing better. Started on IV Zosyn yesterday after his CT scan of the chest showed possible pneumonia. He denies having any cough overnight. No chest pain. Appetite was good yesterday, no nausea. He states his bowels moved yesterday. Dopamine adjusted up to maintain SBP > 90. Diet: ADULT DIET; Regular; Low Sodium (2 gm)  Medications:   Scheduled Meds:   furosemide  40 mg IntraVENous BID    piperacillin-tazobactam  3,375 mg IntraVENous Q8H    aspirin  81 mg Oral Daily    Vitamin D  2,000 Units Oral Daily    clopidogrel  75 mg Oral Daily    magnesium oxide  400 mg Oral Daily    midodrine  20 mg Oral TID    [Held by provider] propranolol  10 mg Oral BID    rosuvastatin  10 mg Oral Daily    tamsulosin  0.4 mg Oral Nightly    vitamin B-12  1,000 mcg Oral Daily    sodium chloride flush  5-40 mL IntraVENous 2 times per day    heparin (porcine)  5,000 Units SubCUTAneous BID    levothyroxine  125 mcg Oral Daily    spironolactone  25 mg Oral BID     Continuous Infusions:   sodium chloride Stopped (02/01/23 0513)    DOPamine 15.0039 mcg/kg/min (02/01/23 0516)    sodium chloride Stopped (02/01/23 0515)       Patient's current medications documented, reviewed, and updated. CBC:   Recent Labs     01/30/23  1422 01/31/23  0355 02/01/23  0400   WBC 6.1 5.4 14.2*   HGB 11.2* 9.9* 11.8*    145 178     BMP:    Recent Labs     01/30/23  1422 01/31/23  0355 02/01/23  0400    140 139   K 4.0 4.0 3.9    101 98   CO2 28 30 29   BUN 34* 35* 32*   CREATININE 1.65* 1.61* 1.54*   GLUCOSE 111* 104* 138*     Hepatic:   Recent Labs     01/30/23  1422   AST 15   ALT 10   BILITOT 1.0   ALKPHOS 66     Troponin: No results for input(s): TROPONINI in the last 72 hours.   BNP: No results for input(s): BNP in the last 72 hours. Lipids: No results for input(s): CHOL, HDL in the last 72 hours. Invalid input(s): LDLCALCU  INR: No results for input(s): INR in the last 72 hours. Objective:   Vitals: BP (!) 83/52   Pulse 80   Temp 97.2 °F (36.2 °C) (Temporal)   Resp 18   Ht 5' 10\" (1.778 m)   Wt 205 lb 3.2 oz (93.1 kg)   SpO2 97%   BMI 29.44 kg/m²   General appearance: alert and cooperative with exam  HEENT: Head: Normocephalic, no lesions, without obvious abnormality. Eye: Normal external eye, conjunctiva, lids cornea, SARABJIT. Nose: Normal external nose, mucus membranes and septum. Nose: Nasal oxygen on. Neck: no adenopathy and supple, symmetrical, trachea midline  Lungs:  Clear anteriorly with rales appreciated in the bases posteriorly. Heart: regular rate and rhythm, S1, S2 normal, and III/VI systolic murmur  Abdomen: soft, non-tender; bowel sounds normal; no masses,  no organomegaly  Extremities:  ACE wraps on the lower legs with improved edema except over the dorsum of the right foot. Neurologic: Mental status: Alert, oriented, thought content appropriate    ----------------------------------------------------------------------------------------------------------------------  Medical Decision Making (MDM) Data:    External documents reviewed: -  My CXR interpretation: -  My EKG interpretation: -  Discussed with:  Dr. Ayush Mahan  Tests considered but not ordered:  -  Heart score:  -  Social Determinants of Health that impact treatment or disposition:  -    Assessment and Plan:   Right sided CHF - placed initially on Dobutamine drip but SBP dropped requiring a small fluid bolus and change to Dopamine with dose adjusted to maintain SBP > 90. Can't tolerate diuresis with SBP in the 80's - 90's. Lower legs ACE wrapped to decrease edema. Bibasilar infiltrates suggestive of Pneumonia - started on Zosyn with Pharmacy dosing according to renal function on 1/31/23. Severe Aortic stenosis.   Severe LV dysfunction - EF 25 to 30 % on 1/8/23 with moderate to severe pulmonary HTN with a PAP at 55 mmHg. Hypotension - Inderal on hold. On Midodrine and currently on Dopamine drip. CKD stage III B  H/O CAD - S/P CABG x 4. On Aspirin and Plavix. Inderal on hold. H/O SSS - Pacemaker placed. Hypothyroidism - on Levothyroxine. Generalized weakness - PT / OT ordered. H/O Atrial fib - refused anticoagulation in the past.     Hyperlipidemia - on Crestor. Vitamin D deficiency - on replacement. BPH - on Flomax. Chronic anemia - Hgb stable. Plan:  Continue the current treatment. Dr. Ovidio Louis to talk with Kwasi Bernard and his son this am.  There has been some discussion about home Hospice. Differential Diagnosis:  -  Condition is improving / unchanged / worsening:  Unchanged  Condition is at treatment goal:  No  Chronic condition is / is not having mild / moderate, severe exacerbation, progression or side effects of treatment:  -    Shared decision making:  -    Code status and discussions:  DNR CC - A    Medical Necessity: In patient is appropriate for this patient secondary to the need for IV Dopamine and Zosyn.       DVT prophylaxis:   [] Lovenox   [] SCDs   [x] SQ Heparin   [] Encourage ambulation, low risk for DVT, no chemical or mechanical    prophylaxis necessary      [] Already on Anticoagulation    Patient Active Problem List:     Hard of hearing     CAD (coronary artery disease)     Atrial fibrillation ([address]     Hiatal hernia     Hyperlipidemia     Hypothyroidism     Pacemaker     History of PTCA     Calculus of gallbladder without cholecystitis without obstruction     Umbilical hernia     Vitamin D deficiency     Chronic systolic (congestive) heart failure     Acute on chronic systolic CHF (congestive heart failure) (HCC)     Acute on chronic systolic (congestive) heart failure (HCC)     Mild malnutrition (HCC)     Generalized weakness     Acute on chronic right-sided heart failure ([address]      Galen Hughes MD, MD  Rounding Hospitalist

## 2023-02-01 NOTE — PROGRESS NOTES
Spoke with Dr. Ravindra Powell and updated him on patient blood pressures. Most recent 70/40. Dr. Ravindra Powell would like patient BP to be checked every 2 hours. Orders updated. Will check more frequently if there is a rate change to Dopamine.

## 2023-02-01 NOTE — PROGRESS NOTES
Gaynel Section several time since receiving lasix. He has had 3 unmeasured urine outputs due to being unable to get to his urinal in time. He has had no more accidents since 2200. Patient uses urinal now and has had a urine output of 250ml at this time. Patient only complaint is \"I just don't feel good\". Monitoring patient closely for any changes.

## 2023-02-01 NOTE — PROGRESS NOTES
[facility]    Date: 2023  Patient Name: Lai Main        : 1932       [] Pt Refusal           [x] Pt Unavailable due to:  PT evaluation being held due to present medical status.    Will continue to monitor via charting and nursing and assess if/when appropriate        IRON LEMOS, PT,  Date: 2023

## 2023-02-01 NOTE — PROGRESS NOTES
Meggan Bowles states to [address] and Rishabh COLEMAN \"I want to go home tomorrow, I don't want to die here\". Rishabh COLEMAN speaks with patient about his comfort and needs being our number one priority. Meggan Bowles is very appreciative of our understanding and care. Based on our conversation he now seems interested in changing his code status and talking about hospice care. Yuriy Tapia we will focus on keeping him comfortable through the night and talk to his Care Team (Dr. Marek Luna and Dr. Rich Villatoro) in the morning. Patient agreeable with this plan.

## 2023-02-01 NOTE — PROGRESS NOTES
Spoke with Dr. Fransisco Rivas over the phone. Updated on patient status and informed of plan to decrease dopamine to 5 mcg/kg/min. Will closely monitor BP and call Dr. Fransisco Rivas if BP drops.

## 2023-02-01 NOTE — PROGRESS NOTES
[facility]    Date: 2023  Patient Name: Gayle Baldwin        : 1932       [] Pt Refusal           [x] Pt Unavailable due to: OT evaluation being held due to present medical status.  Will continue to monitor via charting and nursing and assess if/when appropriate         Camryn Waite OTR/VETO Date: 2023

## 2023-02-01 NOTE — PROGRESS NOTES
Comprehensive Nutrition Assessment    Type and Reason for Visit:  Initial    Nutrition Recommendations/Plan:   Continue current diet. Attached low sodium diet information to d/c instructions. Add 4 oz strawberry ensure enlive TID with meals. Malnutrition Assessment:  Malnutrition Status: At risk for malnutrition (Comment) (02/01/23 1032)    Context:  Chronic Illness     Findings of the 6 clinical characteristics of malnutrition:  Energy Intake:  No significant decrease in energy intake  Weight Loss:  No significant weight loss     Body Fat Loss:  No significant body fat loss     Muscle Mass Loss:  No significant muscle mass loss    Fluid Accumulation:  Severe Extremities (+ 2 weeping, pitting BLE)   Strength:  Not Performed    Nutrition Assessment:    altered nutrition related labs r/t cardiac dysfunction aeb BNP 26,759. Pt received diet instruction in January 2023 on low sodium diet, states he still has that at home. Reviewed diet verbally as he stated he has not had a chance to read it yet. States he used to eat a \"bag of potato chips every night. \" States he is not going to do that anymore. States he is going to read labels, encouraged less than 2,000 mg per day. Encouraged him to choose foods such as fresh fruits, vegetables, unbreaded meats, plain baked potatoes, etc. He acknowledged understanding and was able to demonstrate recall. Asks about [employer] tomato soup. Drank strawberry ensure in the past, will add with meals. Nutrition Related Findings:    no visual indices for malnutrition noted Wound Type:  (traumatic wounds)       Current Nutrition Intake & Therapies:    Average Meal Intake: %  Average Supplements Intake: None Ordered  ADULT DIET; Regular; Low Sodium (2 gm)    Anthropometric Measures:  Height: 5' 10\" (177.8 cm)  Ideal Body Weight (IBW): 166 lbs (75 kg)    Admission Body Weight: 204 lb 13 oz (92.9 kg)  Current Body Weight: 205 lb 3.2 oz (93.1 kg), 123.6 % IBW.  Weight Source: Bed Scale  Current BMI (kg/m2): 29.4  Usual Body Weight: 182 lb (82.6 kg)  % Weight Change (Calculated): 12.7  Weight Adjustment For: No Adjustment                 BMI Categories: Overweight (BMI 25.0-29. 9)    Estimated Daily Nutrient Needs:  Energy Requirements Based On: Kcal/kg  Weight Used for Energy Requirements: Current  Energy (kcal/day): 7267-4022 (20-23/kg)  Weight Used for Protein Requirements: Ideal  Protein (g/day): 98-113g (1.3-1.5g/kg)  Method Used for Fluid Requirements: 1 ml/kcal  Fluid (ml/day): 1900 ml    Nutrition Diagnosis:   Altered nutrition-related lab values related to cardiac dysfunction as evidenced by lab values    Nutrition Interventions:   Food and/or Nutrient Delivery: Continue Current Diet  Nutrition Education/Counseling: Education initiated  Coordination of Nutrition Care: Continue to monitor while inpatient  Plan of Care discussed with: Patient    Goals:     Goals: Meet at least 75% of estimated needs     Recent Labs     01/30/23  1422 01/31/23  0355 02/01/23  0400    140 139   K 4.0 4.0 3.9    101 98   CO2 28 30 29   BUN 34* 35* 32*   CREATININE 1.65* 1.61* 1.54*   GLUCOSE 111* 104* 138*   ALT 10  --   --    ALKPHOS 66  --   --       Lab Results   Component Value Date/Time    LABALBU 3.9 01/30/2023 02:22 PM       Nutrition Monitoring and Evaluation:   Behavioral-Environmental Outcomes: Readiness for Change  Food/Nutrient Intake Outcomes: Food and Nutrient Intake  Physical Signs/Symptoms Outcomes: Biochemical Data, Weight, Fluid Status or Edema    Discharge Planning:    Continue current diet     Joe Thomas RD, LD  Contact: [phone]

## 2023-02-01 NOTE — PROGRESS NOTES
Cardiology    His pressure remains low in spite of being on Dopamine at 10 mcg/kg/min. He however, is relatively asymptomatic, especially when in bed. I note his wishes to possibly change to hospice care. Thus, will turn down dopamine to 5 mcg/kg/min, to see if there is a change in bp. If it drops immediately, will increase again to 10 or even 15 mcg/kg/min for comfort care. If there is no change, would d/c and assess whether dopamine changes clinical status, and if not, will d/c. There has been a good urine output during the night.   Will talk with son at 9 AM.    Madelin Ashley MD

## 2023-02-01 NOTE — CONSULTS
Piperacillin-tazobactam Dosing by Pharmacy    TODAY'S DATE:  2/1/2023  Patient name, age:  Stefanie Sharp, 80 y.o. Indication: possible pneumonia  Additional antimicrobials:  none    Allergies:  Menthol (topical analgesic)   Actual Weight:    Wt Readings from Last 1 Encounters:   02/01/23 205 lb 3.2 oz (93.1 kg)     Labs/Ancillary Data  Estimated Creatinine Clearance: 37 mL/min (A) (based on SCr of 1.54 mg/dL (H)). Recent Labs     01/30/23  1422 01/31/23  0355 02/01/23  0400   CREATININE 1.65* 1.61* 1.54*   BUN 34* 35* 32*   WBC 6.1 5.4 14.2*     No results found for: PROCAL    Intake/Output Summary (Last 24 hours) at 2/1/2023 0930  Last data filed at 2/1/2023 0825  Gross per 24 hour   Intake 1833.48 ml   Output 1500 ml   Net 333.48 ml     Temp: afebrile    ASSESSMENT/ PLAN   Initial loading dose of 4.5 grams IV given x 1 dose on 1/31/2023 at 1830, followed by extended interval dosing of 3.375 grams IV every 8 hours for CrCl=37 ml/min (using LexiComp recommendations for altered kidney function: CrCl 20 to <40 ml/min: extended infusion method)   WBC significantly more elevated this AM, renal function improved over last 24 hours   Pharmacy will continue to monitor renal function, patient condition, and adjust therapy as indicated. Thank you for the consult. Pharmacy will continue to follow.   Patrick Caceres, HeavenlyD

## 2023-02-01 NOTE — DISCHARGE INSTR - DIET
Good nutrition is important when healing from an illness, injury, or surgery. Follow any nutrition recommendations given to you during your hospital stay. If you were given an oral nutrition supplement while in the hospital, continue to take this supplement at home. You can take it with meals, in-between meals, and/or before bedtime. These supplements can be purchased at most local grocery stores, pharmacies, and chain [employer]stores. If you have any questions about your diet or nutrition, call the hospital and ask for the dietitian.       Limit sodium to less than 2,000 mg per day  Choose healthy choice or smart one meals   Call Yen Leon the dietitian with any questions: [phone]

## 2023-02-02 LAB
ABSOLUTE EOS #: 0.2 K/UL (ref 0–0.4)
ABSOLUTE LYMPH #: 0.7 K/UL (ref 1–4.8)
ABSOLUTE MONO #: 0.3 K/UL (ref 0–1)
ANION GAP SERPL CALCULATED.3IONS-SCNC: 11 MMOL/L (ref 9–17)
BASOPHILS # BLD: 0 % (ref 0–2)
BASOPHILS ABSOLUTE: 0 K/UL (ref 0–0.2)
BUN SERPL-MCNC: 26 MG/DL (ref 8–23)
BUN/CREAT BLD: 19 (ref 9–20)
CALCIUM SERPL-MCNC: 9 MG/DL (ref 8.6–10.4)
CHLORIDE SERPL-SCNC: 94 MMOL/L (ref 98–107)
CO2 SERPL-SCNC: 28 MMOL/L (ref 20–31)
CORTISOL COLLECTION INFO: 645
CORTISOL: 18.6 UG/DL (ref 2.7–18.4)
CREAT SERPL-MCNC: 1.39 MG/DL (ref 0.7–1.2)
DIFFERENTIAL TYPE: YES
EOSINOPHILS RELATIVE PERCENT: 2 % (ref 0–5)
GFR SERPL CREATININE-BSD FRML MDRD: 48 ML/MIN/1.73M2
GLUCOSE SERPL-MCNC: 138 MG/DL (ref 70–99)
HCT VFR BLD AUTO: 39.2 % (ref 41–53)
HGB BLD-MCNC: 12.8 G/DL (ref 13.5–17.5)
LYMPHOCYTES # BLD: 8 % (ref 13–44)
MCH RBC QN AUTO: 30.7 PG (ref 26–34)
MCHC RBC AUTO-ENTMCNC: 32.7 G/DL (ref 31–37)
MCV RBC AUTO: 93.7 FL (ref 80–100)
MONOCYTES # BLD: 4 % (ref 5–9)
PDW BLD-RTO: 15.3 % (ref 12.1–15.2)
PLATELET # BLD AUTO: 172 K/UL (ref 140–450)
POTASSIUM SERPL-SCNC: 4.1 MMOL/L (ref 3.7–5.3)
RBC # BLD: 4.18 M/UL (ref 4.5–5.9)
SEG NEUTROPHILS: 86 % (ref 39–75)
SEGMENTED NEUTROPHILS ABSOLUTE COUNT: 7.5 K/UL (ref 2.1–6.5)
SODIUM SERPL-SCNC: 133 MMOL/L (ref 135–144)
WBC # BLD AUTO: 8.8 K/UL (ref 3.5–11)

## 2023-02-02 PROCEDURE — 94761 N-INVAS EAR/PLS OXIMETRY MLT: CPT

## 2023-02-02 PROCEDURE — 6360000002 HC RX W HCPCS: Performed by: INTERNAL MEDICINE

## 2023-02-02 PROCEDURE — 80048 BASIC METABOLIC PNL TOTAL CA: CPT

## 2023-02-02 PROCEDURE — 36415 COLL VENOUS BLD VENIPUNCTURE: CPT

## 2023-02-02 PROCEDURE — 6370000000 HC RX 637 (ALT 250 FOR IP): Performed by: INTERNAL MEDICINE

## 2023-02-02 PROCEDURE — 82533 TOTAL CORTISOL: CPT

## 2023-02-02 PROCEDURE — 2700000000 HC OXYGEN THERAPY PER DAY

## 2023-02-02 PROCEDURE — 1200000000 HC SEMI PRIVATE

## 2023-02-02 PROCEDURE — 85025 COMPLETE CBC W/AUTO DIFF WBC: CPT

## 2023-02-02 PROCEDURE — 2580000003 HC RX 258: Performed by: INTERNAL MEDICINE

## 2023-02-02 RX ADMIN — FUROSEMIDE 40 MG: 10 INJECTION, SOLUTION INTRAMUSCULAR; INTRAVENOUS at 17:17

## 2023-02-02 RX ADMIN — HYDROCORTISONE SODIUM SUCCINATE 100 MG: 100 INJECTION, POWDER, FOR SOLUTION INTRAMUSCULAR; INTRAVENOUS at 08:19

## 2023-02-02 RX ADMIN — TAMSULOSIN HYDROCHLORIDE 0.4 MG: 0.4 CAPSULE ORAL at 20:31

## 2023-02-02 RX ADMIN — MIDODRINE HYDROCHLORIDE 20 MG: 5 TABLET ORAL at 13:05

## 2023-02-02 RX ADMIN — HEPARIN SODIUM 5000 UNITS: 5000 INJECTION INTRAVENOUS; SUBCUTANEOUS at 20:31

## 2023-02-02 RX ADMIN — MIDODRINE HYDROCHLORIDE 20 MG: 5 TABLET ORAL at 20:31

## 2023-02-02 RX ADMIN — CLOPIDOGREL BISULFATE 75 MG: 75 TABLET ORAL at 08:14

## 2023-02-02 RX ADMIN — ROSUVASTATIN 10 MG: 10 TABLET, FILM COATED ORAL at 08:14

## 2023-02-02 RX ADMIN — SPIRONOLACTONE 25 MG: 25 TABLET, FILM COATED ORAL at 08:14

## 2023-02-02 RX ADMIN — DOPAMINE HYDROCHLORIDE IN DEXTROSE 20 MCG/KG/MIN: 1.6 INJECTION, SOLUTION INTRAVENOUS at 20:11

## 2023-02-02 RX ADMIN — CHOLECALCIFEROL TAB 25 MCG (1000 UNIT) 2000 UNITS: 25 TAB at 08:14

## 2023-02-02 RX ADMIN — PIPERACILLIN AND TAZOBACTAM 3375 MG: 3; .375 INJECTION, POWDER, FOR SOLUTION INTRAVENOUS at 08:09

## 2023-02-02 RX ADMIN — DOPAMINE HYDROCHLORIDE IN DEXTROSE 20 MCG/KG/MIN: 1.6 INJECTION, SOLUTION INTRAVENOUS at 16:17

## 2023-02-02 RX ADMIN — ACETAMINOPHEN 650 MG: 325 TABLET, FILM COATED ORAL at 13:04

## 2023-02-02 RX ADMIN — MIDODRINE HYDROCHLORIDE 20 MG: 5 TABLET ORAL at 08:14

## 2023-02-02 RX ADMIN — DOPAMINE HYDROCHLORIDE IN DEXTROSE 20 MCG/KG/MIN: 1.6 INJECTION, SOLUTION INTRAVENOUS at 08:12

## 2023-02-02 RX ADMIN — Medication 400 MG: at 08:13

## 2023-02-02 RX ADMIN — LEVOTHYROXINE SODIUM 125 MCG: 25 TABLET ORAL at 06:10

## 2023-02-02 RX ADMIN — DOPAMINE HYDROCHLORIDE IN DEXTROSE 20 MCG/KG/MIN: 1.6 INJECTION, SOLUTION INTRAVENOUS at 04:18

## 2023-02-02 RX ADMIN — SODIUM CHLORIDE, PRESERVATIVE FREE 10 ML: 5 INJECTION INTRAVENOUS at 08:21

## 2023-02-02 RX ADMIN — DOPAMINE HYDROCHLORIDE IN DEXTROSE 20 MCG/KG/MIN: 1.6 INJECTION, SOLUTION INTRAVENOUS at 00:15

## 2023-02-02 RX ADMIN — SPIRONOLACTONE 25 MG: 25 TABLET, FILM COATED ORAL at 17:18

## 2023-02-02 RX ADMIN — PIPERACILLIN AND TAZOBACTAM 3375 MG: 3; .375 INJECTION, POWDER, FOR SOLUTION INTRAVENOUS at 00:18

## 2023-02-02 RX ADMIN — HYDROCORTISONE SODIUM SUCCINATE 100 MG: 100 INJECTION, POWDER, FOR SOLUTION INTRAMUSCULAR; INTRAVENOUS at 17:17

## 2023-02-02 RX ADMIN — CYANOCOBALAMIN TAB 1000 MCG 1000 MCG: 1000 TAB at 08:14

## 2023-02-02 RX ADMIN — ASPIRIN 81 MG: 81 TABLET, CHEWABLE ORAL at 08:13

## 2023-02-02 RX ADMIN — HEPARIN SODIUM 5000 UNITS: 5000 INJECTION INTRAVENOUS; SUBCUTANEOUS at 08:14

## 2023-02-02 RX ADMIN — DOPAMINE HYDROCHLORIDE IN DEXTROSE 20 MCG/KG/MIN: 1.6 INJECTION, SOLUTION INTRAVENOUS at 12:12

## 2023-02-02 RX ADMIN — PIPERACILLIN AND TAZOBACTAM 3375 MG: 3; .375 INJECTION, POWDER, FOR SOLUTION INTRAVENOUS at 17:17

## 2023-02-02 ASSESSMENT — PAIN - FUNCTIONAL ASSESSMENT: PAIN_FUNCTIONAL_ASSESSMENT: PREVENTS OR INTERFERES SOME ACTIVE ACTIVITIES AND ADLS

## 2023-02-02 ASSESSMENT — PAIN DESCRIPTION - LOCATION: LOCATION: COCCYX

## 2023-02-02 ASSESSMENT — PAIN SCALES - GENERAL
PAINLEVEL_OUTOF10: 0
PAINLEVEL_OUTOF10: 0
PAINLEVEL_OUTOF10: 2

## 2023-02-02 ASSESSMENT — PAIN DESCRIPTION - DESCRIPTORS: DESCRIPTORS: GNAWING

## 2023-02-02 ASSESSMENT — PAIN DESCRIPTION - ORIENTATION: ORIENTATION: MID

## 2023-02-02 NOTE — PROGRESS NOTES
[facility]  Occupational Therapy    Date: 2023  Patient Name: Matt Faith        : 1932       [] Pt Refusal           [x] Pt Unavailable due to:  OT evaluation being held due to present medical status.  Will continue to monitor via charting and nursing and assess if/when appropriate        Sophy Rothman OTR/L Date: 2023

## 2023-02-02 NOTE — PROGRESS NOTES
Hospitalist Progress Note  2/2/2023 5:47 AM  Subjective:   Admit Date: 1/30/2023  PCP: LORY Magana - CNP    Interval History: Meggan Bowles has no complaints this am.  He states his cough has resolved with the IV antibiotic. Continues to require Dopamine at 20 mcg/kg/min. No chest pain. Appetite not very good, no nausea. Labs pending this am.    Diet: ADULT DIET; Regular; Low Sodium (2 gm)  ADULT ORAL NUTRITION SUPPLEMENT; Breakfast, Lunch, Dinner; Standard High Calorie/High Protein Oral Supplement  Medications:   Scheduled Meds:   furosemide  40 mg IntraVENous BID    piperacillin-tazobactam  3,375 mg IntraVENous Q8H    aspirin  81 mg Oral Daily    Vitamin D  2,000 Units Oral Daily    clopidogrel  75 mg Oral Daily    magnesium oxide  400 mg Oral Daily    midodrine  20 mg Oral TID    [Held by provider] propranolol  10 mg Oral BID    rosuvastatin  10 mg Oral Daily    tamsulosin  0.4 mg Oral Nightly    vitamin B-12  1,000 mcg Oral Daily    sodium chloride flush  5-40 mL IntraVENous 2 times per day    heparin (porcine)  5,000 Units SubCUTAneous BID    levothyroxine  125 mcg Oral Daily    spironolactone  25 mg Oral BID     Continuous Infusions:   DOPamine 20 mcg/kg/min (02/02/23 0419)    sodium chloride Stopped (02/01/23 0515)       Patient's current medications documented, reviewed, and updated. CBC:   Recent Labs     01/30/23  1422 01/31/23  0355 02/01/23  0400   WBC 6.1 5.4 14.2*   HGB 11.2* 9.9* 11.8*    145 178     BMP:    Recent Labs     01/30/23  1422 01/31/23  0355 02/01/23  0400    140 139   K 4.0 4.0 3.9    101 98   CO2 28 30 29   BUN 34* 35* 32*   CREATININE 1.65* 1.61* 1.54*   GLUCOSE 111* 104* 138*     Hepatic:   Recent Labs     01/30/23  1422   AST 15   ALT 10   BILITOT 1.0   ALKPHOS 66     Troponin: No results for input(s): TROPONINI in the last 72 hours. BNP: No results for input(s): BNP in the last 72 hours.   Lipids: No results for input(s): CHOL, HDL in the last 72 hours.    Invalid input(s): LDLCALCU  INR: No results for input(s): INR in the last 72 hours. Objective:   Vitals: BP (!) 95/59   Pulse 93   Temp 97.5 °F (36.4 °C) (Temporal)   Resp 16   Ht 5' 10\" (1.778 m)   Wt 205 lb 3.2 oz (93.1 kg)   SpO2 100%   BMI 29.44 kg/m²   General appearance: alert and cooperative with exam  HEENT: Head: Normocephalic, no lesions, without obvious abnormality. Eye: Normal external eye, conjunctiva, lids cornea, SARABJIT. Nose: Normal external nose, mucus membranes and septum. Nasal oxygen on. Neck: no adenopathy and supple, symmetrical, trachea midline  Lungs:  Clear anteriorly with rales in the bases posteriorly. Heart: regular rate and rhythm, S1, S2 normal, and II/VI systolic murmur  Abdomen: soft, non-tender; bowel sounds normal; no masses,  no organomegaly  Extremities:  ACE Wraps on bilateral with decrease in edema. Neurologic: Mental status: Alert, oriented, thought content appropriate    ----------------------------------------------------------------------------------------------------------------------  Medical Decision Making (MDM) Data:    External documents reviewed: -  My CXR interpretation: -  My EKG interpretation: -  Discussed with:  -  Tests considered but not ordered:  -  Heart score:  -  Social Determinants of Health that impact treatment or disposition:  -    Assessment and Plan:   Right sided CHF - placed initially on Dobutamine drip but SBP dropped requiring a small fluid bolus and change to Dopamine with dose adjusted to maintain SBP > 90 (at 20 mcg/kg/min). Can't tolerate diuresis with SBP in the 80's - 90's. Lower legs ACE wrapped to decrease edema. Bibasilar infiltrates suggestive of Pneumonia - started on Zosyn with Pharmacy dosing according to renal function on 1/31/23. Cough has improved. Awaiting WBC this am.  Severe Aortic stenosis.   Severe LV dysfunction - EF 25 to 30 % on 1/8/23 with moderate to severe pulmonary HTN with a PAP at 55 mmHg.  Hypotension - Inderal on hold. On Midodrine and currently on Dopamine drip. 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 H/O CAD - S/P CABG x 4. On Aspirin and Plavix. Inderal on hold. H/O SSS - Pacemaker placed. Hypothyroidism - on Levothyroxine. Generalized weakness - PT / OT ordered. H/O Atrial fib - refused anticoagulation in the past.     Hyperlipidemia - on Crestor. Vitamin D deficiency - on replacement. BPH - on Flomax. Chronic anemia - Hgb stable. Plan:  Follow up on am labs when available. Continue the current treatment. Differential Diagnosis:  -  Condition is improving / unchanged / worsening:  unchanged  Condition is at treatment goal:  No  Chronic condition is / is not having mild / moderate, severe exacerbation, progression or side effects of treatment:  -    Shared decision making:  -    Code status and discussions:  DNR CC - A    Medical Necessity: In patient is appropriate for this patient secondary to the need for IV antibiotic and IV Dopamine.         DVT prophylaxis:   [] Lovenox   [] SCDs   [x] SQ Heparin   [] Encourage ambulation, low risk for DVT, no chemical or mechanical    prophylaxis necessary      [] Already on Anticoagulation    Patient Active Problem List:     Hard of hearing     CAD (coronary artery disease)     Atrial fibrillation ([address]     Hiatal hernia     Hyperlipidemia     Hypothyroidism     Pacemaker     History of PTCA     Calculus of gallbladder without cholecystitis without obstruction     Umbilical hernia     Vitamin D deficiency     Chronic systolic (congestive) heart failure     Acute on chronic systolic CHF (congestive heart failure) (HCC)     Acute on chronic systolic (congestive) heart failure (HCC)     Mild malnutrition (HCC)     Generalized weakness     Acute on chronic right-sided heart failure ([address]        Ana Lilia Dsouza MD, MD  Rounding Hospitalist

## 2023-02-02 NOTE — PROGRESS NOTES
[facility]  Physical Therapy    Date: 2023  Patient Name: Laurita Chacon        : 1932       [] Pt Refusal           [x] Pt Unavailable due to:   PT evaluation being held due to present medical status.    Will continue to monitor via charting and nursing and assess if/when appropriate        IRON LEMOS, PT,  Date: 2023

## 2023-02-02 NOTE — PROGRESS NOTES
Vitals and assessment completed. Pt awake, alert, and oriented. Pts ACE wraps removed along with soiled gauze and non adherent pads. Pt has open wound to left and right top of foot with the left being significantly larger than the left. Wound bedding red with minimal draining. Pts wounds cleansed with normal saline and re dressed with nonadherent pads, gauze, and ACE wraps. Pts pressures continue to run 80s/50s. Taking BP q30 min.

## 2023-02-02 NOTE — PLAN OF CARE
Problem: Discharge Planning  Goal: Discharge to home or other facility with appropriate resources  Outcome: Progressing  Flowsheets (Taken 2/1/2023 1930)  Discharge to home or other facility with appropriate resources:   Identify barriers to discharge with patient and caregiver   Arrange for needed discharge resources and transportation as appropriate   Identify discharge learning needs (meds, wound care, etc)   Refer to discharge planning if patient needs post-hospital services based on physician order or complex needs related to functional status, cognitive ability or social support system     Problem: Safety - Adult  Goal: Free from fall injury  Outcome: Progressing     Problem: ABCDS Injury Assessment  Goal: Absence of physical injury  Outcome: Progressing     Problem: Respiratory - Adult  Goal: Achieves optimal ventilation and oxygenation  Outcome: Progressing     Problem: Cardiovascular - Adult  Goal: Maintains optimal cardiac output and hemodynamic stability  Outcome: Progressing     Problem: Musculoskeletal - Adult  Goal: Return mobility to safest level of function  Outcome: Progressing     Problem: Infection - Adult  Goal: Absence of infection at discharge  Outcome: Progressing     Problem: Metabolic/Fluid and Electrolytes - Adult  Goal: Electrolytes maintained within normal limits  Outcome: Progressing  Flowsheets (Taken 2/1/2023 1930)  Electrolytes maintained within normal limits: Monitor labs and assess patient for signs and symptoms of electrolyte imbalances  Goal: Glucose maintained within prescribed range  Outcome: Progressing     Problem: Chronic Conditions and Co-morbidities  Goal: Patient's chronic conditions and co-morbidity symptoms are monitored and maintained or improved  Outcome: Progressing  Flowsheets (Taken 2/1/2023 1930)  Care Plan - Patient's Chronic Conditions and Co-Morbidity Symptoms are Monitored and Maintained or Improved: Monitor and assess patient's chronic conditions and comorbid symptoms for stability, deterioration, or improvement     Problem: Skin/Tissue Integrity - Adult  Goal: Incisions, wounds, or drain sites healing without S/S of infection  Outcome: Progressing     Problem: Nutrition Deficit:  Goal: Optimize nutritional status  2/1/2023 2157 by Chicho Grace RN  Outcome: Progressing  2/1/2023 1127 by Kev Rojas RD, LD  Flowsheets (Taken 2/1/2023 1127)  Nutrient intake appropriate for improving, restoring, or maintaining nutritional needs:   Monitor oral intake, labs, and treatment plans   Recommend appropriate diets, oral nutritional supplements, and vitamin/mineral supplements  Note: Nutrition Problem #1: Altered nutrition-related lab values  Intervention: Food and/or Nutrient Delivery: Continue Current Diet

## 2023-02-03 PROCEDURE — 94761 N-INVAS EAR/PLS OXIMETRY MLT: CPT

## 2023-02-03 PROCEDURE — 2580000003 HC RX 258: Performed by: INTERNAL MEDICINE

## 2023-02-03 PROCEDURE — 1200000000 HC SEMI PRIVATE

## 2023-02-03 PROCEDURE — 6360000002 HC RX W HCPCS: Performed by: INTERNAL MEDICINE

## 2023-02-03 PROCEDURE — 6370000000 HC RX 637 (ALT 250 FOR IP): Performed by: INTERNAL MEDICINE

## 2023-02-03 PROCEDURE — 2700000000 HC OXYGEN THERAPY PER DAY

## 2023-02-03 RX ORDER — DOPAMINE HYDROCHLORIDE 160 MG/100ML
15 INJECTION, SOLUTION INTRAVENOUS CONTINUOUS
Status: DISCONTINUED | OUTPATIENT
Start: 2023-02-03 | End: 2023-02-04

## 2023-02-03 RX ORDER — FUROSEMIDE 10 MG/ML
20 INJECTION INTRAMUSCULAR; INTRAVENOUS 2 TIMES DAILY
Status: DISCONTINUED | OUTPATIENT
Start: 2023-02-03 | End: 2023-02-05

## 2023-02-03 RX ORDER — AMIODARONE HYDROCHLORIDE 200 MG/1
200 TABLET ORAL DAILY
Status: DISCONTINUED | OUTPATIENT
Start: 2023-02-03 | End: 2023-02-06 | Stop reason: HOSPADM

## 2023-02-03 RX ADMIN — SODIUM CHLORIDE, PRESERVATIVE FREE 10 ML: 5 INJECTION INTRAVENOUS at 09:22

## 2023-02-03 RX ADMIN — SPIRONOLACTONE 25 MG: 25 TABLET, FILM COATED ORAL at 17:39

## 2023-02-03 RX ADMIN — DOPAMINE HYDROCHLORIDE IN DEXTROSE 15 MCG/KG/MIN: 1.6 INJECTION, SOLUTION INTRAVENOUS at 14:41

## 2023-02-03 RX ADMIN — PIPERACILLIN AND TAZOBACTAM 3375 MG: 3; .375 INJECTION, POWDER, FOR SOLUTION INTRAVENOUS at 00:13

## 2023-02-03 RX ADMIN — MIDODRINE HYDROCHLORIDE 20 MG: 5 TABLET ORAL at 14:10

## 2023-02-03 RX ADMIN — CYANOCOBALAMIN TAB 1000 MCG 1000 MCG: 1000 TAB at 09:08

## 2023-02-03 RX ADMIN — Medication 400 MG: at 09:08

## 2023-02-03 RX ADMIN — FUROSEMIDE 20 MG: 10 INJECTION, SOLUTION INTRAMUSCULAR; INTRAVENOUS at 17:39

## 2023-02-03 RX ADMIN — SPIRONOLACTONE 25 MG: 25 TABLET, FILM COATED ORAL at 09:08

## 2023-02-03 RX ADMIN — ASPIRIN 81 MG: 81 TABLET, CHEWABLE ORAL at 09:08

## 2023-02-03 RX ADMIN — PIPERACILLIN AND TAZOBACTAM 3375 MG: 3; .375 INJECTION, POWDER, FOR SOLUTION INTRAVENOUS at 16:09

## 2023-02-03 RX ADMIN — HYDROCORTISONE SODIUM SUCCINATE 100 MG: 100 INJECTION, POWDER, FOR SOLUTION INTRAMUSCULAR; INTRAVENOUS at 00:10

## 2023-02-03 RX ADMIN — FUROSEMIDE 20 MG: 10 INJECTION, SOLUTION INTRAMUSCULAR; INTRAVENOUS at 09:07

## 2023-02-03 RX ADMIN — CHOLECALCIFEROL TAB 25 MCG (1000 UNIT) 2000 UNITS: 25 TAB at 09:08

## 2023-02-03 RX ADMIN — DEXTROSE MONOHYDRATE 150 MG: 50 INJECTION, SOLUTION INTRAVENOUS at 14:55

## 2023-02-03 RX ADMIN — TAMSULOSIN HYDROCHLORIDE 0.4 MG: 0.4 CAPSULE ORAL at 20:06

## 2023-02-03 RX ADMIN — AMIODARONE HYDROCHLORIDE 200 MG: 200 TABLET ORAL at 15:07

## 2023-02-03 RX ADMIN — DOPAMINE HYDROCHLORIDE IN DEXTROSE 15 MCG/KG/MIN: 1.6 INJECTION, SOLUTION INTRAVENOUS at 09:19

## 2023-02-03 RX ADMIN — MIDODRINE HYDROCHLORIDE 20 MG: 5 TABLET ORAL at 09:07

## 2023-02-03 RX ADMIN — PIPERACILLIN AND TAZOBACTAM 3375 MG: 3; .375 INJECTION, POWDER, FOR SOLUTION INTRAVENOUS at 09:12

## 2023-02-03 RX ADMIN — LEVOTHYROXINE SODIUM 125 MCG: 25 TABLET ORAL at 06:40

## 2023-02-03 RX ADMIN — DOPAMINE HYDROCHLORIDE IN DEXTROSE 20 MCG/KG/MIN: 1.6 INJECTION, SOLUTION INTRAVENOUS at 04:07

## 2023-02-03 RX ADMIN — SODIUM CHLORIDE, PRESERVATIVE FREE 10 ML: 5 INJECTION INTRAVENOUS at 20:07

## 2023-02-03 RX ADMIN — HEPARIN SODIUM 5000 UNITS: 5000 INJECTION INTRAVENOUS; SUBCUTANEOUS at 09:07

## 2023-02-03 RX ADMIN — ROSUVASTATIN 10 MG: 10 TABLET, FILM COATED ORAL at 09:08

## 2023-02-03 RX ADMIN — HYDROCORTISONE SODIUM SUCCINATE 100 MG: 100 INJECTION, POWDER, FOR SOLUTION INTRAMUSCULAR; INTRAVENOUS at 16:07

## 2023-02-03 RX ADMIN — CLOPIDOGREL BISULFATE 75 MG: 75 TABLET ORAL at 09:08

## 2023-02-03 RX ADMIN — HEPARIN SODIUM 5000 UNITS: 5000 INJECTION INTRAVENOUS; SUBCUTANEOUS at 20:06

## 2023-02-03 RX ADMIN — DOPAMINE HYDROCHLORIDE IN DEXTROSE 15 MCG/KG/MIN: 1.6 INJECTION, SOLUTION INTRAVENOUS at 05:42

## 2023-02-03 RX ADMIN — DOPAMINE HYDROCHLORIDE IN DEXTROSE 20 MCG/KG/MIN: 1.6 INJECTION, SOLUTION INTRAVENOUS at 00:10

## 2023-02-03 RX ADMIN — MIDODRINE HYDROCHLORIDE 20 MG: 5 TABLET ORAL at 20:06

## 2023-02-03 RX ADMIN — HYDROCORTISONE SODIUM SUCCINATE 100 MG: 100 INJECTION, POWDER, FOR SOLUTION INTRAMUSCULAR; INTRAVENOUS at 09:07

## 2023-02-03 RX ADMIN — DOPAMINE HYDROCHLORIDE IN DEXTROSE 15 MCG/KG/MIN: 1.6 INJECTION, SOLUTION INTRAVENOUS at 20:08

## 2023-02-03 ASSESSMENT — PAIN SCALES - GENERAL
PAINLEVEL_OUTOF10: 1
PAINLEVEL_OUTOF10: 0

## 2023-02-03 NOTE — PROGRESS NOTES
Pt expresses that he is very hot and sweaty and would like some ice packs to put on his body. This RN washes pt up with cold wash clothes and changes him into a new gown.

## 2023-02-03 NOTE — PROGRESS NOTES
Comprehensive Nutrition Assessment    Type and Reason for Visit:  Reassess    Nutrition Recommendations/Plan:   Encourage oral intakes, especially lean protein foods  Strawberry Ensure     Malnutrition Assessment:  Malnutrition Status: At risk for malnutrition (Comment) (02/01/23 1032)    Context:  Chronic Illness     Findings of the 6 clinical characteristics of malnutrition:  Energy Intake:  No significant decrease in energy intake  Weight Loss:  No significant weight loss     Body Fat Loss:  No significant body fat loss     Muscle Mass Loss:  No significant muscle mass loss    Fluid Accumulation:  Severe Extremities (+ 2 weeping, pitting BLE)   Strength:  Not Performed    Nutrition Assessment:    Improving nutrition related labs (renal), but weihgt elevations from BLE edema of +2 and wraps to BLE as well. Taking PO well overall per I/O data, less impressive upon visit to room at end of lunch. Encouraged oral intakes upon visit. Nutrition Related Findings:    no visual indices for malnutrition noted Wound Type:  (traumatic wounds)       Current Nutrition Intake & Therapies:    Average Meal Intake: %  Average Supplements Intake: % (\"sometimes takes\")  ADULT DIET; Regular; Low Sodium (2 gm)  ADULT ORAL NUTRITION SUPPLEMENT; Breakfast, Lunch, Dinner; Standard High Calorie/High Protein Oral Supplement    Anthropometric Measures:  Height: 5' 10\" (177.8 cm)  Ideal Body Weight (IBW): 166 lbs (75 kg)    Admission Body Weight: 204 lb 13 oz (92.9 kg)  Current Body Weight: 215 lb (97.5 kg), 123.6 % IBW. Weight Source: Bed Scale  Current BMI (kg/m2): 30.8  Usual Body Weight: 182 lb (82.6 kg)  % Weight Change (Calculated): 18.1  Weight Adjustment For: No Adjustment                 BMI Categories: Obese Class 1 (BMI 30.0-34. 9)    Estimated Daily Nutrient Needs:  Energy Requirements Based On: Kcal/kg  Weight Used for Energy Requirements: Current  Energy (kcal/day): 1915-0656 (20-23/kg)  Weight Used for Protein Requirements: Ideal  Protein (g/day): 98-113g (1.3-1.5g/kg)  Method Used for Fluid Requirements: 1 ml/kcal  Fluid (ml/day): 1900 ml    Nutrition Diagnosis:   Altered nutrition-related lab values related to cardiac dysfunction as evidenced by lab values    Lab Results   Component Value Date     (L) 02/02/2023    K 4.1 02/02/2023    CL 94 (L) 02/02/2023    CO2 28 02/02/2023    BUN 26 (H) 02/02/2023    CREATININE 1.39 (H) 02/02/2023    GLUCOSE 138 (H) 02/02/2023    CALCIUM 9.0 02/02/2023    PROT 6.9 01/30/2023    LABALBU 3.9 01/30/2023    BILITOT 1.0 01/30/2023    ALKPHOS 66 01/30/2023    AST 15 01/30/2023    ALT 10 01/30/2023    LABGLOM 48 (L) 02/02/2023    GFRAA >60 06/06/2022    GLOB NOT REPORTED 04/21/2014     Nutrition Interventions:   Food and/or Nutrient Delivery: Continue Oral Nutrition Supplement, Continue Current Diet  Nutrition Education/Counseling: No recommendation at this time  Coordination of Nutrition Care: Continue to monitor while inpatient  Plan of Care discussed with: Patient    Goals:     Goals: Meet at least 75% of estimated needs       Nutrition Monitoring and Evaluation:   Behavioral-Environmental Outcomes: Readiness for Change  Food/Nutrient Intake Outcomes: Food and Nutrient Intake  Physical Signs/Symptoms Outcomes: Biochemical Data, Weight, Fluid Status or Edema    Discharge Planning:    Continue current diet     Hubert Bull N [address] [place] [address]: [phone]

## 2023-02-03 NOTE — PROGRESS NOTES
Cardiology    BP good through the night. Have decreased Lasix to 20 mg bid, and decreased Dopamine to 15 mcg/kg/min.     Thanks, Yesenia Ramirez MD

## 2023-02-03 NOTE — PROGRESS NOTES
Hospitalist Progress Note  2/3/2023 4:45 AM  Subjective:   Admit Date: 1/30/2023  PCP: Rober Davis, APRN - CNP    Interval History: Falguni Fuentes has no complaints this am.  He states he had a wonderful day yesterday. Falguni Fuentes denies having a cough. No chest pain. IV Solucortef was added yesterday with the concerns of possible adrenal insufficiency with this collapse in BP. Continues on 20 mcg/kg/min of dopamine but SBP staying > 100. Diet: ADULT DIET; Regular; Low Sodium (2 gm)  ADULT ORAL NUTRITION SUPPLEMENT; Breakfast, Lunch, Dinner; Standard High Calorie/High Protein Oral Supplement  Medications:   Scheduled Meds:   hydrocortisone sodium succinate PF  100 mg IntraVENous Q8H    furosemide  40 mg IntraVENous BID    piperacillin-tazobactam  3,375 mg IntraVENous Q8H    aspirin  81 mg Oral Daily    Vitamin D  2,000 Units Oral Daily    clopidogrel  75 mg Oral Daily    magnesium oxide  400 mg Oral Daily    midodrine  20 mg Oral TID    [Held by provider] propranolol  10 mg Oral BID    rosuvastatin  10 mg Oral Daily    tamsulosin  0.4 mg Oral Nightly    vitamin B-12  1,000 mcg Oral Daily    sodium chloride flush  5-40 mL IntraVENous 2 times per day    heparin (porcine)  5,000 Units SubCUTAneous BID    levothyroxine  125 mcg Oral Daily    spironolactone  25 mg Oral BID     Continuous Infusions:   DOPamine 20 mcg/kg/min (02/03/23 0407)    sodium chloride Stopped (02/01/23 0515)       Patient's current medications documented, reviewed, and updated. CBC:   Recent Labs     02/01/23  0400 02/02/23  0605   WBC 14.2* 8.8   HGB 11.8* 12.8*    172     BMP:    Recent Labs     02/01/23  0400 02/02/23  0605    133*   K 3.9 4.1   CL 98 94*   CO2 29 28   BUN 32* 26*   CREATININE 1.54* 1.39*   GLUCOSE 138* 138*     Hepatic: No results for input(s): AST, ALT, ALB, BILITOT, ALKPHOS in the last 72 hours. Troponin: No results for input(s): TROPONINI in the last 72 hours.   BNP: No results for input(s): BNP in the last 72 hours. Lipids: No results for input(s): CHOL, HDL in the last 72 hours. Invalid input(s): LDLCALCU  INR: No results for input(s): INR in the last 72 hours. Objective:   Vitals: /63   Pulse 93   Temp 97.4 °F (36.3 °C) (Axillary)   Resp 16   Ht 5' 10\" (1.778 m)   Wt 211 lb (95.7 kg)   SpO2 98%   BMI 30.28 kg/m²   General appearance: alert and cooperative with exam  HEENT: Head: Normocephalic, no lesions, without obvious abnormality. Eye: Normal external eye, conjunctiva, lids cornea, SARABJIT. Nose: Normal external nose, mucus membranes and septum. Nasal oxygen on. Neck: no adenopathy and supple, symmetrical, trachea midline  Lungs: clear to auscultation bilaterally  Heart: regular rate and rhythm, S1, S2 normal, and II/VI systolic murmur. Abdomen: soft, non-tender; bowel sounds normal; no masses,  no organomegaly  Extremities:  ACE wraps on lower legs with significant improvement in edema. Neurologic: Mental status: Alert, oriented, thought content appropriate    ----------------------------------------------------------------------------------------------------------------------  Medical Decision Making (MDM) Data:    External documents reviewed: -  My CXR interpretation: -  My EKG interpretation: -  Discussed with:  -  Tests considered but not ordered:  -  Heart score:  -  Social Determinants of Health that impact treatment or disposition:  -    Assessment and Plan:   Right sided CHF - placed initially on Dobutamine drip but SBP dropped requiring a small fluid bolus and change to Dopamine with dose adjusted to maintain SBP > 90 (at 20 mcg/kg/min). Placed on IV Lasix 20 mg BID with close monitoring. Lower legs ACE wrapped to decrease edema. Bibasilar infiltrates suggestive of Pneumonia - started on Zosyn with Pharmacy dosing according to renal function on 1/31/23. Cough has improved. WBC has improved. Severe Aortic stenosis.   Severe LV dysfunction - EF 25 to 30 % on 1/8/23 with moderate to severe pulmonary HTN with a PAP at 55 mmHg. Hypotension - Inderal on hold. On Midodrine and currently on Dopamine drip. IV Solucortef added on 2/2/23 with the worries of possible adrenal insufficiency. SBP > 100 this am.    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 H/O CAD - S/P CABG x 4. On Aspirin and Plavix. Inderal on hold. H/O SSS - Pacemaker placed. Hypothyroidism - on Levothyroxine. Generalized weakness - PT / OT ordered. H/O Atrial fib - refused anticoagulation in the past.     Hyperlipidemia - on Crestor. Vitamin D deficiency - on replacement. BPH - on Flomax. Chronic anemia - Hgb stable. Plan:  Continue the current treatment. Wean Dopamine as tolerated. Repeat BMP tomorrow. Differential Diagnosis:  -  Condition is improving / unchanged / worsening:  Improving slowly  Condition is at treatment goal:  No  Chronic condition is / is not having mild / moderate, severe exacerbation, progression or side effects of treatment:  -    Shared decision making:  -    Code status and discussions:  DNR CC - A    Medical Necessity: In patient is appropriate for this patient secondary to the need for IV pressors, IV antibiotics, and close monitoring.       DVT prophylaxis:   [] Lovenox   [] SCDs   [x] SQ Heparin   [] Encourage ambulation, low risk for DVT, no chemical or mechanical    prophylaxis necessary      [] Already on Anticoagulation    Patient Active Problem List:     Hard of hearing     CAD (coronary artery disease)     Atrial fibrillation ([address]     Hiatal hernia     Hyperlipidemia     Hypothyroidism     Pacemaker     History of PTCA     Calculus of gallbladder without cholecystitis without obstruction     Umbilical hernia     Vitamin D deficiency     Chronic systolic (congestive) heart failure     Acute on chronic systolic CHF (congestive heart failure) (HCC)     Acute on chronic systolic (congestive) heart failure (HCC)     Mild malnutrition (HCC)     Generalized weakness Acute on chronic right-sided heart failure ([facility] [address]      Ayush De La Torre MD, MD  Rounding Hospitalist

## 2023-02-03 NOTE — PROGRESS NOTES
[facility]  Occupational Therapy    Date: 2/3/2023  Patient Name: Nancy Soni        : 1932       [] Pt Refusal           [x] Pt Unavailable due to:  OT evaluation being held due to present medical status.  Will continue to monitor via charting and nursing and assess if/when appropriate        Glenna Hashimoto, OTR/L Date: 2/3/2023

## 2023-02-03 NOTE — PLAN OF CARE
Problem: Discharge Planning  Goal: Discharge to home or other facility with appropriate resources  Outcome: Progressing  Flowsheets (Taken 2/2/2023 2010)  Discharge to home or other facility with appropriate resources:   Identify barriers to discharge with patient and caregiver   Arrange for needed discharge resources and transportation as appropriate   Identify discharge learning needs (meds, wound care, etc)   Arrange for interpreters to assist at discharge as needed   Refer to discharge planning if patient needs post-hospital services based on physician order or complex needs related to functional status, cognitive ability or social support system

## 2023-02-03 NOTE — PROGRESS NOTES
Dr. Mary Morton called on 11 beat run V-tach with no symptoms. Orders received for Amiodarone bolus and begin oral 150 mg daily.   Decrease dopamine to 10 mcg/kg/hr tomorrow am.

## 2023-02-03 NOTE — PROGRESS NOTES
Pt has 14 beat run of V Tach, Rn assesses pt and he shows no symptoms.  Comfortably resting back pt regualr rate

## 2023-02-04 LAB
ABSOLUTE EOS #: 0 K/UL (ref 0–0.4)
ABSOLUTE LYMPH #: 0.46 K/UL (ref 1–4.8)
ABSOLUTE MONO #: 0.35 K/UL (ref 0–1)
ALBUMIN SERPL-MCNC: 3.2 G/DL (ref 3.5–5.2)
ALP SERPL-CCNC: 47 U/L (ref 40–129)
ALT SERPL-CCNC: 7 U/L (ref 5–41)
ANION GAP SERPL CALCULATED.3IONS-SCNC: 11 MMOL/L (ref 9–17)
AST SERPL-CCNC: 10 U/L
BASOPHILS # BLD: 0 % (ref 0–2)
BASOPHILS ABSOLUTE: 0 K/UL (ref 0–0.2)
BILIRUB DIRECT SERPL-MCNC: 0.2 MG/DL
BILIRUB INDIRECT SERPL-MCNC: 0.4 MG/DL (ref 0–1)
BILIRUB SERPL-MCNC: 0.6 MG/DL (ref 0.3–1.2)
BUN SERPL-MCNC: 30 MG/DL (ref 8–23)
BUN/CREAT BLD: 20 (ref 9–20)
CALCIUM SERPL-MCNC: 8.9 MG/DL (ref 8.6–10.4)
CHLORIDE SERPL-SCNC: 98 MMOL/L (ref 98–107)
CO2 SERPL-SCNC: 31 MMOL/L (ref 20–31)
CREAT SERPL-MCNC: 1.53 MG/DL (ref 0.7–1.2)
EOSINOPHILS RELATIVE PERCENT: 0 % (ref 0–5)
GFR SERPL CREATININE-BSD FRML MDRD: 43 ML/MIN/1.73M2
GLUCOSE SERPL-MCNC: 159 MG/DL (ref 70–99)
HCT VFR BLD AUTO: 35.9 % (ref 41–53)
HGB BLD-MCNC: 11.7 G/DL (ref 13.5–17.5)
LYMPHOCYTES # BLD: 4 % (ref 13–44)
MCH RBC QN AUTO: 30.6 PG (ref 26–34)
MCHC RBC AUTO-ENTMCNC: 32.6 G/DL (ref 31–37)
MCV RBC AUTO: 94.1 FL (ref 80–100)
MONOCYTES # BLD: 3 % (ref 5–9)
MORPHOLOGY: ABNORMAL
PDW BLD-RTO: 15.6 % (ref 12.1–15.2)
PLATELET # BLD AUTO: 195 K/UL (ref 140–450)
POTASSIUM SERPL-SCNC: 3.6 MMOL/L (ref 3.7–5.3)
PROT SERPL-MCNC: 6 G/DL (ref 6.4–8.3)
RBC # BLD: 3.81 M/UL (ref 4.5–5.9)
SEG NEUTROPHILS: 93 % (ref 39–75)
SEGMENTED NEUTROPHILS ABSOLUTE COUNT: 10.69 K/UL (ref 2.1–6.5)
SODIUM SERPL-SCNC: 140 MMOL/L (ref 135–144)
WBC # BLD AUTO: 11.5 K/UL (ref 3.5–11)

## 2023-02-04 PROCEDURE — 80076 HEPATIC FUNCTION PANEL: CPT

## 2023-02-04 PROCEDURE — 2700000000 HC OXYGEN THERAPY PER DAY

## 2023-02-04 PROCEDURE — 6370000000 HC RX 637 (ALT 250 FOR IP): Performed by: INTERNAL MEDICINE

## 2023-02-04 PROCEDURE — 2580000003 HC RX 258: Performed by: INTERNAL MEDICINE

## 2023-02-04 PROCEDURE — 85025 COMPLETE CBC W/AUTO DIFF WBC: CPT

## 2023-02-04 PROCEDURE — 36415 COLL VENOUS BLD VENIPUNCTURE: CPT

## 2023-02-04 PROCEDURE — 80048 BASIC METABOLIC PNL TOTAL CA: CPT

## 2023-02-04 PROCEDURE — 94761 N-INVAS EAR/PLS OXIMETRY MLT: CPT

## 2023-02-04 PROCEDURE — 1200000000 HC SEMI PRIVATE

## 2023-02-04 PROCEDURE — 6360000002 HC RX W HCPCS: Performed by: INTERNAL MEDICINE

## 2023-02-04 RX ORDER — MIDODRINE HYDROCHLORIDE 5 MG/1
20 TABLET ORAL 4 TIMES DAILY
Status: DISCONTINUED | OUTPATIENT
Start: 2023-02-04 | End: 2023-02-06 | Stop reason: HOSPADM

## 2023-02-04 RX ORDER — DOPAMINE HYDROCHLORIDE 160 MG/100ML
12 INJECTION, SOLUTION INTRAVENOUS CONTINUOUS
Status: DISCONTINUED | OUTPATIENT
Start: 2023-02-04 | End: 2023-02-06 | Stop reason: HOSPADM

## 2023-02-04 RX ORDER — DOPAMINE HYDROCHLORIDE 160 MG/100ML
12 INJECTION, SOLUTION INTRAVENOUS CONTINUOUS
Status: DISCONTINUED | OUTPATIENT
Start: 2023-02-04 | End: 2023-02-04

## 2023-02-04 RX ADMIN — CHOLECALCIFEROL TAB 25 MCG (1000 UNIT) 2000 UNITS: 25 TAB at 08:48

## 2023-02-04 RX ADMIN — DOPAMINE HYDROCHLORIDE IN DEXTROSE 15 MCG/KG/MIN: 1.6 INJECTION, SOLUTION INTRAVENOUS at 01:07

## 2023-02-04 RX ADMIN — MIDODRINE HYDROCHLORIDE 20 MG: 5 TABLET ORAL at 17:11

## 2023-02-04 RX ADMIN — PIPERACILLIN AND TAZOBACTAM 3375 MG: 3; .375 INJECTION, POWDER, FOR SOLUTION INTRAVENOUS at 08:53

## 2023-02-04 RX ADMIN — DOPAMINE HYDROCHLORIDE IN DEXTROSE 12 MCG/KG/MIN: 1.6 INJECTION, SOLUTION INTRAVENOUS at 21:17

## 2023-02-04 RX ADMIN — ASPIRIN 81 MG: 81 TABLET, CHEWABLE ORAL at 08:48

## 2023-02-04 RX ADMIN — AMIODARONE HYDROCHLORIDE 200 MG: 200 TABLET ORAL at 08:48

## 2023-02-04 RX ADMIN — PIPERACILLIN AND TAZOBACTAM 3375 MG: 3; .375 INJECTION, POWDER, FOR SOLUTION INTRAVENOUS at 00:30

## 2023-02-04 RX ADMIN — DOPAMINE HYDROCHLORIDE IN DEXTROSE 12 MCG/KG/MIN: 1.6 INJECTION, SOLUTION INTRAVENOUS at 14:44

## 2023-02-04 RX ADMIN — HYDROCORTISONE SODIUM SUCCINATE 100 MG: 100 INJECTION, POWDER, FOR SOLUTION INTRAMUSCULAR; INTRAVENOUS at 00:29

## 2023-02-04 RX ADMIN — HEPARIN SODIUM 5000 UNITS: 5000 INJECTION INTRAVENOUS; SUBCUTANEOUS at 08:48

## 2023-02-04 RX ADMIN — CLOPIDOGREL BISULFATE 75 MG: 75 TABLET ORAL at 08:48

## 2023-02-04 RX ADMIN — MIDODRINE HYDROCHLORIDE 20 MG: 5 TABLET ORAL at 21:02

## 2023-02-04 RX ADMIN — ROSUVASTATIN 10 MG: 10 TABLET, FILM COATED ORAL at 08:48

## 2023-02-04 RX ADMIN — PIPERACILLIN AND TAZOBACTAM 3375 MG: 3; .375 INJECTION, POWDER, FOR SOLUTION INTRAVENOUS at 17:12

## 2023-02-04 RX ADMIN — DOPAMINE HYDROCHLORIDE IN DEXTROSE 12 MCG/KG/MIN: 1.6 INJECTION, SOLUTION INTRAVENOUS at 07:59

## 2023-02-04 RX ADMIN — TAMSULOSIN HYDROCHLORIDE 0.4 MG: 0.4 CAPSULE ORAL at 21:02

## 2023-02-04 RX ADMIN — SODIUM CHLORIDE, PRESERVATIVE FREE 10 ML: 5 INJECTION INTRAVENOUS at 08:48

## 2023-02-04 RX ADMIN — MIDODRINE HYDROCHLORIDE 20 MG: 5 TABLET ORAL at 13:11

## 2023-02-04 RX ADMIN — MIDODRINE HYDROCHLORIDE 20 MG: 5 TABLET ORAL at 08:48

## 2023-02-04 RX ADMIN — Medication 400 MG: at 08:48

## 2023-02-04 RX ADMIN — HEPARIN SODIUM 5000 UNITS: 5000 INJECTION INTRAVENOUS; SUBCUTANEOUS at 21:02

## 2023-02-04 RX ADMIN — LEVOTHYROXINE SODIUM 125 MCG: 25 TABLET ORAL at 06:12

## 2023-02-04 RX ADMIN — SODIUM CHLORIDE, PRESERVATIVE FREE 10 ML: 5 INJECTION INTRAVENOUS at 21:02

## 2023-02-04 RX ADMIN — CYANOCOBALAMIN TAB 1000 MCG 1000 MCG: 1000 TAB at 08:48

## 2023-02-04 ASSESSMENT — PAIN SCALES - GENERAL: PAINLEVEL_OUTOF10: 0

## 2023-02-04 NOTE — PROGRESS NOTES
Hospitalist Progress Note  2/4/2023 6:29 AM  Subjective:   Admit Date: 1/30/2023  PCP: Whitney Cordova, APRN - CNP    Interval History: Megha Angel has no complaints this am.  He states he is not coughing at all. No chest pain and he denies feeling SOB. SPO2 in the upper 90's at 1 l/m per nasal cannula. Appetite is \"about 1/2\". He denies having any nausea. Bowels did not move yesterday but he doesn't feel like he needs to have a BM. Urine stream good. Dopamine slowly being weaned. Started on Amiodarone yesterday for NSVT. Diet: ADULT DIET; Regular; Low Sodium (2 gm)  ADULT ORAL NUTRITION SUPPLEMENT; Breakfast, Lunch, Dinner; Standard High Calorie/High Protein Oral Supplement  Medications:   Scheduled Meds:   midodrine  20 mg Oral 4x Daily    furosemide  20 mg IntraVENous BID    amiodarone  200 mg Oral Daily    hydrocortisone sodium succinate PF  100 mg IntraVENous Q8H    piperacillin-tazobactam  3,375 mg IntraVENous Q8H    aspirin  81 mg Oral Daily    Vitamin D  2,000 Units Oral Daily    clopidogrel  75 mg Oral Daily    magnesium oxide  400 mg Oral Daily    [Held by provider] propranolol  10 mg Oral BID    rosuvastatin  10 mg Oral Daily    tamsulosin  0.4 mg Oral Nightly    vitamin B-12  1,000 mcg Oral Daily    sodium chloride flush  5-40 mL IntraVENous 2 times per day    heparin (porcine)  5,000 Units SubCUTAneous BID    levothyroxine  125 mcg Oral Daily    spironolactone  25 mg Oral BID     Continuous Infusions:   DOPamine      sodium chloride Stopped (02/01/23 0515)       Patient's current medications documented, reviewed, and updated.       CBC:   Recent Labs     02/02/23  0605 02/04/23  0509   WBC 8.8 11.5*   HGB 12.8* 11.7*    195     BMP:    Recent Labs     02/02/23  0605 02/04/23  0509   * 140   K 4.1 3.6*   CL 94* 98   CO2 28 31   BUN 26* 30*   CREATININE 1.39* 1.53*   GLUCOSE 138* 159*     Hepatic:   Recent Labs     02/04/23  0509   AST 10   ALT 7   BILITOT 0.6   ALKPHOS 47     Troponin: No results for input(s): TROPONINI in the last 72 hours. BNP: No results for input(s): BNP in the last 72 hours. Lipids: No results for input(s): CHOL, HDL in the last 72 hours. Invalid input(s): LDLCALCU  INR: No results for input(s): INR in the last 72 hours. Objective:   Vitals: BP (!) 81/41   Pulse 75   Temp (!) 96.1 °F (35.6 °C) (Temporal)   Resp 18   Ht 5' 10\" (1.778 m)   Wt 215 lb (97.5 kg)   SpO2 98%   BMI 30.85 kg/m²   General appearance: alert and cooperative with exam  HEENT: Head: Normocephalic, no lesions, without obvious abnormality. Eye: Normal external eye, conjunctiva, lids cornea, SARABJIT. Nose: Normal external nose, mucus membranes and septum. Nasal oxygen on. Neck: no adenopathy and supple, symmetrical, trachea midline  Lungs: clear to auscultation bilaterally  Heart: regular rate and rhythm, S1, S2 normal, and II/VI systolic murmur  Abdomen: soft, non-tender; bowel sounds normal; no masses,  no organomegaly  Extremities:  Significant improvement in lower leg edema. ACE wraps on. No calf tenderness. Neurologic: Mental status: Alert, oriented, thought content appropriate    ----------------------------------------------------------------------------------------------------------------------  Medical Decision Making (MDM) Data:    External documents reviewed: -   My CXR interpretation: -  My EKG interpretation: -  Discussed with:  -  Tests considered but not ordered:  -  Heart score:  -  Social Determinants of Health that impact treatment or disposition:  -    Assessment and Plan:   Right sided CHF - placed initially on Dobutamine drip but SBP dropped requiring a small fluid bolus and change to Dopamine with dose adjustment per Dr. Nuha Villagran on IV Lasix 20 mg BID with close monitoring. Lower legs ACE wrapped to decrease edema. Bibasilar infiltrates suggestive of Pneumonia - started on Zosyn with Pharmacy dosing according to renal function on 1/31/23.   Cough has resolved. Severe Aortic stenosis. Severe LV dysfunction - EF 25 to 30 % on 1/8/23 with moderate to severe pulmonary HTN with a PAP at 55 mmHg. Hypotension - Inderal on hold. On Midodrine and currently on Dopamine drip. IV Solu cortef added on 2/2/23 with the worries of possible adrenal insufficiency but Cortisol came back elevated which would be expected in his current condition. Solu Cortef discontinued on 2/4/23. Nonsustained V tach - started on Amiodarone on 2/3/23, with no further episodes. 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 H/O CAD - S/P CABG x 4. On Aspirin and Plavix. Inderal on hold. H/O SSS - Pacemaker placed. Hypothyroidism - on Levothyroxine. Generalized weakness - PT / OT ordered. H/O Atrial fib - refused anticoagulation in the past.     Hyperlipidemia - on Crestor. Vitamin D deficiency - on replacement. BPH - on Flomax. Chronic anemia - Hgb stable. Plan:  Discontinue Solu Cortef. Continue current treatment. Dr. Rajendra Coppola adjusting Dopamine. Differential Diagnosis:  -  Condition is improving / unchanged / worsening:  Improving  Condition is at treatment goal:  No  Chronic condition is / is not having mild / moderate, severe exacerbation, progression or side effects of treatment:  -    Shared decision making:  -    Code status and discussions:  DNR CC - A    Medical Necessity: In patient is appropriate for this patient secondary to the need for IV Dopamine and IV antibiotics.        DVT prophylaxis:   [] Lovenox   [] SCDs   [x] SQ Heparin   [] Encourage ambulation, low risk for DVT, no chemical or mechanical    prophylaxis necessary      [] Already on Anticoagulation    Patient Active Problem List:     Hard of hearing     CAD (coronary artery disease)     Atrial fibrillation ([facility] [address]     Hiatal hernia     Hyperlipidemia     Hypothyroidism     Pacemaker     History of PTCA     Calculus of gallbladder without cholecystitis without obstruction     Umbilical hernia Vitamin D deficiency     Chronic systolic (congestive) heart failure     Acute on chronic systolic CHF (congestive heart failure) (HCC)     Acute on chronic systolic (congestive) heart failure (HCC)     Mild malnutrition (HCC)     Generalized weakness     Acute on chronic right-sided heart failure ([facility] [address]      Jack Castro MD, MD  Rounding Hospitalist

## 2023-02-04 NOTE — PROGRESS NOTES
Cardiology    He had a run of NSVT and is now on Amiodarone 200 mg daily. Will slowly wean Dopamine (good thinking Lucas Mendiola. ..) at 1  mcg/kg/min every 4 hours.     Thanks, Chester Litten MD

## 2023-02-04 NOTE — PLAN OF CARE
Problem: Discharge Planning  Goal: Discharge to home or other facility with appropriate resources  Outcome: Progressing     Problem: Safety - Adult  Goal: Free from fall injury  Outcome: Progressing     Problem: ABCDS Injury Assessment  Goal: Absence of physical injury  Outcome: Progressing     Problem: Respiratory - Adult  Goal: Achieves optimal ventilation and oxygenation  Outcome: Progressing

## 2023-02-05 ENCOUNTER — APPOINTMENT (OUTPATIENT)
Dept: GENERAL RADIOLOGY | Age: 88
DRG: 291 | End: 2023-02-05
Payer: MEDICARE

## 2023-02-05 PROCEDURE — 1200000000 HC SEMI PRIVATE

## 2023-02-05 PROCEDURE — 71045 X-RAY EXAM CHEST 1 VIEW: CPT

## 2023-02-05 PROCEDURE — 94761 N-INVAS EAR/PLS OXIMETRY MLT: CPT

## 2023-02-05 PROCEDURE — 6370000000 HC RX 637 (ALT 250 FOR IP): Performed by: INTERNAL MEDICINE

## 2023-02-05 PROCEDURE — 6360000002 HC RX W HCPCS: Performed by: INTERNAL MEDICINE

## 2023-02-05 PROCEDURE — 2580000003 HC RX 258: Performed by: INTERNAL MEDICINE

## 2023-02-05 RX ORDER — SPIRONOLACTONE 25 MG/1
25 TABLET ORAL DAILY
Status: DISCONTINUED | OUTPATIENT
Start: 2023-02-06 | End: 2023-02-06 | Stop reason: HOSPADM

## 2023-02-05 RX ADMIN — CLOPIDOGREL BISULFATE 75 MG: 75 TABLET ORAL at 08:04

## 2023-02-05 RX ADMIN — PIPERACILLIN AND TAZOBACTAM 3375 MG: 3; .375 INJECTION, POWDER, FOR SOLUTION INTRAVENOUS at 17:29

## 2023-02-05 RX ADMIN — SODIUM CHLORIDE, PRESERVATIVE FREE 10 ML: 5 INJECTION INTRAVENOUS at 08:06

## 2023-02-05 RX ADMIN — ASPIRIN 81 MG: 81 TABLET, CHEWABLE ORAL at 08:04

## 2023-02-05 RX ADMIN — SODIUM CHLORIDE, PRESERVATIVE FREE 10 ML: 5 INJECTION INTRAVENOUS at 20:20

## 2023-02-05 RX ADMIN — ROSUVASTATIN 10 MG: 10 TABLET, FILM COATED ORAL at 08:04

## 2023-02-05 RX ADMIN — AMIODARONE HYDROCHLORIDE 200 MG: 200 TABLET ORAL at 08:04

## 2023-02-05 RX ADMIN — HEPARIN SODIUM 5000 UNITS: 5000 INJECTION INTRAVENOUS; SUBCUTANEOUS at 08:04

## 2023-02-05 RX ADMIN — TAMSULOSIN HYDROCHLORIDE 0.4 MG: 0.4 CAPSULE ORAL at 20:20

## 2023-02-05 RX ADMIN — MIDODRINE HYDROCHLORIDE 20 MG: 5 TABLET ORAL at 17:27

## 2023-02-05 RX ADMIN — DOPAMINE HYDROCHLORIDE IN DEXTROSE 12 MCG/KG/MIN: 1.6 INJECTION, SOLUTION INTRAVENOUS at 04:28

## 2023-02-05 RX ADMIN — MIDODRINE HYDROCHLORIDE 20 MG: 5 TABLET ORAL at 08:04

## 2023-02-05 RX ADMIN — PIPERACILLIN AND TAZOBACTAM 3375 MG: 3; .375 INJECTION, POWDER, FOR SOLUTION INTRAVENOUS at 08:08

## 2023-02-05 RX ADMIN — DOPAMINE HYDROCHLORIDE IN DEXTROSE 12 MCG/KG/MIN: 1.6 INJECTION, SOLUTION INTRAVENOUS at 18:48

## 2023-02-05 RX ADMIN — LEVOTHYROXINE SODIUM 125 MCG: 25 TABLET ORAL at 08:05

## 2023-02-05 RX ADMIN — MIDODRINE HYDROCHLORIDE 20 MG: 5 TABLET ORAL at 13:15

## 2023-02-05 RX ADMIN — MIDODRINE HYDROCHLORIDE 20 MG: 5 TABLET ORAL at 20:19

## 2023-02-05 RX ADMIN — CHOLECALCIFEROL TAB 25 MCG (1000 UNIT) 2000 UNITS: 25 TAB at 08:04

## 2023-02-05 RX ADMIN — HEPARIN SODIUM 5000 UNITS: 5000 INJECTION INTRAVENOUS; SUBCUTANEOUS at 20:20

## 2023-02-05 RX ADMIN — SPIRONOLACTONE 25 MG: 25 TABLET, FILM COATED ORAL at 08:04

## 2023-02-05 RX ADMIN — PIPERACILLIN AND TAZOBACTAM 3375 MG: 3; .375 INJECTION, POWDER, FOR SOLUTION INTRAVENOUS at 00:27

## 2023-02-05 RX ADMIN — CYANOCOBALAMIN TAB 1000 MCG 1000 MCG: 1000 TAB at 08:04

## 2023-02-05 RX ADMIN — Medication 400 MG: at 08:04

## 2023-02-05 ASSESSMENT — PAIN SCALES - GENERAL: PAINLEVEL_OUTOF10: 0

## 2023-02-05 NOTE — PROGRESS NOTES
Hospitalist Progress Note  2/5/2023 5:55 AM  Subjective:   Admit Date: 1/30/2023  PCP: Chilango Agee APRN - CLAU    Interval History: Odalis Smalls has no complaints other than trouble sleeping last night. No chest pain or SOB. Cough has resolved and he has been weaned off oxygen. Continues to require dopamine at 12 mcg/kg/min to keep SBP > 80. Appetite is \"not great\", no nausea. No BM yesterday but he denies feeling bloated, \"I don't feel like there is a lot in there. \"  Urine stream good. Diet: ADULT DIET; Regular; Low Sodium (2 gm)  ADULT ORAL NUTRITION SUPPLEMENT; Breakfast, Lunch, Dinner; Standard High Calorie/High Protein Oral Supplement  Medications:   Scheduled Meds:   midodrine  20 mg Oral 4x Daily    furosemide  20 mg IntraVENous BID    amiodarone  200 mg Oral Daily    piperacillin-tazobactam  3,375 mg IntraVENous Q8H    aspirin  81 mg Oral Daily    Vitamin D  2,000 Units Oral Daily    clopidogrel  75 mg Oral Daily    magnesium oxide  400 mg Oral Daily    [Held by provider] propranolol  10 mg Oral BID    rosuvastatin  10 mg Oral Daily    tamsulosin  0.4 mg Oral Nightly    vitamin B-12  1,000 mcg Oral Daily    sodium chloride flush  5-40 mL IntraVENous 2 times per day    heparin (porcine)  5,000 Units SubCUTAneous BID    levothyroxine  125 mcg Oral Daily    spironolactone  25 mg Oral BID     Continuous Infusions:   DOPamine 12 mcg/kg/min (02/05/23 0428)    sodium chloride Stopped (02/01/23 0515)       Patient's current medications documented, reviewed, and updated. CBC:   Recent Labs     02/02/23  0605 02/04/23  0509   WBC 8.8 11.5*   HGB 12.8* 11.7*    195     BMP:    Recent Labs     02/02/23  0605 02/04/23  0509   * 140   K 4.1 3.6*   CL 94* 98   CO2 28 31   BUN 26* 30*   CREATININE 1.39* 1.53*   GLUCOSE 138* 159*     Hepatic:   Recent Labs     02/04/23  0509   AST 10   ALT 7   BILITOT 0.6   ALKPHOS 47     Troponin: No results for input(s): TROPONINI in the last 72 hours.   BNP: No results for input(s): BNP in the last 72 hours. Lipids: No results for input(s): CHOL, HDL in the last 72 hours. Invalid input(s): LDLCALCU  INR: No results for input(s): INR in the last 72 hours. Objective:   Vitals: BP (!) 81/54   Pulse 84   Temp 97.9 °F (36.6 °C) (Oral)   Resp 18   Ht 5' 10\" (1.778 m)   Wt 215 lb (97.5 kg)   SpO2 97%   BMI 30.85 kg/m²   General appearance: alert and cooperative with exam  HEENT: Head: Normocephalic, no lesions, without obvious abnormality. Eye: Normal external eye, conjunctiva, lids cornea, SARABJIT. Nose: Normal external nose, mucus membranes and septum. Neck: no adenopathy and supple, symmetrical, trachea midline  Lungs: clear to auscultation bilaterally  Heart: regular rate and rhythm, S1, S2 normal, and II/VI systolic murmur  Abdomen: soft, non-tender; bowel sounds normal; no masses,  no organomegaly  Extremities:  ACE wraps on the lower legs with improvement in edema. No calf tenderness. Neurologic: Mental status: Alert, oriented, thought content appropriate    ----------------------------------------------------------------------------------------------------------------------  Medical Decision Making (MDM) Data:    External documents reviewed: -  My CXR interpretation: -  My EKG interpretation: -  Discussed with:  -  Tests considered but not ordered:  -  Heart score:  -  Social Determinants of Health that impact treatment or disposition:  -    Assessment and Plan:   Right sided CHF - placed initially on Dobutamine drip but SBP dropped requiring a small fluid bolus and change to Dopamine with dose adjustment per Dr. Flor Click on IV Lasix 20 mg BID with close monitoring. Lower legs ACE wrapped to decrease edema. Bibasilar infiltrates suggestive of Pneumonia - started on Zosyn with Pharmacy dosing according to renal function on 1/31/23. Cough has resolved and oxygen weaned off. Severe Aortic stenosis.   Severe LV dysfunction - EF 25 to 30 % on 1/8/23 with moderate to severe pulmonary HTN with a PAP at 55 mmHg. Hypotension - Inderal on hold. On Midodrine and currently on Dopamine drip. IV Solu cortef added on 2/2/23 with the worries of possible adrenal insufficiency but Cortisol came back elevated which would be expected in his current condition. Solu Cortef discontinued on 2/4/23. Nonsustained V tach - started on Amiodarone on 2/3/23, with no further episodes. 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 H/O CAD - S/P CABG x 4. On Aspirin and Plavix. Inderal on hold. H/O SSS - Pacemaker placed. Hypothyroidism - on Levothyroxine. Generalized weakness - PT / OT ordered. H/O Atrial fib - refused anticoagulation in the past.     Hyperlipidemia - on Crestor. Vitamin D deficiency - on replacement. BPH - on Flomax. Chronic anemia - Hgb stable. Plan:  Repeat portable CXR this am.  Repeat labs tomorrow. Continue to wean Dopamine as tolerated. Differential Diagnosis:  Increased severity of aortic stenosis, which in combination with the severe LV dysfunction, is making it difficult to generate an adequate blood pressure. Condition is improving / unchanged / worsening:  Improving slowly. Condition is at treatment goal:  No  Chronic condition is / is not having mild / moderate, severe exacerbation, progression or side effects of treatment:  -    Shared decision making:  -    Code status and discussions:  DNR CC - A. Medical Necessity: In patient is appropriate for this patient secondary to the need for IV antibiotic and IV Dopamine.         DVT prophylaxis:   [] Lovenox   [] SCDs   [x] SQ Heparin   [] Encourage ambulation, low risk for DVT, no chemical or mechanical    prophylaxis necessary      [] Already on Anticoagulation    Patient Active Problem List:     Hard of hearing     CAD (coronary artery disease)     Atrial fibrillation ([facility] [address]     Hiatal hernia     Hyperlipidemia     Hypothyroidism     Pacemaker     History of PTCA Calculus of gallbladder without cholecystitis without obstruction     Umbilical hernia     Vitamin D deficiency     Chronic systolic (congestive) heart failure     Acute on chronic systolic CHF (congestive heart failure) (HCC)     Acute on chronic systolic (congestive) heart failure (HCC)     Mild malnutrition (HCC)     Generalized weakness     Acute on chronic right-sided heart failure ([address]      Kat Gonsales MD, MD  Rounding Hospitalist

## 2023-02-05 NOTE — PROGRESS NOTES
Dr. Grabiel Caruso called d/t decreasing BP's-70's/30's. Orders received to increase back to 12 mcg/kg/hr. Will continue to monitor.

## 2023-02-05 NOTE — PROGRESS NOTES
Cardiology    BP 88 with weight at 203. Will d/c lasix and decrease Aldactone to 25 mg daily. Will try to decrease dopamine to 11 mcg keeping bp above 80 mmHg. If he doesn't tolerate, may need to consider continuous dopamine infusion. Will keep trying to wean. .. Thanks, Bozena Segura MD    ADDENDUM:    Did not tolerate decrease in Dopamine. Resumed at 12 mcg/kg/min.

## 2023-02-05 NOTE — PROGRESS NOTES
Dr. Pelletier Bart calls to check on patient, orders received to decrease dopamine to 11 mcg/kg/hr. Will continue to monitor.

## 2023-02-05 NOTE — PROGRESS NOTES
Pt ambulates to  for BM. He is unsuccessful at this time. Pt denies dizziness or lightheadedness. Back to chair. Legs elevated. He then ambulates to bed. He is steady. Two BP have had systolic reading in the 05'A.

## 2023-02-06 ENCOUNTER — CARE COORDINATION (OUTPATIENT)
Dept: CARE COORDINATION | Age: 88
End: 2023-02-06

## 2023-02-06 VITALS
TEMPERATURE: 97.3 F | OXYGEN SATURATION: 98 % | BODY MASS INDEX: 29.18 KG/M2 | SYSTOLIC BLOOD PRESSURE: 88 MMHG | HEIGHT: 70 IN | DIASTOLIC BLOOD PRESSURE: 48 MMHG | HEART RATE: 77 BPM | WEIGHT: 203.8 LBS | RESPIRATION RATE: 16 BRPM

## 2023-02-06 LAB
ANION GAP SERPL CALCULATED.3IONS-SCNC: 8 MMOL/L (ref 9–17)
BUN SERPL-MCNC: 28 MG/DL (ref 8–23)
BUN/CREAT BLD: 22 (ref 9–20)
CALCIUM SERPL-MCNC: 9.2 MG/DL (ref 8.6–10.4)
CHLORIDE SERPL-SCNC: 99 MMOL/L (ref 98–107)
CO2 SERPL-SCNC: 32 MMOL/L (ref 20–31)
CREAT SERPL-MCNC: 1.28 MG/DL (ref 0.7–1.2)
GFR SERPL CREATININE-BSD FRML MDRD: 53 ML/MIN/1.73M2
GLUCOSE SERPL-MCNC: 123 MG/DL (ref 70–99)
MAGNESIUM SERPL-MCNC: 2.1 MG/DL (ref 1.6–2.6)
POTASSIUM SERPL-SCNC: 3.5 MMOL/L (ref 3.7–5.3)
SODIUM SERPL-SCNC: 139 MMOL/L (ref 135–144)

## 2023-02-06 PROCEDURE — 6370000000 HC RX 637 (ALT 250 FOR IP): Performed by: INTERNAL MEDICINE

## 2023-02-06 PROCEDURE — 83735 ASSAY OF MAGNESIUM: CPT

## 2023-02-06 PROCEDURE — 6360000002 HC RX W HCPCS: Performed by: INTERNAL MEDICINE

## 2023-02-06 PROCEDURE — 36415 COLL VENOUS BLD VENIPUNCTURE: CPT

## 2023-02-06 PROCEDURE — 94761 N-INVAS EAR/PLS OXIMETRY MLT: CPT

## 2023-02-06 PROCEDURE — 80048 BASIC METABOLIC PNL TOTAL CA: CPT

## 2023-02-06 PROCEDURE — 2580000003 HC RX 258: Performed by: INTERNAL MEDICINE

## 2023-02-06 RX ORDER — MORPHINE SULFATE 2 MG/ML
2 INJECTION, SOLUTION INTRAMUSCULAR; INTRAVENOUS ONCE
Status: COMPLETED | OUTPATIENT
Start: 2023-02-06 | End: 2023-02-06

## 2023-02-06 RX ORDER — POTASSIUM CHLORIDE 750 MG/1
40 TABLET, FILM COATED, EXTENDED RELEASE ORAL ONCE
Status: COMPLETED | OUTPATIENT
Start: 2023-02-06 | End: 2023-02-06

## 2023-02-06 RX ADMIN — ROSUVASTATIN 10 MG: 10 TABLET, FILM COATED ORAL at 09:12

## 2023-02-06 RX ADMIN — SPIRONOLACTONE 25 MG: 25 TABLET, FILM COATED ORAL at 09:12

## 2023-02-06 RX ADMIN — DOPAMINE HYDROCHLORIDE IN DEXTROSE 12 MCG/KG/MIN: 1.6 INJECTION, SOLUTION INTRAVENOUS at 16:13

## 2023-02-06 RX ADMIN — MIDODRINE HYDROCHLORIDE 20 MG: 5 TABLET ORAL at 13:23

## 2023-02-06 RX ADMIN — DOPAMINE HYDROCHLORIDE IN DEXTROSE 12 MCG/KG/MIN: 1.6 INJECTION, SOLUTION INTRAVENOUS at 09:06

## 2023-02-06 RX ADMIN — CLOPIDOGREL BISULFATE 75 MG: 75 TABLET ORAL at 09:12

## 2023-02-06 RX ADMIN — POTASSIUM CHLORIDE 40 MEQ: 750 TABLET, FILM COATED, EXTENDED RELEASE ORAL at 09:12

## 2023-02-06 RX ADMIN — Medication 400 MG: at 09:12

## 2023-02-06 RX ADMIN — PIPERACILLIN AND TAZOBACTAM 3375 MG: 3; .375 INJECTION, POWDER, FOR SOLUTION INTRAVENOUS at 00:13

## 2023-02-06 RX ADMIN — LEVOTHYROXINE SODIUM 125 MCG: 25 TABLET ORAL at 06:51

## 2023-02-06 RX ADMIN — ACETAMINOPHEN 650 MG: 325 TABLET, FILM COATED ORAL at 01:47

## 2023-02-06 RX ADMIN — CYANOCOBALAMIN TAB 1000 MCG 1000 MCG: 1000 TAB at 09:12

## 2023-02-06 RX ADMIN — MIDODRINE HYDROCHLORIDE 20 MG: 5 TABLET ORAL at 16:28

## 2023-02-06 RX ADMIN — CHOLECALCIFEROL TAB 25 MCG (1000 UNIT) 2000 UNITS: 25 TAB at 09:12

## 2023-02-06 RX ADMIN — HEPARIN SODIUM 5000 UNITS: 5000 INJECTION INTRAVENOUS; SUBCUTANEOUS at 09:15

## 2023-02-06 RX ADMIN — DOPAMINE HYDROCHLORIDE IN DEXTROSE 12 MCG/KG/MIN: 1.6 INJECTION, SOLUTION INTRAVENOUS at 01:53

## 2023-02-06 RX ADMIN — SODIUM CHLORIDE, PRESERVATIVE FREE 10 ML: 5 INJECTION INTRAVENOUS at 09:17

## 2023-02-06 RX ADMIN — ASPIRIN 81 MG: 81 TABLET, CHEWABLE ORAL at 09:12

## 2023-02-06 RX ADMIN — PIPERACILLIN AND TAZOBACTAM 3375 MG: 3; .375 INJECTION, POWDER, FOR SOLUTION INTRAVENOUS at 16:15

## 2023-02-06 RX ADMIN — MORPHINE SULFATE 2 MG: 2 INJECTION, SOLUTION INTRAMUSCULAR; INTRAVENOUS at 18:26

## 2023-02-06 RX ADMIN — AMIODARONE HYDROCHLORIDE 200 MG: 200 TABLET ORAL at 09:12

## 2023-02-06 RX ADMIN — MIDODRINE HYDROCHLORIDE 20 MG: 5 TABLET ORAL at 09:12

## 2023-02-06 RX ADMIN — PIPERACILLIN AND TAZOBACTAM 3375 MG: 3; .375 INJECTION, POWDER, FOR SOLUTION INTRAVENOUS at 09:18

## 2023-02-06 ASSESSMENT — PAIN - FUNCTIONAL ASSESSMENT: PAIN_FUNCTIONAL_ASSESSMENT: PREVENTS OR INTERFERES SOME ACTIVE ACTIVITIES AND ADLS

## 2023-02-06 ASSESSMENT — PAIN DESCRIPTION - DESCRIPTORS: DESCRIPTORS: ACHING

## 2023-02-06 ASSESSMENT — PAIN SCALES - GENERAL
PAINLEVEL_OUTOF10: 1
PAINLEVEL_OUTOF10: 1

## 2023-02-06 ASSESSMENT — PAIN DESCRIPTION - LOCATION
LOCATION: COCCYX
LOCATION: OTHER (COMMENT)

## 2023-02-06 ASSESSMENT — PAIN DESCRIPTION - ORIENTATION: ORIENTATION: LEFT

## 2023-02-06 NOTE — CARE COORDINATION
Chart Reviewed  Patient chart and inpatient notes reviewed. Has been inpatient at [facility] for past 7 days. Noted patient wishes to return home with hospice care. Care Coordination Plan of Care:    This nurse Care Coordinator will  - octavio care coordination exclusion due to newly enrolled into hospice care  -remove name from care team

## 2023-02-06 NOTE — DISCHARGE INSTR - COC
Continuity of Care Form    Patient Name: Le Weeks   :  1932  MRN:  [id number]    Admit date:  2023  Discharge date:  2023      Code Status Order: DNR-CCA   Advance Directives:     Admitting Physician:  Maryjane Hernandes MD  PCP: Ennis Denver, APRN - CNP    Discharging Nurse: [facility] [place] [facility]/Room#: 1569/6536-65  Discharging Unit Phone Number: [phone]    Emergency Contact:   Extended Emergency Contact Information  Primary Emergency Contact: Sharmila Montgomery  Home Phone: [phone]  Relation: Other  Preferred language: English   needed? No  Secondary Emergency Contact: [address] Phone: [phone]  Relation: Brother/Sister  Preferred language: English   needed?  No    Past Surgical History:  Past Surgical History:   Procedure Laterality Date    CARDIAC SURGERY      CORONARY ARTERY BYPASS GRAFT      quad bypass [place]    HERNIA REPAIR      UMBILICAL HERNIA REPAIR    PACEMAKER INSERTION  12    [employer]    PTCA      UMBILICAL HERNIA REPAIR  2016    Rivas Champagne MD       Immunization History:   Immunization History   Administered Date(s) Administered    Influenza Vaccine, unspecified formulation 10/16/2015    Influenza Virus Vaccine 2020    Influenza, High Dose (Fluzone 65 yrs and older) 2020    Pneumococcal Conjugate 13-valent ([place]) 2016, 2020    Pneumococcal Polysaccharide ([id number]) 2018       Active Problems:  Patient Active Problem List   Diagnosis Code    Hard of hearing H91.90    CAD (coronary artery disease) I25.10    Atrial fibrillation (HCC) I48.91    Hiatal hernia K44.9    Hyperlipidemia E78.5    Hypothyroidism E03.9    Pacemaker Z95.0    History of PTCA Z98.61    Calculus of gallbladder without cholecystitis without obstruction U56.63    Umbilical hernia Q29.4    Vitamin D deficiency E55.9    Chronic systolic (congestive) heart failure I50.22    Acute on chronic systolic CHF (congestive heart failure) ([facility]) I50.23    Acute on chronic systolic (congestive) heart failure ([facility]) I50.23    Mild malnutrition ([facility]) E44.1    Generalized weakness R53.1    Acute on chronic right-sided heart failure ([facility]) I50.813       Isolation/Infection:   Isolation            No Isolation          Patient Infection Status       Infection Onset Added Last Indicated Last Indicated By Review Planned Expiration Resolved Resolved By    None active    Resolved    COVID-19 (Rule Out) 01/08/23 01/08/23 01/08/23 COVID-19, Rapid (Ordered)   01/08/23 Rule-Out Test Resulted    COVID-19 (Rule Out) 10/05/20 10/05/20 10/05/20 COVID-19 (Ordered)   10/05/20 Rule-Out Test Resulted            Nurse Assessment:  Last Vital Signs: BP (!) 88/48   Pulse 77   Temp 97.3 °F (36.3 °C) (Oral)   Resp 16   Ht 5' 10\" (1.778 m)   Wt 203 lb 12.8 oz (92.4 kg)   SpO2 98%   BMI 29.24 kg/m²     Last documented pain score (0-10 scale): Pain Level: 1  Last Weight:   Wt Readings from Last 1 Encounters:   02/05/23 203 lb 12.8 oz (92.4 kg)     Mental Status:  oriented, alert, and thought processes intact    IV Access: PIV Left forearm, to be removed upon arrival by hospice nurse    Nursing Mobility/ADLs:  Walking   Assisted  Transfer  Assisted  Bathing  Assisted  Dressing  Assisted  Toileting  Assisted  Feeding  Independent  Med Admin  Assisted  Med Delivery   whole    Wound Care Documentation and Therapy:  Wound 01/30/23 Foot Right;Dorsal (Active)   Wound Image   01/30/23 1830   Wound Etiology Other 02/06/23 0900   Dressing Status New dressing applied 02/06/23 0900   Wound Cleansed Cleansed with saline 02/06/23 0900   Dressing/Treatment Non adherent; Ace wrap; Other (comment) 02/06/23 0900   Wound Assessment Pink/red 02/03/23 0830   Drainage Amount Small 02/06/23 0900   Drainage Description Serous 02/06/23 0900   Odor None 02/06/23 0900   Lynette-wound Assessment Fragile 02/06/23 0900   Number of days: 6       Wound 01/30/23 Foot Anterior; Left (Active)   Wound Image   01/30/23 1830   Wound Etiology Other 02/06/23 0900   Dressing Status New dressing applied 02/06/23 0900   Wound Cleansed Cleansed with saline 02/06/23 0900   Dressing/Treatment Non adherent;Gauze dressing/dressing sponge; Ace wrap; Other (comment) 02/06/23 0900   Wound Assessment Pink/red 02/03/23 1413   Drainage Amount Small 02/06/23 0900   Drainage Description Sanguinous 02/06/23 0900   Odor None 02/06/23 0900   Lynette-wound Assessment Fragile 02/06/23 0900   Number of days: 6       Wound 01/31/23 Foot Anterior;Right Opened blister (Active)   Dressing Status New dressing applied 02/06/23 0900   Wound Cleansed Cleansed with saline 02/06/23 0900   Dressing/Treatment Non adherent; Ace wrap; Other (comment) 02/06/23 0900   Wound Assessment Ruptured blister 02/06/23 0900   Drainage Amount Small 02/06/23 0900   Drainage Description Sanguinous 02/06/23 0900   Lynette-wound Assessment Fragile 02/06/23 0900   Number of days: 6       Wound 02/03/23 Sacrum Left stage II (Active)   Wound Etiology Pressure Stage 2 02/06/23 0900   Dressing Status Intact 02/06/23 0900   Wound Cleansed Soap and water 02/06/23 0900   Dressing/Treatment Open to air 02/06/23 0900   Wound Assessment Subcutaneous 02/03/23 1413   Number of days: 3        Elimination:  Continence: Bowel: Yes  Bladder: Yes  Urinary Catheter: None   Colostomy/Ileostomy/Ileal Conduit: No       Date of Last BM: 2/5/2023      Intake/Output Summary (Last 24 hours) at 2/6/2023 1652  Last data filed at 2/6/2023 1644  Gross per 24 hour   Intake 700 ml   Output 1050 ml   Net -350 ml     I/O last 3 completed shifts: In: 200 [P.O.:200]  Out: 1000 [Urine:1000]    Safety Concerns:      At Risk for Falls    Impairments/Disabilities:      None    Nutrition Therapy:  Current Nutrition Therapy:   - Oral Diet:  Low Sodium (2gm)    Routes of Feeding: Oral  Liquids: No Restrictions  Daily Fluid Restriction: no  Last Modified Barium Swallow with Video (Video Swallowing Test): not done    Treatments at the Time of Hospital Discharge:   Respiratory Treatments: None  Oxygen Therapy:  is not on home oxygen therapy. Ventilator:    - No ventilator support    Rehab Therapies: Hospice  Weight Bearing Status/Restrictions: No weight bearing restrictions  Other Medical Equipment (for information only, NOT a DME order):  hospital bed and no walker- SBA  Other Treatments: ***    Patient's personal belongings (please select all that are sent with patient):  Bilat hearing aids    RN SIGNATURE:  Electronically signed by Isabel Ibrahim RN on 23 at 4:58 PM EST    CASE MANAGEMENT/SOCIAL WORK SECTION    Inpatient Status Date: ***    Readmission Risk Assessment Score:  Readmission Risk              Risk of Unplanned Readmission:  22           Discharging to Facility/ Agency   Name:   Address:  Phone:  Fax:    Dialysis Facility (if applicable)   Name:  Address:  Dialysis Schedule:  Phone:  Fax:    / signature: {Esignature:[id number]}    PHYSICIAN SECTION    Prognosis: {Prognosis:3355804508}    Condition at Discharge: [address] [place] [address] Patient Condition:[id number]}    Rehab Potential (if transferring to Rehab): {Prognosis:8094615802}    Recommended Labs or Other Treatments After Discharge: ***    Physician Certification: I certify the above information and transfer of Mathew Yarbrough  is necessary for the continuing treatment of the diagnosis listed and that he requires {Admit to Appropriate Level of Care:10957} for {GREATER/LESS:440469546} 30 days.      Update Admission H&P: {CHP DME Changes in LHFS}    PHYSICIAN SIGNATURE:  {Esignature:[id number]}

## 2023-02-06 NOTE — PROGRESS NOTES
Pt has been in bed for the majority of the shift. Tells writer that it is more comfortable than the bed. Pt c/o generalized discomfort. 650 mg of Tylenol given as ordered. Pt has said to this nurse that he just wants to go home. Pt uses urinal at the bedside.

## 2023-02-06 NOTE — PROGRESS NOTES
Cardiology    He \"wants to go home and die\". He has been on 12 mcg/kg/min of dopamine, and has bp in the 80's and 90's. When it is decreased to 11 mcg/kg/min, his bp drops to the 60's. Difficult situation. One option is to send home on dopamine continuous infusion with pic line, with IV bag changes every 24 hours. At home would decrease dopamine by 1 mcg/kg/min daily. He would need family there continuously for at least 2 weeks, until dopamine was off. I don't think he would survive that long, but anything goes. .. That would limit the infusion to reasonable length of time. Logistics would be difficult, but will do anything necessary to facilitate.     Yadi Zuluaga MD

## 2023-02-06 NOTE — PROGRESS NOTES
Comprehensive Nutrition Assessment    Type and Reason for Visit:  Reassess    Nutrition Recommendations/Plan:   Encourage oral intakes      Malnutrition Assessment:  Malnutrition Status: At risk for malnutrition (Comment) (02/01/23 1032)    Context:  Chronic Illness     Findings of the 6 clinical characteristics of malnutrition:  Energy Intake:  No significant decrease in energy intake  Weight Loss:  No significant weight loss     Body Fat Loss:  No significant body fat loss     Muscle Mass Loss:  No significant muscle mass loss    Fluid Accumulation:  Severe Extremities (+ 2 weeping, pitting BLE)   Strength:  Not Performed    Nutrition Assessment:    Worsening nutrition intakes. Weight decreases with diuresis, remaining with +2 BLE edema. Low BP and adjustments by cardiology noted. Actively desiring d/c home. Provider and cardiology coordinating. No nutrition recommendations given situation. Nutrition Related Findings:    no visual indices for malnutrition noted Wound Type: Multiple, Stage II (blistered feet, II sacral wound)       Current Nutrition Intake & Therapies:    Average Meal Intake: 26-50%  Average Supplements Intake: 0%  ADULT DIET; Regular; Low Sodium (2 gm)  ADULT ORAL NUTRITION SUPPLEMENT; Breakfast, Lunch, Dinner; Standard High Calorie/High Protein Oral Supplement    Anthropometric Measures:  Height: 5' 10\" (177.8 cm)  Ideal Body Weight (IBW): 166 lbs (75 kg)    Admission Body Weight: 204 lb 13 oz (92.9 kg)  Current Body Weight: 203 lb 12.8 oz (92.4 kg), 123.6 % IBW. Weight Source: Bed Scale  Current BMI (kg/m2): 29.2  Usual Body Weight: 182 lb (82.6 kg)  % Weight Change (Calculated): 12  Weight Adjustment For: No Adjustment                 BMI Categories: Overweight (BMI 25.0-29. 9)    Estimated Daily Nutrient Needs:  Energy Requirements Based On: Kcal/kg  Weight Used for Energy Requirements: Current  Energy (kcal/day): 6594-6209 (20-23/kg)  Weight Used for Protein Requirements: Ideal  Protein (g/day): 98-113g (1.3-1.5g/kg)  Method Used for Fluid Requirements: 1 ml/kcal  Fluid (ml/day): 1900 ml    Nutrition Diagnosis:   Altered nutrition-related lab values related to cardiac dysfunction as evidenced by lab values    Lab Results   Component Value Date     02/06/2023    K 3.5 (L) 02/06/2023    CL 99 02/06/2023    CO2 32 (H) 02/06/2023    BUN 28 (H) 02/06/2023    CREATININE 1.28 (H) 02/06/2023    GLUCOSE 123 (H) 02/06/2023    CALCIUM 9.2 02/06/2023    PROT 6.0 (L) 02/04/2023    LABALBU 3.2 (L) 02/04/2023    BILITOT 0.6 02/04/2023    ALKPHOS 47 02/04/2023    AST 10 02/04/2023    ALT 7 02/04/2023    LABGLOM 53 (L) 02/06/2023    GFRAA >60 06/06/2022    GLOB NOT REPORTED 04/21/2014     Nutrition Interventions:   Food and/or Nutrient Delivery: Continue Oral Nutrition Supplement, Continue Current Diet  Nutrition Education/Counseling: No recommendation at this time  Coordination of Nutrition Care: Continue to monitor while inpatient  Plan of Care discussed with: no one    Goals:  Previous Goal Met: Progress towards Goal(s) Declining  Goals: Meet at least 75% of estimated needs       Nutrition Monitoring and Evaluation:   Behavioral-Environmental Outcomes:  Other (Comment) (clinical condition)  Food/Nutrient Intake Outcomes: Food and Nutrient Intake  Physical Signs/Symptoms Outcomes: Biochemical Data, Weight, Fluid Status or Edema    Discharge Planning:    Continue current diet     Andrea Harris, [address] [place] [address],   Contact: [phone]

## 2023-02-06 NOTE — PROGRESS NOTES
Palliative Care Inpatient Follow Up    Patient: Cristal Mcginnis  Room: 3279/8902-16    Reason For Consult   Goals of care evaluation  Distress management  Guidance and support  Facilitate communications  Assistance in coordinating care    Code Status: DNR-CCA      Impression: Cristal Mcginnis is a 80y.o. year old male  has a past medical history of Atrial fibrillation ([address] CAD (coronary artery disease), Hard of hearing, Hiatal hernia, History of PTCA, Hyperlipidemia, Hypothyroidism, and Pacemaker. .  Currently hospitalized for the management of heart failure. The Palliative Care Team is following to assist with patient and family support, goals of care. Vital Signs  BP (!) 89/43   Pulse 79   Temp 97.3 °F (36.3 °C) (Oral)   Resp 16   Ht 5' 10\" (1.778 m)   Wt 203 lb 12.8 oz (92.4 kg)   SpO2 98%   BMI 29.24 kg/m²     Patient Active Problem List   Diagnosis    Hard of hearing    CAD (coronary artery disease)    Atrial fibrillation (HCC)    Hiatal hernia    Hyperlipidemia    Hypothyroidism    Pacemaker    History of PTCA    Calculus of gallbladder without cholecystitis without obstruction    Umbilical hernia    Vitamin D deficiency    Chronic systolic (congestive) heart failure    Acute on chronic systolic CHF (congestive heart failure) (HCC)    Acute on chronic systolic (congestive) heart failure (HCC)    Mild malnutrition (HCC)    Generalized weakness    Acute on chronic right-sided heart failure ([address]       Palliative Interaction: Phoenix is resting in bed for our discussion. States that he spoke with both attending physician and the cardiologist this morning. \"Dr Nimco Timmons surprised me. He prayed with me. I thought that was pretty amazing. He told me I was going to a better place. \" Support provided. He shares that he was told if they take the IV medication off that he will pass away. Discussed hospice options and being able to go home with the IV on and stop it once he gets back home.  Phoenix states \"I have been in the hospital 14 out of the last 30 days and I am ready to go home. I am tired. \" He wishes to go home to caregiver/step-daughter Sharmila's home. States that his biological children live too far away. Discussed hospice agency options. He wishes for [facility] at this time. Discussed with primary nurse and attending. Waylon Reza wishes for writer to speak with Beatrice Camara who will be in at 5 am. Waylon Reza is ok to start setting up discharge plans at this time. He denies further questions at this time. States that his only fear is that he is unsure of if he will go to heaven or not. Support provided. He does not belong to any specific [employer] but states that Sister Kristin Harmon has been a help to him while he has been admitted. Discussed with Sister Kristin Harmon who will visit with him this morning. 0830 Spoke with Shari from [facility] regarding above. States that she will speak with ambulance services and return call to writer. Requested information faxed to [facility].     Goals/Plan of care  Education/support to family  Education/support to patient  Discharge planning/helping to coordinate care  Communications with primary service  Providing support for coping/adaptation/distress of patient  Managing depression/anxiety  Fear of death or dying  Decision making regarding life prolonging treatment  Provided information about hospice  Medications to decrease non-pain symptoms  Recognizing, reflecting, and empathizing with the patient's anticipatory grief    Electronically signed by   Meli Allred RN  Palliative Care Team  on 2/6/2023 at 8:30 AM    Palliative care office: [phone]

## 2023-02-06 NOTE — PROGRESS NOTES
Palliative care working with pt, family and [facility] to potentially get him home this afternoon. SW will remain available as needed.  Colten Denson MSW LSW 2/6/2023

## 2023-02-06 NOTE — PALLIATIVE CARE
Yuridia Escalona is at the bedside. Discussed discharge plans with both Anna Craven and Milton Vance. Anna Craven is willing to take Milton Vance home with hospice and states that she will call his son Opal Whitaker. Opal Whitaker lives 3 hours away and she is not sure if he will want to be here. Opal Whitaker also speaks with primary nurse via phone. Anna Craven provides her home address for transport purposes. Address provided to [facility]. [facility] visits with Milton Vance at the bedside. Writer speaks with Anna Craven. Support provided. States that her mother and Milton Vance friend had [facility] when she passed in  so she is familiar with hospice care. She shares that Milton Vance son Fam Loving has been estranged from him for many years. States that she would be able to get his phone number and asked if writer could contact him to let him know what is happening. Son Opal Whitaker is attempting to come down but states that the 3 hour drive and their finances are causing some issues. Anna Craven is also concerned about  planning. States that Milton Vance does not have any finances and only wished to be cremated. Information shared with [facility].      [address] Nurse Coordinator  2023 10:03 AM

## 2023-02-06 NOTE — PROGRESS NOTES
Attempted to call Megha Angel son Any Coppola via phone at [phone]. No answer at this time. Requested return phone call.      Agueda Carr Nurse Coordinator  2/6/2023 2:49 PM

## 2023-02-06 NOTE — PROGRESS NOTES
Spoke with Sandoval via phone. Shared updated hospice information. Denies questions at this time. Offered support with gas cards. States that the cost for them to come down is 150-200$  and that they are not going to be able to make it. Support provided. Jennifer Hsieh states that they have not had contact with Elle Duque since 2014 and he does not have his contact information. Spoke with Cortespatricia Kwok. States that she knows that it would make Joanna Fernandez feel better if Elle Duque was contacted, she will look for phone numbers at home while she is there and provide contact information if she is able to locate it.      [address] and Steph Nurse Coordinator  2/6/2023 11:17 AM

## 2023-02-06 NOTE — PLAN OF CARE
Pt BP has remained 52'D systolic thus far this shift. Takes small sips of H2O without swallowing deficits.

## 2023-02-06 NOTE — PROGRESS NOTES
Spoke with Radhika Flores via phone. Updated him on patients plan of care. States that he will call and speak with Franky Blevins. Denies further needs at this time.      [address] Nurse Coordinator  2/6/2023 3:44 PM

## 2023-02-06 NOTE — PROGRESS NOTES
Hospitalist Progress Note  2/6/2023 6:54 AM  Subjective:   Admit Date: 1/30/2023  PCP: LORY Mariscal - CLAU    Interval History:     The patient is awake, oriented, has no complaints. He is requesting to be discharged home. Unfortunately continues to require dopamine at 12 mcg for blood pressure support. We were unable to wean it down yesterday as his blood pressure dropped below 80. Lasix discontinued, and Aldactone. Diet: ADULT DIET; Regular; Low Sodium (2 gm)  ADULT ORAL NUTRITION SUPPLEMENT; Breakfast, Lunch, Dinner; Standard High Calorie/High Protein Oral Supplement  Medications:   Scheduled Meds:   piperacillin-tazobactam  3,375 mg IntraVENous Q8H    spironolactone  25 mg Oral Daily    midodrine  20 mg Oral 4x Daily    amiodarone  200 mg Oral Daily    aspirin  81 mg Oral Daily    Vitamin D  2,000 Units Oral Daily    clopidogrel  75 mg Oral Daily    magnesium oxide  400 mg Oral Daily    [Held by provider] propranolol  10 mg Oral BID    rosuvastatin  10 mg Oral Daily    tamsulosin  0.4 mg Oral Nightly    vitamin B-12  1,000 mcg Oral Daily    sodium chloride flush  5-40 mL IntraVENous 2 times per day    heparin (porcine)  5,000 Units SubCUTAneous BID    levothyroxine  125 mcg Oral Daily     Continuous Infusions:   DOPamine 12 mcg/kg/min (02/06/23 0153)    sodium chloride Stopped (02/01/23 0515)     PRN Medications: sodium chloride flush, sodium chloride, polyethylene glycol, acetaminophen **OR** acetaminophen    Objective:   Vitals: BP (!) 91/56   Pulse 74   Temp 98 °F (36.7 °C) (Oral)   Resp 18   Ht 5' 10\" (1.778 m)   Wt 203 lb 12.8 oz (92.4 kg)   SpO2 95%   BMI 29.24 kg/m²   BMI: Body mass index is 29.24 kg/m².     CBC:   Recent Labs     02/04/23  0509   WBC 11.5*   HGB 11.7*        BMP:    Recent Labs     02/04/23  0509 02/06/23  0530    139   K 3.6* 3.5*   CL 98 99   CO2 31 32*   BUN 30* 28*   CREATININE 1.53* 1.28*   GLUCOSE 159* 123*     Hepatic:   Recent Labs 02/04/23  0509   AST 10   ALT 7   BILITOT 0.6   ALKPHOS 47     Troponin: No results for input(s): TROPONINI in the last 72 hours. BNP: No results for input(s): BNP in the last 72 hours. Lipids:   Physical Exam:  General Appearance: alert and oriented to person, place and time, in no acute distress  Cardiovascular: normal rate, regular rhythm, normal S1 and S2, 2/6 systolic murmur  Pulmonary/Chest: clear to auscultation bilaterally- no wheezes, rales or rhonchi  Abdomen: soft, non-tender, non-distended, normal bowel sounds,  Extremities: Ace wraps present on lower extremities, feet are swollen  Skin: warm, no rash  Neurological: alert, oriented, normal speech, no focal findings or movement disorder noted        Medical Decision Making (MDM) Data:    External documents reviewed: My CXR interpretation:   My EKG interpretation:   Discussed with:    Tests considered but not ordered:    Heart score:    Social Determinants of Health that impact treatment or disposition:      Assessment and Plan:     Acute on chronic right-sided CHF - on dopamine drip to maintain SBP above 80. Did not tolerate IV Lasix due to low BP. Difficult situation as the patient is requesting to be discharged home, yet dependent on dopamine drip for blood pressure support. He is aware of his poor outcome. We will consult hospice to help with transition home with comfort care. Patient is planning on staying with his caregiver Marni Miller. Will await  arrangements  Probable pneumonia -started on Zosyn  Severe aortic stenosis  Severe LV dysfunction with EF 25 to 30% with moderate to severe pulmonary hypertension  Hypotension. On midodrine, dopamine drip. Nonsustained V. Tach   -started on amiodarone  H/O severe CAD, s/p CABG x4  H/O SSS.   Status post pacemaker placement  Generalized weakness  History of atrial fibrillation, not anticoagulated by his choice  Vitamin D deficiency, on replacement  Chronic anemia, at baseline  BPH, on Flomax  Hypokalemia, replace    Differential Diagnosis:  acute on chronic CHF, severe AS  Condition is improving / unchanged / worsening: No significant improvement  Condition is a treatment goal: No  Chronic condition is / is not having mild / moderate, severe exacerbation, progression or side effects of treatment:      Shared decision making:      Code status and discussions: DNR CCA    Medical Necessity: In patient is appropriate for this patient       DVT prophylaxis:   [] Lovenox   [] SCDs   [x] SQ Heparin   [] Encourage ambulation, low risk for DVT, no chemical or mechanical    prophylaxis necessary      [] Already on Anticoagulation        Documentation of the Current Medications in the Medical Record    ( x)  I have utilized all available immediate resources to obtain, update, or review the patient's current medications (including all prescriptions, over-the-counter products, herbals, cannabis / cannabidiol products, vitamin / mineral / dietary (nutritional) supplements). (Satisfies MIPS Performance)  If Yes, Stop Here  ( )  The patient is not eligible for medication reconciliation; the patient is in an emergent medical situation where delaying treatment would jeopardize the patient's health. (MIPS Performance exception / exclusion)  ( )  I did not confirm, update or review the patient's current list of medications today. (Does not satisfy MIPS Performance)        Heart Failure     ( )  The patient has current or prior documentation of left ventricular ejection fraction (LVEF) less than or equal to 40%, or moderate or severely depressed left ventricular systolic function. Answer both:   ( ) The patient was prescribed or already taking an Angiotensin -Converting Enzyme (ACE) Inhibitor, or Angiotensin Receptor Blocker (ARB). ( ) The patient was prescribed or already taking a beta - blocker. If Yes to both, stop here.     ( x)  Patient not prescribed / taking:   ( x) ACE or ARB for medical/patient reason(s) including severe hypotension (ex. Allergy, intolerance, contraindication)   ( x) Beta - blocker for medical/patient reason(s) due to severe hypotension (ex. Allergy, intolerance, contraindication)    ( )  Patient not prescribed / taking:   ( ) ACE or ARB, no reason given (does not satisfy MIPS performance)   ( ) Beta - blocker, no reason given (does not satisfy MIPS performance)    Advanced Care Plan    ( x)  I confirmed that the patient's [address] is present, code status documented, or surrogate decision maker is listed in the patient's medical record. ( )  The patient's advanced care plan is not present because:  (select)   ( ) I confirmed today that the patient does not wish or was not able to name a surrogate decision maker or provide an [address]. ( ) Hospice care is currently being provided or has been provided this calender year. ( )  I did not confirm today the presence of an [address] or surrogate decision maker documented within the patient's medical record. (Does not satisfy MIPS performance).             Rosalie Sauer MD, MD  [facility] Hospitalist

## 2023-02-06 NOTE — PROGRESS NOTES
Drsg change performed to bilat feet at this time. Large open blisters noted to top of each foot. Non-adherent drsg with kerlix and ace wrap applied to BLE.

## 2023-02-06 NOTE — PROGRESS NOTES
Influenza (Adult)    Influenza is also called the flu. It is a viral illness that affects the air passages of your lungs. It is different from the common cold. The flu can easily be passed from one to person to another. It may be spread through the air by coughing and sneezing. Or it can be spread by touching the sick person and then touching your own eyes, nose, or mouth.  The flu starts 1 to 3 days after you are exposed to the flu virus. It may last for 1 to 2 weeks but many people feel tired or fatigued for many weeks afterward. You usually dont need to take antibiotics unless you have a complication. This might be an ear or sinus infection or pneumonia.  Symptoms of the flu may be mild or severe. They can include extreme tiredness (wanting to stay in bed all day), chills, fevers, muscle aches, soreness with eye movement, headache, and a dry, hacking cough.  Home care  Follow these guidelines when caring for yourself at home:  · Avoid being around cigarette smoke, whether yours or other peoples.  · Acetaminophen or ibuprofen will help ease your fever, muscle aches, and headache. Dont give aspirin to anyone younger than 18 who has the flu. Aspirin can harm the liver.  · Nausea and loss of appetite are common with the flu. Eat light meals. Drink 6 to 8 glasses of liquids every day. Good choices are water, sport drinks, soft drinks without caffeine, juices, tea, and soup. Extra fluids will also help loosen secretions in your nose and lungs.  · Over-the-counter cold medicines will not make the flu go away faster. But the medicines may help with coughing, sore throat, and congestion in your nose and sinuses. Dont use a decongestant if you have high blood pressure.  · Stay home until your fever has been gone for at least 24 hours without using medicine to reduce fever.  Follow-up care  Follow up with your healthcare provider, or as advised, if you are not getting better over the next week.  If you are age 65 or  [address] [place] [address] met with patient at bedside and will meet patient at home. Theone Rubinstein RN accompanies transport to discontinue IV medication upon arrival home. older, talk with your provider about getting a pneumococcal vaccine every 5 years. You should also get this vaccine if you have chronic asthma or COPD. All adults should get a flu vaccine every fall. Ask your provider about this.  When to seek medical advice  Call your healthcare provider right away if any of these occur:  · Cough with lots of colored mucus (sputum) or blood in your mucus  · Chest pain, shortness of breath, wheezing, or trouble breathing  · Severe headache, or face, neck, or ear pain  · New rash with fever  · Fever of 100.4°F (38°C) or higher, or as directed by your healthcare provider  · Confusion, behavior change, or seizure  · Severe weakness or dizziness  · You get a new fever or cough after getting better for a few days  Date Last Reviewed: 1/1/2017  © 5971-2881 [employer]. [address] [place] [address]. All rights reserved. This information is not intended as a substitute for professional medical care. Always follow your healthcare professional's instructions.      Please follow up with your Primary care provider within 2-5 days if your signs and symptoms have not resolved or worsen.     If your condition worsens or fails to improve we recommend that you receive another evaluation at the emergency room immediately or contact your primary medical clinic to discuss your concerns.   You must understand that you have received an Urgent Care treatment only and that you may be released before all of your medical problems are known or treated. You, the patient, will arrange for follow up care as instructed.

## 2023-02-07 NOTE — DISCHARGE SUMMARY
Hospitalist Discharge Summary    Patient:  Matt Faith  YOB: 1932    MRN: [id number]   Acct: [de-identified]    Primary Care Physician: LORY Rodriguez CNP    Admit date:  1/30/2023    Discharge date:  2/6/2023       Discharge Diagnoses: 1. Acute on chronic right-sided heart failure ([address]  2. Probable pneumonia  3. Severe AS  4. Severe LV dysfunction with EF 25 to 30% with moderate to severe pulmonary hypertension  5. Hypotension   6. Generalized weakness  7. Nonsustained V. Tach     8.Generalized weakness  9. Chronic anemia  10. BPH  11. H/O severe CAD, s/p CABG x4  12. H/O SSS.   Status post pacemaker placement          Discharge Medications:         Medication List        STOP taking these medications      aspirin 81 MG tablet     clopidogrel 75 MG tablet  Commonly known as: PLAVIX     levothyroxine 125 MCG tablet  Commonly known as: SYNTHROID     Magnesium 400 MG Tabs     metoprolol 100 MG tablet  Commonly known as: LOPRESSOR     midodrine 10 MG tablet  Commonly known as: PROAMATINE     propranolol 10 MG tablet  Commonly known as: INDERAL     rosuvastatin 10 MG tablet  Commonly known as: CRESTOR     spironolactone 25 MG tablet  Commonly known as: ALDACTONE     tamsulosin 0.4 MG capsule  Commonly known as: FLOMAX     vitamin B-12 1000 MCG tablet  Commonly known as: CYANOCOBALAMIN     Vitamin D3 50 MCG (2000 UT) Caps              Diet:  regular as tolerates     Activity:  as tolerates     Follow-up:      Consultants:  Dr. Meche Mojica    CBC:   Recent Labs     02/04/23  0509   WBC 11.5*   HGB 11.7*        BMP:    Recent Labs     02/06/23  0530      K 3.5*   CL 99   CO2 32*   BUN 28*   CREATININE 1.28*   GLUCOSE 123*     Calcium:  Recent Labs     02/06/23  0530   CALCIUM 9.2           Physical Exam:    Vitals:Patient Vitals for the past 24 hrs:   BP Temp Temp src Pulse Resp SpO2   02/06/23 1645 (!) 88/48 -- -- 77 -- --   02/06/23 1324 (!) (P) 81/49 -- -- 87 -- --   02/06/23 1245 (!) 81/44 -- -- 80 -- --   02/06/23 1133 (!) 90/55 -- -- 79 -- --   02/06/23 1000 (!) 91/53 -- -- 76 -- --   02/06/23 0915 (!) 88/50 -- -- 79 -- --   02/06/23 0802 (!) 89/43 97.3 °F (36.3 °C) Oral 79 16 98 %   02/06/23 0400 (!) 91/56 -- -- -- -- --   02/06/23 0200 86/61 -- -- -- -- --   02/06/23 0015 86/60 -- -- -- -- --   02/05/23 2215 (!) 90/54 -- -- -- -- --   02/05/23 2206 -- -- -- -- -- 95 %     Weight: Weight: 203 lb 12.8 oz (92.4 kg)     General Appearance: alert and oriented to person, place and time, in no acute distress  Cardiovascular: normal rate, regular rhythm, normal S1 and S2, 2/6 systolic murmur  Pulmonary/Chest: clear to auscultation bilaterally- no wheezes, rales or rhonchi  Abdomen: soft, non-tender, non-distended, normal bowel sounds,  Extremities: Ace wraps present on lower extremities, feet are swollen  Skin: warm, no rash  Neurological: alert, oriented, normal speech, no focal findings or movement disorder noted    Hospital Course: The patient is a 80 y.o. male who presented to the ER with increasing SOB and leg edema. According to Oralia Ness, he noticed his legs starting to swell more 2 weeks ago. He states the right is always more swollen than the left. During this time he noticed he was more SOB with exertion. Recently he has been waking in the middle of the night SOB. He states he does have an occasional productive cough. No fever or chills. Appetite has been okay. He denies having any salt in his diet. He does try to keep his legs elevated. No chest pain. Oralia Ness denies any change in medication. Over this time he has progressively become weaker. With his worsening symptoms he came to the ER for further evaluation. In the ER his CMP was normal other than a BUN of 34, Creatinine of 1.65, and Glucose at 111. BNP was 26,759. Troponin was 52. CBC showed a WBC at 6.1, Hgb 11.2, and Plt ct at 164. COVID was negative. ECG showed atrial sensed, ventricular paced rhythm. CXR showed:  1. Cardiomegaly with prior sternotomy and pacemaker similar. 2. Slight increase in pleural effusions, left greater than right. 3. Mild interstitial venous prominence again seen. Dr. Ariella Caraballo, his Cardiologist, evaluated Edelmira Goldberg in the ER and felt he had mainly right sided heart failure. He requested admission to place on a Dobutamine drip to see if he could induce diuresis. Overnight his SBP dropped into the 80's requiring a switch from Dobutamine to Dopamine with a small fluid bolus. He developed NSVT and was placed on Amiodarone. Was started on IV Lasix but had a drop in BP . Solucortef was added on 2/2/23 with concerns for adrenal insufficiency. Cortisol level came back elevated and IV steroids were stopped. Dr. Ariella Caraballo was closely following and attempted to decrease Dopamine rate. Unfortunately his BP remained in 80s-90s and BP would drop to 60s with decreasing Dopamine. The situation was difficult and the patient requested to be discharged home \" to die\". We discussed hospice option and he was agreeable with this plan. He wished to go to his caregiver Sharmila's home due to safety concerns and also for Hospice enrollment. Patient was discharged with Dopamine drip just for transportation time and to be stopped upon arrival home. [address] to follow up with Edelmira Goldberg at home. Prognosis very poor.          Disposition: home    Condition: Unstable    Time Spent: 35 minutes      Electronically signed by Luis Alberto Harley MD on 2/6/2023 at 9:45 PM  Discharging Hospitalist

## 2023-02-08 ENCOUNTER — TELEPHONE (OUTPATIENT)
Dept: PRIMARY CARE CLINIC | Age: 88
End: 2023-02-08

## 2023-02-08 NOTE — TELEPHONE ENCOUNTER
Care Transitions Initial Follow Up Call    Outreach made within 2 business days of discharge: Yes    Patient: Rosa Lo Patient : 1932   MRN: [id number]  Reason for Admission: CHF  Discharge Date: 23       Attempted to call pt, no answer and no VM set up    Susan Ray, KARLAN

## 2023-02-10 ENCOUNTER — CARE COORDINATION (OUTPATIENT)
Dept: CARE COORDINATION | Age: 88
End: 2023-02-10

## 2023-02-10 DIAGNOSIS — I50.23 ACUTE ON CHRONIC SYSTOLIC CHF (CONGESTIVE HEART FAILURE) (HCC): Primary | ICD-10-CM

## 2023-02-10 NOTE — TELEPHONE ENCOUNTER
Care Transitions Initial Follow Up Call    Outreach made within 2 business days of discharge: Yes Originally on 23    Patient: Kiki Walsh Patient : 1932   MRN: [id number]  Reason for Admission: CHF  Discharge Date: 23       Spoke with: Geri Richards, pt's friend    Discharge department/facility: [facility]    TCM Interactive Patient Contact:  Was patient able to fill all prescriptions: Yes  Was patient instructed to bring all medications to the follow-up visit: Yes  Is patient taking all medications as directed in the discharge summary? Yes  Does patient understand their discharge instructions: Yes  Does patient have questions or concerns that need addressed prior to 7-14 day follow up office visit: no    Scheduled appointment with PCP within 7-14 days    Follow Up  Future Appointments   Date Time Provider Andres Sharma   2023 11:00 AM Saida Every, APRN - CNP [facility]   3/13/2023 10:40 AM Saida Every, APRN - CNP [facility]   2023 10:00 AM Saida Every, APRN - CNP [facility]       LUIS Lazo stated she is unsure if pt is able to come to appt on . She states he can stand & pivot, but not able to walk.  Pt with hospice, getting morphine & ativan to keep him comfortable

## 2023-02-10 NOTE — CARE COORDINATION
1.Writer called patient to do return kit order. Joy Yang states pt never received the kit. 2.Writer looked up UPS tracking for the kit , and it was sent back to [facility] on 01/20/23. 3.Writer will deactivate pt in [facility].

## 2023-02-10 NOTE — PROGRESS NOTES
2/10/23 9:42 AM     Remote Patient Order Discontinued    Received request from Ravin Martinez RN to discontinue order for remote patient monitoring of CHF and order completed.      Felicia Soria DNP, FNP-C, Remote Patient Monitoring NP, () [phone]

## 2023-02-10 NOTE — TELEPHONE ENCOUNTER
Noted , likely will not come due to home care needs being met .      Can cancel appt with me if pt wished , check with him day prior

## 2023-07-21 NOTE — MR AVS SNAPSHOT
Learn more at: [email or web address]             Medications and Orders      Your Current Medications Are              rosuvastatin (CRESTOR) 10 MG tablet TAKE 1 TABLET BY MOUTH nightly    levothyroxine (SYNTHROID) 175 MCG tablet Take 1 tablet by mouth Daily    clopidogrel (PLAVIX) 75 MG tablet TAKE 1 TABLET BY MOUTH DAILY    clobetasol (TEMOVATE) 0.05 % cream Apply topically 2 times daily prn    metoprolol tartrate (LOPRESSOR) 50 MG tablet Take 1 tablet by mouth 3 times daily    hydrocortisone 0.5 % cream Apply topically 2 times daily Apply topically 2 times daily. Uses prn    aspirin 81 MG tablet Take 81 mg by mouth daily. Allergies              Menthol (Topical Analgesic)          Additional Information        Basic Information     Date Of Birth Sex Race Ethnicity Preferred Language    11/27/1932 Male White Non-/Non  English      Problem List as of 9/13/2017  Date Reviewed: 9/13/2017                Umbilical hernia    Calculus of gallbladder without cholecystitis without obstruction    History of PTCA    Pacemaker    Hard of hearing    CAD (coronary artery disease)    Atrial fibrillation ([address]    Hiatal hernia    Hyperlipidemia    Hypothyroidism      Immunizations as of 9/13/2017     Name Date    Influenza Vaccine, unspecified formulation 10/16/2015    Pneumococcal 13-valent Conjugate ([place]) 11/17/2016      Preventive Care        Date Due    Tetanus Combination Vaccine (1 - Tdap) 11/27/1951    Yearly Flu Vaccine (1) 9/1/2017    Pneumococcal Vaccines (two) for all adults aged 72 and over (2 of 2 - PPSV23) 11/17/2017            [employer] Signup           Our records indicate that you have an active [employer] account. You can view your After Visit Summary by going to [email or web address] and logging in with your [employer] username and password.       If you don't have a [employer] username and password but a parent or guardian has access to your record, the parent or guardian should login with their own [employer] username and password and access your record to view the After Visit Summary. Additional Information  If you have questions, please contact the physician practice where you receive care. Remember, [employer] is NOT to be used for urgent needs. For medical emergencies, dial 911. For questions regarding your [employer] account call [phone]. If you have a clinical question, please call your doctor's office. [phone]